# Patient Record
Sex: FEMALE | Race: WHITE | NOT HISPANIC OR LATINO | Employment: OTHER | URBAN - METROPOLITAN AREA
[De-identification: names, ages, dates, MRNs, and addresses within clinical notes are randomized per-mention and may not be internally consistent; named-entity substitution may affect disease eponyms.]

---

## 2017-01-13 ENCOUNTER — AMBULATORY - HEALTHEAST (OUTPATIENT)
Dept: ALLERGY | Facility: CLINIC | Age: 52
End: 2017-01-13

## 2017-01-13 DIAGNOSIS — J30.1 ALLERGIC RHINITIS DUE TO POLLEN, UNSPECIFIED RHINITIS SEASONALITY: ICD-10-CM

## 2017-01-20 ENCOUNTER — AMBULATORY - HEALTHEAST (OUTPATIENT)
Dept: ALLERGY | Facility: CLINIC | Age: 52
End: 2017-01-20

## 2017-01-20 DIAGNOSIS — J30.89 SEASONAL ALLERGIC RHINITIS DUE TO OTHER ALLERGIC TRIGGER: ICD-10-CM

## 2017-01-29 ENCOUNTER — COMMUNICATION - HEALTHEAST (OUTPATIENT)
Dept: ALLERGY | Facility: CLINIC | Age: 52
End: 2017-01-29

## 2017-01-29 DIAGNOSIS — J45.30 MILD PERSISTENT ASTHMA, UNCOMPLICATED: ICD-10-CM

## 2017-02-15 ENCOUNTER — HOSPITAL ENCOUNTER (OUTPATIENT)
Dept: MAMMOGRAPHY | Facility: CLINIC | Age: 52
Discharge: HOME | End: 2017-02-15
Attending: FAMILY MEDICINE

## 2017-02-15 DIAGNOSIS — Z12.31 VISIT FOR SCREENING MAMMOGRAM: ICD-10-CM

## 2017-02-16 ENCOUNTER — COMMUNICATION - HEALTHEAST (OUTPATIENT)
Dept: MAMMOGRAPHY | Facility: CLINIC | Age: 52
End: 2017-02-16

## 2017-02-20 ENCOUNTER — HOSPITAL ENCOUNTER (OUTPATIENT)
Dept: ULTRASOUND IMAGING | Facility: CLINIC | Age: 52
Discharge: HOME | End: 2017-02-20
Attending: FAMILY MEDICINE

## 2017-02-20 ENCOUNTER — HOSPITAL ENCOUNTER (OUTPATIENT)
Dept: MAMMOGRAPHY | Facility: CLINIC | Age: 52
Discharge: HOME | End: 2017-02-20
Attending: FAMILY MEDICINE

## 2017-02-20 DIAGNOSIS — N63.10 BREAST MASS, RIGHT: ICD-10-CM

## 2017-02-20 DIAGNOSIS — N63.0 BREAST NODULE: ICD-10-CM

## 2017-02-23 ENCOUNTER — COMMUNICATION - HEALTHEAST (OUTPATIENT)
Dept: ONCOLOGY | Facility: CLINIC | Age: 52
End: 2017-02-23

## 2017-02-23 ENCOUNTER — COMMUNICATION - HEALTHEAST (OUTPATIENT)
Dept: FAMILY MEDICINE | Facility: CLINIC | Age: 52
End: 2017-02-23

## 2017-02-23 DIAGNOSIS — C50.919 BREAST CANCER (H): ICD-10-CM

## 2017-02-23 LAB
LAB AP CHARGES (HE HISTORICAL CONVERSION): ABNORMAL
LAB AP IHC ER/PR REPORT,ADDENDUM (HE HISTORICAL CONVERSION): ABNORMAL
PATH REPORT.COMMENTS IMP SPEC: ABNORMAL
PATH REPORT.COMMENTS IMP SPEC: ABNORMAL
PATH REPORT.FINAL DX SPEC: ABNORMAL
PATH REPORT.GROSS SPEC: ABNORMAL
PATH REPORT.MICROSCOPIC SPEC OTHER STN: ABNORMAL
PATH REPORT.MICROSCOPIC SPEC OTHER STN: ABNORMAL
PATH REPORT.RELEVANT HX SPEC: ABNORMAL
RESULT FLAG (HE HISTORICAL CONVERSION): ABNORMAL

## 2017-02-24 ENCOUNTER — OFFICE VISIT - HEALTHEAST (OUTPATIENT)
Dept: ALLERGY | Facility: CLINIC | Age: 52
End: 2017-02-24

## 2017-02-24 DIAGNOSIS — J30.89 ALLERGIC RHINITIS DUE TO OTHER ALLERGEN: ICD-10-CM

## 2017-02-24 DIAGNOSIS — J45.30 MILD PERSISTENT ASTHMA WITHOUT COMPLICATION: ICD-10-CM

## 2017-02-28 ENCOUNTER — HOSPITAL ENCOUNTER (OUTPATIENT)
Dept: PHYSICAL MEDICINE AND REHAB | Facility: CLINIC | Age: 52
Discharge: HOME | End: 2017-02-28
Attending: PHYSICIAN ASSISTANT

## 2017-02-28 ENCOUNTER — COMMUNICATION - HEALTHEAST (OUTPATIENT)
Dept: ALLERGY | Facility: CLINIC | Age: 52
End: 2017-02-28

## 2017-02-28 ENCOUNTER — OFFICE VISIT - HEALTHEAST (OUTPATIENT)
Dept: SURGERY | Facility: CLINIC | Age: 52
End: 2017-02-28

## 2017-02-28 ENCOUNTER — AMBULATORY - HEALTHEAST (OUTPATIENT)
Dept: SURGERY | Facility: CLINIC | Age: 52
End: 2017-02-28

## 2017-02-28 DIAGNOSIS — M79.18 MYOFASCIAL PAIN: ICD-10-CM

## 2017-02-28 DIAGNOSIS — C50.411 MALIGNANT NEOPLASM OF UPPER-OUTER QUADRANT OF RIGHT FEMALE BREAST (H): ICD-10-CM

## 2017-02-28 DIAGNOSIS — M47.816 FACET ARTHROPATHY, LUMBAR: ICD-10-CM

## 2017-02-28 DIAGNOSIS — J45.30 MILD PERSISTENT ASTHMA, UNCOMPLICATED: ICD-10-CM

## 2017-02-28 DIAGNOSIS — G47.9 SLEEP DISTURBANCE: ICD-10-CM

## 2017-02-28 DIAGNOSIS — M54.50 LUMBALGIA: ICD-10-CM

## 2017-02-28 ASSESSMENT — MIFFLIN-ST. JEOR
SCORE: 1812.4
SCORE: 1828.73

## 2017-03-01 ENCOUNTER — OFFICE VISIT - HEALTHEAST (OUTPATIENT)
Dept: FAMILY MEDICINE | Facility: CLINIC | Age: 52
End: 2017-03-01

## 2017-03-01 DIAGNOSIS — Z01.818 PREOP EXAMINATION: ICD-10-CM

## 2017-03-02 ENCOUNTER — AMBULATORY - HEALTHEAST (OUTPATIENT)
Dept: PHYSICAL MEDICINE AND REHAB | Facility: CLINIC | Age: 52
End: 2017-03-02

## 2017-03-02 LAB
ATRIAL RATE - MUSE: 75 BPM
DIASTOLIC BLOOD PRESSURE - MUSE: NORMAL MMHG
INTERPRETATION ECG - MUSE: NORMAL
P AXIS - MUSE: 43 DEGREES
PR INTERVAL - MUSE: 166 MS
QRS DURATION - MUSE: 86 MS
QT - MUSE: 398 MS
QTC - MUSE: 444 MS
R AXIS - MUSE: 23 DEGREES
SYSTOLIC BLOOD PRESSURE - MUSE: NORMAL MMHG
T AXIS - MUSE: 45 DEGREES
VENTRICULAR RATE- MUSE: 75 BPM

## 2017-03-10 ENCOUNTER — HOSPITAL ENCOUNTER (OUTPATIENT)
Dept: MAMMOGRAPHY | Facility: HOSPITAL | Age: 52
Discharge: HOME | End: 2017-03-10
Attending: SPECIALIST

## 2017-03-10 ENCOUNTER — HOSPITAL ENCOUNTER (OUTPATIENT)
Dept: NUCLEAR MEDICINE | Facility: HOSPITAL | Age: 52
Discharge: HOME | End: 2017-03-10
Attending: SPECIALIST

## 2017-03-10 ENCOUNTER — RECORDS - HEALTHEAST (OUTPATIENT)
Dept: ADMINISTRATIVE | Facility: OTHER | Age: 52
End: 2017-03-10

## 2017-03-10 ENCOUNTER — AMBULATORY - HEALTHEAST (OUTPATIENT)
Dept: SURGERY | Facility: CLINIC | Age: 52
End: 2017-03-10

## 2017-03-10 DIAGNOSIS — C50.411 MALIGNANT NEOPLASM OF UPPER-OUTER QUADRANT OF RIGHT FEMALE BREAST (H): ICD-10-CM

## 2017-03-15 ENCOUNTER — COMMUNICATION - HEALTHEAST (OUTPATIENT)
Dept: SURGERY | Facility: CLINIC | Age: 52
End: 2017-03-15

## 2017-03-17 ENCOUNTER — COMMUNICATION - HEALTHEAST (OUTPATIENT)
Dept: ONCOLOGY | Facility: HOSPITAL | Age: 52
End: 2017-03-17

## 2017-03-17 ENCOUNTER — OFFICE VISIT - HEALTHEAST (OUTPATIENT)
Dept: SURGERY | Facility: CLINIC | Age: 52
End: 2017-03-17

## 2017-03-17 DIAGNOSIS — C50.411 MALIGNANT NEOPLASM OF UPPER-OUTER QUADRANT OF RIGHT FEMALE BREAST (H): ICD-10-CM

## 2017-03-17 DIAGNOSIS — Z48.89 POSTOPERATIVE VISIT: ICD-10-CM

## 2017-03-18 ENCOUNTER — COMMUNICATION - HEALTHEAST (OUTPATIENT)
Dept: PHYSICAL MEDICINE AND REHAB | Facility: CLINIC | Age: 52
End: 2017-03-18

## 2017-03-18 DIAGNOSIS — G47.9 SLEEP DISTURBANCE: ICD-10-CM

## 2017-03-18 DIAGNOSIS — M47.816 FACET ARTHROPATHY, LUMBAR: ICD-10-CM

## 2017-03-18 DIAGNOSIS — M99.03 LUMBOSACRAL DYSFUNCTION: ICD-10-CM

## 2017-03-18 DIAGNOSIS — M79.18 MYOFASCIAL PAIN: ICD-10-CM

## 2017-03-18 DIAGNOSIS — M47.816 SPONDYLOSIS OF LUMBAR REGION WITHOUT MYELOPATHY OR RADICULOPATHY: ICD-10-CM

## 2017-03-18 DIAGNOSIS — M54.50 LUMBALGIA: ICD-10-CM

## 2017-03-22 ENCOUNTER — COMMUNICATION - HEALTHEAST (OUTPATIENT)
Dept: ADMINISTRATIVE | Facility: CLINIC | Age: 52
End: 2017-03-22

## 2017-03-22 ENCOUNTER — COMMUNICATION - HEALTHEAST (OUTPATIENT)
Dept: ONCOLOGY | Facility: HOSPITAL | Age: 52
End: 2017-03-22

## 2017-03-30 ENCOUNTER — OFFICE VISIT - HEALTHEAST (OUTPATIENT)
Dept: AUDIOLOGY | Facility: CLINIC | Age: 52
End: 2017-03-30

## 2017-03-30 DIAGNOSIS — H90.3 SENSORINEURAL HEARING LOSS, BILATERAL: ICD-10-CM

## 2017-03-31 ENCOUNTER — RECORDS - HEALTHEAST (OUTPATIENT)
Dept: ADMINISTRATIVE | Facility: OTHER | Age: 52
End: 2017-03-31

## 2017-04-01 ENCOUNTER — COMMUNICATION - HEALTHEAST (OUTPATIENT)
Dept: ALLERGY | Facility: CLINIC | Age: 52
End: 2017-04-01

## 2017-04-01 DIAGNOSIS — J45.30 MILD PERSISTENT ASTHMA, UNCOMPLICATED: ICD-10-CM

## 2017-04-03 ENCOUNTER — COMMUNICATION - HEALTHEAST (OUTPATIENT)
Dept: FAMILY MEDICINE | Facility: CLINIC | Age: 52
End: 2017-04-03

## 2017-04-03 DIAGNOSIS — R11.0 NAUSEA: ICD-10-CM

## 2017-04-12 ENCOUNTER — OFFICE VISIT - HEALTHEAST (OUTPATIENT)
Dept: ONCOLOGY | Facility: CLINIC | Age: 52
End: 2017-04-12

## 2017-04-12 ENCOUNTER — AMBULATORY - HEALTHEAST (OUTPATIENT)
Dept: ONCOLOGY | Facility: CLINIC | Age: 52
End: 2017-04-12

## 2017-04-12 ENCOUNTER — HOSPITAL ENCOUNTER (OUTPATIENT)
Dept: CT IMAGING | Facility: CLINIC | Age: 52
Setting detail: RADIATION/ONCOLOGY SERIES
Discharge: STILL ADMITTED OR ADMINISTRATIVE | End: 2017-04-12
Attending: INTERNAL MEDICINE

## 2017-04-12 ENCOUNTER — RECORDS - HEALTHEAST (OUTPATIENT)
Dept: ADMINISTRATIVE | Facility: OTHER | Age: 52
End: 2017-04-12

## 2017-04-12 DIAGNOSIS — R10.32 LEFT LOWER QUADRANT ABDOMINAL PAIN OF UNKNOWN ETIOLOGY: ICD-10-CM

## 2017-04-12 DIAGNOSIS — C50.411 BREAST CANCER OF UPPER-OUTER QUADRANT OF RIGHT FEMALE BREAST (H): ICD-10-CM

## 2017-04-12 ASSESSMENT — MIFFLIN-ST. JEOR: SCORE: 1825.56

## 2017-04-13 ENCOUNTER — AMBULATORY - HEALTHEAST (OUTPATIENT)
Dept: ONCOLOGY | Facility: CLINIC | Age: 52
End: 2017-04-13

## 2017-04-13 ENCOUNTER — OFFICE VISIT - HEALTHEAST (OUTPATIENT)
Dept: RADIATION ONCOLOGY | Facility: CLINIC | Age: 52
End: 2017-04-13

## 2017-04-13 DIAGNOSIS — C50.411 BREAST CANCER OF UPPER-OUTER QUADRANT OF RIGHT FEMALE BREAST (H): ICD-10-CM

## 2017-04-13 ASSESSMENT — MIFFLIN-ST. JEOR: SCORE: 1825.56

## 2017-04-14 ENCOUNTER — COMMUNICATION - HEALTHEAST (OUTPATIENT)
Dept: ONCOLOGY | Facility: CLINIC | Age: 52
End: 2017-04-14

## 2017-04-16 ENCOUNTER — COMMUNICATION - HEALTHEAST (OUTPATIENT)
Dept: PHYSICAL MEDICINE AND REHAB | Facility: CLINIC | Age: 52
End: 2017-04-16

## 2017-04-16 DIAGNOSIS — M54.50 LUMBALGIA: ICD-10-CM

## 2017-04-16 DIAGNOSIS — M47.816 FACET ARTHROPATHY, LUMBAR: ICD-10-CM

## 2017-04-16 DIAGNOSIS — M79.18 MYOFASCIAL PAIN: ICD-10-CM

## 2017-04-16 DIAGNOSIS — G47.9 SLEEP DISTURBANCE: ICD-10-CM

## 2017-04-17 ENCOUNTER — AMBULATORY - HEALTHEAST (OUTPATIENT)
Dept: RADIATION ONCOLOGY | Facility: HOSPITAL | Age: 52
End: 2017-04-17

## 2017-04-21 ENCOUNTER — COMMUNICATION - HEALTHEAST (OUTPATIENT)
Dept: ONCOLOGY | Facility: CLINIC | Age: 52
End: 2017-04-21

## 2017-04-21 ENCOUNTER — AMBULATORY - HEALTHEAST (OUTPATIENT)
Dept: ALLERGY | Facility: CLINIC | Age: 52
End: 2017-04-21

## 2017-04-21 DIAGNOSIS — J30.89 SEASONAL ALLERGIC RHINITIS DUE TO OTHER ALLERGIC TRIGGER: ICD-10-CM

## 2017-04-25 ENCOUNTER — COMMUNICATION - HEALTHEAST (OUTPATIENT)
Dept: INTERNAL MEDICINE | Facility: CLINIC | Age: 52
End: 2017-04-25

## 2017-04-26 ENCOUNTER — OFFICE VISIT - HEALTHEAST (OUTPATIENT)
Dept: RADIATION ONCOLOGY | Facility: CLINIC | Age: 52
End: 2017-04-26

## 2017-04-26 DIAGNOSIS — C50.411 BREAST CANCER OF UPPER-OUTER QUADRANT OF RIGHT FEMALE BREAST (H): ICD-10-CM

## 2017-04-26 ASSESSMENT — MIFFLIN-ST. JEOR: SCORE: 1817.85

## 2017-05-02 ENCOUNTER — COMMUNICATION - HEALTHEAST (OUTPATIENT)
Dept: ALLERGY | Facility: CLINIC | Age: 52
End: 2017-05-02

## 2017-05-02 DIAGNOSIS — J45.30 MILD PERSISTENT ASTHMA, UNCOMPLICATED: ICD-10-CM

## 2017-05-03 ENCOUNTER — OFFICE VISIT - HEALTHEAST (OUTPATIENT)
Dept: RADIATION ONCOLOGY | Facility: CLINIC | Age: 52
End: 2017-05-03

## 2017-05-03 DIAGNOSIS — C50.411 BREAST CANCER OF UPPER-OUTER QUADRANT OF RIGHT FEMALE BREAST (H): ICD-10-CM

## 2017-05-05 ENCOUNTER — AMBULATORY - HEALTHEAST (OUTPATIENT)
Dept: ALLERGY | Facility: CLINIC | Age: 52
End: 2017-05-05

## 2017-05-05 ENCOUNTER — OFFICE VISIT - HEALTHEAST (OUTPATIENT)
Dept: INTERNAL MEDICINE | Facility: CLINIC | Age: 52
End: 2017-05-05

## 2017-05-05 DIAGNOSIS — Z79.811 AROMATASE INHIBITOR USE: ICD-10-CM

## 2017-05-05 DIAGNOSIS — M81.0 OSTEOPOROSIS: ICD-10-CM

## 2017-05-05 DIAGNOSIS — C50.411 BREAST CANCER OF UPPER-OUTER QUADRANT OF RIGHT FEMALE BREAST (H): ICD-10-CM

## 2017-05-05 DIAGNOSIS — R79.89 ELEVATED TSH: ICD-10-CM

## 2017-05-05 DIAGNOSIS — J30.89 SEASONAL ALLERGIC RHINITIS DUE TO OTHER ALLERGIC TRIGGER: ICD-10-CM

## 2017-05-10 ENCOUNTER — AMBULATORY - HEALTHEAST (OUTPATIENT)
Dept: ONCOLOGY | Facility: CLINIC | Age: 52
End: 2017-05-10

## 2017-05-10 ENCOUNTER — AMBULATORY - HEALTHEAST (OUTPATIENT)
Dept: RADIATION ONCOLOGY | Facility: CLINIC | Age: 52
End: 2017-05-10

## 2017-05-10 ENCOUNTER — OFFICE VISIT - HEALTHEAST (OUTPATIENT)
Dept: RADIATION ONCOLOGY | Facility: CLINIC | Age: 52
End: 2017-05-10

## 2017-05-10 DIAGNOSIS — C50.411 BREAST CANCER OF UPPER-OUTER QUADRANT OF RIGHT FEMALE BREAST (H): ICD-10-CM

## 2017-05-16 ENCOUNTER — AMBULATORY - HEALTHEAST (OUTPATIENT)
Dept: ONCOLOGY | Facility: CLINIC | Age: 52
End: 2017-05-16

## 2017-05-17 ENCOUNTER — OFFICE VISIT - HEALTHEAST (OUTPATIENT)
Dept: RADIATION ONCOLOGY | Facility: CLINIC | Age: 52
End: 2017-05-17

## 2017-05-17 DIAGNOSIS — C50.411 BREAST CANCER OF UPPER-OUTER QUADRANT OF RIGHT FEMALE BREAST (H): ICD-10-CM

## 2017-05-17 ASSESSMENT — MIFFLIN-ST. JEOR: SCORE: 1785.19

## 2017-05-19 ENCOUNTER — AMBULATORY - HEALTHEAST (OUTPATIENT)
Dept: ALLERGY | Facility: CLINIC | Age: 52
End: 2017-05-19

## 2017-05-19 DIAGNOSIS — J30.89 SEASONAL ALLERGIC RHINITIS DUE TO OTHER ALLERGIC TRIGGER: ICD-10-CM

## 2017-05-25 ENCOUNTER — COMMUNICATION - HEALTHEAST (OUTPATIENT)
Dept: RADIATION ONCOLOGY | Facility: CLINIC | Age: 52
End: 2017-05-25

## 2017-06-15 ENCOUNTER — AMBULATORY - HEALTHEAST (OUTPATIENT)
Dept: ONCOLOGY | Facility: CLINIC | Age: 52
End: 2017-06-15

## 2017-06-15 ENCOUNTER — OFFICE VISIT - HEALTHEAST (OUTPATIENT)
Dept: ONCOLOGY | Facility: CLINIC | Age: 52
End: 2017-06-15

## 2017-06-15 ENCOUNTER — AMBULATORY - HEALTHEAST (OUTPATIENT)
Dept: INFUSION THERAPY | Facility: CLINIC | Age: 52
End: 2017-06-15

## 2017-06-15 DIAGNOSIS — C50.411 BREAST CANCER OF UPPER-OUTER QUADRANT OF RIGHT FEMALE BREAST (H): ICD-10-CM

## 2017-06-15 DIAGNOSIS — G89.29 OTHER CHRONIC PAIN: ICD-10-CM

## 2017-06-15 DIAGNOSIS — Z79.811 AROMATASE INHIBITOR USE: ICD-10-CM

## 2017-06-15 ASSESSMENT — MIFFLIN-ST. JEOR: SCORE: 1790.18

## 2017-06-16 ENCOUNTER — COMMUNICATION - HEALTHEAST (OUTPATIENT)
Dept: PHYSICAL MEDICINE AND REHAB | Facility: CLINIC | Age: 52
End: 2017-06-16

## 2017-06-16 DIAGNOSIS — G47.9 SLEEP DISTURBANCE: ICD-10-CM

## 2017-06-16 DIAGNOSIS — M47.816 FACET ARTHROPATHY, LUMBAR: ICD-10-CM

## 2017-06-16 DIAGNOSIS — M54.50 LUMBALGIA: ICD-10-CM

## 2017-06-16 DIAGNOSIS — M79.18 MYOFASCIAL PAIN: ICD-10-CM

## 2017-06-21 ENCOUNTER — OFFICE VISIT - HEALTHEAST (OUTPATIENT)
Dept: RADIATION ONCOLOGY | Facility: CLINIC | Age: 52
End: 2017-06-21

## 2017-06-21 DIAGNOSIS — C50.411 BREAST CANCER OF UPPER-OUTER QUADRANT OF RIGHT FEMALE BREAST (H): ICD-10-CM

## 2017-06-23 ENCOUNTER — AMBULATORY - HEALTHEAST (OUTPATIENT)
Dept: ALLERGY | Facility: CLINIC | Age: 52
End: 2017-06-23

## 2017-06-23 DIAGNOSIS — J30.89 SEASONAL ALLERGIC RHINITIS DUE TO OTHER ALLERGIC TRIGGER: ICD-10-CM

## 2017-06-26 ENCOUNTER — AMBULATORY - HEALTHEAST (OUTPATIENT)
Dept: ALLERGY | Facility: CLINIC | Age: 52
End: 2017-06-26

## 2017-06-26 DIAGNOSIS — J30.89 SEASONAL ALLERGIC RHINITIS DUE TO OTHER ALLERGIC TRIGGER: ICD-10-CM

## 2017-07-05 ENCOUNTER — COMMUNICATION - HEALTHEAST (OUTPATIENT)
Dept: ONCOLOGY | Facility: CLINIC | Age: 52
End: 2017-07-05

## 2017-07-07 ENCOUNTER — COMMUNICATION - HEALTHEAST (OUTPATIENT)
Dept: FAMILY MEDICINE | Facility: CLINIC | Age: 52
End: 2017-07-07

## 2017-07-07 DIAGNOSIS — R11.0 NAUSEA: ICD-10-CM

## 2017-07-11 ENCOUNTER — RECORDS - HEALTHEAST (OUTPATIENT)
Dept: ADMINISTRATIVE | Facility: OTHER | Age: 52
End: 2017-07-11

## 2017-07-13 ENCOUNTER — COMMUNICATION - HEALTHEAST (OUTPATIENT)
Dept: ONCOLOGY | Facility: CLINIC | Age: 52
End: 2017-07-13

## 2017-07-13 ENCOUNTER — COMMUNICATION - HEALTHEAST (OUTPATIENT)
Dept: PHYSICAL MEDICINE AND REHAB | Facility: CLINIC | Age: 52
End: 2017-07-13

## 2017-07-13 DIAGNOSIS — M54.50 LUMBALGIA: ICD-10-CM

## 2017-07-13 DIAGNOSIS — C50.411 BREAST CANCER OF UPPER-OUTER QUADRANT OF RIGHT FEMALE BREAST (H): ICD-10-CM

## 2017-07-13 DIAGNOSIS — G47.9 SLEEP DISTURBANCE: ICD-10-CM

## 2017-07-13 DIAGNOSIS — M47.816 FACET ARTHROPATHY, LUMBAR: ICD-10-CM

## 2017-07-13 DIAGNOSIS — M79.18 MYOFASCIAL PAIN: ICD-10-CM

## 2017-07-21 ENCOUNTER — AMBULATORY - HEALTHEAST (OUTPATIENT)
Dept: ALLERGY | Facility: CLINIC | Age: 52
End: 2017-07-21

## 2017-08-01 ENCOUNTER — COMMUNICATION - HEALTHEAST (OUTPATIENT)
Dept: FAMILY MEDICINE | Facility: CLINIC | Age: 52
End: 2017-08-01

## 2017-08-01 DIAGNOSIS — G89.29 CHRONIC PAIN: ICD-10-CM

## 2017-08-11 ENCOUNTER — OFFICE VISIT - HEALTHEAST (OUTPATIENT)
Dept: ALLERGY | Facility: CLINIC | Age: 52
End: 2017-08-11

## 2017-08-11 ENCOUNTER — COMMUNICATION - HEALTHEAST (OUTPATIENT)
Dept: PHYSICAL MEDICINE AND REHAB | Facility: CLINIC | Age: 52
End: 2017-08-11

## 2017-08-11 DIAGNOSIS — M79.18 MYOFASCIAL PAIN: ICD-10-CM

## 2017-08-11 DIAGNOSIS — J30.89 SEASONAL ALLERGIC RHINITIS DUE TO OTHER ALLERGIC TRIGGER: ICD-10-CM

## 2017-08-11 DIAGNOSIS — G47.9 SLEEP DISTURBANCE: ICD-10-CM

## 2017-08-11 DIAGNOSIS — M47.816 FACET ARTHROPATHY, LUMBAR: ICD-10-CM

## 2017-08-11 DIAGNOSIS — M54.50 LUMBALGIA: ICD-10-CM

## 2017-08-11 DIAGNOSIS — J45.20 MILD INTERMITTENT ASTHMA WITHOUT COMPLICATION: ICD-10-CM

## 2017-08-11 ASSESSMENT — MIFFLIN-ST. JEOR: SCORE: 1809.45

## 2017-08-14 ENCOUNTER — OFFICE VISIT - HEALTHEAST (OUTPATIENT)
Dept: FAMILY MEDICINE | Facility: CLINIC | Age: 52
End: 2017-08-14

## 2017-08-14 ENCOUNTER — AMBULATORY - HEALTHEAST (OUTPATIENT)
Dept: ALLERGY | Facility: CLINIC | Age: 52
End: 2017-08-14

## 2017-08-14 DIAGNOSIS — G47.00 INSOMNIA, UNSPECIFIED: ICD-10-CM

## 2017-08-14 DIAGNOSIS — J30.89 SEASONAL ALLERGIC RHINITIS DUE TO OTHER ALLERGIC TRIGGER: ICD-10-CM

## 2017-08-14 DIAGNOSIS — J45.20 MILD INTERMITTENT ASTHMA WITHOUT COMPLICATION: ICD-10-CM

## 2017-08-14 DIAGNOSIS — Z01.818 PREOP EXAMINATION: ICD-10-CM

## 2017-08-14 DIAGNOSIS — C50.411 BREAST CANCER OF UPPER-OUTER QUADRANT OF RIGHT FEMALE BREAST (H): ICD-10-CM

## 2017-08-14 DIAGNOSIS — M17.9 OSTEOARTHRITIS, KNEE: ICD-10-CM

## 2017-08-14 DIAGNOSIS — F30.9 BIPOLAR I DISORDER, SINGLE MANIC EPISODE (H): ICD-10-CM

## 2017-08-14 DIAGNOSIS — G89.29 OTHER CHRONIC PAIN: ICD-10-CM

## 2017-08-14 ASSESSMENT — MIFFLIN-ST. JEOR: SCORE: 1830.15

## 2017-08-15 ENCOUNTER — RECORDS - HEALTHEAST (OUTPATIENT)
Dept: ADMINISTRATIVE | Facility: OTHER | Age: 52
End: 2017-08-15

## 2017-08-25 ENCOUNTER — AMBULATORY - HEALTHEAST (OUTPATIENT)
Dept: ALLERGY | Facility: CLINIC | Age: 52
End: 2017-08-25

## 2017-08-25 DIAGNOSIS — J30.89 SEASONAL ALLERGIC RHINITIS DUE TO OTHER ALLERGIC TRIGGER: ICD-10-CM

## 2017-08-26 ENCOUNTER — ANESTHESIA - HEALTHEAST (OUTPATIENT)
Dept: SURGERY | Facility: CLINIC | Age: 52
End: 2017-08-26

## 2017-08-28 ENCOUNTER — HOME CARE/HOSPICE - HEALTHEAST (OUTPATIENT)
Dept: HOME HEALTH SERVICES | Facility: HOME HEALTH | Age: 52
End: 2017-08-28

## 2017-08-28 ENCOUNTER — SURGERY - HEALTHEAST (OUTPATIENT)
Dept: SURGERY | Facility: CLINIC | Age: 52
End: 2017-08-28

## 2017-08-28 ASSESSMENT — MIFFLIN-ST. JEOR: SCORE: 1824.2

## 2017-09-05 ENCOUNTER — OFFICE VISIT - HEALTHEAST (OUTPATIENT)
Dept: FAMILY MEDICINE | Facility: CLINIC | Age: 52
End: 2017-09-05

## 2017-09-05 DIAGNOSIS — D62 ACUTE BLOOD LOSS AS CAUSE OF POSTOPERATIVE ANEMIA: ICD-10-CM

## 2017-09-05 DIAGNOSIS — Z96.659 S/P TOTAL KNEE ARTHROPLASTY: ICD-10-CM

## 2017-09-12 ENCOUNTER — RECORDS - HEALTHEAST (OUTPATIENT)
Dept: ADMINISTRATIVE | Facility: OTHER | Age: 52
End: 2017-09-12

## 2017-09-15 ENCOUNTER — AMBULATORY - HEALTHEAST (OUTPATIENT)
Dept: ALLERGY | Facility: CLINIC | Age: 52
End: 2017-09-15

## 2017-09-15 DIAGNOSIS — J30.89 SEASONAL ALLERGIC RHINITIS DUE TO OTHER ALLERGIC TRIGGER: ICD-10-CM

## 2017-09-22 ENCOUNTER — AMBULATORY - HEALTHEAST (OUTPATIENT)
Dept: ALLERGY | Facility: CLINIC | Age: 52
End: 2017-09-22

## 2017-09-22 DIAGNOSIS — J30.89 SEASONAL ALLERGIC RHINITIS DUE TO OTHER ALLERGIC TRIGGER: ICD-10-CM

## 2017-09-29 ENCOUNTER — COMMUNICATION - HEALTHEAST (OUTPATIENT)
Dept: SCHEDULING | Facility: CLINIC | Age: 52
End: 2017-09-29

## 2017-10-02 ENCOUNTER — OFFICE VISIT - HEALTHEAST (OUTPATIENT)
Dept: FAMILY MEDICINE | Facility: CLINIC | Age: 52
End: 2017-10-02

## 2017-10-02 DIAGNOSIS — L50.9 URTICARIA: ICD-10-CM

## 2017-10-17 ENCOUNTER — RECORDS - HEALTHEAST (OUTPATIENT)
Dept: ADMINISTRATIVE | Facility: OTHER | Age: 52
End: 2017-10-17

## 2017-10-18 ENCOUNTER — OFFICE VISIT - HEALTHEAST (OUTPATIENT)
Dept: ONCOLOGY | Facility: CLINIC | Age: 52
End: 2017-10-18

## 2017-10-18 DIAGNOSIS — Z17.0 MALIGNANT NEOPLASM OF UPPER-OUTER QUADRANT OF RIGHT BREAST IN FEMALE, ESTROGEN RECEPTOR POSITIVE (H): ICD-10-CM

## 2017-10-18 DIAGNOSIS — Z79.811 AROMATASE INHIBITOR USE: ICD-10-CM

## 2017-10-18 DIAGNOSIS — C50.411 MALIGNANT NEOPLASM OF UPPER-OUTER QUADRANT OF RIGHT BREAST IN FEMALE, ESTROGEN RECEPTOR POSITIVE (H): ICD-10-CM

## 2017-10-23 ENCOUNTER — COMMUNICATION - HEALTHEAST (OUTPATIENT)
Dept: ONCOLOGY | Facility: CLINIC | Age: 52
End: 2017-10-23

## 2017-10-31 ENCOUNTER — OFFICE VISIT - HEALTHEAST (OUTPATIENT)
Dept: FAMILY MEDICINE | Facility: CLINIC | Age: 52
End: 2017-10-31

## 2017-10-31 DIAGNOSIS — J06.9 URI (UPPER RESPIRATORY INFECTION): ICD-10-CM

## 2017-10-31 DIAGNOSIS — R58 BLEEDING: ICD-10-CM

## 2017-10-31 ASSESSMENT — MIFFLIN-ST. JEOR: SCORE: 1881.8

## 2017-11-07 ENCOUNTER — COMMUNICATION - HEALTHEAST (OUTPATIENT)
Dept: ONCOLOGY | Facility: HOSPITAL | Age: 52
End: 2017-11-07

## 2017-12-11 ENCOUNTER — COMMUNICATION - HEALTHEAST (OUTPATIENT)
Dept: FAMILY MEDICINE | Facility: CLINIC | Age: 52
End: 2017-12-11

## 2017-12-11 DIAGNOSIS — G89.29 OTHER CHRONIC PAIN: ICD-10-CM

## 2018-01-01 ENCOUNTER — COMMUNICATION - HEALTHEAST (OUTPATIENT)
Dept: FAMILY MEDICINE | Facility: CLINIC | Age: 53
End: 2018-01-01

## 2018-01-01 DIAGNOSIS — G89.29 OTHER CHRONIC PAIN: ICD-10-CM

## 2018-01-15 ENCOUNTER — OFFICE VISIT - HEALTHEAST (OUTPATIENT)
Dept: FAMILY MEDICINE | Facility: CLINIC | Age: 53
End: 2018-01-15

## 2018-01-15 DIAGNOSIS — K52.9 GASTROENTERITIS: ICD-10-CM

## 2018-01-16 ENCOUNTER — COMMUNICATION - HEALTHEAST (OUTPATIENT)
Dept: FAMILY MEDICINE | Facility: CLINIC | Age: 53
End: 2018-01-16

## 2018-01-16 DIAGNOSIS — G47.00 INSOMNIA: ICD-10-CM

## 2018-02-14 ENCOUNTER — HOSPITAL ENCOUNTER (OUTPATIENT)
Dept: PHYSICAL MEDICINE AND REHAB | Facility: CLINIC | Age: 53
Discharge: HOME | End: 2018-02-14
Attending: PHYSICIAN ASSISTANT

## 2018-02-14 DIAGNOSIS — G56.00 CARPAL TUNNEL SYNDROME: ICD-10-CM

## 2018-02-14 DIAGNOSIS — G89.4 CHRONIC PAIN SYNDROME: ICD-10-CM

## 2018-02-14 DIAGNOSIS — M54.50 LUMBALGIA: ICD-10-CM

## 2018-02-14 DIAGNOSIS — R20.2 PARESTHESIA OF HAND, BILATERAL: ICD-10-CM

## 2018-02-14 DIAGNOSIS — M79.18 MYOFASCIAL PAIN: ICD-10-CM

## 2018-02-14 DIAGNOSIS — R20.2 PARESTHESIA OF BOTH FEET: ICD-10-CM

## 2018-02-14 ASSESSMENT — MIFFLIN-ST. JEOR: SCORE: 1847.78

## 2018-02-23 ENCOUNTER — HOSPITAL ENCOUNTER (OUTPATIENT)
Dept: MAMMOGRAPHY | Facility: CLINIC | Age: 53
Discharge: HOME | End: 2018-02-23
Attending: SPECIALIST

## 2018-02-23 DIAGNOSIS — C50.411 MALIGNANT NEOPLASM OF UPPER-OUTER QUADRANT OF RIGHT FEMALE BREAST (H): ICD-10-CM

## 2018-02-28 ENCOUNTER — OFFICE VISIT - HEALTHEAST (OUTPATIENT)
Dept: ONCOLOGY | Facility: CLINIC | Age: 53
End: 2018-02-28

## 2018-02-28 DIAGNOSIS — Z17.0 MALIGNANT NEOPLASM OF UPPER-OUTER QUADRANT OF RIGHT BREAST IN FEMALE, ESTROGEN RECEPTOR POSITIVE (H): ICD-10-CM

## 2018-02-28 DIAGNOSIS — C50.411 MALIGNANT NEOPLASM OF UPPER-OUTER QUADRANT OF RIGHT BREAST IN FEMALE, ESTROGEN RECEPTOR POSITIVE (H): ICD-10-CM

## 2018-02-28 DIAGNOSIS — Z79.811 AROMATASE INHIBITOR USE: ICD-10-CM

## 2018-02-28 ASSESSMENT — MIFFLIN-ST. JEOR: SCORE: 1883.62

## 2018-03-02 ENCOUNTER — COMMUNICATION - HEALTHEAST (OUTPATIENT)
Dept: PHYSICAL MEDICINE AND REHAB | Facility: CLINIC | Age: 53
End: 2018-03-02

## 2018-03-02 ENCOUNTER — COMMUNICATION - HEALTHEAST (OUTPATIENT)
Dept: ONCOLOGY | Facility: CLINIC | Age: 53
End: 2018-03-02

## 2018-03-28 ENCOUNTER — COMMUNICATION - HEALTHEAST (OUTPATIENT)
Dept: FAMILY MEDICINE | Facility: CLINIC | Age: 53
End: 2018-03-28

## 2018-03-28 DIAGNOSIS — K21.9 GERD (GASTROESOPHAGEAL REFLUX DISEASE): ICD-10-CM

## 2018-03-29 ENCOUNTER — COMMUNICATION - HEALTHEAST (OUTPATIENT)
Dept: FAMILY MEDICINE | Facility: CLINIC | Age: 53
End: 2018-03-29

## 2018-03-29 DIAGNOSIS — K21.9 GERD (GASTROESOPHAGEAL REFLUX DISEASE): ICD-10-CM

## 2018-04-03 ENCOUNTER — OFFICE VISIT - HEALTHEAST (OUTPATIENT)
Dept: FAMILY MEDICINE | Facility: CLINIC | Age: 53
End: 2018-04-03

## 2018-04-03 ENCOUNTER — COMMUNICATION - HEALTHEAST (OUTPATIENT)
Dept: SCHEDULING | Facility: CLINIC | Age: 53
End: 2018-04-03

## 2018-04-03 DIAGNOSIS — J10.1 INFLUENZA B: ICD-10-CM

## 2018-04-03 LAB
DEPRECATED S PYO AG THROAT QL EIA: NORMAL
FLUAV AG SPEC QL IA: ABNORMAL
FLUBV AG SPEC QL IA: ABNORMAL

## 2018-04-04 LAB — GROUP A STREP BY PCR: NORMAL

## 2018-04-19 ENCOUNTER — COMMUNICATION - HEALTHEAST (OUTPATIENT)
Dept: FAMILY MEDICINE | Facility: CLINIC | Age: 53
End: 2018-04-19

## 2018-04-19 DIAGNOSIS — G47.00 INSOMNIA: ICD-10-CM

## 2018-04-20 ENCOUNTER — COMMUNICATION - HEALTHEAST (OUTPATIENT)
Dept: FAMILY MEDICINE | Facility: CLINIC | Age: 53
End: 2018-04-20

## 2018-04-30 ENCOUNTER — COMMUNICATION - HEALTHEAST (OUTPATIENT)
Dept: FAMILY MEDICINE | Facility: CLINIC | Age: 53
End: 2018-04-30

## 2018-04-30 DIAGNOSIS — G89.29 OTHER CHRONIC PAIN: ICD-10-CM

## 2018-05-23 ENCOUNTER — OFFICE VISIT - HEALTHEAST (OUTPATIENT)
Dept: ONCOLOGY | Facility: CLINIC | Age: 53
End: 2018-05-23

## 2018-05-23 DIAGNOSIS — C50.411 MALIGNANT NEOPLASM OF UPPER-OUTER QUADRANT OF RIGHT BREAST IN FEMALE, ESTROGEN RECEPTOR POSITIVE (H): ICD-10-CM

## 2018-05-23 DIAGNOSIS — M25.511 CHRONIC RIGHT SHOULDER PAIN: ICD-10-CM

## 2018-05-23 DIAGNOSIS — Z17.0 MALIGNANT NEOPLASM OF UPPER-OUTER QUADRANT OF RIGHT BREAST IN FEMALE, ESTROGEN RECEPTOR POSITIVE (H): ICD-10-CM

## 2018-05-23 DIAGNOSIS — G89.29 CHRONIC RIGHT SHOULDER PAIN: ICD-10-CM

## 2018-05-23 DIAGNOSIS — Z79.811 AROMATASE INHIBITOR USE: ICD-10-CM

## 2018-05-23 RX ORDER — DEXTROAMPHETAMINE SACCHARATE, AMPHETAMINE ASPARTATE, DEXTROAMPHETAMINE SULFATE AND AMPHETAMINE SULFATE 5; 5; 5; 5 MG/1; MG/1; MG/1; MG/1
1 TABLET ORAL 2 TIMES DAILY
Refills: 0 | Status: SHIPPED | COMMUNITY
Start: 2018-05-09 | End: 2022-05-23

## 2018-05-24 ENCOUNTER — COMMUNICATION - HEALTHEAST (OUTPATIENT)
Dept: ONCOLOGY | Facility: CLINIC | Age: 53
End: 2018-05-24

## 2018-05-24 ENCOUNTER — HOSPITAL ENCOUNTER (OUTPATIENT)
Dept: RADIOLOGY | Facility: CLINIC | Age: 53
Setting detail: RADIATION/ONCOLOGY SERIES
Discharge: STILL ADMITTED OR ADMINISTRATIVE | End: 2018-05-24
Attending: INTERNAL MEDICINE

## 2018-05-24 DIAGNOSIS — M19.011 PRIMARY OSTEOARTHRITIS OF RIGHT SHOULDER: ICD-10-CM

## 2018-05-24 DIAGNOSIS — C50.411 MALIGNANT NEOPLASM OF UPPER-OUTER QUADRANT OF RIGHT BREAST IN FEMALE, ESTROGEN RECEPTOR POSITIVE (H): ICD-10-CM

## 2018-05-24 DIAGNOSIS — M25.511 CHRONIC RIGHT SHOULDER PAIN: ICD-10-CM

## 2018-05-24 DIAGNOSIS — G89.29 CHRONIC RIGHT SHOULDER PAIN: ICD-10-CM

## 2018-05-24 DIAGNOSIS — Z17.0 MALIGNANT NEOPLASM OF UPPER-OUTER QUADRANT OF RIGHT BREAST IN FEMALE, ESTROGEN RECEPTOR POSITIVE (H): ICD-10-CM

## 2018-06-11 ENCOUNTER — COMMUNICATION - HEALTHEAST (OUTPATIENT)
Dept: FAMILY MEDICINE | Facility: CLINIC | Age: 53
End: 2018-06-11

## 2018-06-11 DIAGNOSIS — G89.29 OTHER CHRONIC PAIN: ICD-10-CM

## 2018-07-30 ENCOUNTER — OFFICE VISIT - HEALTHEAST (OUTPATIENT)
Dept: RHEUMATOLOGY | Facility: CLINIC | Age: 53
End: 2018-07-30

## 2018-07-30 ENCOUNTER — RECORDS - HEALTHEAST (OUTPATIENT)
Dept: GENERAL RADIOLOGY | Facility: CLINIC | Age: 53
End: 2018-07-30

## 2018-07-30 DIAGNOSIS — G89.29 OTHER CHRONIC PAIN: ICD-10-CM

## 2018-07-30 DIAGNOSIS — G47.8 NON-RESTORATIVE SLEEP: ICD-10-CM

## 2018-07-30 DIAGNOSIS — Z96.643 STATUS POST BILATERAL HIP REPLACEMENTS: ICD-10-CM

## 2018-07-30 DIAGNOSIS — M25.50 MULTIPLE JOINT PAIN: ICD-10-CM

## 2018-07-30 DIAGNOSIS — M54.41 LUMBAGO WITH SCIATICA, RIGHT SIDE: ICD-10-CM

## 2018-07-30 DIAGNOSIS — M54.42 LUMBAGO WITH SCIATICA, LEFT SIDE: ICD-10-CM

## 2018-07-30 DIAGNOSIS — M79.7 FIBROMYALGIA: ICD-10-CM

## 2018-07-30 DIAGNOSIS — R30.0 DYSURIA: ICD-10-CM

## 2018-07-30 DIAGNOSIS — M54.41 CHRONIC BILATERAL LOW BACK PAIN WITH BILATERAL SCIATICA: ICD-10-CM

## 2018-07-30 DIAGNOSIS — M25.512 CHRONIC PAIN OF BOTH SHOULDERS: ICD-10-CM

## 2018-07-30 DIAGNOSIS — M54.42 CHRONIC BILATERAL LOW BACK PAIN WITH BILATERAL SCIATICA: ICD-10-CM

## 2018-07-30 DIAGNOSIS — G89.29 CHRONIC PAIN OF BOTH SHOULDERS: ICD-10-CM

## 2018-07-30 DIAGNOSIS — G89.29 CHRONIC BILATERAL LOW BACK PAIN WITH BILATERAL SCIATICA: ICD-10-CM

## 2018-07-30 DIAGNOSIS — E66.3 OVERWEIGHT: ICD-10-CM

## 2018-07-30 DIAGNOSIS — M25.511 CHRONIC PAIN OF BOTH SHOULDERS: ICD-10-CM

## 2018-07-30 DIAGNOSIS — Z96.651 STATUS POST RIGHT KNEE REPLACEMENT: ICD-10-CM

## 2018-07-30 DIAGNOSIS — M15.9 OSTEOARTHRITIS OF MULTIPLE JOINTS, UNSPECIFIED OSTEOARTHRITIS TYPE: ICD-10-CM

## 2018-07-30 LAB
ALBUMIN SERPL-MCNC: 3.6 G/DL (ref 3.5–5)
ALBUMIN UR-MCNC: NEGATIVE MG/DL
ALT SERPL W P-5'-P-CCNC: 24 U/L (ref 0–45)
APPEARANCE UR: CLEAR
AST SERPL W P-5'-P-CCNC: 23 U/L (ref 0–40)
BACTERIA #/AREA URNS HPF: ABNORMAL HPF
BILIRUB UR QL STRIP: NEGATIVE
C REACTIVE PROTEIN LHE: 1.2 MG/DL (ref 0–0.8)
COLOR UR AUTO: YELLOW
CREAT SERPL-MCNC: 0.8 MG/DL (ref 0.6–1.1)
ERYTHROCYTE [SEDIMENTATION RATE] IN BLOOD BY WESTERGREN METHOD: 10 MM/HR (ref 0–20)
GFR SERPL CREATININE-BSD FRML MDRD: >60 ML/MIN/1.73M2
GLUCOSE UR STRIP-MCNC: NEGATIVE MG/DL
HGB UR QL STRIP: NEGATIVE
KETONES UR STRIP-MCNC: NEGATIVE MG/DL
LEUKOCYTE ESTERASE UR QL STRIP: ABNORMAL
MUCOUS THREADS #/AREA URNS LPF: ABNORMAL LPF
NITRATE UR QL: NEGATIVE
PH UR STRIP: 6 [PH] (ref 5–8)
RBC #/AREA URNS AUTO: ABNORMAL HPF
RHEUMATOID FACT SERPL-ACNC: <15 IU/ML (ref 0–30)
SP GR UR STRIP: 1.02 (ref 1–1.03)
SQUAMOUS #/AREA URNS AUTO: ABNORMAL LPF
UROBILINOGEN UR STRIP-ACNC: ABNORMAL
WBC #/AREA URNS AUTO: ABNORMAL HPF

## 2018-07-30 RX ORDER — MAGNESIUM CITRATE
296 SOLUTION, ORAL ORAL DAILY PRN
Status: SHIPPED | COMMUNITY
Start: 2018-07-30

## 2018-07-30 ASSESSMENT — MIFFLIN-ST. JEOR: SCORE: 1846.88

## 2018-07-31 LAB
ANA SER QL: 0.5 U
B BURGDOR IGG+IGM SER QL: 0.03 INDEX VALUE
BACTERIA SPEC CULT: NO GROWTH
CCP AB SER IA-ACNC: <0.5 U/ML
HBV SURFACE AG SERPL QL IA: NEGATIVE
HCV AB SERPL QL IA: NEGATIVE

## 2018-08-03 LAB
B LOCUS: NORMAL
B27TEST METHOD: NORMAL

## 2018-08-06 ENCOUNTER — COMMUNICATION - HEALTHEAST (OUTPATIENT)
Dept: RHEUMATOLOGY | Facility: CLINIC | Age: 53
End: 2018-08-06

## 2018-08-09 ENCOUNTER — COMMUNICATION - HEALTHEAST (OUTPATIENT)
Dept: FAMILY MEDICINE | Facility: CLINIC | Age: 53
End: 2018-08-09

## 2018-08-09 DIAGNOSIS — G47.00 INSOMNIA: ICD-10-CM

## 2018-08-15 ENCOUNTER — OFFICE VISIT - HEALTHEAST (OUTPATIENT)
Dept: FAMILY MEDICINE | Facility: CLINIC | Age: 53
End: 2018-08-15

## 2018-08-15 DIAGNOSIS — F30.9 BIPOLAR I DISORDER, SINGLE MANIC EPISODE (H): ICD-10-CM

## 2018-08-15 DIAGNOSIS — J44.9 CHRONIC OBSTRUCTIVE PULMONARY DISEASE, UNSPECIFIED COPD TYPE (H): ICD-10-CM

## 2018-08-15 DIAGNOSIS — R30.0 DYSURIA: ICD-10-CM

## 2018-08-15 DIAGNOSIS — E66.01 MORBID OBESITY (H): ICD-10-CM

## 2018-08-15 LAB
ALBUMIN UR-MCNC: NEGATIVE MG/DL
APPEARANCE UR: CLEAR
BILIRUB UR QL STRIP: NEGATIVE
COLOR UR AUTO: YELLOW
GLUCOSE UR STRIP-MCNC: NEGATIVE MG/DL
HGB UR QL STRIP: NEGATIVE
KETONES UR STRIP-MCNC: NEGATIVE MG/DL
LEUKOCYTE ESTERASE UR QL STRIP: NEGATIVE
NITRATE UR QL: NEGATIVE
PH UR STRIP: 7.5 [PH] (ref 5–8)
SP GR UR STRIP: 1.02 (ref 1–1.03)
UROBILINOGEN UR STRIP-ACNC: NORMAL

## 2018-08-16 LAB — BACTERIA SPEC CULT: NO GROWTH

## 2018-08-23 ENCOUNTER — OFFICE VISIT - HEALTHEAST (OUTPATIENT)
Dept: ONCOLOGY | Facility: CLINIC | Age: 53
End: 2018-08-23

## 2018-08-23 DIAGNOSIS — Z17.0 MALIGNANT NEOPLASM OF UPPER-OUTER QUADRANT OF RIGHT BREAST IN FEMALE, ESTROGEN RECEPTOR POSITIVE (H): ICD-10-CM

## 2018-08-23 DIAGNOSIS — Z79.811 AROMATASE INHIBITOR USE: ICD-10-CM

## 2018-08-23 DIAGNOSIS — C50.411 MALIGNANT NEOPLASM OF UPPER-OUTER QUADRANT OF RIGHT BREAST IN FEMALE, ESTROGEN RECEPTOR POSITIVE (H): ICD-10-CM

## 2018-08-23 ASSESSMENT — MIFFLIN-ST. JEOR: SCORE: 1835.99

## 2018-09-08 ENCOUNTER — COMMUNICATION - HEALTHEAST (OUTPATIENT)
Dept: FAMILY MEDICINE | Facility: CLINIC | Age: 53
End: 2018-09-08

## 2018-09-08 DIAGNOSIS — G89.29 OTHER CHRONIC PAIN: ICD-10-CM

## 2018-10-03 ENCOUNTER — COMMUNICATION - HEALTHEAST (OUTPATIENT)
Dept: PHYSICAL MEDICINE AND REHAB | Facility: CLINIC | Age: 53
End: 2018-10-03

## 2018-10-03 DIAGNOSIS — G89.4 CHRONIC PAIN SYNDROME: ICD-10-CM

## 2018-10-08 ENCOUNTER — OFFICE VISIT - HEALTHEAST (OUTPATIENT)
Dept: RHEUMATOLOGY | Facility: CLINIC | Age: 53
End: 2018-10-08

## 2018-10-08 DIAGNOSIS — G89.29 CHRONIC PAIN OF LEFT KNEE: ICD-10-CM

## 2018-10-08 DIAGNOSIS — M25.511 CHRONIC PAIN OF BOTH SHOULDERS: ICD-10-CM

## 2018-10-08 DIAGNOSIS — M25.562 CHRONIC PAIN OF LEFT KNEE: ICD-10-CM

## 2018-10-08 DIAGNOSIS — M15.9 OSTEOARTHRITIS OF MULTIPLE JOINTS, UNSPECIFIED OSTEOARTHRITIS TYPE: ICD-10-CM

## 2018-10-08 DIAGNOSIS — G89.29 CHRONIC BILATERAL LOW BACK PAIN WITH BILATERAL SCIATICA: ICD-10-CM

## 2018-10-08 DIAGNOSIS — G89.29 CHRONIC PAIN OF BOTH SHOULDERS: ICD-10-CM

## 2018-10-08 DIAGNOSIS — G47.8 NON-RESTORATIVE SLEEP: ICD-10-CM

## 2018-10-08 DIAGNOSIS — M79.7 FIBROMYALGIA: ICD-10-CM

## 2018-10-08 DIAGNOSIS — M54.42 CHRONIC BILATERAL LOW BACK PAIN WITH BILATERAL SCIATICA: ICD-10-CM

## 2018-10-08 DIAGNOSIS — M25.512 CHRONIC PAIN OF BOTH SHOULDERS: ICD-10-CM

## 2018-10-08 DIAGNOSIS — M54.41 CHRONIC BILATERAL LOW BACK PAIN WITH BILATERAL SCIATICA: ICD-10-CM

## 2018-11-07 ENCOUNTER — COMMUNICATION - HEALTHEAST (OUTPATIENT)
Dept: FAMILY MEDICINE | Facility: CLINIC | Age: 53
End: 2018-11-07

## 2018-11-07 DIAGNOSIS — G47.00 INSOMNIA: ICD-10-CM

## 2018-11-23 ENCOUNTER — OFFICE VISIT - HEALTHEAST (OUTPATIENT)
Dept: ONCOLOGY | Facility: CLINIC | Age: 53
End: 2018-11-23

## 2018-11-23 DIAGNOSIS — C50.411 MALIGNANT NEOPLASM OF UPPER-OUTER QUADRANT OF RIGHT BREAST IN FEMALE, ESTROGEN RECEPTOR POSITIVE (H): ICD-10-CM

## 2018-11-23 DIAGNOSIS — Z17.0 MALIGNANT NEOPLASM OF UPPER-OUTER QUADRANT OF RIGHT BREAST IN FEMALE, ESTROGEN RECEPTOR POSITIVE (H): ICD-10-CM

## 2018-11-23 DIAGNOSIS — Z79.811 AROMATASE INHIBITOR USE: ICD-10-CM

## 2018-11-23 DIAGNOSIS — Z78.0 POST-MENOPAUSAL: ICD-10-CM

## 2018-11-23 DIAGNOSIS — Z12.31 ENCOUNTER FOR SCREENING MAMMOGRAM FOR MALIGNANT NEOPLASM OF BREAST: ICD-10-CM

## 2019-01-02 ENCOUNTER — COMMUNICATION - HEALTHEAST (OUTPATIENT)
Dept: FAMILY MEDICINE | Facility: CLINIC | Age: 54
End: 2019-01-02

## 2019-01-02 DIAGNOSIS — K21.9 GERD (GASTROESOPHAGEAL REFLUX DISEASE): ICD-10-CM

## 2019-01-16 ENCOUNTER — COMMUNICATION - HEALTHEAST (OUTPATIENT)
Dept: FAMILY MEDICINE | Facility: CLINIC | Age: 54
End: 2019-01-16

## 2019-01-16 DIAGNOSIS — G89.29 OTHER CHRONIC PAIN: ICD-10-CM

## 2019-01-24 ENCOUNTER — COMMUNICATION - HEALTHEAST (OUTPATIENT)
Dept: SCHEDULING | Facility: CLINIC | Age: 54
End: 2019-01-24

## 2019-02-25 ENCOUNTER — HOSPITAL ENCOUNTER (OUTPATIENT)
Dept: MAMMOGRAPHY | Facility: CLINIC | Age: 54
Setting detail: RADIATION/ONCOLOGY SERIES
Discharge: STILL ADMITTED OR ADMINISTRATIVE | End: 2019-02-25
Attending: INTERNAL MEDICINE

## 2019-02-25 ENCOUNTER — RECORDS - HEALTHEAST (OUTPATIENT)
Dept: ADMINISTRATIVE | Facility: OTHER | Age: 54
End: 2019-02-25

## 2019-02-25 ENCOUNTER — RECORDS - HEALTHEAST (OUTPATIENT)
Dept: BONE DENSITY | Facility: CLINIC | Age: 54
End: 2019-02-25

## 2019-02-25 DIAGNOSIS — C50.411 MALIGNANT NEOPLASM OF UPPER-OUTER QUADRANT OF RIGHT FEMALE BREAST (H): ICD-10-CM

## 2019-02-25 DIAGNOSIS — Z17.0 MALIGNANT NEOPLASM OF UPPER-OUTER QUADRANT OF RIGHT BREAST IN FEMALE, ESTROGEN RECEPTOR POSITIVE (H): ICD-10-CM

## 2019-02-25 DIAGNOSIS — C50.411 MALIGNANT NEOPLASM OF UPPER-OUTER QUADRANT OF RIGHT BREAST IN FEMALE, ESTROGEN RECEPTOR POSITIVE (H): ICD-10-CM

## 2019-02-25 DIAGNOSIS — Z12.31 ENCOUNTER FOR SCREENING MAMMOGRAM FOR MALIGNANT NEOPLASM OF BREAST: ICD-10-CM

## 2019-02-25 DIAGNOSIS — Z79.811 LONG TERM (CURRENT) USE OF AROMATASE INHIBITORS: ICD-10-CM

## 2019-02-25 DIAGNOSIS — Z78.0 ASYMPTOMATIC MENOPAUSAL STATE: ICD-10-CM

## 2019-02-25 DIAGNOSIS — Z17.0 ESTROGEN RECEPTOR POSITIVE STATUS (ER+): ICD-10-CM

## 2019-03-01 ENCOUNTER — OFFICE VISIT - HEALTHEAST (OUTPATIENT)
Dept: ONCOLOGY | Facility: CLINIC | Age: 54
End: 2019-03-01

## 2019-03-01 DIAGNOSIS — C50.919 BREAST CANCER (H): ICD-10-CM

## 2019-03-01 DIAGNOSIS — C50.411 MALIGNANT NEOPLASM OF UPPER-OUTER QUADRANT OF RIGHT BREAST IN FEMALE, ESTROGEN RECEPTOR POSITIVE (H): ICD-10-CM

## 2019-03-01 DIAGNOSIS — Z17.0 MALIGNANT NEOPLASM OF UPPER-OUTER QUADRANT OF RIGHT BREAST IN FEMALE, ESTROGEN RECEPTOR POSITIVE (H): ICD-10-CM

## 2019-03-01 DIAGNOSIS — Z79.811 AROMATASE INHIBITOR USE: ICD-10-CM

## 2019-03-01 DIAGNOSIS — G89.4 CHRONIC PAIN SYNDROME: ICD-10-CM

## 2019-03-05 ENCOUNTER — OFFICE VISIT - HEALTHEAST (OUTPATIENT)
Dept: FAMILY MEDICINE | Facility: CLINIC | Age: 54
End: 2019-03-05

## 2019-03-05 ENCOUNTER — COMMUNICATION - HEALTHEAST (OUTPATIENT)
Dept: ADMINISTRATIVE | Facility: CLINIC | Age: 54
End: 2019-03-05

## 2019-03-05 ENCOUNTER — COMMUNICATION - HEALTHEAST (OUTPATIENT)
Dept: PULMONOLOGY | Facility: OTHER | Age: 54
End: 2019-03-05

## 2019-03-05 ENCOUNTER — OFFICE VISIT - HEALTHEAST (OUTPATIENT)
Dept: AUDIOLOGY | Facility: CLINIC | Age: 54
End: 2019-03-05

## 2019-03-05 DIAGNOSIS — F30.9 BIPOLAR I DISORDER, SINGLE MANIC EPISODE (H): ICD-10-CM

## 2019-03-05 DIAGNOSIS — H90.3 SENSORINEURAL HEARING LOSS, BILATERAL: ICD-10-CM

## 2019-03-05 DIAGNOSIS — E66.01 MORBID OBESITY (H): ICD-10-CM

## 2019-03-05 DIAGNOSIS — R04.2 HEMOPTYSIS: ICD-10-CM

## 2019-03-05 DIAGNOSIS — J44.9 CHRONIC OBSTRUCTIVE PULMONARY DISEASE, UNSPECIFIED COPD TYPE (H): ICD-10-CM

## 2019-03-06 ENCOUNTER — COMMUNICATION - HEALTHEAST (OUTPATIENT)
Dept: AUDIOLOGY | Facility: CLINIC | Age: 54
End: 2019-03-06

## 2019-03-08 ENCOUNTER — OFFICE VISIT - HEALTHEAST (OUTPATIENT)
Dept: AUDIOLOGY | Facility: CLINIC | Age: 54
End: 2019-03-08

## 2019-03-08 DIAGNOSIS — H90.3 SENSORINEURAL HEARING LOSS, BILATERAL: ICD-10-CM

## 2019-03-08 LAB
GAMMA INTERFERON BACKGROUND BLD IA-ACNC: 0.05 IU/ML
M TB IFN-G BLD-IMP: NEGATIVE
MITOGEN IGNF BCKGRD COR BLD-ACNC: -0.02 IU/ML
MITOGEN IGNF BCKGRD COR BLD-ACNC: -0.03 IU/ML
QTF INTERPRETATION: NORMAL
QTF MITOGEN - NIL: 7.71 IU/ML

## 2019-03-11 ENCOUNTER — COMMUNICATION - HEALTHEAST (OUTPATIENT)
Dept: PULMONOLOGY | Facility: OTHER | Age: 54
End: 2019-03-11

## 2019-03-11 ENCOUNTER — AMBULATORY - HEALTHEAST (OUTPATIENT)
Dept: PULMONOLOGY | Facility: OTHER | Age: 54
End: 2019-03-11

## 2019-03-11 DIAGNOSIS — R04.2 HEMOPTYSIS: ICD-10-CM

## 2019-03-12 ENCOUNTER — HOSPITAL ENCOUNTER (OUTPATIENT)
Dept: RADIOLOGY | Facility: CLINIC | Age: 54
Discharge: HOME | End: 2019-03-12
Attending: INTERNAL MEDICINE

## 2019-03-12 DIAGNOSIS — R04.2 HEMOPTYSIS: ICD-10-CM

## 2019-03-13 ENCOUNTER — OFFICE VISIT - HEALTHEAST (OUTPATIENT)
Dept: PULMONOLOGY | Facility: OTHER | Age: 54
End: 2019-03-13

## 2019-03-13 DIAGNOSIS — K21.9 GASTROESOPHAGEAL REFLUX DISEASE WITHOUT ESOPHAGITIS: ICD-10-CM

## 2019-03-13 DIAGNOSIS — R05.3 CHRONIC COUGH: ICD-10-CM

## 2019-03-13 DIAGNOSIS — J45.41 MODERATE PERSISTENT ASTHMA WITH EXACERBATION: ICD-10-CM

## 2019-03-13 DIAGNOSIS — J32.9 CHRONIC SINUSITIS, UNSPECIFIED LOCATION: ICD-10-CM

## 2019-03-13 ASSESSMENT — MIFFLIN-ST. JEOR: SCORE: 1880.89

## 2019-03-28 ENCOUNTER — COMMUNICATION - HEALTHEAST (OUTPATIENT)
Dept: AUDIOLOGY | Facility: CLINIC | Age: 54
End: 2019-03-28

## 2019-03-29 ENCOUNTER — OFFICE VISIT - HEALTHEAST (OUTPATIENT)
Dept: AUDIOLOGY | Facility: CLINIC | Age: 54
End: 2019-03-29

## 2019-03-29 DIAGNOSIS — H90.3 SENSORINEURAL HEARING LOSS, BILATERAL: ICD-10-CM

## 2019-04-02 ENCOUNTER — COMMUNICATION - HEALTHEAST (OUTPATIENT)
Dept: ADMINISTRATIVE | Facility: CLINIC | Age: 54
End: 2019-04-02

## 2019-04-03 ENCOUNTER — COMMUNICATION - HEALTHEAST (OUTPATIENT)
Dept: AUDIOLOGY | Facility: CLINIC | Age: 54
End: 2019-04-03

## 2019-04-09 ENCOUNTER — OFFICE VISIT - HEALTHEAST (OUTPATIENT)
Dept: FAMILY MEDICINE | Facility: CLINIC | Age: 54
End: 2019-04-09

## 2019-04-09 DIAGNOSIS — G89.29 OTHER CHRONIC PAIN: ICD-10-CM

## 2019-04-09 ASSESSMENT — MIFFLIN-ST. JEOR: SCORE: 1896.77

## 2019-04-10 ENCOUNTER — OFFICE VISIT - HEALTHEAST (OUTPATIENT)
Dept: RHEUMATOLOGY | Facility: CLINIC | Age: 54
End: 2019-04-10

## 2019-04-10 ENCOUNTER — RECORDS - HEALTHEAST (OUTPATIENT)
Dept: GENERAL RADIOLOGY | Facility: CLINIC | Age: 54
End: 2019-04-10

## 2019-04-10 ENCOUNTER — COMMUNICATION - HEALTHEAST (OUTPATIENT)
Dept: FAMILY MEDICINE | Facility: CLINIC | Age: 54
End: 2019-04-10

## 2019-04-10 DIAGNOSIS — M25.512 CHRONIC PAIN OF BOTH SHOULDERS: ICD-10-CM

## 2019-04-10 DIAGNOSIS — G89.29 OTHER CHRONIC PAIN: ICD-10-CM

## 2019-04-10 DIAGNOSIS — M79.7 FIBROMYALGIA: ICD-10-CM

## 2019-04-10 DIAGNOSIS — M25.522 PAIN IN LEFT ELBOW: ICD-10-CM

## 2019-04-10 DIAGNOSIS — M25.511 CHRONIC PAIN OF BOTH SHOULDERS: ICD-10-CM

## 2019-04-10 DIAGNOSIS — M15.9 OSTEOARTHRITIS OF MULTIPLE JOINTS, UNSPECIFIED OSTEOARTHRITIS TYPE: ICD-10-CM

## 2019-04-10 DIAGNOSIS — G89.29 CHRONIC PAIN OF BOTH SHOULDERS: ICD-10-CM

## 2019-04-10 DIAGNOSIS — M25.522 CHRONIC ELBOW PAIN, LEFT: ICD-10-CM

## 2019-04-10 DIAGNOSIS — M53.3 COCCYX PAIN: ICD-10-CM

## 2019-04-10 DIAGNOSIS — G89.29 CHRONIC ELBOW PAIN, LEFT: ICD-10-CM

## 2019-04-10 DIAGNOSIS — M53.3 SI (SACROILIAC) PAIN: ICD-10-CM

## 2019-04-10 DIAGNOSIS — G89.29 CHRONIC PAIN OF LEFT KNEE: ICD-10-CM

## 2019-04-10 DIAGNOSIS — M25.562 CHRONIC PAIN OF LEFT KNEE: ICD-10-CM

## 2019-04-10 DIAGNOSIS — J45.20 INTERMITTENT ASTHMA, UNCOMPLICATED: ICD-10-CM

## 2019-04-10 DIAGNOSIS — M54.2 NECK PAIN: ICD-10-CM

## 2019-04-10 LAB
ALBUMIN SERPL-MCNC: 3.9 G/DL (ref 3.5–5)
ALT SERPL W P-5'-P-CCNC: 25 U/L (ref 0–45)
AST SERPL W P-5'-P-CCNC: 28 U/L (ref 0–40)
CREAT SERPL-MCNC: 0.95 MG/DL (ref 0.6–1.1)
GFR SERPL CREATININE-BSD FRML MDRD: >60 ML/MIN/1.73M2

## 2019-04-10 ASSESSMENT — MIFFLIN-ST. JEOR: SCORE: 1887.69

## 2019-04-15 ENCOUNTER — COMMUNICATION - HEALTHEAST (OUTPATIENT)
Dept: RHEUMATOLOGY | Facility: CLINIC | Age: 54
End: 2019-04-15

## 2019-04-16 ENCOUNTER — HOSPITAL ENCOUNTER (OUTPATIENT)
Dept: PHYSICAL MEDICINE AND REHAB | Facility: CLINIC | Age: 54
Discharge: HOME | End: 2019-04-16
Attending: INTERNAL MEDICINE

## 2019-04-16 DIAGNOSIS — M54.50 LUMBAR SPINE PAIN: ICD-10-CM

## 2019-04-16 DIAGNOSIS — M53.3 COCCYDYNIA: ICD-10-CM

## 2019-04-16 DIAGNOSIS — M54.2 CERVICALGIA: ICD-10-CM

## 2019-04-16 DIAGNOSIS — M54.6 PAIN IN THORACIC SPINE: ICD-10-CM

## 2019-04-16 DIAGNOSIS — R20.2 PARESTHESIA: ICD-10-CM

## 2019-04-17 ENCOUNTER — OFFICE VISIT - HEALTHEAST (OUTPATIENT)
Dept: AUDIOLOGY | Facility: CLINIC | Age: 54
End: 2019-04-17

## 2019-04-17 DIAGNOSIS — H90.3 SENSORINEURAL HEARING LOSS, BILATERAL: ICD-10-CM

## 2019-04-25 ENCOUNTER — HOSPITAL ENCOUNTER (OUTPATIENT)
Dept: MRI IMAGING | Facility: CLINIC | Age: 54
Discharge: HOME | End: 2019-04-25
Attending: PHYSICIAN ASSISTANT

## 2019-04-25 DIAGNOSIS — M54.6 PAIN IN THORACIC SPINE: ICD-10-CM

## 2019-04-25 DIAGNOSIS — M54.2 CERVICALGIA: ICD-10-CM

## 2019-04-25 DIAGNOSIS — M54.50 LUMBAR SPINE PAIN: ICD-10-CM

## 2019-04-26 ENCOUNTER — OFFICE VISIT - HEALTHEAST (OUTPATIENT)
Dept: PULMONOLOGY | Facility: OTHER | Age: 54
End: 2019-04-26

## 2019-04-26 ENCOUNTER — COMMUNICATION - HEALTHEAST (OUTPATIENT)
Dept: PHYSICAL MEDICINE AND REHAB | Facility: CLINIC | Age: 54
End: 2019-04-26

## 2019-04-26 ENCOUNTER — HOSPITAL ENCOUNTER (OUTPATIENT)
Dept: RESPIRATORY THERAPY | Facility: HOSPITAL | Age: 54
Discharge: HOME | End: 2019-04-26
Attending: INTERNAL MEDICINE

## 2019-04-26 DIAGNOSIS — J45.20 INTERMITTENT ASTHMA, UNCOMPLICATED: ICD-10-CM

## 2019-04-26 DIAGNOSIS — J45.41 MODERATE PERSISTENT ASTHMA WITH EXACERBATION: ICD-10-CM

## 2019-04-26 DIAGNOSIS — R05.3 CHRONIC COUGH: ICD-10-CM

## 2019-04-26 DIAGNOSIS — J32.9 CHRONIC SINUSITIS, UNSPECIFIED LOCATION: ICD-10-CM

## 2019-04-26 LAB — HGB BLD-MCNC: 13.6 G/DL (ref 12–16)

## 2019-04-26 RX ORDER — ALBUTEROL SULFATE 90 UG/1
1-2 AEROSOL, METERED RESPIRATORY (INHALATION) EVERY 4 HOURS PRN
Qty: 1 INHALER | Refills: 11 | Status: ON HOLD | OUTPATIENT
Start: 2019-04-26 | End: 2023-01-27

## 2019-04-26 ASSESSMENT — MIFFLIN-ST. JEOR: SCORE: 1871.82

## 2019-04-30 ENCOUNTER — RECORDS - HEALTHEAST (OUTPATIENT)
Dept: ADMINISTRATIVE | Facility: OTHER | Age: 54
End: 2019-04-30

## 2019-04-30 ENCOUNTER — OFFICE VISIT - HEALTHEAST (OUTPATIENT)
Dept: OTOLARYNGOLOGY | Facility: CLINIC | Age: 54
End: 2019-04-30

## 2019-04-30 DIAGNOSIS — H90.3 SENSORINEURAL HEARING LOSS (SNHL) OF BOTH EARS: ICD-10-CM

## 2019-05-08 ENCOUNTER — OFFICE VISIT - HEALTHEAST (OUTPATIENT)
Dept: AUDIOLOGY | Facility: CLINIC | Age: 54
End: 2019-05-08

## 2019-05-08 DIAGNOSIS — H90.3 SENSORINEURAL HEARING LOSS, BILATERAL: ICD-10-CM

## 2019-05-23 ENCOUNTER — HOSPITAL ENCOUNTER (OUTPATIENT)
Dept: PHYSICAL MEDICINE AND REHAB | Facility: CLINIC | Age: 54
Discharge: HOME | End: 2019-05-23
Attending: PHYSICIAN ASSISTANT

## 2019-05-23 DIAGNOSIS — M47.816 LUMBAR FACET ARTHROPATHY: ICD-10-CM

## 2019-05-23 DIAGNOSIS — M54.6 PAIN IN THORACIC SPINE: ICD-10-CM

## 2019-05-23 DIAGNOSIS — R20.2 PARESTHESIA: ICD-10-CM

## 2019-05-23 DIAGNOSIS — G56.02 CARPAL TUNNEL SYNDROME OF LEFT WRIST: ICD-10-CM

## 2019-05-23 DIAGNOSIS — M47.812 ARTHROPATHY OF CERVICAL FACET JOINT: ICD-10-CM

## 2019-05-23 DIAGNOSIS — M54.16 LUMBAR RADICULITIS: ICD-10-CM

## 2019-05-23 DIAGNOSIS — M51.24 PROTRUSION OF THORACIC INTERVERTEBRAL DISC: ICD-10-CM

## 2019-05-23 DIAGNOSIS — M79.18 MYOFASCIAL PAIN: ICD-10-CM

## 2019-05-23 DIAGNOSIS — M48.061 SPINAL STENOSIS OF LUMBAR REGION WITHOUT NEUROGENIC CLAUDICATION: ICD-10-CM

## 2019-05-23 DIAGNOSIS — M51.9 SCHMORL'S NODE: ICD-10-CM

## 2019-05-23 ASSESSMENT — MIFFLIN-ST. JEOR: SCORE: 1871.82

## 2019-05-28 ENCOUNTER — COMMUNICATION - HEALTHEAST (OUTPATIENT)
Dept: FAMILY MEDICINE | Facility: CLINIC | Age: 54
End: 2019-05-28

## 2019-05-28 DIAGNOSIS — F31.9 BIPOLAR AFFECTIVE DISORDER, REMISSION STATUS UNSPECIFIED (H): ICD-10-CM

## 2019-06-05 ENCOUNTER — OFFICE VISIT - HEALTHEAST (OUTPATIENT)
Dept: PULMONOLOGY | Facility: OTHER | Age: 54
End: 2019-06-05

## 2019-06-05 DIAGNOSIS — R05.3 CHRONIC COUGH: ICD-10-CM

## 2019-06-05 ASSESSMENT — MIFFLIN-ST. JEOR: SCORE: 1881.34

## 2019-07-10 ENCOUNTER — OFFICE VISIT - HEALTHEAST (OUTPATIENT)
Dept: ONCOLOGY | Facility: CLINIC | Age: 54
End: 2019-07-10

## 2019-07-10 DIAGNOSIS — C50.411 MALIGNANT NEOPLASM OF UPPER-OUTER QUADRANT OF RIGHT BREAST IN FEMALE, ESTROGEN RECEPTOR POSITIVE (H): ICD-10-CM

## 2019-07-10 DIAGNOSIS — Z17.0 MALIGNANT NEOPLASM OF UPPER-OUTER QUADRANT OF RIGHT BREAST IN FEMALE, ESTROGEN RECEPTOR POSITIVE (H): ICD-10-CM

## 2019-07-10 DIAGNOSIS — Z12.31 ENCOUNTER FOR SCREENING MAMMOGRAM FOR MALIGNANT NEOPLASM OF BREAST: ICD-10-CM

## 2019-10-27 ENCOUNTER — COMMUNICATION - HEALTHEAST (OUTPATIENT)
Dept: FAMILY MEDICINE | Facility: CLINIC | Age: 54
End: 2019-10-27

## 2019-10-27 DIAGNOSIS — G89.29 OTHER CHRONIC PAIN: ICD-10-CM

## 2019-11-11 ENCOUNTER — OFFICE VISIT - HEALTHEAST (OUTPATIENT)
Dept: RHEUMATOLOGY | Facility: CLINIC | Age: 54
End: 2019-11-11

## 2019-11-11 DIAGNOSIS — G89.29 CHRONIC PAIN OF LEFT KNEE: ICD-10-CM

## 2019-11-11 DIAGNOSIS — M25.562 CHRONIC PAIN OF LEFT KNEE: ICD-10-CM

## 2019-11-11 DIAGNOSIS — G89.29 CHRONIC PAIN OF BOTH SHOULDERS: ICD-10-CM

## 2019-11-11 DIAGNOSIS — M79.7 FIBROMYALGIA: ICD-10-CM

## 2019-11-11 DIAGNOSIS — G89.29 CHRONIC ELBOW PAIN, LEFT: ICD-10-CM

## 2019-11-11 DIAGNOSIS — M53.3 SI (SACROILIAC) PAIN: ICD-10-CM

## 2019-11-11 DIAGNOSIS — M25.512 CHRONIC PAIN OF BOTH SHOULDERS: ICD-10-CM

## 2019-11-11 DIAGNOSIS — M25.522 CHRONIC ELBOW PAIN, LEFT: ICD-10-CM

## 2019-11-11 DIAGNOSIS — M25.511 CHRONIC PAIN OF BOTH SHOULDERS: ICD-10-CM

## 2019-11-11 DIAGNOSIS — M15.9 OSTEOARTHRITIS OF MULTIPLE JOINTS, UNSPECIFIED OSTEOARTHRITIS TYPE: ICD-10-CM

## 2019-11-11 ASSESSMENT — MIFFLIN-ST. JEOR: SCORE: 1891.32

## 2019-11-26 ENCOUNTER — COMMUNICATION - HEALTHEAST (OUTPATIENT)
Dept: ADMINISTRATIVE | Facility: HOSPITAL | Age: 54
End: 2019-11-26

## 2019-11-29 ENCOUNTER — COMMUNICATION - HEALTHEAST (OUTPATIENT)
Dept: ONCOLOGY | Facility: CLINIC | Age: 54
End: 2019-11-29

## 2019-11-29 DIAGNOSIS — Z17.0 MALIGNANT NEOPLASM OF UPPER-OUTER QUADRANT OF RIGHT BREAST IN FEMALE, ESTROGEN RECEPTOR POSITIVE (H): ICD-10-CM

## 2019-11-29 DIAGNOSIS — C50.411 MALIGNANT NEOPLASM OF UPPER-OUTER QUADRANT OF RIGHT BREAST IN FEMALE, ESTROGEN RECEPTOR POSITIVE (H): ICD-10-CM

## 2020-02-06 ENCOUNTER — COMMUNICATION - HEALTHEAST (OUTPATIENT)
Dept: FAMILY MEDICINE | Facility: CLINIC | Age: 55
End: 2020-02-06

## 2020-02-10 ENCOUNTER — OFFICE VISIT - HEALTHEAST (OUTPATIENT)
Dept: FAMILY MEDICINE | Facility: CLINIC | Age: 55
End: 2020-02-10

## 2020-02-10 DIAGNOSIS — Z00.00 ROUTINE GENERAL MEDICAL EXAMINATION AT A HEALTH CARE FACILITY: ICD-10-CM

## 2020-02-10 DIAGNOSIS — J45.20 MILD INTERMITTENT ASTHMA WITHOUT COMPLICATION: ICD-10-CM

## 2020-02-10 DIAGNOSIS — J44.9 CHRONIC OBSTRUCTIVE PULMONARY DISEASE, UNSPECIFIED COPD TYPE (H): ICD-10-CM

## 2020-02-10 DIAGNOSIS — E66.01 MORBID OBESITY (H): ICD-10-CM

## 2020-02-10 DIAGNOSIS — F51.01 PRIMARY INSOMNIA: ICD-10-CM

## 2020-02-10 DIAGNOSIS — Z12.4 CERVICAL CANCER SCREENING: ICD-10-CM

## 2020-02-10 DIAGNOSIS — K21.9 GASTROESOPHAGEAL REFLUX DISEASE WITHOUT ESOPHAGITIS: ICD-10-CM

## 2020-02-10 DIAGNOSIS — G89.29 OTHER CHRONIC PAIN: ICD-10-CM

## 2020-02-10 DIAGNOSIS — R05.3 CHRONIC COUGH: ICD-10-CM

## 2020-02-10 DIAGNOSIS — K21.9 GASTROESOPHAGEAL REFLUX DISEASE, ESOPHAGITIS PRESENCE NOT SPECIFIED: ICD-10-CM

## 2020-02-10 DIAGNOSIS — Z17.0 MALIGNANT NEOPLASM OF UPPER-OUTER QUADRANT OF RIGHT BREAST IN FEMALE, ESTROGEN RECEPTOR POSITIVE (H): ICD-10-CM

## 2020-02-10 DIAGNOSIS — C50.411 MALIGNANT NEOPLASM OF UPPER-OUTER QUADRANT OF RIGHT BREAST IN FEMALE, ESTROGEN RECEPTOR POSITIVE (H): ICD-10-CM

## 2020-02-10 DIAGNOSIS — F30.9 BIPOLAR I DISORDER, SINGLE MANIC EPISODE (H): ICD-10-CM

## 2020-02-10 LAB
ALBUMIN SERPL-MCNC: 3.9 G/DL (ref 3.5–5)
ALP SERPL-CCNC: 90 U/L (ref 45–120)
ALT SERPL W P-5'-P-CCNC: 31 U/L (ref 0–45)
ANION GAP SERPL CALCULATED.3IONS-SCNC: 9 MMOL/L (ref 5–18)
AST SERPL W P-5'-P-CCNC: 30 U/L (ref 0–40)
BASOPHILS # BLD AUTO: 0 THOU/UL (ref 0–0.2)
BASOPHILS NFR BLD AUTO: 1 % (ref 0–2)
BILIRUB SERPL-MCNC: 0.5 MG/DL (ref 0–1)
BUN SERPL-MCNC: 15 MG/DL (ref 8–22)
CALCIUM SERPL-MCNC: 9.2 MG/DL (ref 8.5–10.5)
CHLORIDE BLD-SCNC: 103 MMOL/L (ref 98–107)
CHOLEST SERPL-MCNC: 166 MG/DL
CO2 SERPL-SCNC: 29 MMOL/L (ref 22–31)
CREAT SERPL-MCNC: 0.82 MG/DL (ref 0.6–1.1)
EOSINOPHIL # BLD AUTO: 0.3 THOU/UL (ref 0–0.4)
EOSINOPHIL NFR BLD AUTO: 3 % (ref 0–6)
ERYTHROCYTE [DISTWIDTH] IN BLOOD BY AUTOMATED COUNT: 12.5 % (ref 11–14.5)
FASTING STATUS PATIENT QL REPORTED: YES
GFR SERPL CREATININE-BSD FRML MDRD: >60 ML/MIN/1.73M2
GLUCOSE BLD-MCNC: 106 MG/DL (ref 70–125)
HCT VFR BLD AUTO: 40 % (ref 35–47)
HDLC SERPL-MCNC: 41 MG/DL
HGB BLD-MCNC: 13.3 G/DL (ref 12–16)
LDLC SERPL CALC-MCNC: 109 MG/DL
LYMPHOCYTES # BLD AUTO: 2.7 THOU/UL (ref 0.8–4.4)
LYMPHOCYTES NFR BLD AUTO: 32 % (ref 20–40)
MCH RBC QN AUTO: 30.1 PG (ref 27–34)
MCHC RBC AUTO-ENTMCNC: 33.3 G/DL (ref 32–36)
MCV RBC AUTO: 90 FL (ref 80–100)
MONOCYTES # BLD AUTO: 0.4 THOU/UL (ref 0–0.9)
MONOCYTES NFR BLD AUTO: 5 % (ref 2–10)
NEUTROPHILS # BLD AUTO: 5.1 THOU/UL (ref 2–7.7)
NEUTROPHILS NFR BLD AUTO: 60 % (ref 50–70)
PLATELET # BLD AUTO: 208 THOU/UL (ref 140–440)
PMV BLD AUTO: 7.6 FL (ref 7–10)
POTASSIUM BLD-SCNC: 4.5 MMOL/L (ref 3.5–5)
PROT SERPL-MCNC: 6.8 G/DL (ref 6–8)
RBC # BLD AUTO: 4.43 MILL/UL (ref 3.8–5.4)
SODIUM SERPL-SCNC: 141 MMOL/L (ref 136–145)
T3FREE SERPL-MCNC: 3 PG/ML (ref 1.9–3.9)
T4 FREE SERPL-MCNC: 1 NG/DL (ref 0.7–1.8)
TRIGL SERPL-MCNC: 81 MG/DL
TSH SERPL DL<=0.005 MIU/L-ACNC: 3.48 UIU/ML (ref 0.3–5)
WBC: 8.6 THOU/UL (ref 4–11)

## 2020-02-10 ASSESSMENT — PATIENT HEALTH QUESTIONNAIRE - PHQ9: SUM OF ALL RESPONSES TO PHQ QUESTIONS 1-9: 20

## 2020-02-10 ASSESSMENT — MIFFLIN-ST. JEOR: SCORE: 1847.44

## 2020-02-11 LAB
HPV SOURCE: NORMAL
HUMAN PAPILLOMA VIRUS 16 DNA: NEGATIVE
HUMAN PAPILLOMA VIRUS 18 DNA: NEGATIVE
HUMAN PAPILLOMA VIRUS FINAL DIAGNOSIS: NORMAL
HUMAN PAPILLOMA VIRUS OTHER HR: NEGATIVE
SPECIMEN DESCRIPTION: NORMAL
THYROID PEROXIDASE ANTIBODIES - HISTORICAL: 156.2 IU/ML (ref 0–5.6)

## 2020-02-12 ENCOUNTER — COMMUNICATION - HEALTHEAST (OUTPATIENT)
Dept: FAMILY MEDICINE | Facility: CLINIC | Age: 55
End: 2020-02-12

## 2020-02-28 ENCOUNTER — HOSPITAL ENCOUNTER (OUTPATIENT)
Dept: MAMMOGRAPHY | Facility: CLINIC | Age: 55
Setting detail: RADIATION/ONCOLOGY SERIES
Discharge: STILL ADMITTED OR ADMINISTRATIVE | End: 2020-02-28
Attending: INTERNAL MEDICINE

## 2020-02-28 DIAGNOSIS — Z12.31 ENCOUNTER FOR SCREENING MAMMOGRAM FOR MALIGNANT NEOPLASM OF BREAST: ICD-10-CM

## 2020-02-28 DIAGNOSIS — C50.411 MALIGNANT NEOPLASM OF UPPER-OUTER QUADRANT OF RIGHT BREAST IN FEMALE, ESTROGEN RECEPTOR POSITIVE (H): ICD-10-CM

## 2020-02-28 DIAGNOSIS — Z17.0 MALIGNANT NEOPLASM OF UPPER-OUTER QUADRANT OF RIGHT BREAST IN FEMALE, ESTROGEN RECEPTOR POSITIVE (H): ICD-10-CM

## 2020-03-02 ENCOUNTER — AMBULATORY - HEALTHEAST (OUTPATIENT)
Dept: PULMONOLOGY | Facility: OTHER | Age: 55
End: 2020-03-02

## 2020-03-02 DIAGNOSIS — R05.3 CHRONIC COUGH: ICD-10-CM

## 2020-03-02 DIAGNOSIS — J32.9 CHRONIC SINUSITIS, UNSPECIFIED LOCATION: ICD-10-CM

## 2020-03-03 ENCOUNTER — OFFICE VISIT - HEALTHEAST (OUTPATIENT)
Dept: ONCOLOGY | Facility: CLINIC | Age: 55
End: 2020-03-03

## 2020-03-03 DIAGNOSIS — Z17.0 MALIGNANT NEOPLASM OF UPPER-OUTER QUADRANT OF RIGHT BREAST IN FEMALE, ESTROGEN RECEPTOR POSITIVE (H): ICD-10-CM

## 2020-03-03 DIAGNOSIS — Z79.811 AROMATASE INHIBITOR USE: ICD-10-CM

## 2020-03-03 DIAGNOSIS — C50.411 MALIGNANT NEOPLASM OF UPPER-OUTER QUADRANT OF RIGHT BREAST IN FEMALE, ESTROGEN RECEPTOR POSITIVE (H): ICD-10-CM

## 2020-03-04 ENCOUNTER — COMMUNICATION - HEALTHEAST (OUTPATIENT)
Dept: FAMILY MEDICINE | Facility: CLINIC | Age: 55
End: 2020-03-04

## 2020-03-04 DIAGNOSIS — F31.9 BIPOLAR AFFECTIVE DISORDER, REMISSION STATUS UNSPECIFIED (H): ICD-10-CM

## 2020-05-11 ENCOUNTER — COMMUNICATION - HEALTHEAST (OUTPATIENT)
Dept: RHEUMATOLOGY | Facility: CLINIC | Age: 55
End: 2020-05-11

## 2020-05-11 ENCOUNTER — AMBULATORY - HEALTHEAST (OUTPATIENT)
Dept: RHEUMATOLOGY | Facility: CLINIC | Age: 55
End: 2020-05-11

## 2020-05-11 ENCOUNTER — OFFICE VISIT - HEALTHEAST (OUTPATIENT)
Dept: RHEUMATOLOGY | Facility: CLINIC | Age: 55
End: 2020-05-11

## 2020-05-11 DIAGNOSIS — M15.9 OSTEOARTHRITIS OF MULTIPLE JOINTS, UNSPECIFIED OSTEOARTHRITIS TYPE: ICD-10-CM

## 2020-05-11 DIAGNOSIS — M25.511 CHRONIC PAIN OF BOTH SHOULDERS: ICD-10-CM

## 2020-05-11 DIAGNOSIS — G89.29 CHRONIC PAIN OF BOTH SHOULDERS: ICD-10-CM

## 2020-05-11 DIAGNOSIS — G89.29 CHRONIC PAIN OF LEFT KNEE: ICD-10-CM

## 2020-05-11 DIAGNOSIS — M25.562 CHRONIC PAIN OF LEFT KNEE: ICD-10-CM

## 2020-05-11 DIAGNOSIS — M79.7 FIBROMYALGIA: ICD-10-CM

## 2020-05-11 DIAGNOSIS — M25.512 CHRONIC PAIN OF BOTH SHOULDERS: ICD-10-CM

## 2020-05-14 ENCOUNTER — AMBULATORY - HEALTHEAST (OUTPATIENT)
Dept: PULMONOLOGY | Facility: OTHER | Age: 55
End: 2020-05-14

## 2020-05-14 DIAGNOSIS — J32.9 CHRONIC SINUSITIS, UNSPECIFIED LOCATION: ICD-10-CM

## 2020-05-14 DIAGNOSIS — R05.3 CHRONIC COUGH: ICD-10-CM

## 2020-05-26 ENCOUNTER — COMMUNICATION - HEALTHEAST (OUTPATIENT)
Dept: SCHEDULING | Facility: CLINIC | Age: 55
End: 2020-05-26

## 2020-05-27 ENCOUNTER — OFFICE VISIT - HEALTHEAST (OUTPATIENT)
Dept: FAMILY MEDICINE | Facility: CLINIC | Age: 55
End: 2020-05-27

## 2020-05-27 DIAGNOSIS — S99.912A INJURY OF LEFT ANKLE, INITIAL ENCOUNTER: ICD-10-CM

## 2020-06-02 ENCOUNTER — COMMUNICATION - HEALTHEAST (OUTPATIENT)
Dept: FAMILY MEDICINE | Facility: CLINIC | Age: 55
End: 2020-06-02

## 2020-06-02 ENCOUNTER — RECORDS - HEALTHEAST (OUTPATIENT)
Dept: GENERAL RADIOLOGY | Facility: CLINIC | Age: 55
End: 2020-06-02

## 2020-06-02 DIAGNOSIS — S99.912A UNSPECIFIED INJURY OF LEFT ANKLE, INITIAL ENCOUNTER: ICD-10-CM

## 2020-06-03 ENCOUNTER — AMBULATORY - HEALTHEAST (OUTPATIENT)
Dept: FAMILY MEDICINE | Facility: CLINIC | Age: 55
End: 2020-06-03

## 2020-06-03 DIAGNOSIS — S82.892D CLOSED FRACTURE OF LEFT ANKLE WITH ROUTINE HEALING, SUBSEQUENT ENCOUNTER: ICD-10-CM

## 2020-06-08 ENCOUNTER — COMMUNICATION - HEALTHEAST (OUTPATIENT)
Dept: FAMILY MEDICINE | Facility: CLINIC | Age: 55
End: 2020-06-08

## 2020-06-12 ENCOUNTER — COMMUNICATION - HEALTHEAST (OUTPATIENT)
Dept: FAMILY MEDICINE | Facility: CLINIC | Age: 55
End: 2020-06-12

## 2020-06-12 ENCOUNTER — OFFICE VISIT - HEALTHEAST (OUTPATIENT)
Dept: FAMILY MEDICINE | Facility: CLINIC | Age: 55
End: 2020-06-12

## 2020-06-12 DIAGNOSIS — J01.00 ACUTE NON-RECURRENT MAXILLARY SINUSITIS: ICD-10-CM

## 2020-06-17 ENCOUNTER — COMMUNICATION - HEALTHEAST (OUTPATIENT)
Dept: FAMILY MEDICINE | Facility: CLINIC | Age: 55
End: 2020-06-17

## 2020-06-18 ENCOUNTER — RECORDS - HEALTHEAST (OUTPATIENT)
Dept: ADMINISTRATIVE | Facility: OTHER | Age: 55
End: 2020-06-18

## 2020-07-14 ENCOUNTER — AMBULATORY - HEALTHEAST (OUTPATIENT)
Dept: PULMONOLOGY | Facility: OTHER | Age: 55
End: 2020-07-14

## 2020-07-14 DIAGNOSIS — J32.9 CHRONIC SINUSITIS, UNSPECIFIED LOCATION: ICD-10-CM

## 2020-07-14 DIAGNOSIS — R05.3 CHRONIC COUGH: ICD-10-CM

## 2020-08-06 ENCOUNTER — AMBULATORY - HEALTHEAST (OUTPATIENT)
Dept: PULMONOLOGY | Facility: OTHER | Age: 55
End: 2020-08-06

## 2020-08-06 DIAGNOSIS — J32.9 CHRONIC SINUSITIS, UNSPECIFIED LOCATION: ICD-10-CM

## 2020-08-06 DIAGNOSIS — R05.3 CHRONIC COUGH: ICD-10-CM

## 2020-09-14 ENCOUNTER — AMBULATORY - HEALTHEAST (OUTPATIENT)
Dept: LAB | Facility: CLINIC | Age: 55
End: 2020-09-14

## 2020-09-14 DIAGNOSIS — G89.29 CHRONIC PAIN OF LEFT KNEE: ICD-10-CM

## 2020-09-14 DIAGNOSIS — G89.29 CHRONIC PAIN OF BOTH SHOULDERS: ICD-10-CM

## 2020-09-14 DIAGNOSIS — M25.511 CHRONIC PAIN OF BOTH SHOULDERS: ICD-10-CM

## 2020-09-14 DIAGNOSIS — M15.9 OSTEOARTHRITIS OF MULTIPLE JOINTS, UNSPECIFIED OSTEOARTHRITIS TYPE: ICD-10-CM

## 2020-09-14 DIAGNOSIS — M25.512 CHRONIC PAIN OF BOTH SHOULDERS: ICD-10-CM

## 2020-09-14 DIAGNOSIS — M25.562 CHRONIC PAIN OF LEFT KNEE: ICD-10-CM

## 2020-09-14 DIAGNOSIS — M79.7 FIBROMYALGIA: ICD-10-CM

## 2020-09-14 LAB
ALT SERPL W P-5'-P-CCNC: 40 U/L (ref 0–45)
AST SERPL W P-5'-P-CCNC: 40 U/L (ref 0–40)
CREAT SERPL-MCNC: 1.09 MG/DL (ref 0.6–1.1)
GFR SERPL CREATININE-BSD FRML MDRD: 52 ML/MIN/1.73M2

## 2020-09-15 ENCOUNTER — COMMUNICATION - HEALTHEAST (OUTPATIENT)
Dept: RHEUMATOLOGY | Facility: CLINIC | Age: 55
End: 2020-09-15

## 2020-09-15 DIAGNOSIS — G89.29 CHRONIC PAIN OF LEFT KNEE: ICD-10-CM

## 2020-09-15 DIAGNOSIS — M25.511 CHRONIC PAIN OF BOTH SHOULDERS: ICD-10-CM

## 2020-09-15 DIAGNOSIS — M15.9 OSTEOARTHRITIS OF MULTIPLE JOINTS, UNSPECIFIED OSTEOARTHRITIS TYPE: ICD-10-CM

## 2020-09-15 DIAGNOSIS — M25.512 CHRONIC PAIN OF BOTH SHOULDERS: ICD-10-CM

## 2020-09-15 DIAGNOSIS — M25.562 CHRONIC PAIN OF LEFT KNEE: ICD-10-CM

## 2020-09-15 DIAGNOSIS — M79.7 FIBROMYALGIA: ICD-10-CM

## 2020-09-15 DIAGNOSIS — G89.29 CHRONIC PAIN OF BOTH SHOULDERS: ICD-10-CM

## 2020-10-14 ENCOUNTER — COMMUNICATION - HEALTHEAST (OUTPATIENT)
Dept: RHEUMATOLOGY | Facility: CLINIC | Age: 55
End: 2020-10-14

## 2020-10-14 DIAGNOSIS — R79.89 ABNORMAL SERUM CREATININE LEVEL: ICD-10-CM

## 2020-10-16 ENCOUNTER — AMBULATORY - HEALTHEAST (OUTPATIENT)
Dept: LAB | Facility: CLINIC | Age: 55
End: 2020-10-16

## 2020-10-16 DIAGNOSIS — M25.512 CHRONIC PAIN OF BOTH SHOULDERS: ICD-10-CM

## 2020-10-16 DIAGNOSIS — R79.89 ABNORMAL SERUM CREATININE LEVEL: ICD-10-CM

## 2020-10-16 DIAGNOSIS — G89.29 CHRONIC PAIN OF BOTH SHOULDERS: ICD-10-CM

## 2020-10-16 DIAGNOSIS — G89.29 CHRONIC PAIN OF LEFT KNEE: ICD-10-CM

## 2020-10-16 DIAGNOSIS — M25.562 CHRONIC PAIN OF LEFT KNEE: ICD-10-CM

## 2020-10-16 DIAGNOSIS — M25.511 CHRONIC PAIN OF BOTH SHOULDERS: ICD-10-CM

## 2020-10-16 DIAGNOSIS — M15.9 OSTEOARTHRITIS OF MULTIPLE JOINTS, UNSPECIFIED OSTEOARTHRITIS TYPE: ICD-10-CM

## 2020-10-16 DIAGNOSIS — M79.7 FIBROMYALGIA: ICD-10-CM

## 2020-10-16 LAB
ALT SERPL W P-5'-P-CCNC: 34 U/L (ref 0–45)
AST SERPL W P-5'-P-CCNC: 29 U/L (ref 0–40)
CREAT SERPL-MCNC: 0.88 MG/DL (ref 0.6–1.1)
GFR SERPL CREATININE-BSD FRML MDRD: >60 ML/MIN/1.73M2

## 2020-10-19 ENCOUNTER — OFFICE VISIT - HEALTHEAST (OUTPATIENT)
Dept: RHEUMATOLOGY | Facility: CLINIC | Age: 55
End: 2020-10-19

## 2020-10-19 DIAGNOSIS — M25.522 CHRONIC ELBOW PAIN, LEFT: ICD-10-CM

## 2020-10-19 DIAGNOSIS — R94.4 DECREASED GFR: ICD-10-CM

## 2020-10-19 DIAGNOSIS — G89.29 CHRONIC ELBOW PAIN, LEFT: ICD-10-CM

## 2020-10-19 DIAGNOSIS — M15.9 OSTEOARTHRITIS OF MULTIPLE JOINTS, UNSPECIFIED OSTEOARTHRITIS TYPE: ICD-10-CM

## 2020-10-20 ENCOUNTER — COMMUNICATION - HEALTHEAST (OUTPATIENT)
Dept: RHEUMATOLOGY | Facility: CLINIC | Age: 55
End: 2020-10-20

## 2020-10-20 DIAGNOSIS — M25.512 CHRONIC PAIN OF BOTH SHOULDERS: ICD-10-CM

## 2020-10-20 DIAGNOSIS — M79.7 FIBROMYALGIA: ICD-10-CM

## 2020-10-20 DIAGNOSIS — M15.9 OSTEOARTHRITIS OF MULTIPLE JOINTS, UNSPECIFIED OSTEOARTHRITIS TYPE: ICD-10-CM

## 2020-10-20 DIAGNOSIS — G89.29 CHRONIC PAIN OF LEFT KNEE: ICD-10-CM

## 2020-10-20 DIAGNOSIS — M25.511 CHRONIC PAIN OF BOTH SHOULDERS: ICD-10-CM

## 2020-10-20 DIAGNOSIS — M25.562 CHRONIC PAIN OF LEFT KNEE: ICD-10-CM

## 2020-10-20 DIAGNOSIS — G89.29 CHRONIC PAIN OF BOTH SHOULDERS: ICD-10-CM

## 2020-10-26 ENCOUNTER — OFFICE VISIT - HEALTHEAST (OUTPATIENT)
Dept: RHEUMATOLOGY | Facility: CLINIC | Age: 55
End: 2020-10-26

## 2020-10-26 DIAGNOSIS — M25.511 CHRONIC PAIN IN RIGHT SHOULDER: ICD-10-CM

## 2020-10-26 DIAGNOSIS — M25.562 CHRONIC PAIN OF LEFT KNEE: ICD-10-CM

## 2020-10-26 DIAGNOSIS — G89.29 CHRONIC PAIN OF LEFT KNEE: ICD-10-CM

## 2020-10-26 DIAGNOSIS — G89.29 CHRONIC PAIN IN RIGHT SHOULDER: ICD-10-CM

## 2020-11-12 ENCOUNTER — COMMUNICATION - HEALTHEAST (OUTPATIENT)
Dept: ADMINISTRATIVE | Facility: CLINIC | Age: 55
End: 2020-11-12

## 2020-11-13 ENCOUNTER — COMMUNICATION - HEALTHEAST (OUTPATIENT)
Dept: AUDIOLOGY | Facility: CLINIC | Age: 55
End: 2020-11-13

## 2020-11-17 ENCOUNTER — COMMUNICATION - HEALTHEAST (OUTPATIENT)
Dept: RHEUMATOLOGY | Facility: CLINIC | Age: 55
End: 2020-11-17

## 2020-11-17 ENCOUNTER — OFFICE VISIT - HEALTHEAST (OUTPATIENT)
Dept: FAMILY MEDICINE | Facility: CLINIC | Age: 55
End: 2020-11-17

## 2020-11-17 ENCOUNTER — HOSPITAL ENCOUNTER (OUTPATIENT)
Dept: ULTRASOUND IMAGING | Facility: CLINIC | Age: 55
Discharge: HOME | End: 2020-11-17
Attending: FAMILY MEDICINE

## 2020-11-17 DIAGNOSIS — M25.562 CHRONIC PAIN OF LEFT KNEE: ICD-10-CM

## 2020-11-17 DIAGNOSIS — Z13.29 SCREENING FOR THYROID DISORDER: ICD-10-CM

## 2020-11-17 DIAGNOSIS — G89.29 CHRONIC PAIN OF BOTH SHOULDERS: ICD-10-CM

## 2020-11-17 DIAGNOSIS — N93.9 VAGINAL BLEEDING: ICD-10-CM

## 2020-11-17 DIAGNOSIS — R94.4 DECREASED GFR: ICD-10-CM

## 2020-11-17 DIAGNOSIS — M15.9 OSTEOARTHRITIS OF MULTIPLE JOINTS, UNSPECIFIED OSTEOARTHRITIS TYPE: ICD-10-CM

## 2020-11-17 DIAGNOSIS — R31.9 HEMATURIA, UNSPECIFIED TYPE: ICD-10-CM

## 2020-11-17 DIAGNOSIS — G89.29 CHRONIC PAIN OF LEFT KNEE: ICD-10-CM

## 2020-11-17 DIAGNOSIS — M79.7 FIBROMYALGIA: ICD-10-CM

## 2020-11-17 DIAGNOSIS — M25.512 CHRONIC PAIN OF BOTH SHOULDERS: ICD-10-CM

## 2020-11-17 DIAGNOSIS — M25.511 CHRONIC PAIN OF BOTH SHOULDERS: ICD-10-CM

## 2020-11-17 LAB
CREAT SERPL-MCNC: 1 MG/DL (ref 0.6–1.1)
ERYTHROCYTE [DISTWIDTH] IN BLOOD BY AUTOMATED COUNT: 13.1 % (ref 11–14.5)
ESTRADIOL SERPL-MCNC: 17 PG/ML
FSH SERPL-ACNC: 31.8 MIU/ML
GFR SERPL CREATININE-BSD FRML MDRD: 57 ML/MIN/1.73M2
HCT VFR BLD AUTO: 44.9 % (ref 35–47)
HGB BLD-MCNC: 14.7 G/DL (ref 12–16)
LH SERPL-ACNC: 15.6 MIU/ML
MCH RBC QN AUTO: 29.5 PG (ref 27–34)
MCHC RBC AUTO-ENTMCNC: 32.7 G/DL (ref 32–36)
MCV RBC AUTO: 90 FL (ref 80–100)
PLATELET # BLD AUTO: 213 THOU/UL (ref 140–440)
PMV BLD AUTO: 7.6 FL (ref 7–10)
PROGEST SERPL-MCNC: <0.1 NG/ML
RBC # BLD AUTO: 4.98 MILL/UL (ref 3.8–5.4)
T3FREE SERPL-MCNC: 2.1 PG/ML (ref 1.9–3.9)
T4 FREE SERPL-MCNC: 0.9 NG/DL (ref 0.7–1.8)
TSH SERPL DL<=0.005 MIU/L-ACNC: 4.09 UIU/ML (ref 0.3–5)
WBC: 12.2 THOU/UL (ref 4–11)

## 2020-11-17 RX ORDER — CELECOXIB 200 MG/1
CAPSULE ORAL
Qty: 30 CAPSULE | Refills: 0 | Status: SHIPPED | OUTPATIENT
Start: 2020-11-17 | End: 2021-07-22

## 2020-11-18 ENCOUNTER — AMBULATORY - HEALTHEAST (OUTPATIENT)
Dept: FAMILY MEDICINE | Facility: CLINIC | Age: 55
End: 2020-11-18

## 2020-11-18 DIAGNOSIS — D25.9 UTERINE LEIOMYOMA, UNSPECIFIED LOCATION: ICD-10-CM

## 2020-11-18 DIAGNOSIS — N85.00 ENDOMETRIAL HYPERPLASIA: ICD-10-CM

## 2020-11-18 DIAGNOSIS — N93.9 ABNORMAL VAGINAL BLEEDING: ICD-10-CM

## 2020-11-18 LAB — BACTERIA SPEC CULT: NO GROWTH

## 2020-11-20 ENCOUNTER — AMBULATORY - HEALTHEAST (OUTPATIENT)
Dept: LAB | Facility: CLINIC | Age: 55
End: 2020-11-20

## 2020-11-20 DIAGNOSIS — M15.9 OSTEOARTHRITIS OF MULTIPLE JOINTS, UNSPECIFIED OSTEOARTHRITIS TYPE: ICD-10-CM

## 2020-11-20 DIAGNOSIS — G89.29 CHRONIC PAIN OF LEFT KNEE: ICD-10-CM

## 2020-11-20 DIAGNOSIS — M79.7 FIBROMYALGIA: ICD-10-CM

## 2020-11-20 DIAGNOSIS — M25.511 CHRONIC PAIN OF BOTH SHOULDERS: ICD-10-CM

## 2020-11-20 DIAGNOSIS — M25.512 CHRONIC PAIN OF BOTH SHOULDERS: ICD-10-CM

## 2020-11-20 DIAGNOSIS — M25.562 CHRONIC PAIN OF LEFT KNEE: ICD-10-CM

## 2020-11-20 DIAGNOSIS — G89.29 CHRONIC PAIN OF BOTH SHOULDERS: ICD-10-CM

## 2020-11-20 LAB
ALT SERPL W P-5'-P-CCNC: 26 U/L (ref 0–45)
AST SERPL W P-5'-P-CCNC: 22 U/L (ref 0–40)
CREAT SERPL-MCNC: 0.88 MG/DL (ref 0.6–1.1)
GFR SERPL CREATININE-BSD FRML MDRD: >60 ML/MIN/1.73M2

## 2020-12-02 ENCOUNTER — COMMUNICATION - HEALTHEAST (OUTPATIENT)
Dept: RHEUMATOLOGY | Facility: CLINIC | Age: 55
End: 2020-12-02

## 2020-12-02 DIAGNOSIS — R94.4 DECREASED GFR: ICD-10-CM

## 2020-12-03 ENCOUNTER — RECORDS - HEALTHEAST (OUTPATIENT)
Dept: ADMINISTRATIVE | Facility: OTHER | Age: 55
End: 2020-12-03

## 2020-12-04 ENCOUNTER — AMBULATORY - HEALTHEAST (OUTPATIENT)
Dept: SURGERY | Facility: AMBULATORY SURGERY CENTER | Age: 55
End: 2020-12-04

## 2020-12-04 DIAGNOSIS — Z11.59 ENCOUNTER FOR SCREENING FOR OTHER VIRAL DISEASES: ICD-10-CM

## 2020-12-09 ENCOUNTER — OFFICE VISIT - HEALTHEAST (OUTPATIENT)
Dept: FAMILY MEDICINE | Facility: CLINIC | Age: 55
End: 2020-12-09

## 2020-12-09 DIAGNOSIS — J30.89 SEASONAL ALLERGIC RHINITIS DUE TO OTHER ALLERGIC TRIGGER: ICD-10-CM

## 2020-12-09 DIAGNOSIS — F51.01 PRIMARY INSOMNIA: ICD-10-CM

## 2020-12-09 DIAGNOSIS — K21.9 GASTROESOPHAGEAL REFLUX DISEASE, UNSPECIFIED WHETHER ESOPHAGITIS PRESENT: ICD-10-CM

## 2020-12-09 DIAGNOSIS — F98.8 ATTENTION DEFICIT DISORDER (ADD) WITHOUT HYPERACTIVITY: ICD-10-CM

## 2020-12-09 DIAGNOSIS — G89.4 CHRONIC PAIN SYNDROME: ICD-10-CM

## 2020-12-09 DIAGNOSIS — N85.02 ENDOMETRIAL HYPERPLASIA WITH ATYPIA: ICD-10-CM

## 2020-12-09 DIAGNOSIS — F31.9 BIPOLAR AFFECTIVE DISORDER, REMISSION STATUS UNSPECIFIED (H): ICD-10-CM

## 2020-12-09 DIAGNOSIS — Z01.818 PREOP GENERAL PHYSICAL EXAM: ICD-10-CM

## 2020-12-09 DIAGNOSIS — R06.83 SNORES: ICD-10-CM

## 2020-12-09 ASSESSMENT — MIFFLIN-ST. JEOR: SCORE: 1890.42

## 2020-12-11 ENCOUNTER — AMBULATORY - HEALTHEAST (OUTPATIENT)
Dept: LAB | Facility: CLINIC | Age: 55
End: 2020-12-11

## 2020-12-11 DIAGNOSIS — Z11.59 ENCOUNTER FOR SCREENING FOR OTHER VIRAL DISEASES: ICD-10-CM

## 2020-12-12 LAB
SARS-COV-2 PCR COMMENT: NORMAL
SARS-COV-2 RNA SPEC QL NAA+PROBE: NEGATIVE
SARS-COV-2 VIRUS SPECIMEN SOURCE: NORMAL

## 2020-12-13 ENCOUNTER — COMMUNICATION - HEALTHEAST (OUTPATIENT)
Dept: SCHEDULING | Facility: CLINIC | Age: 55
End: 2020-12-13

## 2020-12-14 ENCOUNTER — ANESTHESIA - HEALTHEAST (OUTPATIENT)
Dept: SURGERY | Facility: AMBULATORY SURGERY CENTER | Age: 55
End: 2020-12-14

## 2020-12-14 ASSESSMENT — MIFFLIN-ST. JEOR: SCORE: 1887.7

## 2020-12-15 ENCOUNTER — SURGERY - HEALTHEAST (OUTPATIENT)
Dept: SURGERY | Facility: AMBULATORY SURGERY CENTER | Age: 55
End: 2020-12-15

## 2020-12-15 ENCOUNTER — COMMUNICATION - HEALTHEAST (OUTPATIENT)
Dept: SLEEP MEDICINE | Facility: CLINIC | Age: 55
End: 2020-12-15

## 2020-12-15 ASSESSMENT — MIFFLIN-ST. JEOR: SCORE: 1887.7

## 2020-12-22 ENCOUNTER — COMMUNICATION - HEALTHEAST (OUTPATIENT)
Dept: FAMILY MEDICINE | Facility: CLINIC | Age: 55
End: 2020-12-22

## 2021-01-04 ENCOUNTER — AMBULATORY - HEALTHEAST (OUTPATIENT)
Dept: LAB | Facility: CLINIC | Age: 56
End: 2021-01-04

## 2021-01-04 DIAGNOSIS — G89.29 CHRONIC PAIN OF LEFT KNEE: ICD-10-CM

## 2021-01-04 DIAGNOSIS — M25.562 CHRONIC PAIN OF LEFT KNEE: ICD-10-CM

## 2021-01-04 DIAGNOSIS — M25.511 CHRONIC PAIN OF BOTH SHOULDERS: ICD-10-CM

## 2021-01-04 DIAGNOSIS — M25.512 CHRONIC PAIN OF BOTH SHOULDERS: ICD-10-CM

## 2021-01-04 DIAGNOSIS — M15.9 OSTEOARTHRITIS OF MULTIPLE JOINTS, UNSPECIFIED OSTEOARTHRITIS TYPE: ICD-10-CM

## 2021-01-04 DIAGNOSIS — M79.7 FIBROMYALGIA: ICD-10-CM

## 2021-01-04 DIAGNOSIS — G89.29 CHRONIC PAIN OF BOTH SHOULDERS: ICD-10-CM

## 2021-01-04 LAB
ALT SERPL W P-5'-P-CCNC: 32 U/L (ref 0–45)
AST SERPL W P-5'-P-CCNC: 26 U/L (ref 0–40)
CREAT SERPL-MCNC: 0.81 MG/DL (ref 0.6–1.1)
GFR SERPL CREATININE-BSD FRML MDRD: >60 ML/MIN/1.73M2

## 2021-01-11 ENCOUNTER — AMBULATORY - HEALTHEAST (OUTPATIENT)
Dept: ONCOLOGY | Facility: CLINIC | Age: 56
End: 2021-01-11

## 2021-01-11 DIAGNOSIS — Z17.0 MALIGNANT NEOPLASM OF UPPER-OUTER QUADRANT OF RIGHT BREAST IN FEMALE, ESTROGEN RECEPTOR POSITIVE (H): ICD-10-CM

## 2021-01-11 DIAGNOSIS — E55.9 VITAMIN D DEFICIENCY: ICD-10-CM

## 2021-01-11 DIAGNOSIS — M80.00XS AGE-RELATED OSTEOPOROSIS WITH CURRENT PATHOLOGICAL FRACTURE, SEQUELA: ICD-10-CM

## 2021-01-11 DIAGNOSIS — Z79.811 LONG TERM (CURRENT) USE OF AROMATASE INHIBITORS: ICD-10-CM

## 2021-01-11 DIAGNOSIS — Z87.310 PERSONAL HISTORY OF (HEALED) OSTEOPOROSIS FRACTURE: ICD-10-CM

## 2021-01-11 DIAGNOSIS — Z12.31 ENCOUNTER FOR SCREENING MAMMOGRAM FOR MALIGNANT NEOPLASM OF BREAST: ICD-10-CM

## 2021-01-11 DIAGNOSIS — C50.411 MALIGNANT NEOPLASM OF UPPER-OUTER QUADRANT OF RIGHT BREAST IN FEMALE, ESTROGEN RECEPTOR POSITIVE (H): ICD-10-CM

## 2021-01-15 ENCOUNTER — COMMUNICATION - HEALTHEAST (OUTPATIENT)
Dept: FAMILY MEDICINE | Facility: CLINIC | Age: 56
End: 2021-01-15

## 2021-01-15 DIAGNOSIS — G89.29 OTHER CHRONIC PAIN: ICD-10-CM

## 2021-02-26 ENCOUNTER — RECORDS - HEALTHEAST (OUTPATIENT)
Dept: GENERAL RADIOLOGY | Facility: CLINIC | Age: 56
End: 2021-02-26

## 2021-02-26 ENCOUNTER — OFFICE VISIT - HEALTHEAST (OUTPATIENT)
Dept: FAMILY MEDICINE | Facility: CLINIC | Age: 56
End: 2021-02-26

## 2021-02-26 DIAGNOSIS — S49.92XA UNSPECIFIED INJURY OF LEFT SHOULDER AND UPPER ARM, INITIAL ENCOUNTER: ICD-10-CM

## 2021-02-26 DIAGNOSIS — S49.92XA INJURY OF SHOULDER, LEFT, INITIAL ENCOUNTER: ICD-10-CM

## 2021-02-26 DIAGNOSIS — S46.112A RUPTURE LONG HEAD BICEPS TENDON, LEFT, INITIAL ENCOUNTER: ICD-10-CM

## 2021-02-28 ENCOUNTER — COMMUNICATION - HEALTHEAST (OUTPATIENT)
Dept: FAMILY MEDICINE | Facility: CLINIC | Age: 56
End: 2021-02-28

## 2021-02-28 DIAGNOSIS — F31.9 BIPOLAR AFFECTIVE DISORDER, REMISSION STATUS UNSPECIFIED (H): ICD-10-CM

## 2021-02-28 RX ORDER — BUPROPION HYDROCHLORIDE 150 MG/1
TABLET ORAL
Qty: 90 TABLET | Refills: 3 | Status: SHIPPED | OUTPATIENT
Start: 2021-02-28 | End: 2023-01-26

## 2021-03-01 ENCOUNTER — COMMUNICATION - HEALTHEAST (OUTPATIENT)
Dept: FAMILY MEDICINE | Facility: CLINIC | Age: 56
End: 2021-03-01

## 2021-03-01 DIAGNOSIS — R05.3 CHRONIC COUGH: ICD-10-CM

## 2021-03-01 DIAGNOSIS — K21.9 GASTROESOPHAGEAL REFLUX DISEASE WITHOUT ESOPHAGITIS: ICD-10-CM

## 2021-03-01 RX ORDER — OMEPRAZOLE 40 MG/1
CAPSULE, DELAYED RELEASE ORAL
Qty: 90 CAPSULE | Refills: 3 | Status: SHIPPED | OUTPATIENT
Start: 2021-03-01 | End: 2022-02-28

## 2021-03-03 ENCOUNTER — OFFICE VISIT - HEALTHEAST (OUTPATIENT)
Dept: FAMILY MEDICINE | Facility: CLINIC | Age: 56
End: 2021-03-03

## 2021-03-03 DIAGNOSIS — J45.21 MILD INTERMITTENT ASTHMA WITH EXACERBATION: ICD-10-CM

## 2021-03-03 DIAGNOSIS — G89.4 CHRONIC PAIN SYNDROME: ICD-10-CM

## 2021-03-03 DIAGNOSIS — F31.9 BIPOLAR AFFECTIVE DISORDER, REMISSION STATUS UNSPECIFIED (H): ICD-10-CM

## 2021-03-03 DIAGNOSIS — K21.9 GASTROESOPHAGEAL REFLUX DISEASE WITHOUT ESOPHAGITIS: ICD-10-CM

## 2021-03-03 DIAGNOSIS — Z00.00 ROUTINE GENERAL MEDICAL EXAMINATION AT A HEALTH CARE FACILITY: ICD-10-CM

## 2021-03-03 LAB
CHOLEST SERPL-MCNC: 167 MG/DL
FASTING STATUS PATIENT QL REPORTED: YES
FASTING STATUS PATIENT QL REPORTED: YES
GLUCOSE BLD-MCNC: 108 MG/DL (ref 70–99)
HDLC SERPL-MCNC: 44 MG/DL
LDLC SERPL CALC-MCNC: 106 MG/DL
TRIGL SERPL-MCNC: 87 MG/DL

## 2021-03-03 ASSESSMENT — ANXIETY QUESTIONNAIRES
2. NOT BEING ABLE TO STOP OR CONTROL WORRYING: SEVERAL DAYS
5. BEING SO RESTLESS THAT IT IS HARD TO SIT STILL: NOT AT ALL
GAD7 TOTAL SCORE: 7
4. TROUBLE RELAXING: NEARLY EVERY DAY
IF YOU CHECKED OFF ANY PROBLEMS ON THIS QUESTIONNAIRE, HOW DIFFICULT HAVE THESE PROBLEMS MADE IT FOR YOU TO DO YOUR WORK, TAKE CARE OF THINGS AT HOME, OR GET ALONG WITH OTHER PEOPLE: SOMEWHAT DIFFICULT
6. BECOMING EASILY ANNOYED OR IRRITABLE: SEVERAL DAYS
1. FEELING NERVOUS, ANXIOUS, OR ON EDGE: SEVERAL DAYS
7. FEELING AFRAID AS IF SOMETHING AWFUL MIGHT HAPPEN: NOT AT ALL
3. WORRYING TOO MUCH ABOUT DIFFERENT THINGS: SEVERAL DAYS

## 2021-03-03 ASSESSMENT — PATIENT HEALTH QUESTIONNAIRE - PHQ9: SUM OF ALL RESPONSES TO PHQ QUESTIONS 1-9: 18

## 2021-03-03 ASSESSMENT — MIFFLIN-ST. JEOR: SCORE: 1910.38

## 2021-03-04 ENCOUNTER — COMMUNICATION - HEALTHEAST (OUTPATIENT)
Dept: PULMONOLOGY | Facility: OTHER | Age: 56
End: 2021-03-04

## 2021-03-05 ENCOUNTER — AMBULATORY - HEALTHEAST (OUTPATIENT)
Dept: PULMONOLOGY | Facility: OTHER | Age: 56
End: 2021-03-05

## 2021-03-05 DIAGNOSIS — J32.9 CHRONIC SINUSITIS, UNSPECIFIED LOCATION: ICD-10-CM

## 2021-03-05 DIAGNOSIS — R05.3 CHRONIC COUGH: ICD-10-CM

## 2021-03-09 ENCOUNTER — OFFICE VISIT - HEALTHEAST (OUTPATIENT)
Dept: PULMONOLOGY | Facility: OTHER | Age: 56
End: 2021-03-09

## 2021-03-09 DIAGNOSIS — R05.3 CHRONIC COUGH: ICD-10-CM

## 2021-03-09 DIAGNOSIS — J32.9 CHRONIC SINUSITIS, UNSPECIFIED LOCATION: ICD-10-CM

## 2021-03-09 RX ORDER — MONTELUKAST SODIUM 10 MG/1
10 TABLET ORAL AT BEDTIME
Qty: 90 TABLET | Refills: 3 | Status: SHIPPED | OUTPATIENT
Start: 2021-03-09 | End: 2022-03-21

## 2021-03-09 RX ORDER — GABAPENTIN 800 MG/1
2 TABLET ORAL AT BEDTIME
Status: SHIPPED | COMMUNITY
Start: 2021-02-19 | End: 2023-02-07

## 2021-03-09 RX ORDER — GABAPENTIN 100 MG/1
2 CAPSULE ORAL
Status: SHIPPED | COMMUNITY
Start: 2021-01-27 | End: 2022-05-23

## 2021-03-09 RX ORDER — TRAZODONE HYDROCHLORIDE 100 MG/1
2 TABLET ORAL AT BEDTIME
Status: SHIPPED | COMMUNITY
Start: 2020-12-20 | End: 2023-02-07

## 2021-03-23 ENCOUNTER — OFFICE VISIT - HEALTHEAST (OUTPATIENT)
Dept: RHEUMATOLOGY | Facility: CLINIC | Age: 56
End: 2021-03-23

## 2021-03-23 ENCOUNTER — COMMUNICATION - HEALTHEAST (OUTPATIENT)
Dept: RHEUMATOLOGY | Facility: CLINIC | Age: 56
End: 2021-03-23

## 2021-04-02 ENCOUNTER — HOSPITAL ENCOUNTER (OUTPATIENT)
Dept: MAMMOGRAPHY | Facility: CLINIC | Age: 56
Discharge: HOME | End: 2021-04-02
Attending: INTERNAL MEDICINE

## 2021-04-02 DIAGNOSIS — Z17.0 MALIGNANT NEOPLASM OF UPPER-OUTER QUADRANT OF RIGHT BREAST IN FEMALE, ESTROGEN RECEPTOR POSITIVE (H): ICD-10-CM

## 2021-04-02 DIAGNOSIS — C50.411 MALIGNANT NEOPLASM OF UPPER-OUTER QUADRANT OF RIGHT BREAST IN FEMALE, ESTROGEN RECEPTOR POSITIVE (H): ICD-10-CM

## 2021-04-02 DIAGNOSIS — Z12.31 ENCOUNTER FOR SCREENING MAMMOGRAM FOR MALIGNANT NEOPLASM OF BREAST: ICD-10-CM

## 2021-04-05 ENCOUNTER — RECORDS - HEALTHEAST (OUTPATIENT)
Dept: BONE DENSITY | Facility: CLINIC | Age: 56
End: 2021-04-05

## 2021-04-05 ENCOUNTER — HOSPITAL ENCOUNTER (OUTPATIENT)
Dept: ULTRASOUND IMAGING | Facility: CLINIC | Age: 56
Discharge: HOME | End: 2021-04-05
Attending: FAMILY MEDICINE

## 2021-04-05 ENCOUNTER — RECORDS - HEALTHEAST (OUTPATIENT)
Dept: ADMINISTRATIVE | Facility: OTHER | Age: 56
End: 2021-04-05

## 2021-04-05 DIAGNOSIS — E55.9 VITAMIN D DEFICIENCY, UNSPECIFIED: ICD-10-CM

## 2021-04-05 DIAGNOSIS — M80.00XS AGE-RELATED OSTEOPOROSIS WITH CURRENT PATHOLOGICAL FRACTURE, UNSPECIFIED SITE, SEQUELA: ICD-10-CM

## 2021-04-05 DIAGNOSIS — C50.411 MALIGNANT NEOPLASM OF UPPER-OUTER QUADRANT OF RIGHT FEMALE BREAST (H): ICD-10-CM

## 2021-04-05 DIAGNOSIS — Z87.310 PERSONAL HISTORY OF (HEALED) OSTEOPOROSIS FRACTURE: ICD-10-CM

## 2021-04-05 DIAGNOSIS — S46.112A RUPTURE LONG HEAD BICEPS TENDON, LEFT, INITIAL ENCOUNTER: ICD-10-CM

## 2021-04-05 DIAGNOSIS — Z79.811 LONG TERM (CURRENT) USE OF AROMATASE INHIBITORS: ICD-10-CM

## 2021-04-05 DIAGNOSIS — Z17.0 ESTROGEN RECEPTOR POSITIVE STATUS (ER+): ICD-10-CM

## 2021-04-07 ENCOUNTER — OFFICE VISIT - HEALTHEAST (OUTPATIENT)
Dept: ONCOLOGY | Facility: CLINIC | Age: 56
End: 2021-04-07

## 2021-04-07 DIAGNOSIS — C50.411 MALIGNANT NEOPLASM OF UPPER-OUTER QUADRANT OF RIGHT BREAST IN FEMALE, ESTROGEN RECEPTOR POSITIVE (H): ICD-10-CM

## 2021-04-07 DIAGNOSIS — Z12.31 ENCOUNTER FOR SCREENING MAMMOGRAM FOR MALIGNANT NEOPLASM OF BREAST: ICD-10-CM

## 2021-04-07 DIAGNOSIS — Z17.0 MALIGNANT NEOPLASM OF UPPER-OUTER QUADRANT OF RIGHT BREAST IN FEMALE, ESTROGEN RECEPTOR POSITIVE (H): ICD-10-CM

## 2021-04-16 ENCOUNTER — COMMUNICATION - HEALTHEAST (OUTPATIENT)
Dept: FAMILY MEDICINE | Facility: CLINIC | Age: 56
End: 2021-04-16

## 2021-04-16 DIAGNOSIS — G89.29 OTHER CHRONIC PAIN: ICD-10-CM

## 2021-04-30 ENCOUNTER — AMBULATORY - HEALTHEAST (OUTPATIENT)
Dept: NURSING | Facility: CLINIC | Age: 56
End: 2021-04-30

## 2021-05-17 ENCOUNTER — COMMUNICATION - HEALTHEAST (OUTPATIENT)
Dept: PHYSICAL MEDICINE AND REHAB | Facility: CLINIC | Age: 56
End: 2021-05-17

## 2021-05-17 ENCOUNTER — HOSPITAL ENCOUNTER (OUTPATIENT)
Dept: PHYSICAL MEDICINE AND REHAB | Facility: CLINIC | Age: 56
Discharge: HOME | End: 2021-05-17
Attending: PHYSICIAN ASSISTANT

## 2021-05-17 DIAGNOSIS — M54.16 LUMBAR RADICULITIS: ICD-10-CM

## 2021-05-17 DIAGNOSIS — M47.816 LUMBAR FACET ARTHROPATHY: ICD-10-CM

## 2021-05-17 DIAGNOSIS — M54.6 PAIN IN THORACIC SPINE: ICD-10-CM

## 2021-05-17 DIAGNOSIS — M54.2 CERVICALGIA: ICD-10-CM

## 2021-05-17 DIAGNOSIS — M47.812 ARTHROPATHY OF CERVICAL FACET JOINT: ICD-10-CM

## 2021-05-17 RX ORDER — PREDNISONE 20 MG/1
TABLET ORAL
Qty: 15 TABLET | Refills: 0 | Status: SHIPPED | OUTPATIENT
Start: 2021-05-17 | End: 2022-05-23

## 2021-05-17 ASSESSMENT — MIFFLIN-ST. JEOR: SCORE: 1883.16

## 2021-05-21 ENCOUNTER — AMBULATORY - HEALTHEAST (OUTPATIENT)
Dept: NURSING | Facility: CLINIC | Age: 56
End: 2021-05-21

## 2021-05-25 ENCOUNTER — RECORDS - HEALTHEAST (OUTPATIENT)
Dept: ADMINISTRATIVE | Facility: CLINIC | Age: 56
End: 2021-05-25

## 2021-05-26 ENCOUNTER — RECORDS - HEALTHEAST (OUTPATIENT)
Dept: ADMINISTRATIVE | Facility: CLINIC | Age: 56
End: 2021-05-26

## 2021-05-27 ENCOUNTER — COMMUNICATION - HEALTHEAST (OUTPATIENT)
Dept: PHYSICAL MEDICINE AND REHAB | Facility: CLINIC | Age: 56
End: 2021-05-27

## 2021-05-27 VITALS — SYSTOLIC BLOOD PRESSURE: 140 MMHG | DIASTOLIC BLOOD PRESSURE: 80 MMHG | HEART RATE: 84 BPM

## 2021-05-27 VITALS — HEIGHT: 69 IN | BODY MASS INDEX: 40.29 KG/M2 | WEIGHT: 272 LBS

## 2021-05-27 ASSESSMENT — PATIENT HEALTH QUESTIONNAIRE - PHQ9
SUM OF ALL RESPONSES TO PHQ QUESTIONS 1-9: 20
SUM OF ALL RESPONSES TO PHQ QUESTIONS 1-9: 18

## 2021-05-27 NOTE — PROGRESS NOTES
Assessment:   Emani Vargas is a 54 y.o. y.o. female with past medical history significant for vitamin D deficiency, chronic pain, bipolar disorder, insomnia mild intermittent asthma, breast cancer status post lumpectomy and radiation), COPD, obesity who presents today for follow-up regarding 3 areas of pain.  1.  Chronic neck pain with associated headaches.  X-ray cervical spine shows moderate diffuse facet arthropathy.  Patient also reports left greater than right upper extremity paresthesias.  This may be related to carpal tunnel syndrome.  2.  A 3-month history of midline mid back pain at approximately T8 without thoracic radicular pain.  Patient has not had any imaging of the thoracic spine.  Somewhat concerning is a history of an L1 compression fracture with no known trauma.  Patient did have a recent DEXA scan which was normal.  3.  Chronic bilateral low back pain with coccydynia.  MRI lumbar spine from 2016 showed mild multilevel lumbar spondylitic change with mild to moderate central canal stenosis at L3-4 and L4-5 with multilevel facet arthropathy.  Patient also had x-rays of the sacroiliac joints which showed mild to moderate degenerative changes at these joints.       Plan:     A shared decision making plan was used.  The patient's values and choices were respected.  The following represents what was discussed and decided upon by the physician assistant and the patient.      1.  DIAGNOSTIC TESTS: I reviewed the x-ray sacroiliac joints, x-ray cervical spine, and MRI lumbar spine.  I ordered MRI scans of the cervical, thoracic, and lumbar spine for further evaluation.  -If I cannot  Upper extremity paresthesias by the MRI cervical spine, will recommend an EMG of bilateral upper cavities for further evaluation.    2.  PHYSICAL THERAPY: Entered a referral for the patient begin pool therapy at [facility].  Patient did physical therapy most recently in October 2016 with some relief.  This was land therapy.   I think she will be a better candidate for full therapy.    3.  MEDICATIONS: No changes are made to the patient's medications.  She uses Tylenol 1000 mg as needed, gabapentin 800 mg in the morning and 1600 mg at bedtime 500 mg as needed, and diclofenac gel.  Patient also admits to using some leftover oxycodone sparingly for pain.  I told the patient I recommend avoiding opiates.  Patient does not tolerate NSAIDs due to stomach irritation.    4.  INTERVENTIONS: No interventions were ordered.  Patient may benefit from interventional pain management she fails to improve conservative treatment, depending on the results of her MRI scans.    5.  PATIENT EDUCATION:    -I offered a referral to behavioral health.  Patient declined.  -I recommended that the patient eliminate dairy, sugar, and gluten from her diet and try an anti-inflammatory diet).  -Patient asked if I thought she would benefit from chiropractic manipulation.  I said that I think it can be helpful adjunct in treating chronic pain.    6.  FOLLOW-UP: A nurse will call the patient with results of her imaging studies.  At that time I will likely recommend that she continue with pool therapy and follow-up with me in 4 weeks.  If she has any questions or concerns in the meantime, she should not hesitate to call.    Subjective:     Emani Vargas is a 54 y.o. female who presents today for follow-up regarding 3 areas of pain.  She is referred back to our clinic by her rheumatologist.  Patient was last seen in our clinic in February 2018 by Giselle Londono PA-C.    Patient complains first of neck pain.  This been a chronic issue.  Patient complains of midline neck pain which extends from the craniocervical junction to the cervicothoracic junction.  Patient reports that she has headaches associated with the neck pain.  She denies any pain radiating into the shoulders or down the arms.  She does complain of intermittent numbness and tingling involving digits 2 through 4  bilaterally, left greater than right.  She does wake up with numbness in her hands.  The numbness and tingling also comes and goes throughout the day.  She feels weakness in her hands with grasp.    Patient complains next of mid back pain.  This pain just began 3 months ago.  She cannot think of any injury or event to cause the pain.  It is located at the midline between the shoulder blades, just above her bra line.  This pain comes and goes, but when it is there it is sharp.  She also feels pain in the sternal area.  She thinks it comes through from the back to the sternum.  Denies pain wrapping around the chest wall.    Patient also complains of chronic low back pain.  This is centralized in the lower lumbar region and extends down through the sacrum to the coccyx.  In particular, the tailbone pain is getting worse.  Back pain is aggravated with prolonged standing.  She finds that if she stands for more than 3 hours throughout the day, her pain is much worse.  She denies pain radiating down the legs.  She states that she has intermittent numbness and tingling involving both legs.  She states that it feels like the whole leg goes numb if she sits in certain positions.  The left leg tends to be more severely affected than the right.  Patient states that she has chronic urinary incontinence due to urgency and then not being able to get to the bathroom in time.  This is stable.  She denies loss of bowel control.    Overall, patient rates her pain today is a 4 out of 10.  At its worst it is a 9 out of 10.  At its best it is a 3 out of 10.  In general, her pain is more severe with increased activity but is also worse if she does not move enough.  She states that she feels like she stiffens up if she does not move enough.    Patient did physical therapy for her lower back in 2016 with some relief.  She tried chiropractic treatment 2 years ago with temporary relief.  She had lumbar facet joint injections in 2016 which did  not provide any relief.  She thinks she may have had relief for the first day of the injection.  She has not had any spine surgery.  Patient uses Tylenol 1000 mg as needed.  She takes gabapentin 800 mg in the morning and 60 mg at bedtime.  She uses diclofenac gel as needed.  She uses methocarbamol 500 mg about every other day.  She also admits to using some leftover oxycodone sparingly for days when her pain is severe.    Past medical history is reviewed and is pertinent for seeing rheumatology.  She gets injections in her shoulders and knees as needed.    Review of Systems:  Positive for numbness/tingling, weakness, loss of bladder control, headache, night pain, difficulty swallowing, difficulty with hand skills.  Negative loss of bowel control, inability to urinate, trips, fevers, unintentional weight loss.     Objective:   CONSTITUTIONAL:  Vital signs as above.  No acute distress.  The patient is well nourished and well groomed.    PSYCHIATRIC:  The patient is awake, alert, oriented to person, place and time.  The patient is answering questions appropriately with clear speech.  Normal affect.  HEENT: Normocephalic, atraumatic.  Sclera clear.  Neck is supple.  SKIN:  Skin over the face, neck bilateral upper extremities is clean, dry, intact without rashes.  MUSCULOSKELETAL: Ambulates with an antalgic gait.  Able to ablate on toes and heels bilaterally.  The patient has 5/5 strength for the bilateral shoulder abductors, elbow flexors/extensors, wrist extensors, finger flexors/abductors, hip flexors, knee flexors/extensors, ankle dorsi/plantar flexors, EHL.  Tender to palpation cervical spinous processes.  Tender palpation mid thoracic spinous processes.  Tender palpation bilateral lower lumbar paraspinous muscles and coccyx.  Range of motion is restricted with flexion and lateral rotation left.  Lumbar flexion intact.  Lumbar extension significantly restricted.  Lumbar facet loading maneuvers increase low back pain  bilaterally.  NEUROLOGICAL: 1-2+ biceps, brachioradialis, triceps, patellar, and Achilles reflexes bilaterally.  Negative Ulloa's bilaterally.  No ankle clonus.  Negative Babinski's bilaterally.  Sensation to light touch is subjectively diminished left greater than right fingers 2 through 4.  Sensation light touch is intact over bilateral lower extremities throughout.  Negative Tinel sign bilateral carpal tunnel.  Negative Phalen's sign bilaterally.    RESULTS:    I reviewed the MRI lumbar spine [facility] dated July 2, 2016.  This showed an acute or subacute superior endplate compression fracture of L1 vertebral body with 30% height loss.  There is developmental narrowing of the lumbar spinal canal with mild multilevel lumbar spondylitic change.  This results in mild to moderate central canal stenosis at L3-4 and L4-5.    XR SACROILIAC JOINTS 3 OR MORE VWS   7/30/2018 4:01 PM     INDICATION: Lumbago with sciatica, left side  COMPARISON: None.     FINDINGS: Mild to moderate degenerative changes involving the bilateral SI joints. Bilateral total hip replacements. No sacral fracture.      XR CERVICAL SPINE 2 - 3 VWS4/26/2016 2:54 PMINDICATION: Dorsalgia, unspecifiedCOMPARISON: None.FINDINGS:  Normal cervical lordosis. Mild left convex mid cervical asymmetry. Vertebral body heights are normal. Mild disc space narrowing at C3-4 and mild to   moderate disc space narrowing C5-6. Mild ventral spurring at C5-6. Moderate diffuse facet arthropathy. No significant superimposed bony finding. No prevertebral or posterior soft tissue swelling.This report was electronically interpreted by: Dr. Sam Dunlap MD ON 04/27/2016 at 13:18

## 2021-05-27 NOTE — PATIENT INSTRUCTIONS - HE
Summary of Your Rheumatology Visit    Next Appointment:  6 Months    Medications:  .  Please follow directives on Voltaren tube on how to utilize.      Referrals:    Spine clinic    Tests:     Please have labs and x-rays that were ordered performed.          Injections:  As discussed, if necessary you can contact us and schedule an injection visit for pains involving the following regions: left knee    Other:    Recommend looking into obtaining a paraffin wax machine for hand pains.

## 2021-05-27 NOTE — PROGRESS NOTES
"Emani Vargas who presents today with a chief complaint of  Osteoarthritis (follow up)      Joint Pains:  Yes  Location: neck, shoulders, tail bone  Onset: 3-4 wks for neck, shoulders and tail bone many yrs  Intensity: 5-6 /10  AM Stiffness: few Minutes  Alleviating/Aggravating Factors:some relief from current meds.  Tolerating Meds: not tramadol (nausea)  Other:      ROS:  Patient  Has some chest pain/shortness of breath/cough managed by Pulmonary, no: abdominal pain, + nausea \"all the time\", no: vomiting, rashes, fevers, oral ulcers and recent infections.  Patient admits to having a fair appetite      Problem List:  Patient Active Problem List   Diagnosis     Vitamin D Deficiency     Endometriosis     Chronic Pain     Insomnia     Bipolar Disorder     Hearing Loss     Osteoarthrosis, unspecified whether generalized or localized, pelvic region and thigh     Allergic rhinitis     Mild intermittent asthma without complication     Compression fracture of vertebral column with routine healing     Malignant neoplasm of upper-outer quadrant of right breast in female, estrogen receptor positive (H)     Refusal of blood transfusions as patient is Bahai     Osteoporosis     Aromatase inhibitor use     Knee osteoarthritis     Chronic obstructive pulmonary disease, unspecified COPD type (H)     Bipolar affective disorder, remission status unspecified (H)     Chronic pain syndrome     Morbid obesity (H)        PMH:   Past Medical History:   Diagnosis Date     Allergic rhinitis      Asthma      Bipolar 1 disorder (H)      Breast cancer of upper-outer quadrant of right female breast (H) 4/11/2017     Chronic pain     Back pain due to arthritis.     COPD (chronic obstructive pulmonary disease) (H)      Endometriosis      Fibroid uterus      Hx of radiation therapy 2017     Insomnia      Osteoarthritis      Overweight      Refusal of blood transfusions as patient is Bahai 4/12/2017     Sensorineural hearing " loss, bilateral      Vitamin D deficiency        Surgical History:  Past Surgical History:   Procedure Laterality Date     BREAST BIOPSY Right 2000's     BREAST BIOPSY Right 02/20/2017     BREAST LUMPECTOMY Right 03/10/2017    with sentinel lymph node biopsy.     KNEE ARTHROSCOPY Right 1996    Description: Arthroscopy Knee Right;  Recorded: 12/08/2011;     KNEE SURGERY Right 1997    lateral release     LAPAROSCOPIC ENDOMETRIOSIS FULGURATION  2002     ID TOTAL KNEE ARTHROPLASTY Right 8/28/2017    Procedure: RIGHT TOTAL KNEE ARTHROPLASTY;  Surgeon: Antony Elias MD;  Location: [facility] OR;  Service: Orthopedics     TOTAL HIP ARTHROPLASTY N/A 9/24/2014    Procedure: LEFT HIP TOTAL ARTHROPLASTY;  Surgeon: Antony Elias MD;  Location: [facility] OR;  Service:      TOTAL HIP ARTHROPLASTY Right 8/10/2015    Procedure: HIP TOTAL ARTHROPLASTY RIGHT;  Surgeon: Antony Elias MD;  Location: [facility] OR;  Service:        Family History:  Family History   Problem Relation Age of Onset     Depression Mother      COPD Mother      Diabetes Father      Skin cancer Father 45     Colon cancer Maternal Grandmother      Colon cancer Maternal Grandfather      Diabetes Paternal Grandmother      Colon cancer Paternal Grandmother 80     Breast cancer Cousin 48        Paternal side.     Testicular cancer Brother 44     Lung cancer Paternal Aunt 60     Anesthesia problems Neg Hx        Social History:   reports that she quit smoking about 34 years ago. She has a 7.00 pack-year smoking history. She has never used smokeless tobacco. She reports that she drinks alcohol. She reports that she does not use drugs.    Allergies:  Allergies   Allergen Reactions     Cat/Feline Products Itching     Egg Derived      Flu like symptoms--pain     Trees Other (See Comments)     Grass, itching        Current Medications:  Current Outpatient Medications   Medication Sig Dispense Refill     acetaminophen (TYLENOL) 500 MG  "tablet Take 1,000 mg by mouth every 6 (six) hours as needed for pain.       albuterol (PROVENTIL HFA;VENTOLIN HFA) 90 mcg/actuation inhaler Inhale 2 puffs every 6 (six) hours as needed for wheezing. 2 Inhaler 0     buPROPion (WELLBUTRIN XL) 150 MG 24 hr tablet Take 1 tablet (150 mg total) by mouth every morning. 90 tablet 3     cetirizine (ZYRTEC) 10 MG tablet Take 10 mg by mouth 2 (two) times a day.              cholecalciferol, vitamin D3, 1,000 unit tablet Take 2,000 Units by mouth daily.       dextroamphetamine-amphetamine 20 mg Tab Take 1 tablet by mouth 2 (two) times a day.  0     FLUoxetine (PROZAC) 20 MG capsule Take 20 mg by mouth daily.       fluticasone (FLONASE ALLERGY RELIEF) 50 mcg/actuation nasal spray One spray in each nostril daily 16 g 12     gabapentin (NEURONTIN) 400 MG capsule 2400 mg daily  11     letrozole (FEMARA) 2.5 mg tablet Take 1 tablet (2.5 mg total) by mouth daily. 90 tablet 3     lidocaine (XYLOCAINE) 5 % ointment Apply to the affected area BID prn for pain. 35.44 g 1     magnesium citrate solution Take 296 mL by mouth daily as needed.       methocarbamol (ROBAXIN) 500 MG tablet Take 1 tablet (500 mg total) by mouth 3 (three) times a day. As needed 90 tablet 6     montelukast (SINGULAIR) 10 mg tablet Take 1 tablet (10 mg total) by mouth at bedtime. 30 tablet 11     omeprazole (PRILOSEC) 40 MG capsule Take 1 capsule (40 mg total) by mouth daily before breakfast. 30 capsule 11     traMADol (ULTRAM) 50 mg tablet Take 1-2 tab po q6hr prn for pain. 1 tab for pain levels of 4-6/10. 2 tabs for pain levels of 7-10/10. 90 tablet 1     traZODone (DESYREL) 100 MG tablet TAKE 1/2 TO 1 TABLET BY MOUTH NIGHTLY AT BEDTIME. 90 tablet 3     No current facility-administered medications for this visit.            Physical Exam:  /86 (Patient Site: Left Arm, Patient Position: Sitting, Cuff Size: Adult Large)   Pulse 88   Ht 5' 10\" (1.778 m)   Wt (!) 269 lb 8 oz (122.2 kg)   LMP 08/10/2012   " "BMI 38.67 kg/m    General: A & O x 3 in NAD, overweight.  HEENT: EOMI, Non injected/non icteric sclera, no oral lesions noted  CV: s1s2 with RRR, no rubs appreciated   Lungs: CTA B/L, no wheezing , rales or rhonci appreciated  GI: Soft, NT/ND, no rebound, no guarding noted  MS: Hypertrophic changes noted involving hand joints.  Positive discomfort involving left shoulder on passive/active range of motion testing.  Mild discomfort of left subacromial bursa, no discomfort over left bicipital tendon.  Has some difficulty with full extension of left elbow, no synovitis noted.  Positive discomfort involving left knee on passive flexion/extension, no clear signs of synovitis noted.  Otherwise patient demonstrated good passive/active ROM over other joints with no warmth, erythema, tenderness or synovitis noted over these joints.      Summary/Assessment:    History that includes osteoarthritis, fibromyalgia, chronic low back pain.    Presents for a follow-up visit.    Lately having pains involving neck, left shoulder and left knee.    For years has been experiencing difficulty with full extension of left elbow, attributes to multiple falls in the past however cannot recall any specific incident.    Denies trauma contributing to neck pains.  Neck pain is nonradiating.    Has chronic pains involving \"tailbone\"'.  Managed by spine clinic in the past with injections.    Current combination of medications provides relief.    Did not tolerate tramadol well contributing to worsening nausea.    Cortisone injections provided on past visit involving right shoulder left knee were helpful.  However defers on having repeat left knee injection does desire a left shoulder cortisone injection.    No clear signs of synovitis noted on exam today.    Please see below for management plan.      Pertinent rheumatology/past medical history (please refer to above for more detailed history):      Osteoarthritis    Fibromyalgia    Chronic shoulder " pains (likely rotator cuff tendinopathy)    Chronic low back pain    Neck pain    History bilateral hip replacements    Chronic left knee pain (s/p cortisone injection)    History right knee replacement    History of vertebral compression fracture    History right breast cancer    History COPD/chronic bronchitis    Overweight    Rheumatology medications provided/suggested:          Pertinent medication from other providers or from otc (please refer to above for more detailed med list):    Tylenol  Neurontin  Trazodone  Robaxin  Prozac  Letrozole (MSK pain present prior to initiation)      Pertinent medications already tried:     Tylenol (pruritus)  NSAIDs (upset stomach)  Tramadol (worsening nausea)  Lidoderm patches (ineffective)     Pertinent lab history:    Noted to have negative/unremarkable: Rheumatoid factor, CCP antibody, CONSTANTINE, Lyme antibody, HLA-B27.    Mildly elevated CRP, normal ESR.    Pertinent imaging/test history:      X-rays of right shoulder from May 2018 showed:  Very minimal degenerative irregularity at the rotator cuff insertion site. Glenohumeral joint appears normal. No fracture or dislocation.     Other:    Was a  for over 25 years.    Plan:      We will inject left shoulder with cortisone today.    Deferred repeat left knee with cortisone today.    Takes Tylenol with some benefit.    Receives benefit from combination of Neurontin, trazodone, Robaxin prescribed by other providers.    We will x-ray left elbow.    Will refer to spine clinic for chronic pains involving: SI joints, coccyx and worsening neck pain.    Suggested looking into obtaining a paraffin wax machine for hand pains.    We will provide Voltaren gel for hand and left elbow pain as needed.    We will check labs today.    Follow-up in 6 months.      Procedure note:      Consent to treat form signed by the patient.  Patient's left shoulder joint was prepped in a sterile manner with an alcohol swab and ChloraPrep applicator.   Injected Kenalog 40 mg 1:1 with lidocaine into left shoulder joint.  Patient tolerated the procedure well with no complications noted.  Patient was advised to place ice over injection sites if sore over the next 24-48 hours. Emiliana CASTORENA assisted with procedure.    Post injections lungs were clear to auscultation.      Spent 40  minutes with greater than half of this time spent with the patient going over differential diagnosis, prognosis, treatment plan, medication side effects and  answering questions.    Above time noted, does not include time involved with procedure(s).      This note was transcribed using Dragon voice recognition software as a result unintentional grammatical errors or word substitutions may have occurred. Please contact our Rheumatology department if you need any clarification or if you have any related inquiries.    Major side effect profile of medications provided were discussed with the patient.      Kilo Ramon ....................  4/10/2019   2:32 PM

## 2021-05-27 NOTE — PROGRESS NOTES
ASSESSMENT/PLAN:  Chronic Pain  Increase methocarbamol to 500 mg 3 times a day as needed to treat her leg pain, back pain, neck pain.  Follow-up if she has further questions or concerns.  Patient verbalized understanding and agreed with the plan  -     methocarbamol (ROBAXIN) 500 MG tablet; Take 1 tablet (500 mg total) by mouth 3 (three) times a day. As needed    Hemoptysis  Pulmonology is following  Stable at this time    SUBJECTIVE:    Emani Vargas is a 54 y.o. female who came in today for medication check.  She takes methocarbamol for chronic leg pain, back pain, neck pain.  She has been taking 500 mg daily as needed.  She finds that this dose is not enough for her.  She is asking for an increase in dose.  She is also taking gabapentin, tramadol as needed, and applying lidocaine ointment as needed.    The patient was seen recently for hemoptysis.  I referred her to pulmonology.  The notes were available for review.  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  She will be undergoing a pulmonary function test.  She is tolerating Flonase and Singulair.  Omeprazole was increased to 40 mg.  Patient has not had any further hemoptysis episodes    Review of Systems (except those mentioned above)  Constitutional: Negative.   HENT: Negative.   Eyes: Negative.   Respiratory: Negative.   Cardiovascular: Negative.   Gastrointestinal: Negative.   Endocrine: Negative.   Genitourinary: Negative.   Musculoskeletal: Negative.   Skin: Negative.   Allergic/Immunologic: Negative.   Neurological: Negative.   Hematological: Negative.   Psychiatric/Behavioral: Negative.     Patient Active Problem List    Diagnosis Date Noted     Morbid obesity (H) 08/15/2018     Knee osteoarthritis 08/28/2017     Chronic obstructive pulmonary disease, unspecified COPD type (H)      Bipolar affective disorder, remission status unspecified (H)      Chronic pain syndrome      Osteoporosis  05/05/2017     Aromatase inhibitor use 05/05/2017     Refusal of blood transfusions as patient is Tenriism 04/12/2017     Malignant neoplasm of upper-outer quadrant of right breast in female, estrogen receptor positive (H) 04/11/2017     Compression fracture of vertebral column with routine healing 09/09/2016     Mild intermittent asthma without complication 02/24/2015     Osteoarthrosis, unspecified whether generalized or localized, pelvic region and thigh 09/24/2014     Allergic rhinitis      Vitamin D Deficiency      Endometriosis      Chronic Pain      Insomnia      Bipolar Disorder      Hearing Loss      Allergies   Allergen Reactions     Cat/Feline Products Itching     Egg Derived      Flu like symptoms--pain     Trees Other (See Comments)     Grass, itching     Current Outpatient Medications   Medication Sig Dispense Refill     acetaminophen (TYLENOL) 500 MG tablet Take 1,000 mg by mouth every 6 (six) hours as needed for pain.       albuterol (PROVENTIL HFA;VENTOLIN HFA) 90 mcg/actuation inhaler Inhale 2 puffs every 6 (six) hours as needed for wheezing. 2 Inhaler 0     buPROPion (WELLBUTRIN XL) 150 MG 24 hr tablet Take 1 tablet (150 mg total) by mouth every morning. 90 tablet 3     cetirizine (ZYRTEC) 10 MG tablet Take 10 mg by mouth 2 (two) times a day.              cholecalciferol, vitamin D3, 1,000 unit tablet Take 2,000 Units by mouth daily.       dextroamphetamine-amphetamine 20 mg Tab Take 1 tablet by mouth 2 (two) times a day.  0     FLUoxetine (PROZAC) 20 MG capsule Take 20 mg by mouth daily.       fluticasone (FLONASE ALLERGY RELIEF) 50 mcg/actuation nasal spray One spray in each nostril daily 16 g 12     gabapentin (NEURONTIN) 400 MG capsule 2400 mg daily  11     letrozole (FEMARA) 2.5 mg tablet Take 1 tablet (2.5 mg total) by mouth daily. 90 tablet 3     lidocaine (XYLOCAINE) 5 % ointment Apply to the affected area BID prn for pain. 35.44 g 1     magnesium citrate solution Take 296 mL by  mouth daily as needed.       methocarbamol (ROBAXIN) 500 MG tablet Take 1 tablet (500 mg total) by mouth 3 (three) times a day. As needed 90 tablet 6     montelukast (SINGULAIR) 10 mg tablet Take 1 tablet (10 mg total) by mouth at bedtime. 30 tablet 11     omeprazole (PRILOSEC) 40 MG capsule Take 1 capsule (40 mg total) by mouth daily before breakfast. 30 capsule 11     traMADol (ULTRAM) 50 mg tablet Take 1-2 tab po q6hr prn for pain. 1 tab for pain levels of 4-6/10. 2 tabs for pain levels of 7-10/10. 90 tablet 1     traZODone (DESYREL) 100 MG tablet TAKE 1/2 TO 1 TABLET BY MOUTH NIGHTLY AT BEDTIME. 90 tablet 3     No current facility-administered medications for this visit.      Past Medical History:   Diagnosis Date     Allergic rhinitis      Asthma      Bipolar 1 disorder (H)      Breast cancer of upper-outer quadrant of right female breast (H) 4/11/2017     Chronic pain     Back pain due to arthritis.     COPD (chronic obstructive pulmonary disease) (H)      Endometriosis      Fibroid uterus      Hx of radiation therapy 2017     Insomnia      Osteoarthritis      Overweight      Refusal of blood transfusions as patient is Mu-ism 4/12/2017     Sensorineural hearing loss, bilateral      Vitamin D deficiency      Past Surgical History:   Procedure Laterality Date     BREAST BIOPSY Right 2000's     BREAST BIOPSY Right 02/20/2017     BREAST LUMPECTOMY Right 03/10/2017    with sentinel lymph node biopsy.     KNEE ARTHROSCOPY Right 1996    Description: Arthroscopy Knee Right;  Recorded: 12/08/2011;     KNEE SURGERY Right 1997    lateral release     LAPAROSCOPIC ENDOMETRIOSIS FULGURATION  2002     DE TOTAL KNEE ARTHROPLASTY Right 8/28/2017    Procedure: RIGHT TOTAL KNEE ARTHROPLASTY;  Surgeon: Antony Elias MD;  Location: [facility];  Service: Orthopedics     TOTAL HIP ARTHROPLASTY N/A 9/24/2014    Procedure: LEFT HIP TOTAL ARTHROPLASTY;  Surgeon: Antony Elias MD;  Location: [facility]  Main OR;  Service:      TOTAL HIP ARTHROPLASTY Right 8/10/2015    Procedure: HIP TOTAL ARTHROPLASTY RIGHT;  Surgeon: Antony Elias MD;  Location: [facility] Main OR;  Service:      Social History     Socioeconomic History     Marital status:      Spouse name: None     Number of children: None     Years of education: None     Highest education level: None   Occupational History     Occupation: Disabled.     Comment: Was an .   Social Needs     Financial resource strain: None     Food insecurity:     Worry: None     Inability: None     Transportation needs:     Medical: None     Non-medical: None   Tobacco Use     Smoking status: Former Smoker     Packs/day: 1.00     Years: 7.00     Pack years: 7.00     Last attempt to quit: 1985     Years since quittin.2     Smokeless tobacco: Never Used   Substance and Sexual Activity     Alcohol use: Yes     Alcohol/week: 0.0 oz     Comment: 1 drink/month     Drug use: No     Comment: past use of marijuana and hash.     Sexual activity: None   Lifestyle     Physical activity:     Days per week: None     Minutes per session: None     Stress: None   Relationships     Social connections:     Talks on phone: None     Gets together: None     Attends Sabianism service: None     Active member of club or organization: None     Attends meetings of clubs or organizations: None     Relationship status: None     Intimate partner violence:     Fear of current or ex partner: None     Emotionally abused: None     Physically abused: None     Forced sexual activity: None   Other Topics Concern     None   Social History Narrative     None     Family History   Problem Relation Age of Onset     Depression Mother      COPD Mother      Diabetes Father      Skin cancer Father 45     Colon cancer Maternal Grandmother      Colon cancer Maternal Grandfather      Diabetes Paternal Grandmother      Colon cancer Paternal Grandmother 80     Breast cancer Cousin 48         "Paternal side.     Testicular cancer Brother 44     Lung cancer Paternal Aunt 60     Anesthesia problems Neg Hx          OBJECTIVE:    Vitals:    04/09/19 1523   BP: 110/80   Patient Site: Left Arm   Patient Position: Sitting   Cuff Size: Adult Large   Pulse: 96   Temp: 97.9  F (36.6  C)   TempSrc: Oral   Weight: (!) 271 lb 8 oz (123.2 kg)   Height: 5' 10\" (1.778 m)     Body mass index is 38.96 kg/m .    Physical Exam:   Constitutional: Patient is oriented to person, place, and time. Patient appears well-developed and well-nourished. No distress.   Head: Normocephalic and atraumatic.   Right Ear: External ear normal.   Left Ear: External ear normal.   Nose: Nose normal.   Eyes: Conjunctivae and EOM are normal. Right eye exhibits no discharge. Left eye exhibits no discharge. No scleral icterus.   Neurological: Patient is alert and oriented to person, place, and time. Patient has normal reflexes. No cranial nerve deficit. Coordination normal.   Skin: No rash noted. Patient is not diaphoretic. No erythema. No pallor.          Results for orders placed or performed in visit on 03/05/19   QTF-Mycobacterium tuberculosis by QuantiFERON-TB Gold Plus   Result Value Ref Range    QTF RESULT Negative Negative    QTF INTREPRETATION       No interferon-gamma response to M. tuberculosis antigens was detected.  Infecton with M. tuberculosis is unlikely.  A negative result alone does not exclude infection with M. tuberculosis    QTF NIL 0.05 IU/mL    QTF ANTIGEN TB1-NIL -0.02 IU/mL    QTF ANTIGEN TB2 - NIL -0.03 IU/mL    QTF MITOGEN-NIL 7.71 IU/mL      "

## 2021-05-27 NOTE — TELEPHONE ENCOUNTER
pts insurance does not cover Diclofenac sodium 1% gel, is there an alternative pt can get?    Last ov- 4/10/19  Last labs- 4/10/19    Future appt- 10/14/19

## 2021-05-27 NOTE — TELEPHONE ENCOUNTER
Please call [phone] to schedule a 30 min. Hearing aid check appointment - new earmolds are in and the tubing needs to be cut to fit.

## 2021-05-27 NOTE — TELEPHONE ENCOUNTER
Pt would like to speak directly about the hearing aid concerns.  Please call her back at [phone]

## 2021-05-27 NOTE — TELEPHONE ENCOUNTER
Hearing aid is dead and out of warranty; repair would cost $75.00 through , with an additional 12-month warranty. Per insurance review 3-5-19, Ms. Vargas is also eligible for new hearing aids through her insurance carrier. A hearing aid evaluation appointment may be scheduled to discuss current hearing aid options and to start process of a new fitting. Please call [phone] to discuss options or to schedule hearing aid evaluation appointment.

## 2021-05-27 NOTE — TELEPHONE ENCOUNTER
I believe that there is an online coupon, which may reduce the cost by about $25 and may make the medication more feasible.  The tube is a large tube, which typically can last a while.    Otherwise, she can try over-the-counter Aspercreme or Mobisyl cream instead and follow directives on respective cartons/tubes on how to utilize.

## 2021-05-27 NOTE — TELEPHONE ENCOUNTER
Please inform the patient that her insurance informed us that they denied the methocarbamol prescription.  Which she be interested in an alternative such as tizanidine?

## 2021-05-27 NOTE — PROGRESS NOTES
Audiology only; hearing aid check ([facility] patient)    Ms. Vargas received new earmolds today. Otoscopy was unremarkable in either ear. Physical fit was good, bilaterally. Tubing was cut to fit, and acoustic specifications were modified in fitting software. Further HAC appointments may be made on an as-needed basis. Ms. Vargas expressed verbal understanding of this information and plan.    Carter Peng, [facility]  [place] Licensed Audiologist [id number]

## 2021-05-27 NOTE — TELEPHONE ENCOUNTER
Patient Returning Call  Reason for call:  Returning phone call  Information relayed to patient:  Below message relayed to patient. Patient states she does not want to change the medication at this time. Patient states she will just pay for the prescription out of pocket.  Patient has additional questions:  No  If YES, what are your questions/concerns:  No additional questions at this time.  Okay to leave a detailed message?: No call back needed

## 2021-05-27 NOTE — TELEPHONE ENCOUNTER
Left message to call back for: Emani  Information to relay to patient:  Please inform the patient that her insurance informed us that they denied the methocarbamol prescription.  Which she be interested in an alternative such as tizanidine?

## 2021-05-27 NOTE — TELEPHONE ENCOUNTER
Dr Navarrete    Fax received from [facility] , insurance plan does not cover Methocarbamol 500mg tablets    Covered Alternatives: Tizanidine, Baclofen    Will you consider changing to a covered alternative?    If you prefer that the patient stays on this medciation and you would like to start the PA process, please send this encounter to the [facility] MED pool       Insurance Plan: [employer]OB[employer] Part D  Patient ID: [id number]

## 2021-05-28 ASSESSMENT — ASTHMA QUESTIONNAIRES
ACT_TOTALSCORE: 21
ACT_TOTALSCORE: 14

## 2021-05-28 ASSESSMENT — ANXIETY QUESTIONNAIRES: GAD7 TOTAL SCORE: 7

## 2021-05-28 NOTE — TELEPHONE ENCOUNTER
Pt returned call. Provider's results and recommendations given. Pt verbalized understanding. Assisted pt in scheduling 40 minute long 4 week follow-up appt.

## 2021-05-28 NOTE — PROGRESS NOTES
Emani Vargas is a 54 y.o. female seen in consultation at the request of Dr. Rosalba Yap for hearing loss.  Patient has noticed gradual hearing loss over many years.  SHe has been using BTE hearing aids for 5 years.  She notices particular decline in hearing over the past 6 months.  Denies otologic history of infections or surgeries. Denied vertigo but notes intermittent non pulsatile tinnitus.  She denies noise exposure and strong family history of hearing loss.    ALLERGY:    Allergies   Allergen Reactions     Cat/Feline Products Itching     Egg Derived      Flu like symptoms--pain     Trees Other (See Comments)     Grass, itching       MEDICATIONS:     Current Outpatient Medications on File Prior to Visit   Medication Sig Dispense Refill     acetaminophen (TYLENOL) 500 MG tablet Take 1,000 mg by mouth every 6 (six) hours as needed for pain.       albuterol (PROAIR HFA;PROVENTIL HFA;VENTOLIN HFA) 90 mcg/actuation inhaler Inhale 1-2 puffs every 4 (four) hours as needed for wheezing or shortness of breath. 1 Inhaler 11     buPROPion (WELLBUTRIN XL) 150 MG 24 hr tablet Take 1 tablet (150 mg total) by mouth every morning. 90 tablet 3     cholecalciferol, vitamin D3, 1,000 unit tablet Take 2,000 Units by mouth daily.       dextroamphetamine-amphetamine 20 mg Tab Take 1 tablet by mouth 2 (two) times a day.  0     diclofenac sodium (VOLTAREN) 1 % Gel Apply approximately 1/2-1 gm over affected hand and/or left elbow joints tid-qid, as needed. 1 Tube 2     FLUoxetine (PROZAC) 20 MG capsule Take 20 mg by mouth daily.       fluticasone furoate-vilanterol (BREO ELLIPTA) 200-25 mcg/dose DsDv inhaler Inhale 1 puff daily. 30 each 11     fluticasone propionate (FLONASE ALLERGY RELIEF) 50 mcg/actuation nasal spray One spray in each nostril twice daily. 16 g 12     gabapentin (NEURONTIN) 400 MG capsule 2400 mg daily  11     letrozole (FEMARA) 2.5 mg tablet Take 1 tablet (2.5 mg total) by mouth daily. 90 tablet 3      loratadine-pseudoephedrine (CLARITIN-D 24 HOUR)  mg per 24 hr tablet Take 1 tablet by mouth daily. 30 tablet 11     magnesium citrate solution Take 296 mL by mouth daily as needed.       methocarbamol (ROBAXIN) 500 MG tablet Take 1 tablet (500 mg total) by mouth 3 (three) times a day. As needed 90 tablet 6     montelukast (SINGULAIR) 10 mg tablet Take 1 tablet (10 mg total) by mouth at bedtime. 30 tablet 11     omeprazole (PRILOSEC) 40 MG capsule Take 1 capsule (40 mg total) by mouth daily before breakfast. 30 capsule 11     traZODone (DESYREL) 100 MG tablet TAKE 1/2 TO 1 TABLET BY MOUTH NIGHTLY AT BEDTIME. 90 tablet 3     No current facility-administered medications on file prior to visit.        Past Medical/Surgical History, Family History and Social History reviewed in detail and documented separately in the medical record.    Complete Review of Systems:  A 10-point review was performed.  Pertinent positives are noted in the HPI and on a separate scanned document in the chart.    EXAM:  There were no vitals filed for this visit.    Nurse documentation reviewed  and documented separately.    General Appearance: Pleasant, alert, appropriate appearance for age. No acute distress    Head Exam: Normal. Normocephalic, atraumatic.    Eye Exam: Normal external eye, conjunctiva, lids, cornea. Extra-ocular movements are intact.    Left external ear: normal  Left otoscopic exam: Normal EAC. Normal TM     Right external ear: normal  Right otoscopic exam: Normal EAC. Normal TM    Nose Exam: Normal external nose. Septum midline. Nasal mucosa normal.  Inferior turbinates normal.    OroPharynx Exam: Dental hygiene adequate. Normal tongue. Normal buccal mucosa. Normal palate.  Normal pharynx. Normal tonsils.    Neck Exam: Supple, no masses or nodes. Trachea and larynx midline.    Thyroid Exam: No tenderness, nodules or enlargement.    Salivary Glands: nontender without masses    Neuro: Alert and oriented times 3, CN 2-12  "grossly intact, no nystagmus, PERRL, EOMI, normal speech and gait    Chest/Respiratory Exam: Normal chest wall motion and respiratory effort. No audible stridor or wheezing.    Cardiovascular Exam: Regular rate and rhythm.  No cyanosis, clubbing or edema.    Pulses: carotid pulses normal    Mood:  Calm, appropriate    Skin:  No conspicuous rash or lesion in the head and neck    Audiogram:  Essentially stable \"cookie-bite\" hearing loss    ASSESSMENT:  1. Sensorineural hearing loss (SNHL) of both ears        PLAN: Findings, assessment, and management options were discussed. I do note see anything significant with her hearing loss that makes me concerned about an underlying medical issue.  Recommend annual audiogram.  Happy that she is using hearing amplification.        "

## 2021-05-28 NOTE — PATIENT INSTRUCTIONS - HE
It was good to see you in clinic today. This is what we discussed:    1. Your lung function test is normal.  2. There can still be some asthma even with a normal lung function test.  3. We will try to treat sinus inflammation and asthma and see if you improve.  4. Start Breo one inhalation daily. Rinse, gargle, and spit water after use.  5. Increase the Flonase to one spray in each nostril twice daily.  6. Stop Zyrtec.  7. Start Claritin-D one pill daily.  8. Continue montelukast one pill at bedtime.  9. I will see you in 6 weeks.  10. Call any time with questions or concerning symptoms.    Marques Mary MD  Pulmonary and Critical Care Medicine  [facility]  Office [phone]

## 2021-05-28 NOTE — TELEPHONE ENCOUNTER
----- Message from Flavia Camarena PA-C sent at 4/26/2019  1:03 PM CDT -----  Please call patient let her know I reviewed her MRI scans of the cervical, thoracic, and lumbar spine.  In the neck there is some arthritis in the joints in the neck.  There is also some narrowing around nerves of the exit of the spine on the right.  In the thoracic spine there is a small disc bulge at T7-T8 which could explain her mid back pain.  In the lower back, there is mild spinal canal stenosis related to a disc bulge.  There is also some arthritis in the joints.  I would recommend that the patient continue with pool therapy and follow-up with me in 4 weeks.  This patient will need 40 minutes for her follow-up visit.

## 2021-05-28 NOTE — PROGRESS NOTES
"Pulmonary Clinic Follow-up Visit    Assessment and Plan:   54 year old female with a history of remote tobacco use, obesity, vitamin D deficiency, osteoarthritis s/p bilateral LALA and right TKA, insomnia, possible asthma, GERD, right breast cancer s/p lumpectomy and radiation, chronic low back pain, bipolar disorder, allergic rhinitis, endometriosis, and chronic cough, presenting for follow-up.     Chronic cough: Present for years. Empiric therapy for upper airway cough syndrome (\"postnasal drip\" due to chronic rhinosinusitis) with nasal fluticasone and montelukast in addition to cetirizine, and increase in omeprazole from 20 mg daily to 40 mg daily (was having persistent nocturnal chest burning despite the lower dose), led to some improvement (from 6/10 to 4/10 with 10 being worse), but still has bothersome cough. Most prominent when lauging, with occasional white to off-white phlegm. Not worse at any particular time of day. With the cough she occasionally has dyspnea. Has \"constant drainage in the back of the throat.\" Has history of wheezing and allergies; was previously on mometasone-formoterol but felt that it did not help; that was about two years ago by her reckoning. Complete PFTs are normal. Use of albuterol HFA at home does sometimes help with the cough and associated dyspnea. 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     Plan:  - discussed options of possible empiric therapies, possible additional testing  - will try more aggressive therapy for chronic rhinosinusitis and possible asthma first before additional testing  - stop cetirizine  - start loratadine-pseudoephedrine 24-hr tab one daily  - start fluticasone-vilanterol 200-25 mcg one inhalation daily; rinse/gargle/spit water after use  - increase nasal fluticasone from one spray in each nostril daily to one spray in each nostril two times a day  - continue montelukast 10 mg at bedtime  - follow up in 6 weeks  - if " "refractory, consider methacholine challenge, possible sinus CT and chest CT, possible nasal antihistamine  - encouraged her to call any time with questions or concerning symptoms     I appreciate the opportunity to participate in the care of Ms. Vargas.  Please feel free to contact me at any time.     CCx: chronic cough    HPI: 54 year old female with a history of remote tobacco use, obesity, vitamin D deficiency, osteoarthritis s/p bilateral LALA and right TKA, insomnia, possible asthma, GERD, right breast cancer s/p lumpectomy and radiation, chronic low back pain, bipolar disorder, allergic rhinitis, endometriosis, and chronic cough, presenting for follow-up. Present for years. Empiric therapy for upper airway cough syndrome (\"postnasal drip\" due to chronic rhinosinusitis) with nasal fluticasone and montelukast in addition to cetirizine, and increase in omeprazole from 20 mg daily to 40 mg daily (was having persistent nocturnal chest burning despite the lower dose), led to some improvement (from 6/10 to 4/10 with 10 being worse), but still has bothersome cough. Most prominent when lauging, with occasional white to off-white phlegm. Not worse at any particular time of day. With the cough she occasionally has dyspnea. Has \"constant drainage in the back of the throat.\" Has history of wheezing and allergies; was previously on mometasone-formoterol but felt that it did not help; that was about two years ago by her reckoning. Complete PFTs are normal. Use of albuterol HFA at home does sometimes help with the cough and associated dyspnea. Feels an occasional tightness in the center of her chest. 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    ROS:  A 12-system review was obtained and was negative with the exception of the symptoms endorsed in the history of present illness.    PMH:  BMI 38.2  Hearing loss  OA s/p bilateral LALA, right TKA  Insomnia  Possible asthma  Back pain  Right breast " cancer s/p lumpectomy and radiation  Bipolar  Allergic rhinitis  Multiple allergies  endometriosis    PSH:  Past Surgical History:   Procedure Laterality Date     BREAST BIOPSY Right 2000's     BREAST BIOPSY Right 02/20/2017     BREAST LUMPECTOMY Right 03/10/2017    with sentinel lymph node biopsy.     KNEE ARTHROSCOPY Right 1996    Description: Arthroscopy Knee Right;  Recorded: 12/08/2011;     KNEE SURGERY Right 1997    lateral release     LAPAROSCOPIC ENDOMETRIOSIS FULGURATION  2002     MO TOTAL KNEE ARTHROPLASTY Right 8/28/2017    Procedure: RIGHT TOTAL KNEE ARTHROPLASTY;  Surgeon: Antony Elias MD;  Location: [facility] Main OR;  Service: Orthopedics     TOTAL HIP ARTHROPLASTY N/A 9/24/2014    Procedure: LEFT HIP TOTAL ARTHROPLASTY;  Surgeon: Antony Elias MD;  Location: [facility] Main OR;  Service:      TOTAL HIP ARTHROPLASTY Right 8/10/2015    Procedure: HIP TOTAL ARTHROPLASTY RIGHT;  Surgeon: Antony Elias MD;  Location: [facility] Main OR;  Service:        Allergies:  Allergies   Allergen Reactions     Cat/Feline Products Itching     Egg Derived      Flu like symptoms--pain     Trees Other (See Comments)     Grass, itching       Family HX:  Family History   Problem Relation Age of Onset     Depression Mother      COPD Mother      Diabetes Father      Skin cancer Father 45     Colon cancer Maternal Grandmother      Colon cancer Maternal Grandfather      Diabetes Paternal Grandmother      Colon cancer Paternal Grandmother 80     Breast cancer Cousin 48        Paternal side.     Testicular cancer Brother 44     Lung cancer Paternal Aunt 60     Anesthesia problems Neg Hx        Social Hx:  Social History     Socioeconomic History     Marital status:      Spouse name: Not on file     Number of children: Not on file     Years of education: Not on file     Highest education level: Not on file   Occupational History     Occupation: Disabled.     Comment: Was an .   Social  Needs     Financial resource strain: Not on file     Food insecurity:     Worry: Not on file     Inability: Not on file     Transportation needs:     Medical: Not on file     Non-medical: Not on file   Tobacco Use     Smoking status: Former Smoker     Packs/day: 1.00     Years: 8.00     Pack years: 8.00     Last attempt to quit: 1985     Years since quittin.3     Smokeless tobacco: Never Used   Substance and Sexual Activity     Alcohol use: Yes     Alcohol/week: 0.0 oz     Comment: 1 drink/month     Drug use: No     Comment: past use of marijuana and hash.     Sexual activity: Not on file   Lifestyle     Physical activity:     Days per week: Not on file     Minutes per session: Not on file     Stress: Not on file   Relationships     Social connections:     Talks on phone: Not on file     Gets together: Not on file     Attends [employer] service: Not on file     Active member of club or organization: Not on file     Attends meetings of clubs or organizations: Not on file     Relationship status: Not on file     Intimate partner violence:     Fear of current or ex partner: Not on file     Emotionally abused: Not on file     Physically abused: Not on file     Forced sexual activity: Not on file   Other Topics Concern     Not on file   Social History Narrative     Not on file       Current Meds:  Current Outpatient Medications   Medication Sig Dispense Refill     acetaminophen (TYLENOL) 500 MG tablet Take 1,000 mg by mouth every 6 (six) hours as needed for pain.       albuterol (PROAIR HFA;PROVENTIL HFA;VENTOLIN HFA) 90 mcg/actuation inhaler Inhale 1-2 puffs every 4 (four) hours as needed for wheezing or shortness of breath. 1 Inhaler 11     buPROPion (WELLBUTRIN XL) 150 MG 24 hr tablet Take 1 tablet (150 mg total) by mouth every morning. 90 tablet 3     cholecalciferol, vitamin D3, 1,000 unit tablet Take 2,000 Units by mouth daily.       dextroamphetamine-amphetamine 20 mg Tab Take 1 tablet by mouth 2 (two)  "times a day.  0     diclofenac sodium (VOLTAREN) 1 % Gel Apply approximately 1/2-1 gm over affected hand and/or left elbow joints tid-qid, as needed. 1 Tube 2     FLUoxetine (PROZAC) 20 MG capsule Take 20 mg by mouth daily.       fluticasone furoate-vilanterol (BREO ELLIPTA) 200-25 mcg/dose DsDv inhaler Inhale 1 puff daily. 30 each 11     fluticasone propionate (FLONASE ALLERGY RELIEF) 50 mcg/actuation nasal spray One spray in each nostril twice daily. 16 g 12     gabapentin (NEURONTIN) 400 MG capsule 2400 mg daily  11     letrozole (FEMARA) 2.5 mg tablet Take 1 tablet (2.5 mg total) by mouth daily. 90 tablet 3     loratadine-pseudoephedrine (CLARITIN-D 24 HOUR)  mg per 24 hr tablet Take 1 tablet by mouth daily. 30 tablet 11     magnesium citrate solution Take 296 mL by mouth daily as needed.       methocarbamol (ROBAXIN) 500 MG tablet Take 1 tablet (500 mg total) by mouth 3 (three) times a day. As needed 90 tablet 6     montelukast (SINGULAIR) 10 mg tablet Take 1 tablet (10 mg total) by mouth at bedtime. 30 tablet 11     omeprazole (PRILOSEC) 40 MG capsule Take 1 capsule (40 mg total) by mouth daily before breakfast. 30 capsule 11     traZODone (DESYREL) 100 MG tablet TAKE 1/2 TO 1 TABLET BY MOUTH NIGHTLY AT BEDTIME. 90 tablet 3     No current facility-administered medications for this visit.        Physical Exam:  /78   Pulse 86   Ht 5' 10\" (1.778 m)   Wt (!) 266 lb (120.7 kg)   LMP 08/10/2012   SpO2 95%   Breastfeeding? No   BMI 38.17 kg/m    Gen: alert, oriented, no distress  HEENT: nasal turbinates are mildly edematous, no oropharyngeal lesions, no cervical or supraclavicular lymphadenopathy  CV: RRR, no M/G/R  Resp: CTAB, no focal crackles or wheezes  Abd: soft, nontender, no palpable organomegaly  Skin: no apparent rashes  Ext: no cyanosis, clubbing or edema  Neuro: alert, nonfocal    Labs:  reviewed     Imaging studies:  CXR (3/12/19):  - directly reviewed  - clear lungs  - no " effusions    PFTs (4/26/19):  FEV1/FVC  is normal  FEV1 is normal  FVC is normal  There was no improvement in spirometry after a single inhaled dose of bronchodilator  TLC is normal  DLCO is normal when it is corrected for hemoglobin.    Marques Mary MD  Pulmonary and Critical Care Medicine  [facility]  Cell [phone]  Office [phone]  Pager [phone]

## 2021-05-28 NOTE — PROGRESS NOTES
RESPIRATORY CARE NOTE     Patient Name: Emani Vargas  Today's Date: 4/26/2019     Complete PFT done. Pt performed tests with good effort. Test results meet ATS standards for acceptability and repeatability.  HGB drawn. Results scanned into epic. Pt left in no distress.       Marycarmen Goodwin, BRENDANT

## 2021-05-28 NOTE — PROGRESS NOTES
Hearing aid fitting (MA patient)    Emani Vargas was seen today for hearing aid fitting, and is an accomplished user of amplification.  She was fit binaurally with [employer] B50-IL behind-the-ear devices (Right SN = [id number]; Left SN = [id number]; MA model # 050-0374-01), coupled to the ears with patient's existing custom silicone skeleton earmolds ordered 3-8-19 (vent size =  2.5 mm) and filtered earhooks/standard tubing. Warranty on both devices expires 7-; return for credit date will be 6-24-19.  Otoscopy was unremarkable in both ears.  Physical fit was good, and initial sound quality was acceptable to the patient.   Initial targets were set at 100%. Speechmapping via [employer] equipment yielded close approximations to targets at various input levels.Emani Vargas readily demonstrated ability to manipulate the on/off button (long/2 seconds press to turn on/off), and to insert/remove devices from her ears. The toggle switch on each device controls volume. Device use and care, as well as use of the  were all discussed.  Hearing aid check will be scheduled as needed, per patient request. Emani Vargas was in verbal agreement with this information and plan.    Carter Peng, [facility]  [place] Licensed Audiologist [id number]

## 2021-05-29 NOTE — PATIENT INSTRUCTIONS - HE
It was good to see you in clinic today. This is what we discussed:    1. Continue Claritin-D one tab daily.  2. Continue Flonase one spray in each nostril twice daily.  3. Continue Breo one inhalation daily. Rinse, gargling, and spit water after use.  4. Continue Singulair (montelukast) one pill at bedtime.  5. Try guaifenesin (Mucinex) 1-2 tabs twice daily as needed for throat and chest congestion.  6. If you have worsening cough, or other questions or concerning symptoms, give me a call.    Marques Mary MD  Pulmonary and Critical Care Medicine  [facility]  Office [phone]

## 2021-05-29 NOTE — PROGRESS NOTES
Assessment:   Emani Vargas is a 54 y.o. y.o. female with past medical history significant for vitamin D deficiency, chronic pain, bipolar disorder, insomnia, mild intermittent asthma, breast cancer status post lumpectomy and radiation, COPD, obesity who presents today for follow-up regarding 3 areas of pain.  1.  Chronic right greater than left neck pain and headaches.  Patient also has left hand paresthesias.  MRI cervical spine shows scattered cervical facet hypertrophy, worse on the right.  There is minimal right foraminal stenosis C3-4 and moderate right foraminal stenosis C4-5.  Pain is most consistent with cervical facet arthropathy with secondary myofascial pain.  I do not see any high-grade left sided neural impingement to explain her left hand paresthesias.  I suspect that this is related to carpal tunnel syndrome.  2.  A 3-month history of midline mid back pain at approximately T8 without thoracic radicular pain.  MRI thoracic spine shows a mild posterior annular bulge at T7-T8 and Schmorl's nodes at both sides of the T11-T12 interspace.  3.  Chronic bilateral low back pain with coccydynia.  The patient has intermittent pain and numbness radiating down the left leg in a nondermatomal pattern.  MRI lumbar spine shows a posterior annular bulge and facet hypertrophy at L4-5 resulting in mild spinal canal stenosis.  There is facet hypertrophy L3-L5.  X-rays of the sacroiliac joint shows mild to moderate degenerative change.  Patient seems primarily symptomatic from the lumbar facet arthropathy.  -Overall, patient reports a 10% improvement in her pain with pool therapy.       Plan:     A shared decision making plan was used.  The patient's values and choices were respected.  The following represents what was discussed and decided upon by the physician assistant and the patient.      1.  DIAGNOSTIC TESTS: I reviewed the MRI scans of the cervical, thoracic, and lumbar spine with the patient.  I offered to obtain  an EMG of the left upper extremity for further evaluation of her hand paresthesias.  She declined.    2.  PHYSICAL THERAPY: Patient is currently in pool therapy at [facility].  She has had 6-7 sessions so far.  She has 3 sessions remaining.  I encouraged her to continue with the physical therapy program.    3.  MEDICATIONS: No changes are made to the patient's medications.  Uses Tylenol 1000 mill grams as needed, gabapentin 800 mg in the morning and 1600 mg at bedtime, methocarbamol 500 mg as needed, and diclofenac gel.  -I did provide a prescription for a left wrist splint for carpal tunnel syndrome.    4.  INTERVENTIONS:    -Patient states that her neck pain is most bothersome area for her.  I recommended a right C3-4, C4-5, C5-6 facet joint injection.  Patient indicated she would like to proceed and an order was placed.  -If the right-sided pain improves but she continues to have left-sided pain, we could repeat this procedure on the left.  -If the cervical facet joint injections failed to provide relief of her pain, we could also consider a C7-T1 interlaminar epidural steroid injection versus trigger point injections.  I was not able to identify a discrete trigger point on exam today.  -I would not recommend interventional pain management for the thoracic spine pain at this time.  - In regards to the lower back pain, I would likely begin with bilateral L3, L4, L5 medial branch blocks.  She had tried lumbar facet joint injections in 2016 which did not provide relief of her pain.  -If she had a positive response to the medial branch blocks, recommend a bilateral L4-5, L5-S1 facet joint injection.  -If she fails the medial branch blocks, we could try a bilateral L4-5 transforaminal epidural steroid injection to address the spinal stenosis at this level.  Patient, we could consider sacral iliac joint injections.    5.  PATIENT EDUCATION: Patient is in agreement the above plan.  All questions were  answered.  -Patient complained of some change in her vision over the past 2 days.  It comes and goes.  She states that it feels like her eyes are shaky.  I recommended that she follow-up with her ophthalmologist.  She states that she will call to schedule an appointment.    6.  FOLLOW-UP: I will see the patient back in clinic for a 2-week postprocedure follow-up visit.  This patient always needs a 40-minute follow-up.  If she has questions or concerns in the meantime, she should not hesitate to call.    Subjective:     Emani Vargas is a 54 y.o. female who presents today for follow-up regarding 3 areas of pain.  I last saw the patient on April 16, 2019.  At that time I ordered MRI scans of the cervical, thoracic, and lumbar spine.  She returns today to review those results and discuss treatment options.  Since the patient was last seen she has had 6 or 7 sessions of pool therapy.  She notes about 10% improvement in her pain overall.    Patient complains of right greater than left neck pain.  This is the most severe area of pain.  The pain radiates up into the head causing headache.  She has some pain which extends into the right shoulder.  She also complains of pain in the left elbow.  She has numbness and tingling in left fingers 2, 3, and 4, and occasionally left finger 1.  Patient states that she feels weak in her arms.    Patient also complains of midline mid back pain.  She has pain right between the shoulder blades which is worse today, but is not always that severe.  She also has pain at the thoracolumbar junction.  She denies any pain wrapping around the chest/abdomen.    Patient also complains of bilateral low back pain.  She has pain which extends into the tailbone.  She states that she has intermittent pain and paresthesias radiating down the left leg.  She states that it feels like the whole leg is affected.  She states that she does not really pay attention to it.    Overall, patient rates her pain  today as a 4-10.  At its worst it is an 8 out of 10.  At its best it is a 2 out of 10.  Pain tends to be worse with being on her feet too long, sitting too long, turning her neck.  Pain is alleviated with rest and balance activity.    Patient has had 6 or 7 sessions of pool therapy.  She has 3 sessions remaining..  Patient uses Tylenol 1000 mg as needed, gabapentin 800 mg in the morning and 1600 mill grams at bedtime, diclofenac gel as needed, and methocarbamol 500 mg as needed.    Past medical history is reviewed and is unchanged.    Review of Systems:  Positive for numbness/tingling, weakness, wetting pants, headache, night pain, falls, difficulty swallowing, difficulty with hand skills.  Negative for loss of bowel/bladder control, inability to urinate, fevers, unintentional weight loss.     Objective:   CONSTITUTIONAL:  Vital signs as above.  No acute distress.  The patient is well nourished and well groomed.    PSYCHIATRIC:  The patient is awake, alert, oriented to person, place and time.  The patient is answering questions appropriately with clear speech.  Normal affect.  HEENT: Normocephalic, atraumatic.  Sclera clear.  Neck is supple.  SKIN:  Skin over the face, neck bilateral upper extremities is clean, dry, intact without rashes.  MUSCULOSKELETAL: Tender to palpation right cervical paraspinous muscles.  Cervical range of motion is moderately restricted with lateral rotation bilaterally.  Positive Kemps maneuver bilaterally.  Mild tenderness palpation midline at approximately T8.  Mild tenderness palpation bilateral lower lumbar paraspinous muscles.  The patient has 5/5 strength for the bilateral shoulder abductors, elbow flexors/extensors, wrist extensors, finger flexors/abductors, hip flexors, knee flexors/extensors, ankle dorsi/plantar flexors.   NEUROLOGICAL: 1-2+ biceps, brachioradialis, triceps, patellar, and Achilles reflexes bilaterally.  Negative Ulloa's bilaterally.  Babinski's bilaterally.  No  ankle clonus.  Sensation to light touch is subjectively diminished left fingers 2, 3, and 4.    RESULTS:    I reviewed the MRI cervical spine from [facility] dated April 25, 2019.  There are multilevel posterior and right foraminal disc bulges resulting in minimal right foraminal stenosis C3-4 and moderate right foraminal stenosis C4-5.  No additional neural impingement.  There is scattered cervical facet hypertrophy, greater on the right.    I reviewed the MRI thoracic spine from [facility] dated April 25, 2019.  This shows a mild posterior annular bulge at T7-T8 there are Schmorl's nodes on both sides of the T11-T12 interspace and along the superior endplate of L1 with minimal edema.    I reviewed the MRI lumbar spine from [facility] dated April 25, 2019.  This shows a posterior annular bulge and facet hypertrophy at L4-5 resulting in mild spinal canal stenosis.  There is no neuroforaminal stenosis.  There are Schmorl's nodes about the upper lumbar interspaces and facet hypertrophy L3-L5.

## 2021-05-29 NOTE — TELEPHONE ENCOUNTER
Refill Approved    Rx renewed per Medication Renewal Policy. Medication was last renewed on 4/23/18.    Lucy Gloria, [facility] Triage/Med Refill 5/28/2019     Requested Prescriptions   Pending Prescriptions Disp Refills     buPROPion (WELLBUTRIN XL) 150 MG 24 hr tablet [Pharmacy Med Name: BUPROPION HCL  MG TABLET] 90 tablet 3     Sig: TAKE 1 TABLET (150 MG TOTAL) BY MOUTH EVERY MORNING.       Tricyclics/Misc Antidepressant/Antianxiety Meds Refill Protocol Passed - 5/28/2019 10:35 AM        Passed - PCP or prescribing provider visit in last year     Last office visit with prescriber/PCP: 4/9/2019 Shira Miller MD OR same dept: 4/9/2019 Shira Miller MD OR same specialty: 4/9/2019 Shira Miller MD  Last physical: 8/14/2017 Last MTM visit: Visit date not found   Next visit within 3 mo: Visit date not found  Next physical within 3 mo: Visit date not found  Prescriber OR PCP: Shira Yap MD  Last diagnosis associated with med order: There are no diagnoses linked to this encounter.  If protocol passes may refill for 12 months if within 3 months of last provider visit (or a total of 15 months).

## 2021-05-30 VITALS — WEIGHT: 256.4 LBS | BODY MASS INDEX: 37.86 KG/M2

## 2021-05-30 VITALS — HEIGHT: 69 IN | BODY MASS INDEX: 38.16 KG/M2 | WEIGHT: 257.6 LBS

## 2021-05-30 VITALS — HEIGHT: 69 IN | WEIGHT: 259.3 LBS | BODY MASS INDEX: 38.41 KG/M2

## 2021-05-30 VITALS — WEIGHT: 257 LBS | BODY MASS INDEX: 37.95 KG/M2

## 2021-05-30 VITALS — BODY MASS INDEX: 38.17 KG/M2 | WEIGHT: 258.5 LBS

## 2021-05-30 VITALS — WEIGHT: 259.3 LBS | HEIGHT: 69 IN | BODY MASS INDEX: 38.41 KG/M2

## 2021-05-30 VITALS — WEIGHT: 260 LBS | HEIGHT: 69 IN | BODY MASS INDEX: 38.51 KG/M2

## 2021-05-30 VITALS — WEIGHT: 256.4 LBS | BODY MASS INDEX: 37.98 KG/M2 | HEIGHT: 69 IN

## 2021-05-30 VITALS — BODY MASS INDEX: 37.86 KG/M2 | WEIGHT: 256.4 LBS

## 2021-05-30 NOTE — PROGRESS NOTES
[facility] Hematology and Oncology Progress Note    Patient: Emani Vargas  MRN: [id number]  Date of Service: 07/10/2019      Reason for Visit    Follow-up regarding breast cancer.    Assessment and Plan  Cancer Staging  Malignant neoplasm of upper-outer quadrant of right breast in female, estrogen receptor positive (H)  Staging form: Breast, AJCC 7th Edition  - Pathologic: Stage IA (T1c, N0, cM0) - Signed by Christina Leigh MD on 4/11/2017      ECOG Performance   ECOG Performance Status: 1    Distress Assessment  Distress Assessment Score: 7: Please see the discussion below.    Pain  Pain Score (Initial OR Reassessment): 6: Continue current management for multiple body aches (fibromyalgia).    Ms. Emani Vargas is a 54 y.o. woman had a breast lumpectomy and sentinel lymph node biopsy done on 3/10/17 by Dr. Yoder.  Final pathology showed a 15 mm invasive ductal carcinoma, grade 2 with associated DCIS, ER UT positive and HER-2 negative.  Margins were clear.  She had a fourth sentinel lymph nodes and none of them were involved.  Final stage was IA (T1c, N0, cM0).  She had an Oncotype DX score of 18 which corresponds to an approximately 11% risk of recurrence in the next 10 years on tamoxifen.  She has numerous comorbidities including bipolar disorder, COPD endometriosis and osteoarthritis.  After thorough discussion we mutually agreed not to go for adjuvant chemotherapy.  She completed adjuvant radiation and started letrozole on 5/18/17.      1.  She tells me that she decided to stop taking letrozole about a month ago.  She says that she stopped taking the medication because of fatigue.  I discussed with her that certainly letrozole can cause fatigue but I doubt that is the cause of how she feels given that she has been on the medication for 2 years.  She is very clear that she wants to be off the medication.  She says that she really cannot tolerate the fatigue because of all the other things that she has to take care  of in her life.  What is curious is that she says that the heart pressures are acting up after the letrozole was stopped.  I told her that I would expect her that I would have thought that the hot flashes would have come down.  She says that she does not think the 4% improvement in overall survival from letrozole is worth it.  I discussed with her that I certainly do not agree with her.  If the breast cancer comes back, more likely it would be metastatic disease and then it will not be curable.  She has promised to think about things.  I told her that if she decides to change her mind and needs a new prescription she need just call us.    2.  I discussed with her that we will keep her in follow-up in the hope of catching a breast cancer recurrence early.  She was agreeable to that plan.  We decided that we will continue with annual mammograms and a follow-up in the clinic every 6 months.    3.  I have ordered the annual screening mammogram for 6 months from now.  Return to clinic with MD or NP after that.    Time spend >25 minutes face to face with the patient. More than 50 % in counseling and coordination of care.      Problem List    1. Malignant neoplasm of upper-outer quadrant of right breast in female, estrogen receptor positive (H)  Mammo Screening Bilateral   2. Encounter for screening mammogram for malignant neoplasm of breast   Mammo Screening Bilateral        CC: Rosalba Yap, Shira Gutierrez MD        ______________________________________________________________________________    History of Present Illness    Ms. Emani Vargas is here for follow-up alone.    She says that she has stopped the letrozole tablets about a month ago.  Apparently she was feeling fatigued.  She felt it is because of the letrozole and stopped the medication.  She says that she did not feel anything different for about 2 weeks but in the last week or so she feels that her energy has improved.  On the other hand she feels that hot  flashes are acting up after the letrozole was stopped.  She had some occasional hot flashes earlier but they are more frequent now.    She says that she is under a lot of stress at home taking care of her elderly father who has multiple medical problems.  She has other stressors in life also.  With that she needs all the energy that she can get.  I discussed with her that earlier she felt that she was tolerating letrozole okay.  She says that she did have the fatigue earlier but she attributed the fatigue to recovering from radiation etc.  She says that she has had a discussion with other breast cancer survivors.  She decided that the side effects of letrozole are not worth it for the 4% improvement in overall survival.    Apart from the fatigue she has not noticed any lumps or bumps anywhere.  She still has multiple body aches.  Off-and-on she has some cough and wheezing from the asthma.    No fevers.  No night sweats.  She has not lost weight.  No nausea or vomiting.  No unusual headaches.  No eyesight problems.  No mouth sores.  No swallowing difficulty.  No nausea or vomiting.  No chest pain or palpitation.  No abdominal pain.  No constipation or diarrhea.  No blood in stool or black stools.  No vaginal bleeding.  No difficulty with urination.  No skin rashes.    Please see below.  A 14 point review of system is otherwise completely negative.      Pain Status  Currently in Pain: Yes    Review of Systems    Constitutional  Constitutional (WDL): Exceptions to WDL  Fatigue: Fatigue relieved by rest  Fever: None  Chills: Mild sensation of cold, shivering, chattering of teeth  Weight Gain: 5 - <10% from baseline  Weight Loss: None(3#)  Neurosensory  Neurosensory (WDL): Exceptions to WDL  Peripheral Motor Neuropathy: Asymptomatic, clinical or diagnostic observations only, intervention not indicated  Ataxia: Asymptomatic, clinical or diagnostic observations only, intervention not indicated  Peripheral Sensory  Neuropathy: Asymptomatic, loss of deep tendon reflexes or paresthesia(Lt hand)  Confusion: None  Syncope: None  Cardiovascular  Cardiovascular (WDL): Exceptions to WDL  Palpitations: None  Edema: No  Phlebitis: None  Superficial thrombophlebitis: None  Pulmonary  Respiratory (WDL): Exceptions to WDL  Cough: Mild symptoms, nonprescription intervention indicated  Dyspnea: Shortness of breath with moderate exertion  Hypoxia: None  Gastrointestinal  Gastrointestinal (WDL): All gastrointestinal elements are within defined limits  Genitourinary  Genitourinary (WDL): All genitourinary elements are within defined limits  Integumentary  Integumentary (WDL): All integumentary elements are within defined limits  Patient Coping  Patient Coping: Open/discussion  Distress Assessment  Distress Assessment Score: 7  Accompanied by  Accompanied by: Alone    Past History  Past Medical History:   Diagnosis Date     Allergic rhinitis      Asthma      Bipolar 1 disorder (H)      Breast cancer of upper-outer quadrant of right female breast (H) 4/11/2017     Chronic pain     Back pain due to arthritis.     COPD (chronic obstructive pulmonary disease) (H)      Endometriosis      Fibroid uterus      Hx of radiation therapy 2017     Insomnia      Osteoarthritis      Overweight      Refusal of blood transfusions as patient is [employer] 4/12/2017     Sensorineural hearing loss, bilateral      Vitamin D deficiency          Past Surgical History:   Procedure Laterality Date     BREAST BIOPSY Right 2000's     BREAST BIOPSY Right 02/20/2017     BREAST LUMPECTOMY Right 03/10/2017    with sentinel lymph node biopsy.     KNEE ARTHROSCOPY Right 1996    Description: Arthroscopy Knee Right;  Recorded: 12/08/2011;     KNEE SURGERY Right 1997    lateral release     LAPAROSCOPIC ENDOMETRIOSIS FULGURATION  2002     VT TOTAL KNEE ARTHROPLASTY Right 8/28/2017    Procedure: RIGHT TOTAL KNEE ARTHROPLASTY;  Surgeon: Antony Elias MD;  Location:  [facility] OR;  Service: Orthopedics     TOTAL HIP ARTHROPLASTY N/A 9/24/2014    Procedure: LEFT HIP TOTAL ARTHROPLASTY;  Surgeon: Antony Elias MD;  Location: [facility] OR;  Service:      TOTAL HIP ARTHROPLASTY Right 8/10/2015    Procedure: HIP TOTAL ARTHROPLASTY RIGHT;  Surgeon: Antony Elias MD;  Location: [facility] OR;  Service:        Physical Exam    Recent Vitals 7/10/2019   Height -   Weight 271 lbs 3 oz   BSA (m2) 2.46 m2   /92   Pulse 92   Temp 98.7   Temp src 1   SpO2 94   Some recent data might be hidden       GENERAL: Alert and oriented to time place and person. Seated comfortably. In no distress.  Large build.  She looks well.  A bit anxious and tearful.    HEAD: Atraumatic and normocephalic.    EYES: CORBY, EOMI.  No pallor.  No icterus.    Oral cavity: no mucosal lesion or tonsillar enlargement.    NECK: supple. JVP normal.  No thyroid enlargement.    LYMPH NODES: No palpable, cervical, axillary or inguinal lymphadenopathy.    CHEST: clear to auscultation bilaterally.  Resonant to percussion throughout bilaterally.  Symmetrical breath movements bilaterally.    CVS: S1 and S2 are heard. Regular rate and rhythm.  No murmur or gallop or rub heard.  No peripheral edema.    ABDOMEN: Soft. Not tender. Not distended.  No palpable hepatomegaly or splenomegaly.  No other mass palpable.  Bowel sounds heard.    EXTREMITIES: Warm.    SKIN: no rash, or bruising or purpura.  Has a full head of hair.    BREASTS:  Right breast: Contour looks normal.  Skin looks normal.  Surgical scar has healed well and is very faint.  There is no underlying induration.  Nipple looks normal.  No palpable mass in the right breast.  Right axilla is without mass or nodule.    Left breast: Contour looks normal.  Skin looks normal.  No palpable mass.  Nipple looks normal.  Left axilla is without mass or nodule.    Lab Results    No results found for this or any previous visit (from the past 168  hour(s)).    Imaging    No results found.      Signed by: Christina Leigh MD

## 2021-05-31 VITALS — WEIGHT: 273 LBS | BODY MASS INDEX: 40.32 KG/M2

## 2021-05-31 VITALS — BODY MASS INDEX: 37.09 KG/M2 | HEIGHT: 69 IN | WEIGHT: 250.4 LBS

## 2021-05-31 VITALS — WEIGHT: 261 LBS | HEIGHT: 68 IN | BODY MASS INDEX: 39.56 KG/M2

## 2021-05-31 VITALS — HEIGHT: 69 IN | BODY MASS INDEX: 37.25 KG/M2 | WEIGHT: 251.5 LBS

## 2021-05-31 VITALS — BODY MASS INDEX: 37.48 KG/M2 | WEIGHT: 253.8 LBS

## 2021-05-31 VITALS — WEIGHT: 271.7 LBS | HEIGHT: 69 IN | BODY MASS INDEX: 40.24 KG/M2

## 2021-05-31 VITALS — BODY MASS INDEX: 38.55 KG/M2 | HEIGHT: 69 IN | WEIGHT: 260.31 LBS

## 2021-05-31 VITALS — BODY MASS INDEX: 39.44 KG/M2 | WEIGHT: 267.06 LBS

## 2021-05-31 VITALS — WEIGHT: 254.7 LBS | BODY MASS INDEX: 37.61 KG/M2

## 2021-05-31 VITALS — HEIGHT: 69 IN | WEIGHT: 259 LBS | BODY MASS INDEX: 38.36 KG/M2

## 2021-05-31 VITALS — WEIGHT: 270.1 LBS | BODY MASS INDEX: 39.89 KG/M2

## 2021-06-01 VITALS — WEIGHT: 264 LBS | HEIGHT: 69 IN | BODY MASS INDEX: 39.1 KG/M2

## 2021-06-01 VITALS — HEIGHT: 69 IN | BODY MASS INDEX: 39.13 KG/M2 | WEIGHT: 264.2 LBS

## 2021-06-01 VITALS — HEIGHT: 69 IN | WEIGHT: 261.6 LBS | BODY MASS INDEX: 38.75 KG/M2

## 2021-06-01 VITALS — WEIGHT: 266 LBS | BODY MASS INDEX: 39.28 KG/M2

## 2021-06-01 VITALS — WEIGHT: 259.31 LBS | BODY MASS INDEX: 38.29 KG/M2

## 2021-06-01 VITALS — BODY MASS INDEX: 40.3 KG/M2 | HEIGHT: 69 IN | WEIGHT: 272.1 LBS

## 2021-06-01 VITALS — BODY MASS INDEX: 40.32 KG/M2 | WEIGHT: 273 LBS

## 2021-06-02 VITALS — WEIGHT: 273.31 LBS | BODY MASS INDEX: 39.22 KG/M2

## 2021-06-02 VITALS — BODY MASS INDEX: 38.58 KG/M2 | HEIGHT: 70 IN | WEIGHT: 269.5 LBS

## 2021-06-02 VITALS — WEIGHT: 271.5 LBS | BODY MASS INDEX: 38.87 KG/M2 | HEIGHT: 70 IN

## 2021-06-02 VITALS — BODY MASS INDEX: 38.75 KG/M2 | WEIGHT: 262.4 LBS

## 2021-06-02 VITALS — BODY MASS INDEX: 38.58 KG/M2 | WEIGHT: 268.9 LBS

## 2021-06-02 VITALS — WEIGHT: 268 LBS | HEIGHT: 70 IN | BODY MASS INDEX: 38.37 KG/M2

## 2021-06-02 VITALS — BODY MASS INDEX: 37.94 KG/M2 | WEIGHT: 256.9 LBS

## 2021-06-02 NOTE — TELEPHONE ENCOUNTER
RN cannot approve Refill Request    RN can NOT refill this medication med is not covered by policy/route to provider. Last office visit: 4/9/2019 Shira Miller MD Last Physical: 8/14/2017 Last MTM visit: Visit date not found Last visit same specialty: 4/9/2019 Shira Miller MD.  Next visit within 3 mo: Visit date not found  Next physical within 3 mo: Visit date not found      Yarelis Del Cid, [facility] Triage/Med Refill 10/27/2019    Requested Prescriptions   Pending Prescriptions Disp Refills     methocarbamol (ROBAXIN) 500 MG tablet [Pharmacy Med Name: METHOCARBAMOL 500 MG TABLET] 90 tablet 6     Sig: TAKE 1 TABLET (500 MG TOTAL) BY MOUTH 3 (THREE) TIMES A DAY. AS NEEDED       There is no refill protocol information for this order

## 2021-06-03 VITALS — BODY MASS INDEX: 38.38 KG/M2 | WEIGHT: 268.1 LBS | HEIGHT: 70 IN

## 2021-06-03 VITALS — WEIGHT: 266 LBS | HEIGHT: 70 IN | BODY MASS INDEX: 38.08 KG/M2

## 2021-06-03 VITALS — BODY MASS INDEX: 38.08 KG/M2 | HEIGHT: 70 IN | WEIGHT: 266 LBS

## 2021-06-03 VITALS — BODY MASS INDEX: 38.35 KG/M2 | WEIGHT: 267.3 LBS

## 2021-06-03 VITALS — WEIGHT: 271.2 LBS | BODY MASS INDEX: 38.91 KG/M2

## 2021-06-03 NOTE — TELEPHONE ENCOUNTER
Last written 2/2018.   Last OV note from July, 19 stated patient had stopped Letrozole and was not going to take.   Called patient, she sauid she restarted Letrozole shortly after last OV>   She said she was very fatigued, but is adjusting.   Scheduled for f/u 3/20 with mammogram.  Hilraia Bloom RN

## 2021-06-03 NOTE — PATIENT INSTRUCTIONS - HE
Summary of Your Rheumatology Visit    Next Appointment:  6 Months    Medications:      Referrals:      Tests:          Injections:        Other:

## 2021-06-03 NOTE — PROGRESS NOTES
Emani Vargas who presents today with a chief complaint of  Follow-up      Joint Pains: Yes  Location: multiple joint pain, mainly LT knee and B/L shoulders  Onset: chronic 42 years   Intensity: 5-6 /10  AM Stiffness: yes, 1-2 hours  Alleviating/Aggravating Factors: no and unsure if med is helpful   Tolerating Meds: yes tolerate general med.   Other:      ROS:  Patient denies having any active:+ chest pain long time, +shortness of breath many years,+ cough 4 years, abdominal pain,+ nausea 10+ years,+ vomiting on/off, rashes, documented fevers, oral ulcers and recent infections. Patient admits to having a good appetite.  Information gathered by medical assistant incorporated into this note, was reviewed and discussed with the patient.    Problem List:  Patient Active Problem List   Diagnosis     Vitamin D Deficiency     Endometriosis     Chronic Pain     Insomnia     Bipolar Disorder     Hearing Loss     Osteoarthrosis, unspecified whether generalized or localized, pelvic region and thigh     Allergic rhinitis     Mild intermittent asthma without complication     Compression fracture of vertebral column with routine healing     Malignant neoplasm of upper-outer quadrant of right breast in female, estrogen receptor positive (H)     Refusal of blood transfusions as patient is [employer]     Osteoporosis     Aromatase inhibitor use     Knee osteoarthritis     Chronic obstructive pulmonary disease, unspecified COPD type (H)     Bipolar affective disorder, remission status unspecified (H)     Chronic pain syndrome     Morbid obesity (H)        PMH:   Past Medical History:   Diagnosis Date     Allergic rhinitis      Asthma      Bipolar 1 disorder (H)      Breast cancer of upper-outer quadrant of right female breast (H) 4/11/2017     Chronic pain     Back pain due to arthritis.     COPD (chronic obstructive pulmonary disease) (H)      Endometriosis      Fibroid uterus      Hx of radiation therapy 2017     Insomnia       Osteoarthritis      Overweight      Refusal of blood transfusions as patient is L[employer]-day 4/12/2017     Sensorineural hearing loss, bilateral      Vitamin D deficiency        Surgical History:  Past Surgical History:   Procedure Laterality Date     BREAST BIOPSY Right 2000's     BREAST BIOPSY Right 02/20/2017     BREAST LUMPECTOMY Right 03/10/2017    with sentinel lymph node biopsy.     KNEE ARTHROSCOPY Right 1996    Description: Arthroscopy Knee Right;  Recorded: 12/08/2011;     KNEE SURGERY Right 1997    lateral release     LAPAROSCOPIC ENDOMETRIOSIS FULGURATION  2002     OR TOTAL KNEE ARTHROPLASTY Right 8/28/2017    Procedure: RIGHT TOTAL KNEE ARTHROPLASTY;  Surgeon: Antony Elias MD;  Location: [facility] OR;  Service: Orthopedics     TOTAL HIP ARTHROPLASTY N/A 9/24/2014    Procedure: LEFT HIP TOTAL ARTHROPLASTY;  Surgeon: Antony Elias MD;  Location: [facility] OR;  Service:      TOTAL HIP ARTHROPLASTY Right 8/10/2015    Procedure: HIP TOTAL ARTHROPLASTY RIGHT;  Surgeon: Antony Elias MD;  Location: [facility] OR;  Service:        Family History:  Family History   Problem Relation Age of Onset     Depression Mother      COPD Mother      Diabetes Father      Skin cancer Father 45     Colon cancer Maternal Grandmother      Colon cancer Maternal Grandfather      Diabetes Paternal Grandmother      Colon cancer Paternal Grandmother 80     Breast cancer Cousin 48        Paternal side.     Testicular cancer Brother 44     Lung cancer Paternal Aunt 60     Anesthesia problems Neg Hx        Social History:   reports that she quit smoking about 34 years ago. She has a 8.00 pack-year smoking history. She has never used smokeless tobacco. She reports current alcohol use. She reports that she does not use drugs.    Allergies:  Allergies   Allergen Reactions     Cat/Feline Products Itching     Egg Derived      Flu like symptoms--pain     Trees Other (See Comments)     Grass,  itching        Current Medications:  Current Outpatient Medications   Medication Sig Dispense Refill     acetaminophen (TYLENOL) 500 MG tablet Take 1,000 mg by mouth every 6 (six) hours as needed for pain.       albuterol (PROAIR HFA;PROVENTIL HFA;VENTOLIN HFA) 90 mcg/actuation inhaler Inhale 1-2 puffs every 4 (four) hours as needed for wheezing or shortness of breath. 1 Inhaler 11     buPROPion (WELLBUTRIN XL) 150 MG 24 hr tablet TAKE 1 TABLET (150 MG TOTAL) BY MOUTH EVERY MORNING. 90 tablet 2     dextroamphetamine-amphetamine 20 mg Tab Take 1 tablet by mouth 2 (two) times a day.  0     diclofenac sodium (VOLTAREN) 1 % Gel Apply approximately 1/2-1 gm over affected hand and/or left elbow joints tid-qid, as needed. 1 Tube 2     FLUoxetine (PROZAC) 20 MG capsule Take 20 mg by mouth daily.       fluticasone furoate-vilanterol (BREO ELLIPTA) 200-25 mcg/dose DsDv inhaler Inhale 1 puff daily. 30 each 11     fluticasone propionate (FLONASE ALLERGY RELIEF) 50 mcg/actuation nasal spray One spray in each nostril twice daily. 16 g 12     gabapentin (NEURONTIN) 400 MG capsule 2400 mg daily  11     letrozole (FEMARA) 2.5 mg tablet Take 1 tablet (2.5 mg total) by mouth daily. 90 tablet 3     magnesium citrate solution Take 296 mL by mouth daily as needed.       methocarbamol (ROBAXIN) 500 MG tablet TAKE 1 TABLET (500 MG TOTAL) BY MOUTH 3 (THREE) TIMES A DAY. AS NEEDED 90 tablet 0     montelukast (SINGULAIR) 10 mg tablet Take 1 tablet (10 mg total) by mouth at bedtime. 30 tablet 11     omeprazole (PRILOSEC) 40 MG capsule Take 1 capsule (40 mg total) by mouth daily before breakfast. 30 capsule 11     cholecalciferol, vitamin D3, 1,000 unit tablet Take 2,000 Units by mouth daily.       guaiFENesin ER (MUCINEX) 600 mg 12 hr tablet Take 1-2 tablets (600-1,200 mg total) by mouth 2 (two) times a day as needed for congestion. 120 tablet 11     loratadine-pseudoephedrine (CLARITIN-D 24 HOUR)  mg per 24 hr tablet Take 1 tablet by  "mouth daily. 30 tablet 11     traZODone (DESYREL) 150 MG tablet 150 mg at bedtime as needed.  5     No current facility-administered medications for this visit.            Physical Exam:  /88   Pulse 88   Ht 5' 10\" (1.778 m)   Wt (!) 270 lb 4.8 oz (122.6 kg)   LMP 08/10/2012   BMI 38.78 kg/m    General: A & O x 3 in NAD  HEENT: EOMI, Non injected/non icteric sclera, no oral lesions noted  CV: s1s2 with RRR, no rubs appreciated   Lungs: CTA B/L, no wheezing , rales or rhonci appreciated  GI: Soft, NT/ND, no rebound, no guarding noted  MS: Positive discomfort involving shoulders on passive range of motion testing with some mild limitation and guarding noted.  Positive discomfort involving left knee on passive flexion, no synovitis noted.  Otherwise patient demonstrated good passive/active ROM over other joints with no warmth, erythema, tenderness or synovitis noted over these joints.      Summary/Assessment:    History that includes osteoarthritis, fibromyalgia, chronic low back pain.    Presents for a follow-up visit.    States neck and sacral pains have improved with water therapy prescribed by spine clinic.    Has been experiencing resurfacing bilateral shoulder and left knee pains.  Cortisone injections in the past have been helpful, desires repeat cortisone injections for these pains.    Left elbow doing better, x-rays showed some signs of advanced DJD.  Denies known trauma however has had multiple falls in the past.    Current combination of medications provides relief.    Latest liver enzymes noted to be within normal limits.    No clear signs of synovitis noted on exam today.    Please see below for management plan.      Pertinent rheumatology/past medical history (please refer to above for more detailed history):      Osteoarthritis    Fibromyalgia    Chronic shoulder pains (likely rotator cuff tendinopathy, cortisone injections helpful)    Chronic low back pain and sacral pains (established with " spine clinic)    Neck pain    History bilateral hip replacements    Chronic left knee pain (s/p cortisone injection)    History right knee replacement    History of vertebral compression fracture    History right breast cancer    History COPD/chronic bronchitis    Overweight    Rheumatology medications provided/suggested:      Voltaren gel    Pertinent medication from other providers or from otc (please refer to above for more detailed med list):    Tylenol  Neurontin  Trazodone  Robaxin  Prozac  Letrozole (MSK pain present prior to initiation)      Pertinent medications already tried:     Tylenol (pruritus)  NSAIDs (upset stomach)  Tramadol (worsening nausea)  Lidoderm patches (ineffective)     Pertinent lab history:    Noted to have negative/unremarkable: Rheumatoid factor, CCP antibody, CONSTANTINE, Lyme antibody, HLA-B27.    Mildly elevated CRP, normal ESR.    Pertinent imaging/test history:      X-rays of right shoulder from May 2018 showed:  Very minimal degenerative irregularity at the rotator cuff insertion site. Glenohumeral joint appears normal. No fracture or dislocation.     X-rays of the left elbow showed some signs of advanced degenerative joint disease.  Some subchondral lucencies noted which may represent either erosive change or degenerative subcortical cystic change.  Small effusion likely. These findings may be related to inflammation or prior trauma.    Other:    Was a  for over 25 years.    On prior visit, suggested looking into obtaining a paraffin wax machine for hand pains.    Plan:      We will inject bilateral shoulders and left knee with cortisone today.    Takes Tylenol with some benefit.    Receives benefit from combination of Neurontin, trazodone, Robaxin prescribed by other providers.    Established with spine clinic for chronic pains involving: SI joints, coccyx and worsening neck pain.    Has Voltaren gel for hand and left elbow pain as needed.    Follow-up in 6 months.      Procedure  note:      Consent to treat form signed by the patient.  Patient's bilateral shoulder joints were prepped in a sterile manner with an alcohol swab and ChloraPrep applicator.  Injected Kenalog 40 mg 1:1 with lidocaine into bilateral shoulder joints.  Patient tolerated the procedures well with no complications noted.  Patient was advised to place ice over injection sites if sore over the next 24-48 hours. Jessica CASTORENA assisted with procedure.    Post injections lungs were clear to auscultation.    Consent to treat form signed by the patient.  Patient's left knee was prepped in a sterile manner with an alcohol swab and ChloraPrep applicator.  Injected Kenalog 40 mg 1:1 with lidocaine into left knee joint.  Patient tolerated the procedure well with no complications noted.  Patient was advised to place ice over injection sites if sore over the next 24-48 hours.  Jessica CASTORENA assisted with procedure.      This note was transcribed using Dragon voice recognition software as a result unintentional grammatical errors or word substitutions may have occurred. Please contact our Rheumatology department if you need any clarification or if you have any related inquiries.    Major side effect profile of medications provided were discussed with the patient.      Kilo Ramon DO....................  11/11/2019   4:43 PM

## 2021-06-04 VITALS
WEIGHT: 266.2 LBS | HEART RATE: 91 BPM | DIASTOLIC BLOOD PRESSURE: 80 MMHG | TEMPERATURE: 98.3 F | BODY MASS INDEX: 39.6 KG/M2 | OXYGEN SATURATION: 94 % | SYSTOLIC BLOOD PRESSURE: 140 MMHG

## 2021-06-04 VITALS
HEART RATE: 88 BPM | WEIGHT: 270.3 LBS | BODY MASS INDEX: 38.7 KG/M2 | DIASTOLIC BLOOD PRESSURE: 88 MMHG | HEIGHT: 70 IN | SYSTOLIC BLOOD PRESSURE: 130 MMHG

## 2021-06-04 VITALS
DIASTOLIC BLOOD PRESSURE: 78 MMHG | WEIGHT: 265 LBS | SYSTOLIC BLOOD PRESSURE: 120 MMHG | BODY MASS INDEX: 39.25 KG/M2 | HEIGHT: 69 IN | HEART RATE: 64 BPM

## 2021-06-05 VITALS
WEIGHT: 272.8 LBS | HEART RATE: 84 BPM | DIASTOLIC BLOOD PRESSURE: 89 MMHG | BODY MASS INDEX: 40.29 KG/M2 | SYSTOLIC BLOOD PRESSURE: 131 MMHG | TEMPERATURE: 98.1 F | OXYGEN SATURATION: 94 %

## 2021-06-05 VITALS
HEART RATE: 93 BPM | SYSTOLIC BLOOD PRESSURE: 128 MMHG | BODY MASS INDEX: 39.97 KG/M2 | DIASTOLIC BLOOD PRESSURE: 90 MMHG | OXYGEN SATURATION: 96 % | WEIGHT: 268.7 LBS

## 2021-06-05 VITALS — HEIGHT: 69 IN | BODY MASS INDEX: 40.43 KG/M2 | WEIGHT: 273 LBS

## 2021-06-05 VITALS
WEIGHT: 273.6 LBS | BODY MASS INDEX: 40.52 KG/M2 | HEIGHT: 69 IN | HEART RATE: 84 BPM | SYSTOLIC BLOOD PRESSURE: 118 MMHG | DIASTOLIC BLOOD PRESSURE: 78 MMHG

## 2021-06-05 VITALS
BODY MASS INDEX: 41.36 KG/M2 | WEIGHT: 280.1 LBS | SYSTOLIC BLOOD PRESSURE: 100 MMHG | DIASTOLIC BLOOD PRESSURE: 72 MMHG | HEART RATE: 80 BPM

## 2021-06-05 VITALS
HEART RATE: 87 BPM | OXYGEN SATURATION: 96 % | WEIGHT: 278 LBS | DIASTOLIC BLOOD PRESSURE: 82 MMHG | HEIGHT: 69 IN | BODY MASS INDEX: 41.18 KG/M2 | SYSTOLIC BLOOD PRESSURE: 130 MMHG

## 2021-06-06 NOTE — PROGRESS NOTES
Assessment and Plan:     Routine general medical examination at a health care facility  -     Lipid Cascade  -     Comprehensive Metabolic Panel  -     Thyroid Peroxidase Antibody  -     Thyroid Stimulating Hormone (TSH)  -     T4, Free  -     T3 (Triiodothyronine), Free  -     HM1(CBC and Differential)  -     HM1 (CBC with Diff)  We discussed healthy lifestyle, nutrition, cardiovascular risk reduction, self care, safety, sunscreen, seatbelt, and timing of cancer screening.  Health maintenance screening and immunizations reviewed with the patient.    Morbid obesity (H)  Counseled healthy lifestyle modifications    Cervical cancer screening  -     Gynecologic Cytology (PAP Smear)    Chronic Pain  -     methocarbamol (ROBAXIN) 500 MG tablet; Take 1 tablet (500 mg total) by mouth 3 (three) times a day. As needed  Gabapentin per spine clinic    Bipolar I disorder, single manic episode (H), insomnia, ADHD  Fluoxetine and bupropion per psychiatry  adderall per psychiatry    Malignant neoplasm of upper-outer quadrant of right breast in female, estrogen receptor positive (H)  Letrozole per oncology    Gastroesophageal reflux disease, esophagitis presence not specified  Omeprazole 40mg    Chronic cough  Has seen pulmonology  Not compliant with meds  -     omeprazole (PRILOSEC) 40 MG capsule; Take 1 capsule (40 mg total) by mouth daily before breakfast.  singulair  claritin-D  flonase  brieo ellipta    The patient's current medical problems were reviewed.    The following are part of a depression follow up plan for the patient:  case management follow-up and patient follow-up to return when and if necessary  The following high BMI interventions were performed this visit: encouragement to exercise, exercise promotion: walking/step counting and lifestyle education regarding diet  The following health maintenance schedule was reviewed with the patient and provided in printed form in the after visit summary:   Health  Maintenance   Topic Date Due     ASTHMA ACTION PLAN  1965     DEPRESSION ACTION PLAN  1965     COPD ACTION PLAN  1965     HIV SCREENING  02/20/1979     MEDICARE ANNUAL WELLNESS VISIT  02/20/1982     ADVANCE CARE PLANNING  02/20/1982     PAP SMEAR  02/20/1985     ZOSTER VACCINES (1 of 2) 02/20/2014     LIPID  12/08/2016     INFLUENZA VACCINE RULE BASED (1) 08/01/2019     ASTHMA CONTROL TEST  05/07/2020     MAMMOGRAM  02/25/2021     COLONOSCOPY  01/05/2025     TD 18+ HE  07/11/2026     HEPATITIS C SCREENING  Completed     SPIROMETRY  Completed     TDAP ADULT ONE TIME DOSE  Completed        Subjective:   Chief Complaint: Emani Vargas is an 54 y.o. female here for an Annual Wellness visit.     HPI:    Bipolar Disorder: She has a history of bipolar disorder. She has been taking fluoxetine 20 mg once a day and bupropion 150 mg once a day. She has been seeing her psychiatrist every 6 months. She no longer sees a psychologist. She notes that she is happy she is not having suicidal thoughts every day anymore. She is worried about increasing her medications because she does not want to induce a manic episode.     Little interest or pleasure in doing things: Nearly every day  Feeling down, depressed, or hopeless: Several days  Trouble falling or staying asleep, or sleeping too much: Nearly every day  Feeling tired or having little energy: Nearly every day  Poor appetite or overeating: Nearly every day  Feeling bad about yourself - or that you are a failure or have let yourself or your family down: Several days  Trouble concentrating on things, such as reading the newspaper or watching television: Nearly every day  Moving or speaking so slowly that other people could have noticed. Or the opposite - being so fidgety or restless that you have been moving around a lot more than usual: More than half the days  Thoughts that you would be better off dead, or of hurting yourself in some way: Several days  PHQ-9  "Total Score: 20  If you checked off any problems, how difficult have these problems made it for you to do your work, take care of things at home, or get along with other people?: Somewhat difficult    Asthma: She has a history of asthma. She is prescribed montelukast 10 mg once a day and albuterol as needed. However, she does not take them consistently. She does not take them regularly because she is afraid of becoming \"dependent on them.\" She also believes that her medications cause her to gain weight. However, she is short of breath everyday without the medications.     Malignant Neoplasm Right Breast: She had right breast cancer. She was taking letrozole 2.5 mg once a day. She stopped taking it for a few months because she was fatigued. Her oncologist lectured her on the dangers of cessation without discussing with him first. She is now taking the letrozole again.     Health Maintenance: She is fasting today. Patient reports last pap smear was 5-7 years ago. She is due for a pap smear today. She is no longer menstruating. Her mammogram in February 2019 was benign. She has a mammogram scheduled for the end of February.     Review of Systems: Please see above. The rest of the review of systems are negative for all systems.    PSFH:   She is taking care of her father.     Patient Care Team:  Shira Miller MD as PCP - General  Christina Leigh MD as Physician (Hematology and Oncology)  Nelida Recio MD as Physician (Radiation Oncology)  Lombardi, Susan L, RN as RN, Care Manager  Shira Miller MD as Assigned PCP     Patient Active Problem List   Diagnosis     Vitamin D Deficiency     Endometriosis     Chronic Pain     Insomnia     Bipolar Disorder     Hearing Loss     Osteoarthrosis, unspecified whether generalized or localized, pelvic region and thigh     Allergic rhinitis     Compression fracture of vertebral column with routine healing     Malignant neoplasm of upper-outer " quadrant of right breast in female, estrogen receptor positive (H)     Refusal of blood transfusions as patient is Yarsani     Osteoporosis     Aromatase inhibitor use     Knee osteoarthritis     Bipolar affective disorder, remission status unspecified (H)     Chronic pain syndrome     Morbid obesity (H)     Past Medical History:   Diagnosis Date     Allergic rhinitis      Asthma      Bipolar 1 disorder (H)      Breast cancer of upper-outer quadrant of right female breast (H) 4/11/2017     Chronic pain     Back pain due to arthritis.     COPD (chronic obstructive pulmonary disease) (H)      Endometriosis      Fibroid uterus      Hx of radiation therapy 2017     Insomnia      Osteoarthritis      Overweight      Refusal of blood transfusions as patient is Yarsani 4/12/2017     Sensorineural hearing loss, bilateral      Vitamin D deficiency       Past Surgical History:   Procedure Laterality Date     BREAST BIOPSY Right 2000's     BREAST BIOPSY Right 02/20/2017     BREAST LUMPECTOMY Right 03/10/2017    with sentinel lymph node biopsy.     KNEE ARTHROSCOPY Right 1996    Description: Arthroscopy Knee Right;  Recorded: 12/08/2011;     KNEE SURGERY Right 1997    lateral release     LAPAROSCOPIC ENDOMETRIOSIS FULGURATION  2002     OK TOTAL KNEE ARTHROPLASTY Right 8/28/2017    Procedure: RIGHT TOTAL KNEE ARTHROPLASTY;  Surgeon: Antony Elias MD;  Location: [facility] OR;  Service: Orthopedics     TOTAL HIP ARTHROPLASTY N/A 9/24/2014    Procedure: LEFT HIP TOTAL ARTHROPLASTY;  Surgeon: Antony Elias MD;  Location: [facility] Main OR;  Service:      TOTAL HIP ARTHROPLASTY Right 8/10/2015    Procedure: HIP TOTAL ARTHROPLASTY RIGHT;  Surgeon: Antony Elias MD;  Location: [facility] OR;  Service:       Family History   Problem Relation Age of Onset     Depression Mother      COPD Mother      Diabetes Father      Skin cancer Father 45     Colon cancer Maternal Grandmother      Colon  cancer Maternal Grandfather      Diabetes Paternal Grandmother      Colon cancer Paternal Grandmother 80     Breast cancer Cousin 48        Paternal side.     Testicular cancer Brother 44     Lung cancer Paternal Aunt 60     Anesthesia problems Neg Hx       Social History     Socioeconomic History     Marital status:      Spouse name: Not on file     Number of children: Not on file     Years of education: Not on file     Highest education level: Not on file   Occupational History     Occupation: Disabled.     Comment: Was an .   Social Needs     Financial resource strain: Not on file     Food insecurity:     Worry: Not on file     Inability: Not on file     Transportation needs:     Medical: Not on file     Non-medical: Not on file   Tobacco Use     Smoking status: Former Smoker     Packs/day: 1.00     Years: 8.00     Pack years: 8.00     Last attempt to quit: 1985     Years since quittin.1     Smokeless tobacco: Never Used   Substance and Sexual Activity     Alcohol use: Yes     Alcohol/week: 0.0 standard drinks     Comment: 1 drink/month     Drug use: No     Comment: past use of marijuana and hash.     Sexual activity: Not on file   Lifestyle     Physical activity:     Days per week: Not on file     Minutes per session: Not on file     Stress: Not on file   Relationships     Social connections:     Talks on phone: Not on file     Gets together: Not on file     Attends Jewish service: Not on file     Active member of club or organization: Not on file     Attends meetings of clubs or organizations: Not on file     Relationship status: Not on file     Intimate partner violence:     Fear of current or ex partner: Not on file     Emotionally abused: Not on file     Physically abused: Not on file     Forced sexual activity: Not on file   Other Topics Concern     Not on file   Social History Narrative     Not on file      Current Outpatient Medications   Medication Sig Dispense Refill      acetaminophen (TYLENOL) 500 MG tablet Take 1,000 mg by mouth every 6 (six) hours as needed for pain.       albuterol (PROAIR HFA;PROVENTIL HFA;VENTOLIN HFA) 90 mcg/actuation inhaler Inhale 1-2 puffs every 4 (four) hours as needed for wheezing or shortness of breath. 1 Inhaler 11     buPROPion (WELLBUTRIN XL) 150 MG 24 hr tablet TAKE 1 TABLET (150 MG TOTAL) BY MOUTH EVERY MORNING. 90 tablet 2     cholecalciferol, vitamin D3, 1,000 unit tablet Take 2,000 Units by mouth daily.       dextroamphetamine-amphetamine 20 mg Tab Take 1 tablet by mouth 2 (two) times a day.  0     diclofenac sodium (VOLTAREN) 1 % Gel Apply approximately 1/2-1 gm over affected hand and/or left elbow joints tid-qid, as needed. 1 Tube 2     FLUoxetine (PROZAC) 20 MG capsule Take 20 mg by mouth daily.       fluticasone furoate-vilanterol (BREO ELLIPTA) 200-25 mcg/dose DsDv inhaler Inhale 1 puff daily. 30 each 11     fluticasone propionate (FLONASE ALLERGY RELIEF) 50 mcg/actuation nasal spray One spray in each nostril twice daily. 16 g 12     gabapentin (NEURONTIN) 400 MG capsule 2400 mg daily  11     guaiFENesin ER (MUCINEX) 600 mg 12 hr tablet Take 1-2 tablets (600-1,200 mg total) by mouth 2 (two) times a day as needed for congestion. 120 tablet 11     letrozole (FEMARA) 2.5 mg tablet Take 1 tablet (2.5 mg total) by mouth daily. 90 tablet 3     loratadine-pseudoephedrine (CLARITIN-D 24 HOUR)  mg per 24 hr tablet Take 1 tablet by mouth daily. 30 tablet 11     magnesium citrate solution Take 296 mL by mouth daily as needed.       methocarbamol (ROBAXIN) 500 MG tablet Take 1 tablet (500 mg total) by mouth 3 (three) times a day. As needed 90 tablet 3     montelukast (SINGULAIR) 10 mg tablet Take 1 tablet (10 mg total) by mouth at bedtime. 30 tablet 11     omeprazole (PRILOSEC) 40 MG capsule Take 1 capsule (40 mg total) by mouth daily before breakfast. 90 capsule 3     traZODone (DESYREL) 150 MG tablet 150 mg at bedtime as needed.  5     No  "current facility-administered medications for this visit.       Objective:   Vital Signs:   Visit Vitals  /78   Pulse 64   Ht 5' 8.75\" (1.746 m)   Wt (!) 265 lb (120.2 kg)   LMP 08/10/2012   BMI 39.42 kg/m       VisionScreening:  No exam data present     PHYSICAL EXAM  Constitutional: Patient is oriented to person, place, and time. Patient appears well-developed and well-nourished. No distress.   Head: Normocephalic and atraumatic.   Right Ear: External ear normal.   Left Ear: External ear normal.   Nose: Nose normal.   Mouth/Throat: Oropharynx is clear and moist. No oropharyngeal exudate.   Eyes: Conjunctivae and EOM are normal. Pupils are equal, round, and reactive to light. Right eye exhibits no discharge. Left eye exhibits no discharge. No scleral icterus.   Neck: Neck supple. No JVD present. No tracheal deviation present. No thyromegaly present.   Breasts:  Normal appearing, no skin involvement, no palpable mass, no tenderness on palpation.  No axillary involvement  Cardiovascular: Normal rate, regular rhythm, normal heart sounds and intact distal pulses.   No murmur heard.   Pulmonary/Chest: Effort normal and breath sounds normal. No stridor. No respiratory distress. Patient has no wheezes, no rales, exhibits no tenderness.   Abdominal: Soft. Bowel sounds are normal. Patient exhibits no distension and no mass. There is no tenderness. There is no rebound and no guarding.   Lymphadenopathy:  Patient has no cervical adenopathy.   Neurological: Patient is alert and oriented to person, place, and time. Patient has normal reflexes. No cranial nerve deficit. Coordination normal.   Skin: Skin is warm and dry. No rash noted. Patient is not diaphoretic. No erythema. No pallor.   Pelvic:  Normal external genitalia with Normal vulva.  She has atrophic vaginitis with a stenotic cervix. Normal vagina with no polyps or lesions and with physiologic discharge.  Normal mucosa and without CMT.  No adnexal " masses  Psychiatric: Patient has good eye contact without any psychomotor retardation or stereotypic behaviors.  normal mood and affect. Judgment and thought content normal.   Speech is regular rate and rhythm.     Assessment Results 2/10/2020   Activities of Daily Living No help needed   Instrumental Activities of Daily Living No help needed   Get Up and Go Score Less than 12 seconds   Mini Cog Total Score 5   Some recent data might be hidden     A Mini-Cog score of 0-2 suggests the possibility of dementia, score of 3-5 suggests no dementia    Identified Health Risks:     The patient was provided with suggestions to help her develop a healthy physical lifestyle.   She is at risk for lack of exercise and has been provided with information to increase physical activity for the benefit of her well-being.  The patient was counseled and encouraged to consider modifying their diet and eating habits. She was provided with information on recommended healthy diet options.  The patient was provided with written information regarding signs of hearing loss.  Information on urinary incontinence and treatment options given to patient.  The patient was provided with suggestions to help her develop a healthy emotional lifestyle.   The patient's PHQ-9 score is consistent with severe depression.    Patient's advanced directive was discussed and I am comfortable with the patient's wishes.    ADDITIONAL HISTORY SUMMARIZED (2): Reviewed rheumatology note from 11/11/2019 about osteoarthritis, fibromyalgia and chronic low back pack.   DECISION TO OBTAIN EXTRA INFORMATION (1): None.   RADIOLOGY TESTS (1): Reviewed mammogram from 02/25/19 which was benign.   LABS (1): Reviewed labs from 04/26/19 and ordered labs today.   MEDICINE TESTS (1): Performed pap smear today.   INDEPENDENT REVIEW (2 each): None.     Batsheva BARNES, am scribing for and in the presence of, Dr. Navarrete.    IDr. Navarrete, personally performed the services described in  this documentation, as scribed by Batsheva Wilkins in my presence, and it is both accurate and complete.    Total data points: 5

## 2021-06-06 NOTE — TELEPHONE ENCOUNTER
Refill Approved    Rx renewed per Medication Renewal Policy. Medication was last renewed on 5/28/19.    Lucy Gloria, [facility] Triage/Med Refill 3/4/2020     Requested Prescriptions   Pending Prescriptions Disp Refills     buPROPion (WELLBUTRIN XL) 150 MG 24 hr tablet [Pharmacy Med Name: BUPROPION HCL  MG TABLET] 90 tablet 2     Sig: TAKE 1 TABLET (150 MG TOTAL) BY MOUTH EVERY MORNING.       Tricyclics/Misc Antidepressant/Antianxiety Meds Refill Protocol Passed - 3/4/2020  2:11 AM        Passed - PCP or prescribing provider visit in last year     Last office visit with prescriber/PCP: 4/9/2019 Shira Miller MD OR same dept: 4/9/2019 Shira Miller MD OR same specialty: 4/9/2019 Shira Miller MD  Last physical: 2/10/2020 Last MTM visit: Visit date not found   Next visit within 3 mo: Visit date not found  Next physical within 3 mo: Visit date not found  Prescriber OR PCP: Shira Yap MD  Last diagnosis associated with med order: 1. Bipolar affective disorder, remission status unspecified (H)  - buPROPion (WELLBUTRIN XL) 150 MG 24 hr tablet [Pharmacy Med Name: BUPROPION HCL  MG TABLET]; TAKE 1 TABLET (150 MG TOTAL) BY MOUTH EVERY MORNING.  Dispense: 90 tablet; Refill: 2    If protocol passes may refill for 12 months if within 3 months of last provider visit (or a total of 15 months).

## 2021-06-06 NOTE — TELEPHONE ENCOUNTER
Left message  Advised selenium supplementation for elevated TPO antibody  Also advised lifestyle modifications to reduce elevated glucose 106

## 2021-06-06 NOTE — PROGRESS NOTES
Pt presented for provider 3 month  follow up visit for breast cancer with mammo on 2/28. Chloé Adames RN

## 2021-06-06 NOTE — PROGRESS NOTES
[facility] Hematology and Oncology Progress Note    Patient: Emani Vargas  MRN: [id number]  Date of Service: 03/03/2020      Reason for Visit    Follow-up regarding breast cancer.    Assessment and Plan  Cancer Staging  Malignant neoplasm of upper-outer quadrant of right breast in female, estrogen receptor positive (H)  Staging form: Breast, AJCC 7th Edition  - Pathologic: Stage IA (T1c, N0, cM0) - Signed by Christina Leigh MD on 4/11/2017      ECOG Performance   ECOG Performance Status: 1    Distress Assessment  Distress Assessment Score: 3: Please see the discussion below.    Pain  Pain Score (Initial OR Reassessment): 4: Continue current management for multiple body aches (fibromyalgia).    Ms. Emani Vargas is a 55 y.o. woman had a breast lumpectomy and sentinel lymph node biopsy done on 3/10/17 by Dr. Yoder.  Final pathology showed a 15 mm invasive ductal carcinoma, grade 2 with associated DCIS, ER AL positive and HER-2 negative.  Margins were clear.  She had a fourth sentinel lymph nodes and none of them were involved.  Final stage was IA (T1c, N0, cM0).  She had an Oncotype DX score of 18 which corresponds to an approximately 11% risk of recurrence in the next 10 years on tamoxifen.  She has numerous comorbidities including bipolar disorder, COPD endometriosis and osteoarthritis.  After thorough discussion we mutually agreed not to go for adjuvant chemotherapy.  She completed adjuvant radiation and started letrozole on 5/18/17.    1.  She had decided to stop letrozole in July 2019.  She states that she stopped it for about 2 months and then restarted.  She still complains of some mild fatigue with the letrozole but otherwise she is doing okay.  She has had a new mammogram done on 2/28/2020.  I reviewed the results with her.  He does not show any evidence of cancer recurrence.  She was glad to see that.  On physical examination there is no suspicious finding.  She does not give any suspicious symptoms for cancer  recurrence.    I reminded her that she is now 3 years out from the surgery for the breast cancer.  She is doing well without any evidence of recurrence.  I think she needs to continue with letrozole only for 2 more years.  She can then stop.  She was very much agreeable to this plan.    She will call when she needs a new prescription for letrozole.    2.  I discussed with her that she should be continue with the calcium and vitamin D tablets to prevent osteoporosis because of the aromatase inhibitor.  She has promised to restart taking that.  We will plan on getting a new DEXA scan in early 2021.    3.  She was agreeable to regular follow-up every 6 months.  She comes back for follow-up in 6 months we will order the mammogram for next year and also the DEXA scan.    4.  Follow-up: Return to clinic with MD or NP in 6 months.    Time spend >25 minutes face to face with the patient. More than 50 % in counseling and coordination of care.    Problem List    1. Malignant neoplasm of upper-outer quadrant of right breast in female, estrogen receptor positive (H)  calcium-vitamin D (CALCIUM-VITAMIN D) 500 mg(1,250mg) -200 unit per tablet   2. Aromatase inhibitor use          CC: Rosalba Yap, Shira Gutierrez MD        ______________________________________________________________________________    History of Present Illness    Ms. Emani Vargas is here for follow-up alone.    She states that even though she told me that she was stopping letrozole in July 2019, she restarted letrozole after about 2 months.  She states that she felt good when she was off letrozole but she does have a mild fatigue that is ongoing ever since she restarted.  Otherwise she feels that she is tolerating it well.  She is taking it daily.  She believes that she has 3 refills left.    She has not noticed any lumps or bumps anywhere.    No complaints of aches or pains that are new.  She has had chronic body aches which continue in the same way.    She  says that she has a cold and has some nasal congestion and some chest congestion.  She is not short of breath.  No fever.  No sore throat.    Weight has remained stable.  Activity level has remained stable.  No lumps or bumps anywhere.  No nausea or vomiting or abdominal pain.  No constipation or diarrhea.  No blood in stool or black stools.  No vaginal bleeding.  No difficulty with urination.  No skin rashes.    Please see below.  A 14 point review of system is otherwise completely negative.    Pain Status  Currently in Pain: Yes    Review of Systems    Constitutional  Constitutional (WDL): Exceptions to WDL  Fatigue: Fatigue not relieved by rest - Limiting instrumental ADL  Chills: Mild sensation of cold, shivering, chattering of teeth  Neurosensory  Neurosensory (WDL): Exceptions to WDL  Cardiovascular  Cardiovascular (WDL): Exceptions to WDL  Palpitations: Definition: A disorder characterized by inflammation of the muscle tissue of the heart.(occasionally)  Edema: No  Pulmonary  Respiratory (WDL): Exceptions to WDL  Cough: Mild symptoms, nonprescription intervention indicated(chronic copd, sputum very light yellowish)  Dyspnea: Shortness of breath with moderate exertion(with activity)  Gastrointestinal  Gastrointestinal (WDL): Exceptions to WDL  Dysphagia: Symptomatic, able to eat regular diet(a little sore throat right side)  Nausea: Loss of appetite without alteration in eating habits  Genitourinary  Genitourinary (WDL): Exceptions to WDL  Urinary Frequency: Present  Integumentary  Integumentary (WDL): All integumentary elements are within defined limits  Patient Coping  Patient Coping: Accepting;Open/discussion  Distress Assessment  Distress Assessment Score: 3  Accompanied by  Accompanied by: Alone    Past History  Past Medical History:   Diagnosis Date     Allergic rhinitis      Asthma      Bipolar 1 disorder (H)      Breast cancer of upper-outer quadrant of right female breast (H) 4/11/2017     Chronic  pain     Back pain due to arthritis.     COPD (chronic obstructive pulmonary disease) (H)      Endometriosis      Fibroid uterus      Hx of radiation therapy 2017     Insomnia      Osteoarthritis      Overweight      Refusal of blood transfusions as patient is [employer] 4/12/2017     Sensorineural hearing loss, bilateral      Vitamin D deficiency          Past Surgical History:   Procedure Laterality Date     BREAST BIOPSY Right 2000's     BREAST BIOPSY Right 02/20/2017     BREAST LUMPECTOMY Right 03/10/2017    with sentinel lymph node biopsy.     KNEE ARTHROSCOPY Right 1996    Description: Arthroscopy Knee Right;  Recorded: 12/08/2011;     KNEE SURGERY Right 1997    lateral release     LAPAROSCOPIC ENDOMETRIOSIS FULGURATION  2002     ND TOTAL KNEE ARTHROPLASTY Right 8/28/2017    Procedure: RIGHT TOTAL KNEE ARTHROPLASTY;  Surgeon: Antony Elias MD;  Location: [facility] Main OR;  Service: Orthopedics     TOTAL HIP ARTHROPLASTY N/A 9/24/2014    Procedure: LEFT HIP TOTAL ARTHROPLASTY;  Surgeon: Antony Elias MD;  Location: [facility] OR;  Service:      TOTAL HIP ARTHROPLASTY Right 8/10/2015    Procedure: HIP TOTAL ARTHROPLASTY RIGHT;  Surgeon: Antony Elias MD;  Location: [facility] OR;  Service:        Physical Exam    Recent Vitals 3/3/2020   Height -   Weight 266 lbs 3 oz   BSA (m2) 2.42 m2   /80   Pulse 91   Temp 98.3   Temp src 1   SpO2 94   Some recent data might be hidden       GENERAL: Alert and oriented to time place and person. Seated comfortably. In no distress.  Heavyset.  She looks well overall.    HEAD: Atraumatic and normocephalic.    EYES: CORBY, EOMI.  No pallor.  No icterus.    Oral cavity: no mucosal lesion or tonsillar enlargement.    NECK: supple. JVP normal.  No thyroid enlargement.    LYMPH NODES: No palpable, cervical, axillary or inguinal lymphadenopathy.    CHEST: clear to auscultation bilaterally.  Resonant to percussion throughout  bilaterally.  Symmetrical breath movements bilaterally.    CVS: S1 and S2 are heard. Regular rate and rhythm.  No murmur or gallop or rub heard.  No peripheral edema.    ABDOMEN: Soft. Not tender. Not distended.  No palpable hepatomegaly or splenomegaly.  No other mass palpable.  Bowel sounds heard.    EXTREMITIES: Warm.    SKIN: no rash, or bruising or purpura.  Has a full head of hair.    BREASTS:  Right breast: Contour looks normal.  Skin looks normal.  Surgical scar has healed well.  Nipple looks normal.  No palpable mass in the right breast.  Right axilla is without mass or nodule.    Left breast: Contour looks normal.  Skin looks normal.  No palpable mass.  Nipple looks normal.  Left axilla is without mass or nodule.    Lab Results    No results found for this or any previous visit (from the past 168 hour(s)).    Imaging    Mammo Screening Bilateral    Result Date: 2/28/2020  BILATERAL FULL FIELD DIGITAL SCREENING MAMMOGRAM Performed on: 2/28/20 Compared to: 02/25/2019 Mammo Screening Bilateral and 02/23/2018 Mammo Diagnostic Bilateral Findings: The breasts have scattered areas of fibroglandular densities.  There are postsurgical lumpectomy changes in the right breast. No evidence for malignancy and no change from the previous exam(s). This study was evaluated with the assistance of Computer-Aided Detection. Continue routine screening mammogram as recommended. ACR BI-RADS Category 2: Benign Findings        Signed by: Christina Leigh MD

## 2021-06-07 ENCOUNTER — COMMUNICATION - HEALTHEAST (OUTPATIENT)
Dept: FAMILY MEDICINE | Facility: CLINIC | Age: 56
End: 2021-06-07

## 2021-06-07 DIAGNOSIS — G89.29 OTHER CHRONIC PAIN: ICD-10-CM

## 2021-06-08 RX ORDER — METHOCARBAMOL 500 MG/1
TABLET, FILM COATED ORAL
Qty: 90 TABLET | Refills: 0 | Status: SHIPPED | OUTPATIENT
Start: 2021-06-08 | End: 2021-10-14

## 2021-06-08 NOTE — PROGRESS NOTES
DFM/DPM/RW  1:1 V/V EXP 1/13/2018  POLLENS/CAT  1:1 V/V EXP 1/13/2018  TREES  1:1 V/V EXP 1/13/2018    HE WBY  CHECKED BY SF  CHARGED 30 UNITS

## 2021-06-08 NOTE — TELEPHONE ENCOUNTER
Patient Returning Call  Reason for call:  lmtcb    Information relayed to patient:    No message left to relay to patient  States ankle pain is about is about the same.  Swelling with ambulation.    Patient has additional questions:  Yes  If YES, what are your questions/concerns:  Requesting results of Xray's and the plan of care.    Okay to leave a detailed message?: Yes

## 2021-06-08 NOTE — PROGRESS NOTES
Emani Vargas who presents today with a chief complaint of  Follow-up      Joint Pains: Yes    Location: all over   Onset: chronic   Intensity:  6-7/10 today on bad day 8/10  AM Stiffness: 10-15 Minutes  Alleviating/Aggravating Factors:no   Tolerating Meds:yes   Other:      ROS:  Patient denies having any +chest pain COPD, +shortness of breath, cough, abdominal pain, nausea, vomiting, rashes, fevers, oral ulcers and recent infections.  Patient admits to having a good appetite      Problem List:  Patient Active Problem List   Diagnosis     Vitamin D Deficiency     Endometriosis     Chronic Pain     Insomnia     Bipolar Disorder     Hearing Loss     Osteoarthrosis, unspecified whether generalized or localized, pelvic region and thigh     Allergic rhinitis     Compression fracture of vertebral column with routine healing     Malignant neoplasm of upper-outer quadrant of right breast in female, estrogen receptor positive (H)     Refusal of blood transfusions as patient is Muslim     Osteoporosis     Aromatase inhibitor use     Knee osteoarthritis     Bipolar affective disorder, remission status unspecified (H)     Chronic pain syndrome     Morbid obesity (H)        PMH:   Past Medical History:   Diagnosis Date     Allergic rhinitis      Asthma      Bipolar 1 disorder (H)      Breast cancer of upper-outer quadrant of right female breast (H) 4/11/2017     Chronic pain     Back pain due to arthritis.     COPD (chronic obstructive pulmonary disease) (H)      Endometriosis      Fibroid uterus      Hx of radiation therapy 2017     Insomnia      Osteoarthritis      Overweight      Refusal of blood transfusions as patient is Muslim 4/12/2017     Sensorineural hearing loss, bilateral      Vitamin D deficiency        Surgical History:  Past Surgical History:   Procedure Laterality Date     BREAST BIOPSY Right 2000's     BREAST BIOPSY Right 02/20/2017     BREAST LUMPECTOMY Right 03/10/2017    with sentinel  lymph node biopsy.     KNEE ARTHROSCOPY Right 1996    Description: Arthroscopy Knee Right;  Recorded: 12/08/2011;     KNEE SURGERY Right 1997    lateral release     LAPAROSCOPIC ENDOMETRIOSIS FULGURATION  2002     DE TOTAL KNEE ARTHROPLASTY Right 8/28/2017    Procedure: RIGHT TOTAL KNEE ARTHROPLASTY;  Surgeon: Antony Elias MD;  Location: [facility];  Service: Orthopedics     TOTAL HIP ARTHROPLASTY N/A 9/24/2014    Procedure: LEFT HIP TOTAL ARTHROPLASTY;  Surgeon: Antony Elias MD;  Location: [facility] OR;  Service:      TOTAL HIP ARTHROPLASTY Right 8/10/2015    Procedure: HIP TOTAL ARTHROPLASTY RIGHT;  Surgeon: Antony Elias MD;  Location: [facility] OR;  Service:        Family History:  Family History   Problem Relation Age of Onset     Depression Mother      COPD Mother      Diabetes Father      Skin cancer Father 45     Colon cancer Maternal Grandmother      Colon cancer Maternal Grandfather      Diabetes Paternal Grandmother      Colon cancer Paternal Grandmother 80     Breast cancer Cousin 48        Paternal side.     Testicular cancer Brother 44     Lung cancer Paternal Aunt 60     Anesthesia problems Neg Hx        Social History:   reports that she quit smoking about 35 years ago. She has a 8.00 pack-year smoking history. She has never used smokeless tobacco. She reports current alcohol use. She reports that she does not use drugs.    Allergies:  Allergies   Allergen Reactions     Cat/Feline Products Itching     Egg Derived      Flu like symptoms--pain     Trees Other (See Comments)     Grass, itching        Current Medications:  Current Outpatient Medications   Medication Sig Dispense Refill     acetaminophen (TYLENOL) 500 MG tablet Take 1,000 mg by mouth every 6 (six) hours as needed for pain.       calcium-vitamin D (CALCIUM-VITAMIN D) 500 mg(1,250mg) -200 unit per tablet Take 1 tablet by mouth 2 (two) times a day with meals.  0     cholecalciferol, vitamin D3, 1,000  unit tablet Take 2,000 Units by mouth daily.       dextroamphetamine-amphetamine 20 mg Tab Take 1 tablet by mouth 2 (two) times a day.  0     diclofenac sodium (VOLTAREN) 1 % Gel Apply approximately 1/2-1 gm over affected hand and/or left elbow joints tid-qid, as needed. 1 Tube 2     fluticasone furoate-vilanterol (BREO ELLIPTA) 200-25 mcg/dose DsDv inhaler Inhale 1 puff daily. (Patient taking differently: Inhale 1 puff daily as needed. ) 30 each 11     fluticasone propionate (FLONASE ALLERGY RELIEF) 50 mcg/actuation nasal spray One spray in each nostril twice daily. (Patient taking differently: daily as needed. One spray in each nostril twice daily.) 16 g 12     gabapentin (NEURONTIN) 400 MG capsule 2400 mg daily  11     guaiFENesin ER (MUCINEX) 600 mg 12 hr tablet Take 1-2 tablets (600-1,200 mg total) by mouth 2 (two) times a day as needed for congestion. 120 tablet 11     loratadine-pseudoephedrine (CLARITIN-D 24 HOUR)  mg per 24 hr tablet Take 1 tablet by mouth daily. 30 tablet 11     magnesium citrate solution Take 296 mL by mouth daily as needed.       methocarbamol (ROBAXIN) 500 MG tablet Take 1 tablet (500 mg total) by mouth 3 (three) times a day. As needed 90 tablet 3     montelukast (SINGULAIR) 10 mg tablet Take 1 tablet (10 mg total) by mouth at bedtime. 30 tablet 11     omeprazole (PRILOSEC) 40 MG capsule Take 1 capsule (40 mg total) by mouth daily before breakfast. 90 capsule 3     traZODone (DESYREL) 150 MG tablet 150 mg at bedtime as needed.  5     albuterol (PROAIR HFA;PROVENTIL HFA;VENTOLIN HFA) 90 mcg/actuation inhaler Inhale 1-2 puffs every 4 (four) hours as needed for wheezing or shortness of breath. 1 Inhaler 11     buPROPion (WELLBUTRIN XL) 150 MG 24 hr tablet TAKE 1 TABLET (150 MG TOTAL) BY MOUTH EVERY MORNING. 90 tablet 3     FLUoxetine (PROZAC) 20 MG capsule Take 20 mg by mouth daily.       letrozole (FEMARA) 2.5 mg tablet Take 1 tablet (2.5 mg total) by mouth daily. (Patient taking  "differently: Take 2.5 mg by mouth daily. Taking daily) 90 tablet 3     No current facility-administered medications for this visit.            Physical Exam:  Emani Vargas is a 55 y.o. female who is being evaluated via a billable video visit.      The patient has been notified of following:     \"This video visit will be conducted via a call between you and your physician/provider. We have found that certain health care needs can be provided without the need for an in-person physical exam.  This service lets us provide the care you need with a video conversation.  If a prescription is necessary we can send it directly to your pharmacy.  If lab work is needed we can place an order for that and you can then stop by our lab to have the test done at a later time.    Video visits are billed at different rates depending on your insurance coverage. Please reach out to your insurance provider with any questions.    If during the course of the call the physician/provider feels a video visit is not appropriate, you will not be charged for this service.\"    Patient has given verbal consent to a Video visit? Yes    Patient would like to receive their AVS by AVS Preference: Honorio.    Patient would like the video invitation sent by: Text to cell phone: [phone]     Will anyone else be joining your video visit? No      Video Start Time: 2:30 PM    Additional provider notes:       Video-Visit Details    Type of service:  Video Visit    Video End Time (time video stopped): 2:45 PM  Originating Location (pt. Location): Home    Distant Location (provider location):  [facility] RHE[facility]     Platform used for Video Visit: Demetrius failed, therefore used Doxscott Ramon DO     Summary/Assessment:    History that includes osteoarthritis, fibromyalgia, chronic low back pain.    Presents for a follow-up video visit.    Cortisone injection involving bilateral shoulders and left knee were " beneficial on last visit, pain has been resurfacing involving right shoulder.    Also has occasional right second digit/DIP discomfort, no swelling.  Has not tried using Voltaren gel for this pain.    Water therapy beneficial for neck and sacral pains, prescribed by spine clinic.    Denies history of GI bleed/peptic ulcer disease.    Please see below for management plan.      Pertinent rheumatology/past medical history (please refer to above for more detailed history):      Osteoarthritis    Fibromyalgia    Chronic shoulder pains (likely rotator cuff tendinopathy, cortisone injections helpful)    Chronic low back pain and sacral pains (established with spine clinic)    Neck pain    History bilateral hip replacements    Chronic left knee pain (s/p cortisone injection)    History right knee replacement    History of vertebral compression fracture    History right breast cancer    History COPD/chronic bronchitis    Overweight    Rheumatology medications provided/suggested:      Voltaren gel  Celebrex    Pertinent medication from other providers or from otc (please refer to above for more detailed med list):    Tylenol  Neurontin  Trazodone  Robaxin  Prozac  Letrozole (MSK pain present prior to initiation)      Pertinent medications already tried:     Tylenol (pruritus)  NSAIDs (upset stomach)  Tramadol (worsening nausea)  Lidoderm patches (ineffective)       Pertinent lab history:    Noted to have negative/unremarkable: Rheumatoid factor, CCP antibody, CONSTANTINE, Lyme antibody, HLA-B27.    Mildly elevated CRP, normal ESR.    Pertinent imaging/test history:      X-rays of right shoulder from May 2018 showed:  Very minimal degenerative irregularity at the rotator cuff insertion site. Glenohumeral joint appears normal. No fracture or dislocation.     X-rays of the left elbow showed some signs of advanced degenerative joint disease.  Some subchondral lucencies noted which may represent either erosive change or degenerative  subcortical cystic change.  Small effusion likely. These findings may be related to inflammation or prior trauma.    Other:    Was a  for over 25 years.    On prior visit, suggested looking into obtaining a paraffin wax machine for hand pains.    Standing order for labs placed every 3-6 months good through May 2021.      Plan:      Continue Tylenol as needed.    We will add Celebrex 200 mg daily, for pain not responsive to Tylenol.    Has Voltaren gel for hand and left elbow pain as needed.  Made aware can alternate with Celebrex, should avoid taking together.    Receives benefit from combination of Neurontin, trazodone, Robaxin prescribed by other providers.    Established with spine clinic for chronic pains involving: SI joints, coccyx and worsening neck pain.    Will place patient on injections for right shoulder for when we reopen (temporarily close due to pandemic) if needs injection sooner, can go to urgent care orthopedics.    Check labs in 6 weeks.    Follow-up in 6 months.      Procedure note:        This note was transcribed using Dragon voice recognition software as a result unintentional grammatical errors or word substitutions may have occurred. Please contact our Rheumatology department if you need any clarification or if you have any related inquiries.    Major side effect profile of medications provided were discussed with the patient.      Kilo Ramon DO ....................  5/11/2020   1:45 PM

## 2021-06-08 NOTE — TELEPHONE ENCOUNTER
Spoke to patient  Reviewed xray   Still with pain and swelling of left ankle  Will refer [facility]

## 2021-06-08 NOTE — PROGRESS NOTES
"Emani Vargas is a 55 y.o. female who is being evaluated via a billable video visit.      The patient has been notified of following:     \"This video visit will be conducted via a call between you and your physician/provider. We have found that certain health care needs can be provided without the need for an in-person physical exam.  This service lets us provide the care you need with a video conversation.  If a prescription is necessary we can send it directly to your pharmacy.  If lab work is needed we can place an order for that and you can then stop by our lab to have the test done at a later time.    Video visits are billed at different rates depending on your insurance coverage. Please reach out to your insurance provider with any questions.    If during the course of the call the physician/provider feels a video visit is not appropriate, you will not be charged for this service.\"    Patient has given verbal consent to a Video visit? Yes    Will anyone else be joining your video visit? No        Video Start Time: 12:50 PM    Additional provider notes:   Emani Vargas 55 y.o.. female with   Chief Complaint   Patient presents with     ear pain     x 1 day, left ear     Jaw Pain     left side x 1 day     facial pressure        S:    Left side ear pain, jaw pain, facial pressure x 1 day  No fever, runny nose, sore throat, cough  Doesn't hurt to chew, bite, or eat  flonase has not been helping     Was seen recently for ankle fracture   Doing better   Pain is better  Swelling is better    O:  Constitutional: Patient is oriented to person, place, and time. Patient appears well-developed and well-nourished. No distress.   Head: Normocephalic and atraumatic.   Right Ear: External ear normal.   Left Ear: External ear normal.   Nose: Nose normal.   Eyes: Conjunctivae and EOM are normal. Right eye exhibits no discharge. Left eye exhibits no discharge. No scleral icterus.   Neurological: Patient is alert and oriented to " person, place, and time.   The patient has good eye contact.  No psychomotor retardation or stereotypical behaviors.  Speech was regular rate, regular rhythm, adequate responses.  Mood was stable and affect was congruent mood.  No suicidal or homicidal intent.  No hallucination.      A/P:  Diagnoses and all orders for this visit:    Acute non-recurrent maxillary sinusitis  -     amoxicillin-clavulanate (AUGMENTIN) 875-125 mg per tablet; Take 1 tablet by mouth 2 (two) times a day for 10 days.  Dispense: 20 tablet; Refill: 0        Video-Visit Details    Type of service:  Video Visit    Video End Time (time video stopped): 1:05 PM  Originating Location (pt. Location): Home    Distant Location (provider location):  [facility]IC[facility]/OB     Platform used for Video Visit: [facility]      Shira Yap MD

## 2021-06-08 NOTE — TELEPHONE ENCOUNTER
Central PA team  [phone]  Pool: HE PA MED ([id number])          PA has been initiated.       PA form completed and faxed insurance via Cover My Meds     Key:  [id number]     Medication:  METHOCARBAMOL    Insurance:  [employer]        Response will be received via fax and may take up to 5-10 business days depending on plan

## 2021-06-08 NOTE — TELEPHONE ENCOUNTER
Prior Authorization Request  Who s requesting:  Pharmacy  Pharmacy Name and Location: [place] [address]  Medication Name: methocarbamol (ROBAXIN) 500 MG tablet   Insurance Plan: [facility]  Insurance Member ID Number:  [id number]  CoverMyMeds Key: [id number]  Informed patient that prior authorizations can take up to 10 business days for response:   NA  Okay to leave a detailed message: No

## 2021-06-08 NOTE — PATIENT INSTRUCTIONS - HE
Summary of Your Rheumatology Visit    Next Appointment:  4 Months    Medications:     Please follow directives on pill bottle on how to take medication(s) provided.      Referrals:       Tests:      Please have labs performed in about 6 weeks.       Injections:         Other:

## 2021-06-08 NOTE — TELEPHONE ENCOUNTER
RN Triage:    Patient calling about an ankle injury    Says that a ladder fell on her left ankle yesterday and it felt sore but no other symptoms    Now states that today it looks swollen, it's bruised, and the last 4 toes are all numb an tingly.   Says the swelling goes into her foot and up into her leg as well.   The area looks red and is warm to touch.  States she is able to walk without difficulty.      PLAN:  Per protocol, patient should be seen in the ED. Patient agreeable to plan and will have  drop her off at nearest ED.     Cassi Ramos RN    Reason for Disposition    [1] Numbness (new loss of sensation) of toe(s) AND [2] present now    Protocols used: FOOT AND ANKLE INJURY-A-

## 2021-06-08 NOTE — TELEPHONE ENCOUNTER
Emani is calling for results.  Please advise. Haleigh Carrillo LPN    XR Ankle Left 3 or More VWS (Order [id number])   Imaging   Date: 6/2/2020  Department: [facility]  Released By: Saundra Perez RT (R)  Authorizing: Shira Miller MD    Study Result     EXAM: XR ANKLE LEFT 3 OR MORE VWS  LOCATION: [facility]  DATE/TIME: 6/2/2020 1:26 PM     INDICATION: Unspecified injury of left ankle, initial encounter  COMPARISON: None.     IMPRESSION:   There is soft tissue swelling with possible ankle joint effusion. There is a transverse lucency at the distal tip of the fibula consistent with a nondisplaced avulsion fracture off the distal fibula. No displaced fractures or dislocation. The   ankle mortise aligns normally. There is a plantar calcaneal spur.     NOTE: ABNORMAL REPORT     THE DICTATION ABOVE DESCRIBES AN ABNORMALITY FOR WHICH FOLLOW-UP IS NEEDED.

## 2021-06-08 NOTE — TELEPHONE ENCOUNTER
Called Emani and left message that order was sent to [facility] on 6/4/20.  Phone number provided for [facility] and also left SS phone number if additional assistance is needed.    [facility] - Specialty

## 2021-06-08 NOTE — PROGRESS NOTES
"Emani Vargas is a 55 y.o. female who is being evaluated via a billable video visit.      The patient has been notified of following:     \"This video visit will be conducted via a call between you and your physician/provider. We have found that certain health care needs can be provided without the need for an in-person physical exam.  This service lets us provide the care you need with a video conversation.  If a prescription is necessary we can send it directly to your pharmacy.  If lab work is needed we can place an order for that and you can then stop by our lab to have the test done at a later time.    Video visits are billed at different rates depending on your insurance coverage. Please reach out to your insurance provider with any questions.    If during the course of the call the physician/provider feels a video visit is not appropriate, you will not be charged for this service.\"    Patient has given verbal consent to a Video visit? Yes    Patient would like to receive their AVS by AVS Preference: Honorio.    Patient would like the video invitation sent by: Text to cell phone: [phone]    Will anyone else be joining your video visit? No        Video Start Time: 3:19 PM    Additional provider notes:     Emani Vargas 55 y.o.. female with   Chief Complaint   Patient presents with     Follow-up     ER 5/27/2020 fracture left ankle 2 nights ago, needs to do x-ray in 10 day        S:  Hanging curtain in porch.  Ladder was not sturdy  Was seen in ED yesterday for ankle injury whereby a ladder fell and struck her ankle along the lateral malleolus.  Xray showed remote bilateral malleolar injury, possible avulsion injury to her lateral malleolus, and plantar heel spur.  ED notes indicated ambulation without difficulty and without tenderness to palpation     Hardly any pain 2-3/10  Has boot on, minimal swelling  No difficulty with walking    O:  Constitutional: Patient is oriented to person, place, and time. " Patient appears well-developed and well-nourished. No distress.   Head: Normocephalic and atraumatic.   Right Ear: External ear normal.   Left Ear: External ear normal.   Nose: Nose normal.   Eyes: Conjunctivae and EOM are normal. Right eye exhibits no discharge. Left eye exhibits no discharge. No scleral icterus.   Neurological: Patient is alert and oriented to person, place, and time.   The patient has good eye contact.  No psychomotor retardation or stereotypical behaviors.  Speech was regular rate, regular rhythm, adequate responses.  Mood was stable and affect was congruent mood.  No suicidal or homicidal intent.  No hallucination.      A/P:  Diagnoses and all orders for this visit:    Injury of left ankle, initial encounter  -     XR Ankle Left 3 or More VWS; Future; Expected date: 06/03/2020        Video-Visit Details    Type of service:  Video Visit    Video End Time (time video stopped): 3:29 PM  Originating Location (pt. Location): Home    Distant Location (provider location):  [facility]ILY MEDICINE/OB     Platform used for Video Visit: Derick Yap MD

## 2021-06-08 NOTE — TELEPHONE ENCOUNTER
Patient Returning Call  Reason for call:  Return call   Information relayed to patient:  Caller was informed of message below.   Patient has additional questions:  Yes  If YES, what are your questions/concerns:  Caller stated that she cant really think right now, and will call back for the referral to orthopedic.   Okay to leave a detailed message?: No call back needed

## 2021-06-08 NOTE — TELEPHONE ENCOUNTER
Please place patient on an injection list for when our clinic reopens regularly.  Patient willing to wait although made aware this can be weeks to months.  Patient desires to then have right shoulder injected with cortisone.     Left arm;

## 2021-06-09 NOTE — PROGRESS NOTES
In for follow-up of her right lumpectomy  with sentinel lymph node biopsy.  She is feeling well.  She is having very minimal pain.      Physical exam:  Appears well.  Does not appear in any discomfort.  Breasts: Incisions are healing nicely with no signs of infection.  No swelling.    Pathology: The tumor was 1.5 cm.  The margins are negative.  Her sentinel lymph node is negative.    Impression: Postop visit. Doing well. Has good prognosis. Needs radiation and then the determination on chemotherapy will depend on her Oncotype.    Plan: We'll have an Oncotype ordered if it hasn't been done already to determine the need for chemotherapy or not.  We'll refer her onto [facility] oncology.  Follow-up with me in 1 year with bilateral mammograms.

## 2021-06-09 NOTE — PROGRESS NOTES
Assessment / Plan:     Diagnoses and all orders for this visit:    Lumbalgia  -     meloxicam (MOBIC) 7.5 MG tablet;   Dispense: 30 tablet; Refill: 0  -     methocarbamol (ROBAXIN) 500 MG tablet; Take 1 tablet (500 mg total) by mouth 3 (three) times a day for 10 days.  Dispense: 30 tablet; Refill: 1    Myofascial pain  -     meloxicam (MOBIC) 7.5 MG tablet;   Dispense: 30 tablet; Refill: 0  -     methocarbamol (ROBAXIN) 500 MG tablet; Take 1 tablet (500 mg total) by mouth 3 (three) times a day for 10 days.  Dispense: 30 tablet; Refill: 1    Sleep disturbance  -     meloxicam (MOBIC) 7.5 MG tablet;   Dispense: 30 tablet; Refill: 0  -     methocarbamol (ROBAXIN) 500 MG tablet; Take 1 tablet (500 mg total) by mouth 3 (three) times a day for 10 days.  Dispense: 30 tablet; Refill: 1    Facet arthropathy, lumbar  -     meloxicam (MOBIC) 7.5 MG tablet;   Dispense: 30 tablet; Refill: 0  -     methocarbamol (ROBAXIN) 500 MG tablet; Take 1 tablet (500 mg total) by mouth 3 (three) times a day for 10 days.  Dispense: 30 tablet; Refill: 1          Emani Vargas is a 52 y.o. y.o. female with past medical history significant for bipolar disorder, obesity, vitamin D deficiency, insomnia,  who presents today for follow-up regarding bilateral back pain.  Patient will continue on home exercises.  I provided patient with a refill on meloxicam, and methocarbamol.  Patient was recommended to follow up with primary care provider for lab work.          A shared decision making plan was used.  The patient's values and choices were respected.  The following represents what was discussed and decided upon by the physician and the patient.      1.  DIAGNOSTIC TESTS:  No imaging to review today.  2.  PHYSICAL THERAPY: Continue on home exercises.  3.  MEDICATIONS: I   refilled  meloxicam, and methocarbamol.  4.  INTERVENTIONS:  No therapeutic injections at this time.    5.  PATIENT EDUCATION:  I thoroughly discussed the plan with the patient  today.  She verbalizes understanding and agrees with the plan.  6.  FOLLOW-UP: She will follow up With me on an as needed basis.  All questions were answered.    Subjective:     Emani Vargas is a 52 y.o. female who presents today for follow-up regarding bilateral back pain.  Today patient reports ongoing improvement in her symptoms.  She has been taking gabapentin 300 mg 3 tablets twice daily, methocarbamol 5 mg 1 tablet as needed for muscle spasm, meloxicam 7.5 mg 1 tablet as needed for pain daily today she describes her pain as dull achy 2 out of 10 in intensity of the lower back that is improving with home exercises.  Patient stated that she noticed that her symptoms are improving with having the balance between exercises, stretching and rest.  She was diagnosed with breast cancer and she is scheduled for lumpectomy.Patient denies urinary or bowel incontinence, unintentional weight loss, saddle anesthesia, fever or chills, balance difficulties.      Review of Systems:  Bilateral low back pain. Patient denies urinary or bowel incontinence, unintentional weight loss, saddle anesthesia, fever or chills, balance difficulties.       Objective:   CONSTITUTIONAL:  Vital signs as above.  No acute distress.  The patient is well nourished and well groomed.    PSYCHIATRIC:  The patient is awake, alert, oriented to person, place and time.  The patient is answering questions appropriately with clear speech.  Normal affect.  SKIN:  Skin over the face, posterior torso, bilateral upper and lower extremities is clean, dry, intact without rashes.  MUSCULOSKELETAL:  Gait is non-antalgic.  The patient is able to heel and toe walk without any difficulty.  Mild tenderness over the L4-L5 L5-S1 lumbar paraspinal muscles.      The patient has 5/5 strength for the bilateral hip flexors, knee flexors/extensors, ankle dorsiflexors/plantar flexors, ankle evertors/invertors.    NEUROLOGICAL:  2/4 patellar, medial hamstring, achilles reflexes  which are symmetric bilaterally.  No ankle clonus bilaterally.  Sensation to light touch is intact in the bilateral L4, L5, and S1 dermatomes.       RESULTS:      XR LUMBAR SPINE 2 OR 3 VWS4/26/2016 2:51 PMINDICATION: Back pain.COMPARISON: None.FINDINGS: 5 lumbar type vertebral bodies. Patient rotated on the lateral projection. Normal lordotic curve. Mild left convex asymmetry mid lumbar spine. Slight   anterolisthesis of L4 on L5. Slight right subluxation of L3 on L4 and slight left subluxation of L4 on L5. Vertebral body heights are normal. Moderate disc space narrowing L2-S1, and mild disc space narrowing L1-2. Moderate facet arthropathy lower lumbar  spine. No significant superimposed bony finding.Mild degenerative change SI joints. Bilateral hip arthroplasties.This report was electronically interpreted by: Dr. Sam Dunlap MD ON 04/27/2016 at 13:17

## 2021-06-09 NOTE — PROGRESS NOTES
Chief complaint: Asthma concerns    History of present illness: This is a pleasant 52-year-old woman who is here today for asthma follow-up.  At her last visit, I denied her an allergy shot due to the fact that she had an asthma exacerbation.  She states she's been sick for the last 3-4 weeks.  She's had significant cough and some shortness of breath.  She has been using her albuterol inhaler regularly.  Over the last 2 days, however, her symptoms have improved.  She is not coughing at night any longer.  She feels that she has some throat congestion but overall feels that the allergy shots are improving symptoms.  She continues on Dulera 200/5 2 puffs twice daily.  She feels this winter has been improved.  Less nasal congestion and drainage.    Past medical history, social history, family medical history, meds and allergies reviewed and updated accordingly.  It should be noted that recently she was diagnosed with stage II breast cancer and will be having a lumpectomy and radiation soon    Review of Systems performed as above and the remainder is negative.        Current Outpatient Prescriptions:      albuterol (PROVENTIL HFA;VENTOLIN HFA) 90 mcg/actuation inhaler, Inhale 2 puffs every 6 (six) hours as needed for wheezing., Disp: 2 Inhaler, Rfl: 0     buPROPion (WELLBUTRIN XL) 150 MG 24 hr tablet, TK 1 T PO D, Disp: , Rfl: 1     EPIPEN 2-DMITRY 0.3 mg/0.3 mL (1:1,000) atIn, , Disp: , Rfl:      FLUoxetine (PROZAC) 20 MG capsule, TAKE 1 CAPSULE BY MOUTH ONCE DAILY, Disp: 30 capsule, Rfl: 0     gabapentin (NEURONTIN) 300 MG capsule, Take 3 capsules (900 mg total) by mouth 2 (two) times a day., Disp: 540 capsule, Rfl: 3     loratadine (CLARITIN) 10 mg tablet, Take 10 mg by mouth 2 (two) times a day. , Disp: , Rfl:      meloxicam (MOBIC) 7.5 MG tablet, TAKE 1 TABLET(7.5 MG) BY MOUTH DAILY, Disp: 30 tablet, Rfl: 0     montelukast (SINGULAIR) 10 mg tablet, TAKE 1 TABLET BY MOUTH EVERY NIGHT AT BEDTIME, Disp: 30 tablet, Rfl:  0     omeprazole (PRILOSEC) 20 MG capsule, TAKE 1 CAPSULE BY MOUTH DAILY, Disp: 90 capsule, Rfl: 2     traZODone (DESYREL) 50 MG tablet, TK 1/2 TO 1 T PO HS, Disp: , Rfl: 1     magnesium oxide 250 mg Tab, Take 250 mg by mouth daily., Disp: , Rfl:      mometasone-formoterol (DULERA) 200-5 mcg/actuation HFAA inhaler, Inhale 2 puffs 2 (two) times a day., Disp: 30 Inhaler, Rfl: 1     oxyCODONE-acetaminophen (PERCOCET) 5-325 mg per tablet, Take 1 tablet by mouth bedtime as needed for pain., Disp: 15 tablet, Rfl: 0    Allergies   Allergen Reactions     Cat/Feline Products      Egg Derived      Flu like symptoms--pain     Feather/Down      Trees        Visit Vitals     /74     Pulse 74     Wt (!) 257 lb (116.6 kg)     LMP 08/10/2012     BMI 37.95 kg/m2     Gen:  Pleasant female not in acute distress  HEENT:  Eyes no erythema of the bulbar or palpebral conjunctiva, no edema.  Nose:  Clear, no congestionMouth:  Throat clear, no lip or tongue edema.    Cardiac:  Regular rate and rhythm, no murmurs, rubs or gallops  Respiratory:  Clear to auscultation bilaterally, no adventitious breath sounds    Skin:  No rashes or lesions  Psych:  Alert and oriented times 3    Impression report and plan:  1.  Moderate persistent asthma  2.  Recent asthma exacerbation  3.  Allergic rhinitis    Continue current medication.  I did give her allergy shot today.  She seems to be improved.  I asked her to let me know if her asthma worsens prior to her surgery.  Otherwise, she will follow in August.  Continue current medication regimen.  Notify a systemic reaction.  I think it is okay to continue allergy shots with radiation.  If she needs chemotherapy, I would discontinue allergy shots.

## 2021-06-09 NOTE — PROGRESS NOTES
Surgery- right breast lumpectomy  DOS - 3/10/17   Location-  [facility] fax # [phone]  Physician - Dr Yoder        ASSESSMENT/PLAN:  Emani Vargas 52 y.o. female with breast cancer here for preoperative evaluation for a right breast lumpectomy.     1. Preop examination  - Basic Metabolic Panel  - HM2(CBC w/o Differential)  - Electrocardiogram Perform - Clinic      Cardiac Risk Stratification and Management:  Active Cardiac Conditions: No  Low Risk surgery: Yes  Functional Capacity >=4METS:  yes    Surgical Risk: Risk for cardiac event is 0.4%.   Stop adderall, NSAIDs now.  Do not take ASA, fish oil, multivitamins  I do not have any contraindication to the planned procedure.    CHIEF COMPLAINT:  Chief Complaint   Patient presents with     Pre-op Exam     right breast lumpectomy. needs urine test kidney function. Needs EKG         SUBJECTIVE:  Emani Vargas is a 52 y.o. female who is here for preoperative evaluation. She is having a right breast lumpectomy on March 10, 2017 at [facility] with Dr. Yoder.  Pathology showed INVASIVE DUCTAL CARCINOMA, GRADE II (of III), DUCTAL CARCINOMA IN-SITU (DCIS) PRESENT, NONEXTENSIVE GRADE: 2 (INTERMEDIATE) with estrogen/progesterone receptor positive and HER2 negative.   She is going to have 3 weeks of radiation post surgery, and then start Arimidex. She will meet with an oncologist after her surgery.     Abdominal Flutters: She has left upper quadrant flutters. She has had this on and off for years, but has been more consistent in the past 3 months. She is not constipated, and moves her bowels every day. She usually has looser stools. This lasts a few seconds and comes and goes. No pain or shortness of breath associated with this.    Prior Anesthesia: yes  Previous Anesthesia Reaction:  no  Bleeding Disorder: no    REVIEW OF SYSTEMS:  Her breathing has been stable, she does not need the Dulera every day. She does not use albuterol every day.   All  other systems reviewed are negative.     PFSH:  No new history.     History   Smoking Status     Former Smoker   Smokeless Tobacco     Former User     Quit date: 6/6/1985       Family History   Problem Relation Age of Onset     Depression Mother      Diabetes Father      Skin cancer Father      Colon cancer Maternal Grandmother      Colon cancer Maternal Grandfather      Diabetes Paternal Grandmother      Lung cancer Paternal Grandmother      Breast cancer Cousin      Anesthesia problems Neg Hx        Past Surgical History:   Procedure Laterality Date     BREAST BIOPSY Right 2000's     JOINT REPLACEMENT Left     hip     NH KNEE SCOPE,DIAGNOSTIC      Description: Arthroscopy Knee Right;  Recorded: 12/08/2011;     NH LAP,UTERUS,UNLISTED PROCEDURE      Description: Laparoscopy Of Uterus;  Recorded: 11/21/2008;     TOTAL HIP ARTHROPLASTY N/A 9/24/2014    Procedure: LEFT HIP TOTAL ARTHROPLASTY;  Surgeon: Antony Elias MD;  Location: [facility] OR;  Service:      TOTAL HIP ARTHROPLASTY Right 8/10/2015    Procedure: HIP TOTAL ARTHROPLASTY RIGHT;  Surgeon: Antony Elias MD;  Location: [facility] OR;  Service:        Allergies   Allergen Reactions     Cat/Feline Products      Egg Derived      Flu like symptoms--pain     Feather/Down      Trees        Active Ambulatory Problems     Diagnosis Date Noted     Vitamin D Deficiency      Overweight      Endometriosis      Chronic Pain      Insomnia      Bipolar Disorder      Hearing Loss      Osteoarthrosis, unspecified whether generalized or localized, pelvic region and thigh 09/24/2014     Allergic rhinitis      Mild persistent asthma without complication 02/24/2015     Compression fracture of vertebral column with routine healing 09/09/2016     Resolved Ambulatory Problems     Diagnosis Date Noted     Asthma      Asthma      Past Medical History:   Diagnosis Date     Allergic rhinitis      Asthma      Bipolar 1 disorder      Chronic pain      Endometriosis       Hearing loss      Insomnia      Overweight      Vitamin D deficiency        PHYSICAL EXAM:    Vitals:    03/01/17 1529   BP: 120/80   Patient Site: Left Arm   Patient Position: Sitting   Cuff Size: Adult Large   Pulse: 85   SpO2: 97%   Weight: (!) 258 lb 8 oz (117.3 kg)       Physical Exam:  Constitutional: Patient is oriented to person, place, and time. Patient appears well-developed and well-nourished. No distress.   Head: Normocephalic and atraumatic.   Right Ear: External ear normal. Ear canal and TM normal.   Left Ear: External ear normal. Ear canal and TM normal.   Nose: Nose normal.   Mouth/Throat: Oropharynx is clear and moist. No oropharyngeal exudate.   Eyes: Conjunctivae and EOM are normal. Pupils are equal, round, and reactive to light. Right eye exhibits no discharge. Left eye exhibits no discharge. No scleral icterus.   Cardiovascular: Normal rate, regular rhythm, normal heart sounds and intact distal pulses. No murmur heard.   Pulmonary/Chest: Effort normal and breath sounds normal. No stridor. No respiratory distress. Patient has no wheezes, no rales, exhibits no tenderness.   Abdominal: Soft. Bowel sounds are normal. Patient exhibits no distension and no mass. There is no tenderness. There is no rebound and no guarding.   Neurological: Patient is alert and oriented to person, place, and time. Patient has normal reflexes. No cranial nerve deficit. Coordination normal.   Skin: Skin is warm and dry. No rash noted. Patient is not diaphoretic. No erythema. No pallor.       Results for orders placed or performed in visit on 03/01/17   Basic Metabolic Panel   Result Value Ref Range    Sodium 141 136 - 145 mmol/L    Potassium 4.1 3.5 - 5.0 mmol/L    Chloride 105 98 - 107 mmol/L    CO2 28 22 - 31 mmol/L    Anion Gap, Calculation 8 5 - 18 mmol/L    Glucose 71 70 - 125 mg/dL    Calcium 9.1 8.5 - 10.5 mg/dL    BUN 17 8 - 22 mg/dL    Creatinine 0.96 0.60 - 1.10 mg/dL    GFR MDRD Af Amer >60 >60 mL/min/1.73m2     GFR MDRD Non Af Amer >60 >60 mL/min/1.73m2   HM2(CBC w/o Differential)   Result Value Ref Range    WBC 8.7 4.0 - 11.0 thou/uL    RBC 4.82 3.80 - 5.40 mill/uL    Hemoglobin 14.1 12.0 - 16.0 g/dL    Hematocrit 43.3 35.0 - 47.0 %    MCV 90 80 - 100 fL    MCH 29.3 27.0 - 34.0 pg    MCHC 32.6 32.0 - 36.0 g/dL    RDW 12.5 11.0 - 14.5 %    Platelets 191 140 - 440 thou/uL    MPV 7.8 7.0 - 10.0 fL   Electrocardiogram Perform - Clinic   Result Value Ref Range    SYSTOLIC BLOOD PRESSURE  mmHg    DIASTOLIC BLOOD PRESSURE  mmHg    VENTRICULAR RATE 75 BPM    ATRIAL RATE 75 BPM    P-R INTERVAL 166 ms    QRS DURATION 86 ms    Q-T INTERVAL 398 ms    QTC CALCULATION (BEZET) 444 ms    P Axis 43 degrees    R AXIS 23 degrees    T AXIS 45 degrees    MUSE DIAGNOSIS       Normal sinus rhythm  Normal ECG  No previous ECGs available           ADDITIONAL HISTORY SUMMARIZED (2): Dr munguia's notes.  DECISION TO OBTAIN EXTRA INFORMATION (1): Used surgical risk calculator.   RADIOLOGY TESTS (1): None.  LABS (1): Ordered labs today.  MEDICINE TESTS (1): Ordered EKG today.  INDEPENDENT REVIEW (2 each): Personally reviewed EKG.     The visit lasted a total of 22 minutes face to face with the patient. Over 50% of the time was spent counseling and educating the patient about pre operative procedures.    I, Nelida Lopez, am scribing for and in the presence of, Dr. Navarrete.    I, Dr. Navarrete, personally performed the services described in this documentation, as scribed by Nelida Lopez in my presence, and it is both accurate and complete.    MEDICATIONS:  Current Outpatient Prescriptions   Medication Sig Dispense Refill     albuterol (PROVENTIL HFA;VENTOLIN HFA) 90 mcg/actuation inhaler Inhale 2 puffs every 6 (six) hours as needed for wheezing. 2 Inhaler 0     buPROPion (WELLBUTRIN XL) 150 MG 24 hr tablet TK 1 T PO D  1     dextroamphetamine-amphetamine (ADDERALL) 5 mg Tab tablet Take 1 tablet by mouth daily.       EPIPEN 2-DMITRY 0.3  mg/0.3 mL (1:1,000) atIn        FLUoxetine (PROZAC) 20 MG capsule TAKE 1 CAPSULE BY MOUTH ONCE DAILY 30 capsule 0     gabapentin (NEURONTIN) 300 MG capsule Take 3 capsules (900 mg total) by mouth 2 (two) times a day. 540 capsule 3     loratadine (CLARITIN) 10 mg tablet Take 10 mg by mouth 2 (two) times a day.        magnesium oxide 250 mg Tab Take 250 mg by mouth daily.       meloxicam (MOBIC) 7.5 MG tablet TAKE 1 TABLET(7.5 MG) BY MOUTH DAILY 30 tablet 0     methocarbamol (ROBAXIN) 500 MG tablet Take 1 tablet (500 mg total) by mouth 3 (three) times a day for 10 days. 30 tablet 1     mometasone-formoterol (DULERA) 200-5 mcg/actuation HFAA inhaler Inhale 2 puffs 2 (two) times a day. 30 Inhaler 1     montelukast (SINGULAIR) 10 mg tablet TAKE 1 TABLET BY MOUTH EVERY NIGHT AT BEDTIME 30 tablet 0     omeprazole (PRILOSEC) 20 MG capsule TAKE 1 CAPSULE BY MOUTH DAILY 90 capsule 2     traZODone (DESYREL) 50 MG tablet TK 1/2 TO 1 T PO HS  1     No current facility-administered medications for this visit.        Total data points: 7

## 2021-06-09 NOTE — PROGRESS NOTES
90-day supply of size 13 hearing aid batteries were mailed to patient's address on record. Patient was not seen by provider today.    Nahid Peng., [facility]  [place] Licensed Audiologist [id number]

## 2021-06-09 NOTE — PROGRESS NOTES
Emani is scheduled for right breast lumpectomy with Dr. Yoder on 3/10/17 at [facility]. Patient was given instructions of arrival time, need a , pre-op physical within 30days and NPO after midnight. Patient verbalized understanding.     Roxane Collier [facility]  Physician    [facility]   [phone]

## 2021-06-09 NOTE — PROGRESS NOTES
This is a 52 y.o.  female who I'm asked to see by Shira Yap MD for evaluation of a right breast cancer.  This was picked up  on screening mammogram.  The patient cannot feel a mass.  A needle biopsy was done which shows an invasive ductal carcinoma.  It is estrogen receptor positive, progesterone receptor positive and HER-2 negative.  Interestingly, this developed in an area that she had had a needle biopsy for some calcifications 13 years ago.    She has no significant  family history of breast cancer.      Please see the chart review for PMH, med list, allergies and social history.  Past Medical History:   Diagnosis Date     Allergic rhinitis      Asthma      Bipolar 1 disorder      Chronic pain      Endometriosis      Hearing loss      Insomnia      Overweight      Vitamin D deficiency        Current Outpatient Prescriptions:      albuterol (PROVENTIL HFA;VENTOLIN HFA) 90 mcg/actuation inhaler, Inhale 2 puffs every 6 (six) hours as needed for wheezing., Disp: 2 Inhaler, Rfl: 0     buPROPion (WELLBUTRIN XL) 150 MG 24 hr tablet, TK 1 T PO D, Disp: , Rfl: 1     dextroamphetamine-amphetamine (ADDERALL) 5 mg Tab tablet, Take 1 tablet by mouth daily., Disp: , Rfl:      EPIPEN 2-DMITRY 0.3 mg/0.3 mL (1:1,000) atIn, , Disp: , Rfl:      FLUoxetine (PROZAC) 20 MG capsule, TAKE 1 CAPSULE BY MOUTH ONCE DAILY, Disp: 30 capsule, Rfl: 0     gabapentin (NEURONTIN) 300 MG capsule, Take 3 capsules (900 mg total) by mouth 2 (two) times a day., Disp: 540 capsule, Rfl: 3     loratadine (CLARITIN) 10 mg tablet, Take 10 mg by mouth 2 (two) times a day. , Disp: , Rfl:      magnesium oxide 250 mg Tab, Take 250 mg by mouth daily., Disp: , Rfl:      mometasone-formoterol (DULERA) 200-5 mcg/actuation HFAA inhaler, Inhale 2 puffs 2 (two) times a day., Disp: 30 Inhaler, Rfl: 1     montelukast (SINGULAIR) 10 mg tablet, TAKE 1 TABLET BY MOUTH EVERY NIGHT AT BEDTIME, Disp: 30 tablet, Rfl: 0     omeprazole (PRILOSEC) 20 MG  "capsule, TAKE 1 CAPSULE BY MOUTH DAILY, Disp: 90 capsule, Rfl: 2     traZODone (DESYREL) 50 MG tablet, TK 1/2 TO 1 T PO HS, Disp: , Rfl: 1     meloxicam (MOBIC) 7.5 MG tablet, TAKE 1 TABLET(7.5 MG) BY MOUTH DAILY, Disp: 30 tablet, Rfl: 0     methocarbamol (ROBAXIN) 500 MG tablet, Take 1 tablet (500 mg total) by mouth 3 (three) times a day for 10 days., Disp: 30 tablet, Rfl: 1  Allergies   Allergen Reactions     Cat/Feline Products      Egg Derived      Flu like symptoms--pain     Feather/Down      Trees          ROS:  A 12 point comprehensive review of systems was negative except as noted.    Physical Exam  Visit Vitals     BP (!) 149/96 (Patient Site: Left Arm, Patient Position: Sitting, Cuff Size: Adult Large)     Pulse 89     Resp 18     Ht 5' 9\" (1.753 m)     Wt (!) 260 lb (117.9 kg)     LMP 08/10/2012     SpO2 100%     BMI 38.4 kg/m2     General:alert, appears older than stated age, cooperative and mildly obese  Lungs:clear to auscultation bilaterally  CV:regular rate and rhythm, S1, S2 normal, no murmur, click, rub or gallop  Breasts: No palpable masses in either breast.  No skin changes noted.  Lymph Nodes:no axillary nodes palpated  Neuro:Grossly normal      Reviewed her mammograms and ultrasound and pathology.     Impression: Right Breast Cancer.  This is clinically T1, N0.  Discussed the surgical options for treatment of breast cancer which generally are a lumpectomy (partial mastectomy) combined with radiation versus a mastectomy.  Explained that the survival benefit is the same for both.  The difference is in local recurrence risk.  The patient is a Excellent candidate for a lumpectomy.  This is a small low-grade lesion.  Discussed SLN biopsy.  The procedure and rationale were explained.  Discussed that at this point we do not know yet whether or not she will need chemotherapy and we may not know until we get all of the results of surgery back.  Explained that often Oncotype is necessary to help " determine this.      Plan: We'll schedule for a right partial mastectomy with wire localization and sentinel lymph node biopsy. This is typically an outpatient procedure under local MAC anesthetic.  The risks and benefits of surgery were explained.  Also talked about expected recovery time.

## 2021-06-10 NOTE — PROGRESS NOTES
"  RADIATION ONCOLOGY WEEKLY TREATMENT VISIT NOTE      Assessment / Impression       1. Breast cancer of upper-outer quadrant of right female breast          Tolerating radiation therapy well.  All questions and concerns addressed.      Plan:     Continue radiation treatment as prescribed.  Radiation: Site: Right Breast  Stereotactic Radiosurgery: No  Stereotactic Radiosurgery: No  Concurrent Therapy: No  Today's Dose: 4256  Total Dose for Breast: 4256  Today's Fraction/Total Fraction Breast: 16/16        Subjective:      HPI: Emani Vargas is a 52 y.o. female with    1. Breast cancer of upper-outer quadrant of right female breast         The following portions of the patient's history were reviewed and updated as appropriate: allergies, current medications, past family history, past medical history, past social history, past surgical history and problem list.      Objective:     Exam:     Vitals:    05/17/17 1038   BP: 132/87   Pulse: 67   Temp: 97.6  F (36.4  C)   TempSrc: Oral   SpO2: 97%   Weight: (!) 250 lb 6.4 oz (113.6 kg)   Height: 5' 9\" (1.753 m)       Wt Readings from Last 8 Encounters:   05/17/17 (!) 250 lb 6.4 oz (113.6 kg)   05/10/17 (!) 254 lb 11.2 oz (115.5 kg)   05/05/17 (!) 256 lb 6.4 oz (116.3 kg)   05/03/17 (!) 256 lb 6.4 oz (116.3 kg)   04/26/17 (!) 257 lb 9.6 oz (116.8 kg)   04/13/17 (!) 259 lb 4.8 oz (117.6 kg)   04/12/17 (!) 259 lb 4.8 oz (117.6 kg)   03/01/17 (!) 258 lb 8 oz (117.3 kg)       General: Alert and oriented, in no acute distress  Emani has moderate Erythema.    Treatment Summary to Date    Aria chart and setup information reviewed    Nelida Recio MD  "

## 2021-06-10 NOTE — PROGRESS NOTES
I met with Emani and her sister in law today following her consult with Dr. Leigh.  She said the appt went well.  She said she is on disability and takes in about $1077/ month.  She said when she is done paying bills and rent, she has about 100.00 left over to pay gas and food.  I talked to her about the [facility] medical bill payment program.  I sent Tomasa Alonzo an email to ask about sending pt an application.  I also offered to apply for an [employer] for her and also fill out an [employer] application for her.  She agrees to both.  Support and encouragement provided.  Invited calls.

## 2021-06-10 NOTE — PROGRESS NOTES
"  RADIATION ONCOLOGY WEEKLY TREATMENT VISIT NOTE      Assessment / Impression       1. Breast cancer of upper-outer quadrant of right female breast          Tolerating radiation therapy well.  All questions and concerns addressed.      Plan:     Continue radiation treatment as prescribed.  Radiation: Site: Right Breast  Stereotactic Radiosurgery: No  Stereotactic Radiosurgery: No  Concurrent Therapy: No  Today's Dose: 532  Total Dose for Breast: 4256  Today's Fraction/Total Fraction Breast: 2/16      Subjective:      HPI: Emani Vargas is a 52 y.o. female with    1. Breast cancer of upper-outer quadrant of right female breast         The following portions of the patient's history were reviewed and updated as appropriate: allergies, current medications, past family history, past medical history, past social history, past surgical history and problem list.      Objective:     Exam:     Vitals:    04/26/17 1025   BP: 124/85   Pulse: 89   Temp: 98.3  F (36.8  C)   TempSrc: Oral   SpO2: 96%   Weight: (!) 257 lb 9.6 oz (116.8 kg)   Height: 5' 9\" (1.753 m)       Wt Readings from Last 8 Encounters:   04/26/17 (!) 257 lb 9.6 oz (116.8 kg)   04/13/17 (!) 259 lb 4.8 oz (117.6 kg)   04/12/17 (!) 259 lb 4.8 oz (117.6 kg)   03/01/17 (!) 258 lb 8 oz (117.3 kg)   02/28/17 (!) 256 lb 6.4 oz (116.3 kg)   02/28/17 (!) 260 lb (117.9 kg)   02/24/17 (!) 257 lb (116.6 kg)   12/28/16 (!) 268 lb (121.6 kg)       General: Alert and oriented, in no acute distress  Emani has no Erythema.    Treatment Summary to Date    Aria chart and setup information reviewed    Nelida Recio MD  "

## 2021-06-10 NOTE — PROGRESS NOTES
1. Compression fracture of vertebral column with routine healing  denosumab 60 mg (PROLIA 60 mg/ml)   2. Osteoporosis  Vitamin D, Total (25-Hydroxy)    denosumab 60 mg (PROLIA 60 mg/ml)   3. Breast cancer of upper-outer quadrant of right female breast  denosumab 60 mg (PROLIA 60 mg/ml)   4. Aromatase inhibitor use  denosumab 60 mg (PROLIA 60 mg/ml)   5. Elevated TSH  Thyroid Stimulating Hormone (TSH)    T4, Free       Return in about 6 months (around 11/5/2017) for Recheck.    Patient Instructions   Prolia 1st today.  Prolia 2nd in 6 months.  DXA in 1 year .   Phone number to schedule [phone].  Please avoid any extensive dental work as implants and teeth extractions for the next 1-2 months.  Daily calcium need is 1667-3570 mg a day from the diet and supplements.  Calcium citrate is easier to digest.  Vitamin D 2000 IU daily recommended.      Chief Complaint   Patient presents with     Osteoporosis Follow Up     recent diagnosis of breast cancer       /76  Pulse 96  Wt (!) 256 lb 6.4 oz (116.3 kg)  LMP 08/10/2012  BMI 37.86 kg/m2        Any medication change in the last 6 months? no  Did you take prednisone or other immunosupressant drugs in the last 6 months   (chemo, transplant, rheum, dermatology conditions)? no  Did you have any serious infection in the last 6 months?no  Any recent hospitalizations?no  Do you plan any dental work in the next 2-3 months?no  How much calcium do you take daily from the diet and supplements?1200 mg  How much vit D do you take daily? 2000 IU  Last DXA? 9/2016      Patient is here today for the 1st Prolia injection.   I saw her in October 2016 for the follow-up of subacute L1 compression fracture which happened a few months earlier.  No clear explanation how and why this fracture happened. Patient had a bone density scan which actually came back as normal with a T score of -0.2 in the lumbar spine and a 1.1 in her forearm. She had both hips replaced so we were not able  to evaluate these 2 sites. She never had any other fractures. Complete workup for secondary causes of the bone loss and was negative. TSH was just slightly elevated to 6.8 but the free T4 was normal. Vitamin D was slightly low and she is now taking 4000 international units of vitamin D daily.  She was diagnosed with breast cancer in feb 2017, had lumpectomy, radiation and started on Femara.  We discussed calcium and vit D daily needs today.     Next Prolia injection will be in 6 months.     25 minutes spent with the patient and more then 50 % of the time in counseling.  This note has been dictated using voice recognition software. Any grammatical or context distortions are unintentional and inherent to the software      Patient Active Problem List   Diagnosis     Vitamin D Deficiency     Endometriosis     Chronic Pain     Insomnia     Bipolar Disorder     Hearing Loss     Osteoarthrosis, unspecified whether generalized or localized, pelvic region and thigh     Allergic rhinitis     Mild persistent asthma without complication     Compression fracture of vertebral column with routine healing     Breast cancer of upper-outer quadrant of right female breast     Refusal of blood transfusions as patient is Gnosticism     Osteoporosis     Aromatase inhibitor use       Current Outpatient Prescriptions on File Prior to Visit   Medication Sig Dispense Refill     albuterol (PROVENTIL HFA;VENTOLIN HFA) 90 mcg/actuation inhaler Inhale 2 puffs every 6 (six) hours as needed for wheezing. 2 Inhaler 0     buPROPion (WELLBUTRIN XL) 150 MG 24 hr tablet TK 1 T PO D  1     cholecalciferol, vitamin D3, 1,000 unit tablet Take 2,000 Units by mouth daily.       dextroamphetamine-amphetamine (ADDERALL) 5 mg Tab tablet Take 1 tablet by mouth daily.       EPIPEN 2-DMITRY 0.3 mg/0.3 mL (1:1,000) atIn        FLUoxetine (PROZAC) 20 MG capsule TAKE 1 CAPSULE BY MOUTH ONCE DAILY 30 capsule 0     gabapentin (NEURONTIN) 300 MG capsule Take 3  capsules (900 mg total) by mouth 2 (two) times a day. 540 capsule 3     HYDROcodone-acetaminophen 5-325 mg per tablet TAKE 1 TO 2 TABLETS PO Q 4 TO 6 H PRN FOR PAIN  0     loratadine (CLARITIN) 10 mg tablet Take 10 mg by mouth 2 (two) times a day.        magnesium oxide 250 mg Tab Take 250 mg by mouth daily.       meloxicam (MOBIC) 7.5 MG tablet TAKE 1 TABLET(7.5 MG) BY MOUTH DAILY 30 tablet 1     mometasone-formoterol (DULERA) 200-5 mcg/actuation HFAA inhaler Inhale 2 puffs 2 (two) times a day. 30 Inhaler 1     montelukast (SINGULAIR) 10 mg tablet TAKE 1 TABLET BY MOUTH EVERY NIGHT AT BEDTIME 30 tablet 0     multivitamin with minerals (THERA-M) 9 mg iron-400 mcg Tab tablet Take 1 tablet by mouth daily.       omeprazole (PRILOSEC) 20 MG capsule TAKE 1 CAPSULE BY MOUTH DAILY 90 capsule 0     traZODone (DESYREL) 50 MG tablet TK 1/2 TO 1 T PO HS  1     letrozole (FEMARA) 2.5 mg tablet Take 1 tablet (2.5 mg total) by mouth daily. (Patient taking differently: Take 2.5 mg by mouth daily. Starting after radiation completed) 30 tablet 2     No current facility-administered medications on file prior to visit.

## 2021-06-10 NOTE — PROGRESS NOTES
RADIATION ONCOLOGY WEEKLY TREATMENT VISIT NOTE      Assessment / Impression       1. Breast cancer of upper-outer quadrant of right female breast          Tolerating radiation therapy well.  All questions and concerns addressed.      Plan:     Continue radiation treatment as prescribed.  Radiation: Site: Right Breast  Stereotactic Radiosurgery: No  Stereotactic Radiosurgery: No  Concurrent Therapy: No  Today's Dose: 1862  Total Dose for Breast: 4256  Today's Fraction/Total Fraction Breast: 7/16        Subjective:      HPI: Emani Vargas is a 52 y.o. female with    1. Breast cancer of upper-outer quadrant of right female breast         The following portions of the patient's history were reviewed and updated as appropriate: allergies, current medications, past family history, past medical history, past social history, past surgical history and problem list.      Objective:     Exam:     Vitals:    05/03/17 1021   BP: 123/76   Pulse: 69   Temp: 98.1  F (36.7  C)   TempSrc: Oral   SpO2: 96%   Weight: (!) 256 lb 6.4 oz (116.3 kg)       Wt Readings from Last 8 Encounters:   05/03/17 (!) 256 lb 6.4 oz (116.3 kg)   04/26/17 (!) 257 lb 9.6 oz (116.8 kg)   04/13/17 (!) 259 lb 4.8 oz (117.6 kg)   04/12/17 (!) 259 lb 4.8 oz (117.6 kg)   03/01/17 (!) 258 lb 8 oz (117.3 kg)   02/28/17 (!) 256 lb 6.4 oz (116.3 kg)   02/28/17 (!) 260 lb (117.9 kg)   02/24/17 (!) 257 lb (116.6 kg)       General: Alert and oriented, in no acute distress  Emani has mild Erythema.    Treatment Summary to Date    Aria chart and setup information reviewed    Nelida Recio MD

## 2021-06-10 NOTE — PROGRESS NOTES
Radiation Treatment Summary    Patient: Emani Vargas   MRN: [id number]  : 1965  Care Provider: Nelida Recio    Date of Service: 05/10/2017      HISTORY: Emani Vargas was treated with 3D conformal radiation therapy for Right UOQ [id number] ER+ HER-2 neg, breast cancer.    SITE TREATED: right  breast  TOTAL DOSE: [id number]  NUMBER OF FRACTIONS: 16  DATES COMPLETED: 2017  CONCURRENT CHEMOTHERAPY: No  ADJUVANT THERAPY:Yes: Letrozole     Emani tolerated the treatment without unexpected side effects.     PLAN: Discharge instructions were given and Emani knows to call if questions/issues arise. Emani will be seen in f/u in 4 weeks without a scan.        Signed by: Nelida Recio MD

## 2021-06-10 NOTE — CONSULTS
[facility]ology [facility] Consult Note    Patient: Emani Vargas  MRN: [id number]  Date of Service: 04/12/2017        Reason for Visit    I was consulted by Dr. Yoder regarding right-sided breast cancer.    Assessment/Plan    Ms. Emani Vargas is a 52 y.o. woman who was diagnosed with breast cancer after the screening mammogram showed a suspicious lesion in the upper outer quadrant of the right breast.  She could not palpate a mass there.  She had a breast biopsy followed by a breast lumpectomy and sentinel lymph node procedure done on 3/10/17 by Dr. Yoder.  Final pathology showed a 15 mm invasive ductal carcinoma, grade 2 with associated DCIS, ER GA positive and HER-2 negative.  Margins were clear.  She had a fourth sentinel lymph nodes and none of them were involved.  Final stage was IA (T1c, N0, cM0).  She had an Oncotype DX score of 18 which corresponds to an approximately 11% risk of recurrence in the next 10 years on tamoxifen.  She has numerous comorbidities including bipolar disorder, COPD endometriosis and osteoarthritis.    1.  Today I had a detailed discussion with the patient about the breast cancer diagnosis.  I went through the pathology in detail with her and explained the details to her.  I discussed with her that she had stage IA breast cancer.  It is completely resected.  It was hormone receptor positive and HER-2 negative.  I discussed with her that because she had a lumpectomy and not a mastectomy I certainly recommend that she should have adjuvant radiation.  She was agreeable to the plan.  She already has an appointment with radiation oncology.  I explained to her that radiation is mainly to reduce the risk of local recurrence.  I gave her a rough idea about what to expect with the radiation.    2.  We then discussed about adjuvant chemotherapy.  I went through the Oncotype DX results with her in detail.  I explained to her that from her clinical features in pathology, she is on the  borderline of where I would consider chemotherapy.  When we look at the Oncotype DX score of 18 this is right at the borderline between low risk and intermediate risk based on the score.  I explained to her that if she had a score higher than 31 I would have certainly recommend adjuvant chemotherapy.  At this time it is not quite clear how to handle patients in the intermediate risk zone.  We have not received the results of the TailorX trial for the patient's in this region.  I told her that I can certainly give her chemotherapy if that is her wish, but taking into account her comorbidities I think chemotherapy would likely cause more problems for her than benefit.  She was also of the same mind and we mutually decided to forego adjuvant chemotherapy.    3.  I then discussed with her about adjuvant hormonal treatment.  I explained to her about how we can reduce the risk of cancer recurrence by a blocking estrogen.  I discussed with her about tamoxifen and what aromatase inhibitors.  Since she is clearly postmenopausal she is a good candidate for aromatase inhibitors.  I explained that they have a slightly better response rate compared to tamoxifen.  I went through the side effects of aromatase inhibitors in detail.  We discussed about side effects like body aches, hot flashes, stomach upset, bloated sensation, edema, and allergic reactions.  We then discussed about serious side effects including hypersensitivity reactions, the increased risk of blood clots including DVTs, pulmonary emboli, heart attacks and strokes.  We discussed about the increased risk of osteoporosis.  We discussed about the possibility of occasional reduction of blood counts and LFT elevations.  I also discussed with her particularly about the risk of second gynecological cancers including endometrial cancer.  This is a particular importance for her because of the history of endometriosis and fibroids and occasional vaginal bleeding.  I  emphasized to her that she should continue regular follow-up with gynecology and have her annual exams done.  She should have Pap smears according to standard schedule.  If she has any suspicious symptoms, especially increased vaginal bleeding she will need to go for prompt gynecology evaluation.  She voiced understanding.    I discussed with her that we usually start aromatase inhibitors after she is done with radiation.  The rationale was explained.  She wanted me to write the prescription for the medication.  Her insurance company appears to prefer letrozole.  I have prescribed letrozole 2.5 mg p.o. daily.  I told her that she can fill the prescription but start the medication only after she completes radiation.  I have also given her printed information about letrozole to read from the [employer] database.    4.  She tells me that she is somewhat apprehensive about the possibility of metastatic breast cancer or of a second cancer like ovarian cancer.  This is mainly on account of the persistent left lower quadrant abdominal discomfort that she has had for months or years.  I told her that my suspicion for metastatic breast cancer is low but if she is very worried about it we can obtain a CT scan of the chest abdomen and pelvis.  She wanted to have that done.  Today we will obtain a baseline blood count and CMP.  I have ordered a CT scan of the chest abdomen and pelvis which will be scheduled soon according to her convenience.  She just wants to be called with the results.    5.  I discussed with her about preventing osteoporosis.  She is no longer smoking.  I encouraged her to be physically active and start a walking program.  I also to take calcium and vitamin D tablets daily.  She has already had a DEXA scan done in 2016 which showed no evidence of osteoporosis.  We will plan to repeat a DEXA scan in 2 years.    6.  We also discussed about her family history.  There are several members of her family who have had  cancer.  However none of the cancers appear to have happened very early.  I offered referral to the genetic counselor.  The patient however feels that this is of not much benefit for her and declined.    7.  She has brought in her advanced directives.  She wants to be DNR and DNI.  This is already been entered into the medical record from earlier.  She discussed with me that she is a [employer] and does not want any blood transfusion or blood fractions.  I told her that I will have her wishes entered into the medical record.  The advanced directive will be scanned into epic.    8.  I discussed with her about the follow-up schedule that we follow usually for resected breast cancer.  She can expect a follow-up visit every 3 months or so once we are sure that she is doing okay on letrozole.  We will plan on an annual mammogram.  She was agreeable to the plan.    9.  Follow-up: I have asked her to schedule a follow-up appointment with me in 2 months.  We will recheck a CBC differential platelets and CMP at that time.  This appointment is mainly to see how she is doing on letrozole.          ECOG Performance   ECOG Performance Status: 1  Distress Assessment  Distress Assessment Score: 1        Problem List    1. Breast cancer of upper-outer quadrant of right female breast  HM1(CBC and Differential)    Comprehensive Metabolic Panel    CT Chest Abdomen Pelvis With Oral With IV Cont    letrozole (FEMARA) 2.5 mg tablet    HM1(CBC and Differential)    Comprehensive Metabolic Panel   2. Left lower quadrant abdominal pain of unknown etiology  HM1(CBC and Differential)    Comprehensive Metabolic Panel    CT Chest Abdomen Pelvis With Oral With IV Cont           CC: MD Shelia Campbell MD Maureen McDonald, MD      ______________________________________________________________________________      Staging History    Breast cancer of upper-outer quadrant of right female breast    Staging form:  Breast, AJCC 7th Edition      Pathologic: Stage IA (T1c, N0, cM0) - Signed by Christina Leigh MD on 4/11/2017      History    Ms. Emani Vargas is a 52-year-old woman who is here for the consultation with her sister-in-law.    She had a screening mammogram done on 2/15/17 which showed an enlarging soft tissue nodule in the superior part of the right breast.  Subsequently she had this confirmed by a diagnostic mammogram and ultrasound.  She had an ultrasound-guided core biopsy done on 2/20/17 which showed an invasive ductal carcinoma that was ER MD positive and HER-2 negative.  She herself could not palpate any lump.  She says that her breasts were always lumpy.  She had a right breast lumpectomy and sentinel lymph node procedure done on 3/10/17.  She is here today to discuss about adjuvant treatment.    She says that she is healing very well after the surgery.  She has no pain or swelling in the site of the surgery in the right breast.  In fact she does not feel anything suspicious in the breasts elsewhere.     She says that she has several chronic health problems.  She says that she has a persistent cough and episodic shortness of breath because of asthma and COPD.  This has been going on for a long time.  She has hearing loss bilaterally and she uses hearing aids.  She says that this started after a very loud rock concert by Jose Wheeldonida in the 1970s.    She says that her physical activity level is up and down.  Some days she can do a lot some days that she cannot do a lot.  When she tries to walk with the main problem is from arthritic pains.  She can walk only about a block before her legs and feet start to hurt.  She also has some shortness of breath.  However she is able to manage all the ADLs and work needed at home.    She has a long-standing history of nausea in the morning.  No other than she throws up.  She says that she has long-standing pain in the left lower quadrant.  She says that she has a fibroid and  residual endometriosis and it is thought that this pain is from that.  This is been going on for many years.    She says that she still has episodic vaginal bleeding which is thought to be due to the fibroid.  She is under the care of Dr. Smith for gynecology.    Her appetite is up and down but her weight has remained stable.  She usually has loose stools.  No blood in stool or black stools.    She has brought in her advanced directives.  She states that she wants to be DNR and DNI.  She also states that she does not want blood transfusions or even fractions of blood.  On asking she says that she is a [employer] and does not want blood transfusion due to her Christian conviction.    No unusual headaches.  Vision is stable.  No mouth sores.  No swallowing difficulty.  No anginal chest pain.  No palpitations.  No difficulty passing urine.  No numbness or tingling in hands or feet.  No skin rashes.    Please see below.  A 14 point review of system is otherwise completely negative.      Past History  Past Medical History:   Diagnosis Date     Allergic rhinitis      Asthma      Bipolar 1 disorder      Breast cancer of upper-outer quadrant of right female breast 4/11/2017     Chronic pain     Back pain due to arthritis.     COPD (chronic obstructive pulmonary disease)      Endometriosis      Fibroid uterus      Insomnia      Osteoarthritis      Overweight      Refusal of blood transfusions as patient is [employer] 4/12/2017     Sensorineural hearing loss, bilateral      Vitamin D deficiency      Past Surgical History:   Procedure Laterality Date     BREAST BIOPSY Right 2000's     BREAST LUMPECTOMY Right 03/10/2017    with sentinel lymph node biopsy.     KNEE ARTHROSCOPY Right 1996    Description: Arthroscopy Knee Right;  Recorded: 12/08/2011;     LAPAROSCOPIC ENDOMETRIOSIS FULGURATION  2002     TOTAL HIP ARTHROPLASTY N/A 9/24/2014    Procedure: LEFT HIP TOTAL ARTHROPLASTY;  Surgeon: Antony Elias,  MD;  Location: [facility];  Service:      TOTAL HIP ARTHROPLASTY Right 8/10/2015    Procedure: HIP TOTAL ARTHROPLASTY RIGHT;  Surgeon: Antony Elias MD;  Location: [facility];  Service:      Family History   Problem Relation Age of Onset     Depression Mother      COPD Mother      Diabetes Father      Skin cancer Father 45     Colon cancer Maternal Grandmother      Colon cancer Maternal Grandfather      Diabetes Paternal Grandmother      Colon cancer Paternal Grandmother 80     Breast cancer Cousin 48     Paternal side.     Testicular cancer Brother 44     Lung cancer Paternal Aunt 60     No Medical Problems Son      Anesthesia problems Neg Hx      Social History     Social History     Marital status:      Spouse name: N/A     Number of children: N/A     Years of education: N/A     Occupational History     Disabled.      Was an .     Social History Main Topics     Smoking status: Former Smoker     Quit date: 1/1/1985     Smokeless tobacco: Never Used     Alcohol use 0.0 oz/week     0 Standard drinks or equivalent per week      Comment: 1 drink/month     Drug use: No      Comment: past use of marijuana and hash.     Sexual activity: Not Asked     Other Topics Concern     None     Social History Narrative     Allergies    Allergies   Allergen Reactions     Cat/Feline Products      Egg Derived      Flu like symptoms--pain     Feather/Down      Trees        Review of Systems    General  General (WDL): Exceptions to WDL  Fatigue: Yes - Chronic (Greater than 3 months)  Fever: None  Generalized Muscle Weakness: None  Weight Loss: No  ENT  ENT (WDL): Exceptions to WDL  Vertigo (Dizziness): None  Changes in vision: None  Glasses or Contacts: Yes - Chronic (Greater than 3 months)  Hearing loss: Yes - Chronic (Greater than 3 months)  Hearing Aids: Yes - Chronic (Greater than 3 months)  Tinnitus: None  Pain/Pressure in ears: None  Sinus Congestion/Drainage: Yes - Chronic (Greater  than 3 months)  Hoarseness: Yes - Chronic (Greater than 3 months)  Sore Throat: Yes - Chronic (Greater than 3 months)  Dental Problems: Yes - Chronic (Greater than 3 months)  Dentures: None  Respiratory  Respiratory (WDL): Exceptions to WDL  Dyspnea: Yes - Chronic (Greater than 3 months)  hemoptysis: None  Is patient on O2?: None  Cough: Yes - Chronic (Greater than 3 months)  Non-Cardiac Chest Pain: None  Cardiovascular  Cardiovascular (WDL): Exceptions to WDL  Palpitations: None  Edema Limbs: None  Irregular Heart Beat: None  Chest Pain: None  Lightheadedness: Yes - Chronic (Greater than 3 months)  Endocrine  Endocrine (WDL): Exceptions to WDL  Heat Intolerance: Yes - Chronic (Greater than 3 months)  Excessive Thirst: None  Cold Intolerance: None  Excessive Urination: None  Hotflashes: None  Gastrointestinal  Gastrointestinal (WDL): Exceptions to WDL  Difficulty Swallowing: None  Heartburn: Yes - Chronic (Greater than 3 months)  Constipation: None  Yellowish skin and/or eyes: None  Blood from rectum: None  Nausea and Vomiting: Yes - Chronic (Greater than 3 months)  Abdominal Pain: None  Diarrhea: Yes - Chronic (Greater than 3 months)  Have had black or tan stools?: None  Hemorrhoids: Yes - Recent (Less than 3 months)  Poor Appetite: Yes- Recent (Less than 3 months)  Musculoskeletal  Musculoskeletal (WDL): Exceptions to WDL  Range of Motion Limitation: Yes - Chronic (Greater than 3 months)  Joint pain: Yes - Chronic (Greater than 3 months)  Back Pain: Yes - Chronic (Greater than 3 months)  Activity Assistance: None  Difficulty to lie flat for more than 30 minutes: Yes - Chronic (Greater than 3 months)  Pain interfering with walking: Yes - Chronic (Greater than 3 months)  Muscle pain or stiffness: Yes - Chronic (Greater than 3 months)  Recent fall: None  Assistive device: None  Neurological  Neurological (WDL): Exceptions to WDL  History of LOC?: None  Headaches: Yes - Chronic (Greater than 3 months)  Difficulty  "walking: Yes - Chronic (Greater than 3 months)  Difficulty with speech: Yes - Recent (Less than 3 months)  Difficulty with memory: Yes - Chronic (Greater than 3 months)  Vertigo (Dizziness): None  Dominant Hand: Right  Seizures: None  Difficulty with Balance: None  Numbness and/or tingling: Yes - Chronic (Greater than 3 months)  Psychological/Emotional  Psychological/Emotional (WDL): Exceptions to WDL  Depression: Yes - Chronic (Greater than 3 months)  Insomnia: Yes - Chronic (Greater than 3 months)  Panic attacks: None  Anxiety: Yes - Chronic (Greater than 3 months)  Daytime sleepiness: Yes - Chronic (Greater than 3 months)  Hallucinations: None  Psychosocial needs or not coping well: None  Hematological/Lymphatic  Hematological/Lymphatic (WDL): All hematological/lymphatic elements are within defined limits  Dermatological  Dermatologic (WDL): Exceptions to WDL  Open wounds: None  Rash : None  Hair Loss: Yes - Chronic (Greater than 3 months)  Healing Incision: None  Changes in moles: None  Genitourinary/Reproductive  Genitourinary/Reproductive (WDL): Exceptions to WDL  Urinary Frequency: None  Urinary Incontinence: Yes - Chronic (Greater than 3 months)  Painful urination: None  Urination more than 2 times a night: Yes - Chronic (Greater than 3 months)  Urinary Urgency: None  Difficulty Initiating Urine Stream: None  Blood in urine: Yes - Chronic (Greater than 3 months)  Reproductive (Females only)  Age at start of periods: 11  Last menstural cycle: 11/2012  Number of pregnancies: 1  Age at first pregnancy: 17  Patient Pregnant?: No  Is the patient trying to get pregnant?: No  Is the patient on birth control: No  Pain  Currently in Pain: No/denies    Physical Exam    Recent Vitals 4/12/2017   Height 5' 9\"   Weight 259 lbs 5 oz   BSA (m2) 2.39 m2   /79   Pulse 92   SpO2 93       GENERAL: Alert and oriented. Seated comfortably. In no distress.  Heavily built.    HEAD: Atraumatic and normocephalic.  Has a full " head of hair.    EYES: CORBY, EOMI.  No pallor.  No icterus.    Oral cavity: no mucosal lesion or tonsillar enlargement.    NECK: supple. JVP normal.  No thyroid enlargement.    LYMPH NODES: No palpable, cervical, axillary or inguinal lymphadenopathy.    CHEST: clear to auscultation bilaterally.  Resonant to percussion throughout bilaterally.  Symmetrical breath movements bilaterally.    CVS: S1 and S2 are heard. Regular rate and rhythm.  No murmur or gallop or rub heard.    ABDOMEN: Soft. Not tender. Not distended.  A bit obese.  No palpable hepatomegaly or splenomegaly.  No other mass palpable.  Bowel sounds heard.    EXTREMITIES: Warm.  No peripheral edema.    SKIN: no rash, or bruising or purpura.    BREASTS:  Right breast: The contour looks normal.  The skin looks normal.  The surgical scars of the lumpectomy and sentinel lymph node procedure are healing quite well.  Clean and dry.  No palpable mass.  No tenderness.  Nipple looks normal.  Right axilla without mass or nodule.    Left breast: The contour looks normal.  Skin looks normal.  No palpable mass.  The nipple looks normal.  The left axilla is without mass or nodule.      Lab Results  Results for [place] (MRN [id number]) as of 4/12/2017 14:19   Ref. Range 3/1/2017 16:37   WBC Latest Ref Range: 4.0 - 11.0 thou/uL 8.7   RBC Latest Ref Range: 3.80 - 5.40 mill/uL 4.82   Hemoglobin Latest Ref Range: 12.0 - 16.0 g/dL 14.1   Hematocrit Latest Ref Range: 35.0 - 47.0 % 43.3   MCV Latest Ref Range: 80 - 100 fL 90   MCH Latest Ref Range: 27.0 - 34.0 pg 29.3   MCHC Latest Ref Range: 32.0 - 36.0 g/dL 32.6   RDW Latest Ref Range: 11.0 - 14.5 % 12.5   Platelets Latest Ref Range: 140 - 440 thou/uL 191   MPV Latest Ref Range: 7.0 - 10.0 fL 7.8     Results for [place] (MRN [id number]) as of 4/12/2017 14:19   Ref. Range 3/1/2017 16:37   Sodium Latest Ref Range: 136 - 145 mmol/L 141   Potassium Latest Ref Range: 3.5 - 5.0 mmol/L 4.1   Chloride Latest Ref Range:  98 - 107 mmol/L 105   CO2 Latest Ref Range: 22 - 31 mmol/L 28   Anion Gap, Calculation Latest Ref Range: 5 - 18 mmol/L 8   BUN Latest Ref Range: 8 - 22 mg/dL 17   Creatinine Latest Ref Range: 0.60 - 1.10 mg/dL 0.96   GFR MDRD Af Amer Latest Ref Range: >60 mL/min/1.73m2 >60   GFR MDRD Non Af Amer Latest Ref Range: >60 mL/min/1.73m2 >60   Calcium Latest Ref Range: 8.5 - 10.5 mg/dL 9.1   Glucose Latest Ref Range: 70 - 125 mg/dL 71       Pathology report from the breast biopsy dated 2/20/17 and the lumpectomy dated 3/10/17 were reviewed.      Imaging Results  SCREENING MAMMOGRAM   2/15/2017      INDICATION: Screening.     COMPARISON: May 2014 and older studies.     REPORT: Scattered glandular tissue present bilaterally. Left breast is stable and negative.     Enlarging soft tissue nodule directly adjacent to or surrounding the previously placed surgical clip within the superior aspect of right breast.     Images evaluated with the assistance of CAD.     IMPRESSION:   CONCLUSION:   We will recall the patient for diagnostic mammogram including spot compression views of superior right breast to address the question of a developing nodule. Ultrasound will follow.      ACR BI-RADS Category 0: Incomplete; Needs Additional Imaging Evaluation and/or Prior Mammograms for Comparison.    MAMMO DIAGNOSITC 2D RIGHT , US BREAST LIMITED (FOCAL) RIGHT  2/20/2017 1:46 PM     INDICATION: Benign right breast biopsy 2001. New right breast nodule near the biopsy clip on screening mammogram area  COMPARISON: 2/15/2017, 2014, and 2011 mammograms.     MAMMOGRAPHIC FINDINGS: Right full-field digital diagnostic mammograms performed. There are scattered areas of fibroglandular density. The additional views confirm the presence of an irregular 12:00 position mass surrounding the clip. This lesion is   suspicious for cancer.     ULTRASOUND FINDINGS: Targeted ultrasound of the right breast 12:00 position zone 2 demonstrates a 7 x 7 x 5 mm irregular  mass suspicious for cancer. It is also possible this represents very delayed scar development, however this is felt less likely.     No suspicious lymph nodes in the axilla.     IMPRESSION:   Suspicious right breast mass.  Ultrasound-guided core biopsy will be performed today.        Signed by: Christina Leigh MD

## 2021-06-10 NOTE — PROGRESS NOTES
Patient here ambulatory for final radiation treatment.  Bright erythema present.  Reviewed skin cares.  Written discharge instructions reviewed and given to patient.  Follow up with Dr. Recio in about 4 to 6 weeks.  Patient able to verbalize understanding and left ambulatory.

## 2021-06-10 NOTE — PROGRESS NOTES
Pt here ambulatory for consult with Dr. Recio for new dx of breast cancer. She's had lumpectomy with LN bx and saw [facility] yesterday and they decided against chemotherapy. She plans to have RT, then hormone therapy. She's feeling well since surgery, incision healed up well. She was given the new patient RT folder and we discussed consult, body mold, SIM CT, daily treatments, weekly RO visits, and common s/e of fatigue and skin irritation. I explained the skin care routine and she verbalized her understanding and had her questions answered. She prefers to have tx at [facility] over [facility].

## 2021-06-10 NOTE — PROGRESS NOTES
Brief check in with Emani today following her second to the last radiation treatment.  She will finish tomorrow.  Denied needs, invited calls.

## 2021-06-10 NOTE — PROGRESS NOTES
RADIATION ONCOLOGY WEEKLY TREATMENT VISIT NOTE      Assessment / Impression       1. Breast cancer of upper-outer quadrant of right female breast          Tolerating radiation therapy well.  All questions and concerns addressed.      Plan:   Doing well.     Continue radiation treatment as prescribed.  Radiation: Site: Right Breast  Stereotactic Radiosurgery: No  Stereotactic Radiosurgery: No  Concurrent Therapy: No  Today's Dose: 3192  Total Dose for Breast: 4256  Today's Fraction/Total Fraction Breast: 12/16        Subjective:      HPI: Emani Vargas is a 52 y.o. female with    1. Breast cancer of upper-outer quadrant of right female breast         The following portions of the patient's history were reviewed and updated as appropriate: allergies, current medications, past family history, past medical history, past social history, past surgical history and problem list.      Objective:     Exam:     Vitals:    05/10/17 1028   BP: 125/79   Pulse: 71   SpO2: 96%   Weight: (!) 254 lb 11.2 oz (115.5 kg)       Wt Readings from Last 8 Encounters:   05/10/17 (!) 254 lb 11.2 oz (115.5 kg)   05/05/17 (!) 256 lb 6.4 oz (116.3 kg)   05/03/17 (!) 256 lb 6.4 oz (116.3 kg)   04/26/17 (!) 257 lb 9.6 oz (116.8 kg)   04/13/17 (!) 259 lb 4.8 oz (117.6 kg)   04/12/17 (!) 259 lb 4.8 oz (117.6 kg)   03/01/17 (!) 258 lb 8 oz (117.3 kg)   02/28/17 (!) 256 lb 6.4 oz (116.3 kg)       General: Alert and oriented, in no acute distress  Emani has mild Erythema.    Treatment Summary to Date    [facility] chart and setup information reviewed    Nelida Recio MD

## 2021-06-10 NOTE — PROGRESS NOTES
I met with Emani briefly to give her more info on [employer] and [employer].  Those applications were sent yesterday, I told her I would let her know when I heard back from [employer] but [employer] will contact her directly.  I told her to let me know if she doesn't hear from them.  I also gave her informaiton on the open enrollment for [employer].  She does have a computer at home so I told her to go to their website to apply.  I told her let me know if she has any issues.  Support and encouragement provided, invited calls.

## 2021-06-10 NOTE — PROGRESS NOTES
[facility] Radiation Oncology Consult Note    Patient: Emani Vargas  MRN: [id number]  Date of Service: 2017    Assessment / Impression     1. Breast cancer of upper-outer quadrant of right female breast       Breast cancer of upper-outer quadrant of right female breast    Staging form: Breast, AJCC 7th Edition      Pathologic: Stage IA (T1c, N0, cM0) - Signed by Christina Leigh MD on 2017    ECOG Peformance Status  ECOG Performance Status: 1  Distress Assessment Score: 1    Plan:   Proceed with hypofractionated XRT to right breast.  Encouraged her to find ways to decrease cat scratches to right arm.     Face to face time  60 minutes with > 75% spent on consultation, education and coordination of care.  Intent of Therapy: Curative  Patient on concurrent Herceptin No  Adjuvant hormonal therapy: Yes:  Agent: TBD  Start: Following radiation  Chemotherapy: n/a   Intended fractionation schedule - hypofractionation without boost.     Breast cancer risk factors:     LMP Dates from Last 4 Encounters:   LMP: 08/10/2012   LMP: 08/10/2012   LMP: 08/10/2012   LMP: 08/10/2012       We recommend adjuvant radiation as part of her breast conserving therapy to decrease her chance of local recurrence to the single digits. ROM stretches and skin care were discussed with the patient. She understands that these maneuvers need to continue for 6 months following completion of radiation as skin and muscle fibrosis continue to form for weeks to months following completion of therapy.      Side effects that may occur during or within weeks after radiation therapy      Fatigue and general weakness    Darkening, irritation, itchiness, redness, dryness, erythema, peeling, scabbing, ulceration and contraction of the skin of the breast and chest    Swelling of the breast    Loss of armpit hair    Lung irritation    Decrease in appetite    Side effects that may occur months or years after radiation therapy      Development of  another tumor or cancer    Thickening, telangiectasias (development of spider like blood vessels in the skin) and ulceration of the skin of the breast and chest    Firming, fibrosis (scar tissue), fat necrosis, and distortion of the breast    Poor healing after a trauma or surgery in the irradiated area    Nerve damage resulting in loss of arm strength and sensation    Coronary artery blockage causing angina pain or a heart attack    Lung inflammation of fibrosis causing cough, fever and shortness of breath    Fracture of the ribs    Swellingof an arm and hand    The risks, benefits and alternatives to radiation therapy were outlined with the patient. All questions were answered and a consent was signed.      Subjective:      HPI: Emani Vargas is a 52 y.o. female with mammographically detected ER+/HER-2 neg  right upper outer breast cancer. Oncotype 18.  Lumpectomy 3/10/2017 with Dr. Yoder 15mm` grade II LVI negative IDC with negative margins 0/4 SLN involved .  Uncomplicated post op course. Oncotype 18.  Seen by medical oncology and will get hormonal treatment post XRT.     Chief Complaint   Patient presents with     HE Cancer     Consult with Dr. Recio     Breast Cancer   .  Prior Radiation: No  Concurrent Chemotherapy: No    Current Outpatient Prescriptions   Medication Sig Dispense Refill     albuterol (PROVENTIL HFA;VENTOLIN HFA) 90 mcg/actuation inhaler Inhale 2 puffs every 6 (six) hours as needed for wheezing. 2 Inhaler 0     buPROPion (WELLBUTRIN XL) 150 MG 24 hr tablet TK 1 T PO D  1     cholecalciferol, vitamin D3, 1,000 unit tablet Take 2,000 Units by mouth daily.       dextroamphetamine-amphetamine (ADDERALL) 5 mg Tab tablet Take 1 tablet by mouth daily.       EPIPEN 2-DMITRY 0.3 mg/0.3 mL (1:1,000) atIn        FLUoxetine (PROZAC) 20 MG capsule TAKE 1 CAPSULE BY MOUTH ONCE DAILY 30 capsule 0     gabapentin (NEURONTIN) 300 MG capsule Take 3 capsules (900 mg total) by mouth 2 (two) times a day. 540 capsule  3     HYDROcodone-acetaminophen 5-325 mg per tablet TAKE 1 TO 2 TABLETS PO Q 4 TO 6 H PRN FOR PAIN  0     letrozole (FEMARA) 2.5 mg tablet Take 1 tablet (2.5 mg total) by mouth daily. 30 tablet 2     loratadine (CLARITIN) 10 mg tablet Take 10 mg by mouth 2 (two) times a day.        magnesium oxide 250 mg Tab Take 250 mg by mouth daily.       meloxicam (MOBIC) 7.5 MG tablet TAKE 1 TABLET(7.5 MG) BY MOUTH DAILY 30 tablet 0     mometasone-formoterol (DULERA) 200-5 mcg/actuation HFAA inhaler Inhale 2 puffs 2 (two) times a day. 30 Inhaler 1     montelukast (SINGULAIR) 10 mg tablet TAKE 1 TABLET BY MOUTH EVERY NIGHT AT BEDTIME 30 tablet 0     multivitamin with minerals (THERA-M) 9 mg iron-400 mcg Tab tablet Take 1 tablet by mouth daily.       omeprazole (PRILOSEC) 20 MG capsule TAKE 1 CAPSULE BY MOUTH DAILY 90 capsule 0     traZODone (DESYREL) 50 MG tablet TK 1/2 TO 1 T PO HS  1     No current facility-administered medications for this visit.      Past Medical History:   Diagnosis Date     Allergic rhinitis      Asthma      Bipolar 1 disorder      Breast cancer of upper-outer quadrant of right female breast 4/11/2017     Chronic pain     Back pain due to arthritis.     COPD (chronic obstructive pulmonary disease)      Endometriosis      Fibroid uterus      Insomnia      Osteoarthritis      Overweight      Refusal of blood transfusions as patient is Bahai 4/12/2017     Sensorineural hearing loss, bilateral      Vitamin D deficiency      Past Surgical History:   Procedure Laterality Date     BREAST BIOPSY Right 2000's     BREAST LUMPECTOMY Right 03/10/2017    with sentinel lymph node biopsy.     KNEE ARTHROSCOPY Right 1996    Description: Arthroscopy Knee Right;  Recorded: 12/08/2011;     LAPAROSCOPIC ENDOMETRIOSIS FULGURATION  2002     TOTAL HIP ARTHROPLASTY N/A 9/24/2014    Procedure: LEFT HIP TOTAL ARTHROPLASTY;  Surgeon: Antony Elias MD;  Location: [facility];  Service:      TOTAL HIP  ARTHROPLASTY Right 8/10/2015    Procedure: HIP TOTAL ARTHROPLASTY RIGHT;  Surgeon: Antony Elias MD;  Location: [facility] Main OR;  Service:      Cat/feline products; Egg derived; Feather/down; and Trees  Family History   Problem Relation Age of Onset     Depression Mother      COPD Mother      Diabetes Father      Skin cancer Father 45     Colon cancer Maternal Grandmother      Colon cancer Maternal Grandfather      Diabetes Paternal Grandmother      Colon cancer Paternal Grandmother 80     Breast cancer Cousin 48     Paternal side.     Testicular cancer Brother 44     Lung cancer Paternal Aunt 60     No Medical Problems Son      Anesthesia problems Neg Hx      Social History     Social History     Marital status:      Spouse name: N/A     Number of children: N/A     Years of education: N/A     Occupational History     Disabled.      Was an .     Social History Main Topics     Smoking status: Former Smoker     Quit date: 1/1/1985     Smokeless tobacco: Never Used     Alcohol use 0.0 oz/week     0 Standard drinks or equivalent per week      Comment: 1 drink/month     Drug use: No      Comment: past use of marijuana and hash.     Sexual activity: Not on file     Other Topics Concern     Not on file     Social History Narrative        Review of Systems     General  General (WDL): Exceptions to WDL  Fatigue: Yes - Chronic (Greater than 3 months)  Fever: None  Generalized Muscle Weakness: None  Weight Loss: No  ENT  ENT (WDL): Exceptions to WDL  Vertigo (Dizziness): None  Changes in vision: None  Glasses or Contacts: Yes - Chronic (Greater than 3 months)  Hearing loss: Yes - Chronic (Greater than 3 months)  Hearing Aids: Yes - Chronic (Greater than 3 months)  Tinnitus: None  Pain/Pressure in ears: None  Sinus Congestion/Drainage: Yes - Chronic (Greater than 3 months)  Hoarseness: Yes - Chronic (Greater than 3 months)  Sore Throat: Yes - Chronic (Greater than 3 months)  Dental Problems: Yes  - Chronic (Greater than 3 months)  Dentures: None  Respiratory  Respiratory (WDL): Exceptions to WDL  Dyspnea: Yes - Chronic (Greater than 3 months)  hemoptysis: None  Is patient on O2?: None  Cough: Yes - Chronic (Greater than 3 months)  Non-Cardiac Chest Pain: None  Cardiovascular  Cardiovascular (WDL): Exceptions to WDL  Palpitations: None  Edema Limbs: None  Irregular Heart Beat: None  Chest Pain: None  Lightheadedness: Yes - Chronic (Greater than 3 months)  Endocrine  Endocrine (WDL): Exceptions to WDL  Heat Intolerance: Yes - Chronic (Greater than 3 months)  Excessive Thirst: None  Cold Intolerance: None  Excessive Urination: None  Hotflashes: None  Gastrointestinal  Gastrointestinal (WDL): Exceptions to WDL  Difficulty Swallowing: None  Heartburn: Yes - Chronic (Greater than 3 months)  Constipation: None  Yellowish skin and/or eyes: None  Blood from rectum: None  Nausea and Vomiting: Yes - Chronic (Greater than 3 months)  Abdominal Pain: None  Diarrhea: Yes - Chronic (Greater than 3 months)  Have had black or tan stools?: None  Hemorrhoids: Yes - Recent (Less than 3 months)  Poor Appetite: Yes- Recent (Less than 3 months)  Musculoskeletal  Musculoskeletal (WDL): Exceptions to WDL  Range of Motion Limitation: Yes - Chronic (Greater than 3 months)  Joint pain: Yes - Chronic (Greater than 3 months)  Back Pain: Yes - Chronic (Greater than 3 months)  Activity Assistance: None  Difficulty to lie flat for more than 30 minutes: Yes - Chronic (Greater than 3 months)  Pain interfering with walking: Yes - Chronic (Greater than 3 months)  Muscle pain or stiffness: Yes - Chronic (Greater than 3 months)  Recent fall: None  Assistive device: None  Neurological  Neurological (WDL): Exceptions to WDL  History of LOC?: None  Headaches: Yes - Chronic (Greater than 3 months)  Difficulty walking: Yes - Chronic (Greater than 3 months)  Difficulty with speech: Yes - Recent (Less than 3 months)  Difficulty with memory: Yes - Chronic  "(Greater than 3 months)  Vertigo (Dizziness): None  Dominant Hand: Right  Seizures: None  Difficulty with Balance: None  Numbness and/or tingling: Yes - Chronic (Greater than 3 months)  Psychological/Emotional  Psychological/Emotional (WDL): Exceptions to WDL  Depression: Yes - Chronic (Greater than 3 months)  Insomnia: Yes - Chronic (Greater than 3 months)  Panic attacks: None  Anxiety: Yes - Chronic (Greater than 3 months)  Daytime sleepiness: Yes - Chronic (Greater than 3 months)  Hallucinations: None  Psychosocial needs or not coping well: None  Hematological/Lymphatic  Hematological/Lymphatic (WDL): All hematological/lymphatic elements are within defined limits  Dermatological  Dermatologic (WDL): Exceptions to WDL  Open wounds: None  Rash : None  Hair Loss: Yes - Chronic (Greater than 3 months)  Healing Incision: None  Changes in moles: None  Genitourinary/Reproductive  Genitourinary/Reproductive (WDL): Exceptions to WDL  Urinary Frequency: None  Urinary Incontinence: Yes - Chronic (Greater than 3 months)  Painful urination: None  Urination more than 2 times a night: Yes - Chronic (Greater than 3 months)  Urinary Urgency: None  Difficulty Initiating Urine Stream: None  Blood in urine: Yes - Chronic (Greater than 3 months)  Reproductive (Females only)  Age at start of periods: 11  Last menstural cycle: 11/2012  Number of pregnancies: 1  Age at first pregnancy: 17  Patient Pregnant?: No  Is the patient trying to get pregnant?: No  Is the patient on birth control: No  Pain  Currently in Pain: No/denies    Objective:     Physical Exam    Vitals:    04/13/17 0827   Weight: (!) 259 lb 4.8 oz (117.6 kg)   Height: 5' 9\" (1.753 m)       Ht 5' 9\" (1.753 m)  Wt (!) 259 lb 4.8 oz (117.6 kg)  LMP 08/10/2012  BMI 38.29 kg/m2  General appearance: alert, appears stated age and cooperative  Eyes: negative findings: conjunctivae and sclerae normal  Neck: no adenopathy and supple, symmetrical, trachea midline  Back: " symmetric, no curvature. ROM normal. No CVA tenderness.  Lungs: clear to auscultation bilaterally  Breasts: incision dry intact, expected post op changes.   Heart: regular rate and rhythm  Extremities: extremities normal, atraumatic, no cyanosis or edema  Skin: Skin color, texture, turgor normal. No rashes or lesions  Lymph nodes: Cervical, supraclavicular, and axillary nodes normal.  Neurologic: Grossly normal     Recent Labs:   Recent Results (from the past 168 hour(s))   Comprehensive Metabolic Panel   Result Value Ref Range    Sodium 141 136 - 145 mmol/L    Potassium 3.6 3.5 - 5.0 mmol/L    Chloride 106 98 - 107 mmol/L    CO2 27 22 - 31 mmol/L    Anion Gap, Calculation 8 5 - 18 mmol/L    Glucose 83 70 - 125 mg/dL    BUN 8 8 - 22 mg/dL    Creatinine 0.80 0.60 - 1.10 mg/dL    GFR MDRD Af Amer >60 >60 mL/min/1.73m2    GFR MDRD Non Af Amer >60 >60 mL/min/1.73m2    Bilirubin, Total 0.5 0.0 - 1.0 mg/dL    Calcium 9.0 8.5 - 10.5 mg/dL    Protein, Total 7.0 6.0 - 8.0 g/dL    Albumin 3.6 3.5 - 5.0 g/dL    Alkaline Phosphatase 93 45 - 120 U/L    AST 27 0 - 40 U/L    ALT 27 0 - 45 U/L   HM1 (CBC with Diff)   Result Value Ref Range    WBC 7.8 4.0 - 11.0 thou/uL    RBC 4.51 3.80 - 5.40 mill/uL    Hemoglobin 12.9 12.0 - 16.0 g/dL    Hematocrit 39.8 35.0 - 47.0 %    MCV 88 80 - 100 fL    MCH 28.6 27.0 - 34.0 pg    MCHC 32.4 32.0 - 36.0 g/dL    RDW 13.2 11.0 - 14.5 %    Platelets 204 140 - 440 thou/uL    MPV 10.2 8.5 - 12.5 fL    Neutrophils % 50 50 - 70 %    Lymphocytes % 39 20 - 40 %    Monocytes % 7 2 - 10 %    Eosinophils % 3 0 - 6 %    Basophils % 1 0 - 2 %    Neutrophils Absolute 3.8 2.0 - 7.7 thou/uL    Lymphocytes Absolute 3.1 0.8 - 4.4 thou/uL    Monocytes Absolute 0.6 0.0 - 0.9 thou/uL    Eosinophils Absolute 0.3 0.0 - 0.4 thou/uL    Basophils Absolute 0.0 0.0 - 0.2 thou/uL       Imaging: Imaging results 30 days: Ct Chest Abdomen Pelvis With Oral With Iv Cont    Result Date: 4/12/2017  CT CHEST, ABDOMEN, AND PELVIS  4/12/2017 5:26 PM      INDICATION: New diagnosis of breast cancer, left lower quadrant pain TECHNIQUE: CT chest, abdomen, and pelvis. Dose reduction techniques were used. IV CONTRAST: Omnipaque 350, 100 mL COMPARISON: None FINDINGS: CHEST: The lungs are clear. The pleural spaces appear normal. No mediastinal or hilar lymphadenopathy. Postoperative changes are seen in the right breast and right axilla.  ABDOMEN: The liver is diffusely low in attenuation consistent with fatty infiltration. The gallbladder, spleen, adrenal glands, kidneys, and pancreas appear normal. The abdominal aorta is normal in caliber. The appendix is visualized and appears normal. PELVIS: There are sigmoid diverticula without evidence of diverticulitis. Beam hardening artifact from bilateral total hip arthroplasties compromises evaluation. No definite mass. MUSCULOSKELETAL: Bilateral total hip arthroplasties.     CONCLUSION: 1.  No evidence of metastatic disease. 2.  Colonic diverticulosis without evidence of diverticulitis. 3.  Fatty infiltration of the liver. 4.  Postoperative changes in the right axilla and right breast.      Pathology:   Results for orders placed or performed during the hospital encounter of 02/20/17 (from the past 8760 hour(s))   Surgical Pathology Exam   Result Value    Case Report      Surgical Pathology                                Case: [id number]                                   Authorizing Provider:  Shira Navarrete         Collected:           02/20/2017 Jac Yap MD                                                                 Ordering Location:     [facility]    Received:            02/21/2017 0759                                     Ultrasound                                                                   Pathologist:           Toyin Zee MD PhD                                                        Specimen:    Breast, Right                                                                               IHC ER/WI  Report, Addendum      BREAST, RIGHT, CORE BIOPSY:     1) ESTROGEN RECEPTOR:  POSITIVE (80% OF TUMOR NUCLEI, STRONG)     2) PROGESTERONE RECEPTOR: POSITIVE (APPROXIMATELY 60% OF TUMOR NUCLEI, MODERATE TO                                                                           STRONG)     3) HER2:    NEGATIVE (0)    COMMENT:  The negative and positive controls are adequate and the results of ER, WI and HER-2/bill immunostains were  analyzed by visual estimate.     PQRS: 82057N, 3395F    Note - Estrogen and Progesterone Receptor Immunoperoxidase Stains:  These stains were done using the following criteria:    Specimen fixative: Formalin-fixed paraffin-embedded sections    Detection system: Biotin-free multimer-based technology detection system ([employer])    Retrieval method: CC1 pretreatment; a mario-based buffer with a slightly basic PH used at an elevated     temperature (95+5 degrees C)    Clone:  Estrogen receptor-SP1, Rabbit monoclonal ([place])     Progesterone receptor-1E2, Rabbit monoclonal ([employer])     Scoring method: Intensity of nuclear stain, strong vs weak vs absent; % of cells stained     Note - HER-2/bill Immunoperoxidase Stain:  This stain was done using the following criteria:    Specimen fixative: Formalin-fixed paraffin-embedded sections    Detection system: Biotin-free multimer-based technology detection system ([employer])    Retrieval method: CC1 pretreatment; a mario-based buffer with a slightly basic PH used at an elevated     temperature (95 plus or minus 5 degrees C)    Clone:  4B5, Rabbit monoclonal ([place] [facility])    Scoring method: IHC 3+ - Positive (Circumferential membrane staining that is complete, intense, and     within greater than 10% of tumor cells)       IHC 2+ - Equivocal (Circumferential Membrane staining that is incomplete and/or     weak/moderate and within greater than 10% of tumor cells or complete  "and     circumferential membrane staining that is intense and less than or equal to 10% of tumor cells)       IHC1+ - Negative (Incomplete membrane staining  that is faint/barely perceptible     and within greater than 10% of tumor cells       IHC 0 - Negative (No staining is Observed or Membrane staining that is incomplete and     is faint/barely perceptible and within less than or equal to 10% of tumor cells)    REFERENCES:  1) Gonzalo GOLDMAN et al: Recommendations for Human Epidermal Growth Factor Receptor 2 Testing in Breast Cancer.      American Society of Clinical Oncology/College of American Pathologists Clinical Practice Guideline Update.      Arch Pathol Lab Med. 2014;138: 241-256     * HER-2/bill stain performed using the [place] [facility]'s FDA approved methodology.      Final Diagnosis      BREAST, RIGHT, 12 O'CLOCK DIRECTION, ZONE 2, CORE BIOPSY:     1) INVASIVE DUCTAL CARCINOMA        a) GRADE: OBEY GRADE II (of III)        b) SCORE: 6 (OF 9)    2) DUCTAL CARCINOMA IN-SITU (DCIS) PRESENT, NONEXTENSIVE:         a) NUCLEAR GRADE: 2 (INTERMEDIATE)        b) PATTERNS: SOLID    3) ADDITIONAL FINDINGS: BACKGROUND EXTENSIVE DESMOPLASIA     4) ER/MN/HER2 IMMUNOHISTOCHEMISTRY IS PENDING AND WILL BE REPORTED IN AN ADDENDUM    [facility]    Comment      Dr. Shantal Brown has reviewed this case and concurs with the diagnosis.    Black ink is confirmed. Time in formalin: 7 hours 3 minutes    Microscopic Description      Microscopic examination performed, substantiating the above diagnosis.    Clinical Information      Pre-op Diagnosis: N63 - Breast mass, right [ICD-10-CM]  Time in Formalin: 03:12 pm    Breast: Right   Site: 12:00, zone 2  Quadrant: Not provided   Number of Cores: 7  Indication: .7 cm  Pre-test Suspicion: High  Microcalcifications on Specimen Radiograph: Not provided   Radiologist: Josh Andrew MD    Gross Description      Received in formalin, labeled with the patient's name Emani Vargas and \"right " "breast cores, 12 o'clock, Zone 2,\" are multiple, minute to 0.7 x 0.1 cm, irregular to cylindrical, yellow-white to pink cores of tissue. The cores are inked black. F&TE-1C  RJR:dls    Note: The specimen is placed into formalin at 1512, 02/20/17. RJR:eugenia    Special Stains      Note - Estrogen and Progesterone Receptor Immunoperoxidase Stains:  These stains were done using the following criteria:    Specimen fixative: Formalin-fixed paraffin-embedded sections    Detection system: Biotin-free multimer-based technology detection system ([employer])    Retrieval method: CC1 pretreatment; a mario-based buffer with a slightly basic PH used at an elevated     temperature (95+5 degrees C)    Clone:  Estrogen receptor-SP1, Rabbit monoclonal ([place])     Progesterone receptor-1E2, Rabbit monoclonal ([employer])    Scoring method: Intensity of nuclear stain, strong vs weak vs absent; % of cells stained       Note - HER-2/bill Immunoperoxidase Stain:  This stain was done using the following criteria:    Specimen fixative: Formalin-fixed paraffin-embedded sections    Detection system: Biotin-free multimer-based technology detection system ([employer])    Retrieval method: CC1 pretreatment; a mario-based buffer with a slightly basic PH used at an elevated     temperature (95 plus or  minus 5 degrees C)    Clone:  4B5, Rabbit monoclonal ([place] [facility])    Scoring method: IHC 3+ - Positive (Circumferential membrane staining that is complete, intense, and     within greater than 10% of tumor cells)       IHC 2+ - Equivocal (Circumferential Membrane staining that is incomplete and/or     weak/moderate and within greater than 10% of tumor cells or complete and     circumferential membrane staining that is intense and less than or equal to 10% of tumor cells)       IHC1+ - Negative (Incomplete membrane staining that is faint/barely perceptible     and within greater than 10% of tumor cells       IHC 0 - Negative (No staining is Observed or " Membrane staining that is incomplete and     is faint/barely perceptible and within less than or equal to 10% of tumor cells)    REFERENCES:  1) Gonzalo GOLDMAN et al: Recommendations for Human Epidermal Growth Factor Receptor 2 Testing in Breast Cancer.      American Society of Clinical Oncology/College of American Pathologists Clinical  Practice Guideline Update.      Arch Pathol Lab Med. 2014;138: 241-256     * HER-2/bill stain performed using the [place] [facility]'s FDA approved methodology.    Charges      CPT: 48059, 88360 x3  ICD-10: C50.811    CC:  Josh Andrew MD    Result Flag Malignant (!)     Comment:   SPECIMEN PROCESSING:    All histology slide preparation and stains; and cytology slide preparation, staining, and cytotechnologist screening done at [facility] are performed at [facility], [address] [place] [address], [facility], with final interpretation, frozen section analysis, and cytology adequacy assessment at indicated laboratory.         Pathology Results     Malignant - Core biopsy, Right - 2/20/2017      Pathology Code Malignancy Type    Invasive ductal carcinoma Invasive    Ductal carcinoma in situ intermediate nuclear grade Non-Invasive          Additional Information            Concordance:  Not Entered Estrogen:  Positive     Progesterone:  Positive    Stage:  1A     Histology Grade:  G2 Margin Status:  Uninvolved    HER2/bill IHC:  0           Removed:  4     Positive:  0          Overall Size:  15 mm     Additional Comments:  Updated with 3/10/17 [facility] report [id number]. MSC. Dr. Shelia Yoder.                       Signed by: Nelida Recio MD

## 2021-06-10 NOTE — PROGRESS NOTES
I met with Emani briefly today after her radiation treatment.  She said she is doing well.  She is receiving the [employer] meals and finds them good and helpful.  She said she still hasn't heard from the [facility] Rj and I assured her my other patients have not either.  Support and encouragement provided, invited calls.

## 2021-06-11 NOTE — PROGRESS NOTES
[facility] Hematology and Oncology Progress Note    Patient: Emani Vargas  MRN: [id number]  Date of Service: 06/15/2017        DNI / DNI    Reason for Visit:    Scheduled routine follow up.    Assessment and Plan:    1) Breast cancer of upper-outer quadrant of right female breast       Pathologic Stage IA (T1c, N0, cM0)    ER and CT positive and HER-2 negative   Oncotype DX score of 18    52 y.o.     2017, February - diagnosed with screening mammogram showing a suspicious lesion in the upper outer quadrant of the right breast.      03/10/17 - right breast biopsy followed by lumpectomy and sentinel lymph node procedure by Dr. Yoder.      Final pathology showed a 15 mm invasive ductal carcinoma, grade 2 with associated DCIS, ER CT positive and HER-2 negative.  Margins were clear.  Four sentinel lymph nodes negative.  Oncotype DX score of 18 which corresponds to an approximately 11% risk of recurrence in the next 10 years on tamoxifen.      CT CAP - no evidence of metastatic disease.  Colonic diverticulosis without evidence of diverticulitis.  Fatty infiltration of the liver.  Postoperative changes in the right axilla and right breast.    Numerous comorbidities including bipolar disorder, COPD endometriosis and osteoarthritis.      Opted against genetic counselor referral.      Mutually decided to forego adjuvant chemotherapy.    05/17/17 completed 4256 cGy / 16 fx breast conservation EBRT.    05/18/17 - began daily adjuvant aromatase inhibitor (AI) therapy with letrozole 2.5 mg p.o.    History of endometriosis, fibroids and occasional vaginal bleeding suggesting increased risk of second gynecological cancers including endometrial cancer.        Continue regular follow-up with gynecology with annual examinations.  Pap smears according to standard schedule.  If she has any suspicious symptoms, especially increased vaginal bleeding she will need prompt gynecology evaluation.    06/15/17:    CBC - WNL    CMP -  WNL    Reviewed Survivorship Care Plan.    Patient looks and feels quite good.  Occasional mild warm sensations since starting AI therapy.  Otherwise tolerating AI therapy very well.  Clinically ROXAAN.  Encouraged self-breast examinations.    Continue daily AI therapy with letrozole, anticipating 5 years adjuvant therapy.    09/14/17 - 3-month follow up without labs.    2) Osteoporosis prevention:    Quit smoking 30 years ago.      Encouraged her to be physically active and start a walking program.      Continue calcium 1200 mg and vitamin D 2000 iu supplements daily.      2016 DEXA scan showed no evidence of osteoporosis.      Repeat a DEXA scan in 2019.    ECOG Performance:     ECOG Performance Status: 0    Distress Assessment:    Distress Assessment Score: No distress        TT > 25 minutes face to face with > 50% counseling and care coordination.    Chetan No, CNP     CC: Shira Yap MD  ______________________________________________________________________________    Interim History:    The patient presents looking and feeling quite good.  Feels good other than chronic arthritic joint discomforts.  Has had BTHA and needs BTKAs.  Has noted mild breast itching and occasional shooting pains since completing EBRT.  She denies cough, fever, chills, unusual headaches, visual or mentation disturbance, bowel or bladder issues.  Her appetite, weight and performance status are quite stable.  She has lost a few pounds intentionally by watching portions.    Pain Status:    Currently in Pain: No/denies    Review of Systems    Constitutional  Constitutional (WDL): All constitutional elements are within defined limits  Neurosensory  Neurosensory (WDL): All neurosensory elements are within defined limits  Cardiovascular  Cardiovascular (WDL): All cardiovascular elements are within defined limits  Pulmonary  Respiratory (WDL): Within Defined Limits  Gastrointestinal  Gastrointestinal (WDL): All  "gastrointestinal elements are within defined limits  Genitourinary  Genitourinary (WDL): All genitourinary elements are within defined limits  Integumentary  Integumentary (WDL): All integumentary elements are within defined limits  Patient Coping  Patient Coping: Accepting  Distress Assessment  Distress Assessment Score: No distress  Accompanied by       Past Histories:    Past Medical History:   Diagnosis Date     Allergic rhinitis      Asthma      Bipolar 1 disorder      Breast cancer of upper-outer quadrant of right female breast 4/11/2017     Chronic pain     Back pain due to arthritis.     COPD (chronic obstructive pulmonary disease)      Endometriosis      Fibroid uterus      Insomnia      Osteoarthritis      Overweight      Refusal of blood transfusions as patient is Hinduism 4/12/2017     Sensorineural hearing loss, bilateral      Vitamin D deficiency          Past Surgical History:   Procedure Laterality Date     BREAST BIOPSY Right 2000's     BREAST LUMPECTOMY Right 03/10/2017    with sentinel lymph node biopsy.     KNEE ARTHROSCOPY Right 1996    Description: Arthroscopy Knee Right;  Recorded: 12/08/2011;     LAPAROSCOPIC ENDOMETRIOSIS FULGURATION  2002     TOTAL HIP ARTHROPLASTY N/A 9/24/2014    Procedure: LEFT HIP TOTAL ARTHROPLASTY;  Surgeon: Antony Elias MD;  Location: [facility];  Service:      TOTAL HIP ARTHROPLASTY Right 8/10/2015    Procedure: HIP TOTAL ARTHROPLASTY RIGHT;  Surgeon: Antony Elias MD;  Location: [facility];  Service:        Physical Exam:    Recent Vitals 5/17/2017   Height 5' 9\"   Weight 250 lbs 6 oz   BSA (m2) 2.35 m2   /87   Pulse 67   Temp 97.6   Temp src 1   SpO2 97       GENERAL:   Alert and oriented.  No distress.    HEENT:   Full head of hair. CORBY, EOMI.  No pallor.  No icterus.  No mucosal lesions.    LYMPH NODES:  No palpable cervical, axillary or inguinal lymphadenopathy.    CHEST:   Lungs clear.    CVS:    S1 and S2 heard. " Regular rate and rhythm.  No murmur, gallop or rub heard.    ABDOMEN:   Soft. Not tender. Not distended.  No palpable hepatomegaly or splenomegaly, masses or ascites.    EXTREMITIES:  Warm.  No peripheral edema.    SKIN:    No rash, bruising or purpura.    BREASTS:  Right c/w lumpectomy and EBRT hyperpigmentation.  No suspect lumps, bumps or skin changes.  Bilateral axilla negative.  Left breast unremarkable.  Patient declined offer for female  during examination.    Lab Results:    Reviewed with patient.    Recent Results (from the past 24 hour(s))   Comprehensive Metabolic Panel   Result Value Ref Range    Sodium 141 136 - 145 mmol/L    Potassium 4.4 3.5 - 5.0 mmol/L    Chloride 106 98 - 107 mmol/L    CO2 26 22 - 31 mmol/L    Anion Gap, Calculation 9 5 - 18 mmol/L    Glucose 97 70 - 125 mg/dL    BUN 17 8 - 22 mg/dL    Creatinine 0.88 0.60 - 1.10 mg/dL    GFR MDRD Af Amer >60 >60 mL/min/1.73m2    GFR MDRD Non Af Amer >60 >60 mL/min/1.73m2    Bilirubin, Total 0.6 0.0 - 1.0 mg/dL    Calcium 8.9 8.5 - 10.5 mg/dL    Protein, Total 7.1 6.0 - 8.0 g/dL    Albumin 3.8 3.5 - 5.0 g/dL    Alkaline Phosphatase 75 45 - 120 U/L    AST 21 0 - 40 U/L    ALT 20 0 - 45 U/L   HM1 (CBC with Diff)   Result Value Ref Range    WBC 7.2 4.0 - 11.0 thou/uL    RBC 4.54 3.80 - 5.40 mill/uL    Hemoglobin 13.5 12.0 - 16.0 g/dL    Hematocrit 40.2 35.0 - 47.0 %    MCV 89 80 - 100 fL    MCH 29.7 27.0 - 34.0 pg    MCHC 33.6 32.0 - 36.0 g/dL    RDW 12.8 11.0 - 14.5 %    Platelets 203 140 - 440 thou/uL    MPV 9.8 8.5 - 12.5 fL    Neutrophils % 62 50 - 70 %    Lymphocytes % 30 20 - 40 %    Monocytes % 5 2 - 10 %    Eosinophils % 2 0 - 6 %    Basophils % 1 0 - 2 %    Neutrophils Absolute 4.5 2.0 - 7.7 thou/uL    Lymphocytes Absolute 2.2 0.8 - 4.4 thou/uL    Monocytes Absolute 0.3 0.0 - 0.9 thou/uL    Eosinophils Absolute 0.1 0.0 - 0.4 thou/uL    Basophils Absolute 0.1 0.0 - 0.2 thou/uL       Imaging:    No results found.

## 2021-06-11 NOTE — PROGRESS NOTES
I met with Emani today to present her SCP. I explained the different components of the plan, treatment summary and resources. She denies having any struggles in her survivorship. I asked if she could review it again when she has a few minutes and see what she thinks. She jokingly said she would put it with all her other HE folders. I advised that I would call her in a couple weeks on follow up to get her thoughts. She expressed understanding. I congratulated her on her survivorship! Jason

## 2021-06-11 NOTE — PROGRESS NOTES
[facility] Radiation Oncology Follow Up Note    Patient: Emani Vargas  MRN: [id number]  Date of Service: 06/21/2017    Assessment / Impression     1. Breast cancer of upper-outer quadrant of right female breast        ECOG Peformance Status  ECOG Performance Status: 0  Distress Assessment Score  Distress Assessment Score: No distress  Body site: Breast     Plan:   F/u prn   Use inhaler more often  Call primary MD if fever, chills, purulent sputum.     Face to face time  25 minutes with > 75% spent on consultation, education and coordination of care.    Subjective:      HPI: Emani Vargas is a 52 y.o. female with     Emani Vargas was treated with 3D conformal radiation therapy for Right UOQ [id number] ER+ HER-2 neg, breast cancer.     SITE TREATED: right  breast  TOTAL DOSE: [id number]  NUMBER OF FRACTIONS: 16  DATES COMPLETED: 5/17/2017  CONCURRENT CHEMOTHERAPY: No  ADJUVANT THERAPY:Yes: Letrozole start 5/18/2017      Emani tolerated the treatment without unexpected side effects.     Cough with whitish sputum. No fevers. Has asthma on antihistamines. Uses inhaler prn.   Chief Complaint   Patient presents with     HE Cancer   .    Current Outpatient Prescriptions   Medication Sig Dispense Refill     albuterol (PROVENTIL HFA;VENTOLIN HFA) 90 mcg/actuation inhaler Inhale 2 puffs every 6 (six) hours as needed for wheezing. 2 Inhaler 0     buPROPion (WELLBUTRIN XL) 150 MG 24 hr tablet TK 1 T PO D  1     cholecalciferol, vitamin D3, 1,000 unit tablet Take 2,000 Units by mouth daily.       dextroamphetamine-amphetamine (ADDERALL) 5 mg Tab tablet Take 1 tablet by mouth daily.       EPIPEN 2-DMITRY 0.3 mg/0.3 mL (1:1,000) atIn        FLUoxetine (PROZAC) 20 MG capsule TAKE 1 CAPSULE BY MOUTH ONCE DAILY 30 capsule 0     gabapentin (NEURONTIN) 300 MG capsule Take 3 capsules (900 mg total) by mouth 2 (two) times a day. 540 capsule 3     letrozole (FEMARA) 2.5 mg tablet Take 1 tablet (2.5 mg total) by mouth daily. 90 tablet 3      loratadine (CLARITIN) 10 mg tablet Take 10 mg by mouth 2 (two) times a day.        magnesium oxide 250 mg Tab Take 250 mg by mouth daily.       meloxicam (MOBIC) 7.5 MG tablet TAKE 1 TABLET(7.5 MG) BY MOUTH DAILY 30 tablet 0     methocarbamol (ROBAXIN) 500 MG tablet Take 1 tablet by mouth as needed.  1     montelukast (SINGULAIR) 10 mg tablet TAKE 1 TABLET BY MOUTH EVERY NIGHT AT BEDTIME 30 tablet 0     multivitamin with minerals (THERA-M) 9 mg iron-400 mcg Tab tablet Take 1 tablet by mouth daily.       omeprazole (PRILOSEC) 20 MG capsule TAKE 1 CAPSULE BY MOUTH DAILY 90 capsule 0     traZODone (DESYREL) 50 MG tablet TK 1/2 TO 1 T PO HS  1     HYDROcodone-acetaminophen 5-325 mg per tablet TAKE 1 TO 2 TABLETS PO Q 4 TO 6 H PRN FOR PAIN  0     No current facility-administered medications for this visit.        The following portions of the patient's history were reviewed and updated as appropriate: allergies, current medications, past family history, past medical history, past social history, past surgical history and problem list.    Review of Systems    Constitutional  Constitutional (WDL): Exceptions to WDL  Fatigue: Fatigue relieved by rest  Hot flashes/Night Sweats: Mild symptoms, no intervention needed (mild and tolerable)  Neurosensory  Neurosensory (WDL): All neurosensory elements are within defined limits  Eye        Eye Disorder (WDL): All eye disorder elements are within defined limits  Ear     Cardiovascular  Cardiovascular (WDL): All cardiovascular elements are within defined limits  Pulmonary  Respiratory (WDL): Exceptions to WDL (normal for her, allergy related issues)  Cough: Mild symptoms, nonprescription intervention indicated  Dyspnea: Shortness of breath with moderate exertion  Gastrointestinal  Gastrointestinal (WDL): All gastrointestinal elements are within defined limits  Genitourinary  Genitourinary (WDL): All genitourinary elements are within defined limits              Musculoskeletal               Musculoskeletal and Connetive Tissue Disorders (WDL): Exceptions to WDL  Arthralgia: Mild pain  Bone Pain: Mild pain  Integumentary  Integumentary (WDL): All integumentary elements are within defined limits (skin healed well from RT, mild pigmentation )  Patient Coping     Pain              Currently in Pain: Yes  Pain Score (Initial OR Reassessment): 3  Pain Frequency: Intermittent  Pain Characteristics : Aching  Pain Intervention(s): Home medication  Accompanied by  Accompanied by: Alone    Objective:     Physical Exam    Vitals:    06/21/17 1126   BP: 115/80   Pulse: 83   Temp: 97.9  F (36.6  C)   TempSrc: Oral   SpO2: 95%   Weight: (!) 253 lb 12.8 oz (115.1 kg)     Resp; Mild wheezing left side.     Recent Labs:   Recent Results (from the past 168 hour(s))   Comprehensive Metabolic Panel   Result Value Ref Range    Sodium 141 136 - 145 mmol/L    Potassium 4.4 3.5 - 5.0 mmol/L    Chloride 106 98 - 107 mmol/L    CO2 26 22 - 31 mmol/L    Anion Gap, Calculation 9 5 - 18 mmol/L    Glucose 97 70 - 125 mg/dL    BUN 17 8 - 22 mg/dL    Creatinine 0.88 0.60 - 1.10 mg/dL    GFR MDRD Af Amer >60 >60 mL/min/1.73m2    GFR MDRD Non Af Amer >60 >60 mL/min/1.73m2    Bilirubin, Total 0.6 0.0 - 1.0 mg/dL    Calcium 8.9 8.5 - 10.5 mg/dL    Protein, Total 7.1 6.0 - 8.0 g/dL    Albumin 3.8 3.5 - 5.0 g/dL    Alkaline Phosphatase 75 45 - 120 U/L    AST 21 0 - 40 U/L    ALT 20 0 - 45 U/L   HM1 (CBC with Diff)   Result Value Ref Range    WBC 7.2 4.0 - 11.0 thou/uL    RBC 4.54 3.80 - 5.40 mill/uL    Hemoglobin 13.5 12.0 - 16.0 g/dL    Hematocrit 40.2 35.0 - 47.0 %    MCV 89 80 - 100 fL    MCH 29.7 27.0 - 34.0 pg    MCHC 33.6 32.0 - 36.0 g/dL    RDW 12.8 11.0 - 14.5 %    Platelets 203 140 - 440 thou/uL    MPV 9.8 8.5 - 12.5 fL    Neutrophils % 62 50 - 70 %    Lymphocytes % 30 20 - 40 %    Monocytes % 5 2 - 10 %    Eosinophils % 2 0 - 6 %    Basophils % 1 0 - 2 %    Neutrophils Absolute 4.5 2.0 - 7.7 thou/uL    Lymphocytes Absolute 2.2  0.8 - 4.4 thou/uL    Monocytes Absolute 0.3 0.0 - 0.9 thou/uL    Eosinophils Absolute 0.1 0.0 - 0.4 thou/uL    Basophils Absolute 0.1 0.0 - 0.2 thou/uL           Signed by: Nelida Recio MD

## 2021-06-11 NOTE — PROGRESS NOTES
I met with Emani today following her follow up appt with Dr. Leigh.  She said she is doing well.  She said she was able to receive the financial grants ([employer] [facility] financial aide) and PRR.  She is feeling good and tolerating her hormonal medication well.  Support and reassurance provided.  Invited calls.

## 2021-06-12 NOTE — ANESTHESIA PROCEDURE NOTES
Peripheral Block    Patient location during procedure: pre-op  Start time: 8/28/2017 7:33 AM  End time: 8/28/2017 7:37 AM  post-op analgesia per surgeon order as noted in medical record  Staffing:  Performing  Anesthesiologist: MISTY MERLOS  Preanesthetic Checklist  Completed: patient identified, site marked, risks, benefits, and alternatives discussed, timeout performed, consent obtained, at patient's request, airway assessed, oxygen available, suction available, emergency drugs available and hand hygiene performed  Peripheral Block  Block type: saphenous  Prep: ChloraPrep  Patient position: supine  Patient monitoring: cardiac monitor, continuous pulse oximetry, blood pressure and heart rate  Laterality: right  Injection technique: ultrasound guided    Ultrasound used to visualize needle placement in proximity to nerve being blocked: yes   Permanent ultrasound image captured for medical record    Needle  Needle type: Stimuplex   Needle gauge: 21 G  Needle length: 4 in  no peripheral nerve catheter placed  Assessment  Injection assessment: negative aspiration for heme, no difficulty with injection, no paresthesia on injection and incremental injection

## 2021-06-12 NOTE — PROGRESS NOTES
Patient presents today for a procedure visit, as patient is still experiencing similar pains involving right shoulder and left knee.    Consent to treat form signed by the patient.  Patient's right shoulder joint was prepped in a sterile manner with an alcohol swab and ChloraPrep applicator.  Injected Kenalog 40 mg 1:1 with lidocaine into right shoulder joint.  Patient tolerated the procedure well with no complications noted.  Patient was advised to place ice over injection sites if sore over the next 24-48 hours. Shaye PEMBERTON assisted with procedure.      Consent to treat form signed by the patient.  Patient's left knee was prepped in a sterile manner with an alcohol swab and ChloraPrep applicator.  Injected Kenalog 40 mg 1:1 with lidocaine into left knee joint.  Patient tolerated the procedure well with no complications noted.  Patient was advised to place ice over injection sites if sore over the next 24-48 hours. Shaye PEMBERTON assisted with procedure(s).

## 2021-06-12 NOTE — PROGRESS NOTES
Surgery- RIGHT TOTAL KNEE ARTHROPLASTY  DOS -8/28/17  Location-   Physician - Dr. Manzo      ASSESSMENT/PLAN:  Emani Vargas 52 y.o. female with osteoarthritis here for preoperative evaluation for right total knee arthroplasty.     1. Preop examination  2. Osteoarthritis, right knee  - HM2(CBC w/o Differential)  - Basic Metabolic Panel  Cardiac Risk Stratification and Management:  Active Cardiac Conditions: No  Low Risk surgery: Yes  Functional Capacity >=4METS:  Yes    Surgical Risk: Risk for cardiac event is 0.4%.   Stop adderall and letrozol as discussed below  I do not have any contraindication to the planned procedure.    3. Chronic Pain  refilled  - methocarbamol (ROBAXIN) 500 MG tablet; Take 1 tablet (500 mg total) by mouth as needed.  Dispense: 30 tablet; Refill: 1  - gabapentin (NEURONTIN) 300 MG capsule; TAKE 3 CAPSULES BY MOUTH TWICE DAILY  Dispense: 180 capsule; Refill: 3    4. Insomnia  Stable with trazodone    5. Bipolar Disorder  Stable with fluoxetine    6. Breast cancer of upper-outer quadrant of right female breast  Following oncology  Stop letrozole 2 days prior to surgery    7. Mild intermittent asthma without complication  stable    8.  Low engery, taking adderall  Stop adderall 1 wk prior to surgery      CHIEF COMPLAINT:  Chief Complaint   Patient presents with     Pre-op Exam     RIGHT TOTAL KNEE ARTHROPLASTY     Medication Refill       SUBJECTIVE:  Emani Vargas is a 52 y.o. female who is here for preoperative evaluation. She is having a total knee arthroplasty on August 28, 2017 at [facility] with Dr. Manzo. She has had problems with her right knee for years. The knee has been worsening for her as of recent. She has had multiple steroid injections without relief. She has also tried physical therapy without improvement. She will stay in the hospital for a few days post surgery before she goes into transitional care.     Breast Cancer: She is seeing the oncologist every 3 months. She is  currently taking letrozole. She did finish radiation therapy.     Prior Anesthesia: yes  Previous Anesthesia Reaction:  no  Bleeding Disorder: no    REVIEW OF SYSTEMS:  Denies chest pain. Notes some shortness of breath from asthma. She is using her albuterol 3 times per week. All other systems are negative.     PFSH:  No new history.     History   Smoking Status     Former Smoker     Packs/day: 1.00     Years: 7.00     Quit date: 1/1/1985   Smokeless Tobacco     Never Used       Family History   Problem Relation Age of Onset     Depression Mother      COPD Mother      Diabetes Father      Skin cancer Father 45     Colon cancer Maternal Grandmother      Colon cancer Maternal Grandfather      Diabetes Paternal Grandmother      Colon cancer Paternal Grandmother 80     Breast cancer Cousin 48     Paternal side.     Testicular cancer Brother 44     Lung cancer Paternal Aunt 60     No Medical Problems Son      Anesthesia problems Neg Hx        Past Surgical History:   Procedure Laterality Date     BREAST BIOPSY Right 2000's     BREAST LUMPECTOMY Right 03/10/2017    with sentinel lymph node biopsy.     KNEE ARTHROSCOPY Right 1996    Description: Arthroscopy Knee Right;  Recorded: 12/08/2011;     LAPAROSCOPIC ENDOMETRIOSIS FULGURATION  2002     TOTAL HIP ARTHROPLASTY N/A 9/24/2014    Procedure: LEFT HIP TOTAL ARTHROPLASTY;  Surgeon: Antony Elias MD;  Location: [facility] OR;  Service:      TOTAL HIP ARTHROPLASTY Right 8/10/2015    Procedure: HIP TOTAL ARTHROPLASTY RIGHT;  Surgeon: Antony Elias MD;  Location: [facility] OR;  Service:        Allergies   Allergen Reactions     Cat/Feline Products      Egg Derived      Flu like symptoms--pain     Trees        Active Ambulatory Problems     Diagnosis Date Noted     Vitamin D Deficiency      Endometriosis      Chronic Pain      Insomnia      Bipolar Disorder      Hearing Loss      Osteoarthrosis, unspecified whether generalized or localized, pelvic region  "and thigh 09/24/2014     Allergic rhinitis      Mild intermittent asthma without complication 02/24/2015     Compression fracture of vertebral column with routine healing 09/09/2016     Breast cancer of upper-outer quadrant of right female breast 04/11/2017     Refusal of blood transfusions as patient is [facility] 04/12/2017     Osteoporosis 05/05/2017     Aromatase inhibitor use 05/05/2017     Resolved Ambulatory Problems     Diagnosis Date Noted     Asthma      Overweight      Asthma      Past Medical History:   Diagnosis Date     Allergic rhinitis      Asthma      Bipolar 1 disorder      Breast cancer of upper-outer quadrant of right female breast 4/11/2017     Chronic pain      COPD (chronic obstructive pulmonary disease)      Endometriosis      Fibroid uterus      Insomnia      Osteoarthritis      Overweight      Refusal of blood transfusions as patient is [facility] 4/12/2017     Sensorineural hearing loss, bilateral      Vitamin D deficiency        PHYSICAL EXAM:    Vitals:    08/14/17 1348   BP: 110/88   Patient Site: Left Arm   Patient Position: Sitting   Cuff Size: Adult Large   Pulse: 77   SpO2: 95%   Weight: (!) 260 lb 5 oz (118.1 kg)   Height: 5' 9\" (1.753 m)       Physical Exam:  Constitutional: Patient is oriented to person, place, and time. Patient appears well-developed and well-nourished. No distress.   Head: Normocephalic and atraumatic.   Right Ear: External ear normal. Hearing aide. Ear canal and TM normal.   Left Ear: External ear normal. Hearing aide. Ear canal and TM normal.   Nose: Nose normal.   Mouth/Throat: Oropharynx is clear and moist. No oropharyngeal exudate.   Eyes: Conjunctivae and EOM are normal. Pupils are equal, round, and reactive to light. Right eye exhibits no discharge. Left eye exhibits no discharge. No scleral icterus.   Neck: Neck supple. No JVD present. No tracheal deviation present. No thyromegaly present.   Cardiovascular: Normal rate, regular rhythm, " normal heart sounds and intact distal pulses. No murmur heard.   Pulmonary/Chest: Effort normal and breath sounds normal. No stridor. No respiratory distress. Patient has no wheezes, no rales, exhibits no tenderness.   Neurological: Patient is alert and oriented to person, place, and time. Patient has normal reflexes. No cranial nerve deficit. Coordination normal.   Skin: Skin is warm and dry. No rash noted. Patient is not diaphoretic. No erythema. No pallor.       Results for orders placed or performed in visit on 08/14/17   HM2(CBC w/o Differential)   Result Value Ref Range    WBC 7.8 4.0 - 11.0 thou/uL    RBC 4.68 3.80 - 5.40 mill/uL    Hemoglobin 13.9 12.0 - 16.0 g/dL    Hematocrit 41.6 35.0 - 47.0 %    MCV 89 80 - 100 fL    MCH 29.7 27.0 - 34.0 pg    MCHC 33.4 32.0 - 36.0 g/dL    RDW 12.3 11.0 - 14.5 %    Platelets 212 140 - 440 thou/uL    MPV 7.4 7.0 - 10.0 fL         ADDITIONAL HISTORY SUMMARIZED (2): Reviewed note from [facility] from 7/11/2017.  DECISION TO OBTAIN EXTRA INFORMATION (1): Used surgical risk calculator.   RADIOLOGY TESTS (1): None.  LABS (1): Ordered labs today.  MEDICINE TESTS (1): None.  INDEPENDENT REVIEW (2 each): None.     INelida, am scribing for and in the presence of, Dr. Navarrete.    IShira MD , personally performed the services described in this documentation, as scribed by Nelida Lopez in my presence, and it is both accurate and complete.    MEDICATIONS:  Current Outpatient Prescriptions   Medication Sig Dispense Refill     albuterol (PROVENTIL HFA;VENTOLIN HFA) 90 mcg/actuation inhaler Inhale 2 puffs every 6 (six) hours as needed for wheezing. 2 Inhaler 0     buPROPion (WELLBUTRIN XL) 150 MG 24 hr tablet TK 1 T PO D  1     cholecalciferol, vitamin D3, 1,000 unit tablet Take 2,000 Units by mouth daily.       dextroamphetamine-amphetamine (ADDERALL) 5 mg Tab tablet Take 1 tablet by mouth daily.       EPIPEN 2-DMITRY 0.3 mg/0.3  mL (1:1,000) atIn        FLUoxetine (PROZAC) 20 MG capsule TAKE 1 CAPSULE BY MOUTH ONCE DAILY 30 capsule 0     gabapentin (NEURONTIN) 300 MG capsule TAKE 3 CAPSULES BY MOUTH TWICE DAILY 180 capsule 3     HYDROcodone-acetaminophen 5-325 mg per tablet TAKE 1 TO 2 TABLETS PO Q 4 TO 6 H PRN FOR PAIN  0     letrozole (FEMARA) 2.5 mg tablet TAKE 1 TABLET(2.5 MG) BY MOUTH DAILY 90 tablet 3     loratadine (CLARITIN) 10 mg tablet Take 10 mg by mouth 2 (two) times a day.        magnesium oxide 250 mg Tab Take 250 mg by mouth daily.       meloxicam (MOBIC) 7.5 MG tablet TAKE 1 TABLET(7.5 MG) BY MOUTH DAILY 30 tablet 2     methocarbamol (ROBAXIN) 500 MG tablet Take 1 tablet (500 mg total) by mouth as needed. 30 tablet 1     multivitamin with minerals (THERA-M) 9 mg iron-400 mcg Tab tablet Take 1 tablet by mouth daily.       omeprazole (PRILOSEC) 20 MG capsule TAKE 1 CAPSULE BY MOUTH DAILY 90 capsule 2     traZODone (DESYREL) 50 MG tablet TK 1/2 TO 1 T PO HS  1     No current facility-administered medications for this visit.        Total data points: 4

## 2021-06-12 NOTE — ANESTHESIA PROCEDURE NOTES
Peripheral Block    Patient location during procedure: pre-op  Start time: 8/28/2017 7:26 AM  End time: 8/28/2017 7:33 AM  post-op analgesia per surgeon order as noted in medical record  Staffing:  Performing  Anesthesiologist: MISTY MERLOS  Preanesthetic Checklist  Completed: patient identified, site marked, risks, benefits, and alternatives discussed, timeout performed, consent obtained, at patient's request, airway assessed, oxygen available, suction available, emergency drugs available and hand hygiene performed  Peripheral Block  Block type: other, tibial  Prep: ChloraPrep  Patient position: supine  Patient monitoring: cardiac monitor, continuous pulse oximetry, blood pressure and heart rate  Laterality: right  Injection technique: ultrasound guided    Ultrasound used to visualize needle placement in proximity to nerve being blocked: yes   Permanent ultrasound image captured for medical record    Needle  Needle type: Stimuplex   Needle gauge: 21 G  Needle length: 4 in  no peripheral nerve catheter placed  Assessment  Injection assessment: negative aspiration for heme, no difficulty with injection, no paresthesia on injection and incremental injection

## 2021-06-12 NOTE — TELEPHONE ENCOUNTER
Pt notified of lab results, [facility] Pt will hold NSAIDs and have labs rechecked on Friday 10/16. Pt has f/u appt Monday and changed this to virtual visit. Pt states she is having a lot of pain in her Right shoulder and would like an injection again, added pt to 10/26 with Dr Ramon, pt aware this will be at [facility] clinic.     Lab orders entered in chart.

## 2021-06-12 NOTE — ANESTHESIA PROCEDURE NOTES
Spinal Block    Patient location during procedure: OR  Start time: 8/28/2017 7:44 AM  End time: 8/28/2017 7:48 AM  Reason for block: primary anesthetic    Staffing:  Performing  Anesthesiologist: MISTY MERLOS    Preanesthetic Checklist  Completed: patient identified, risks, benefits, and alternatives discussed, timeout performed, consent obtained, airway assessed, oxygen available, suction available, emergency drugs available and hand hygiene performed  Spinal Block  Patient position: sitting  Prep: ChloraPrep and site prepped and draped  Patient monitoring: blood pressure, continuous pulse ox, cardiac monitor and heart rate  Approach: midline  Location: L3-4  Injection technique: single-shot  Needle type: pencil-tip     Assessment  Events: blood aspirated and Small amount of blood return with CSF, clearing after several drops of CSF

## 2021-06-12 NOTE — PATIENT INSTRUCTIONS - HE
Summary of Your Rheumatology Visit    Next Appointment:   4-6 Months for follow-up visit, next week for injections.    Medications:    Please follow directives on Diclofenac tube on how to utilize.    Referrals:      Tests:     Recheck kidney function level in 1 to 2 months.     Injections:        Other:

## 2021-06-12 NOTE — PROGRESS NOTES
Chief complaint: Follow-up allergies and asthma    History of present illness: This is a pleasant 52-year-old woman who is here today for follow-up of allergies and asthma.  She has been on allergy shots since April 2016.  No systemic reactions.  She reports she stopped her controller medication as she had been doing well and thought it was leading to weight gain.  She states she uses her albuterol 1-2 times per week now.  She denies any wheezing or shortness breath but does have a cough at times.  She denies coughing in her sleep.  She states she coughs up thick white mucus on occasion.  She states this happens a few times per week.  She does not use any allergy medication regularly except for Zyrtec.  She denies itchy eyes sneezing and congestion.  She feels that the allergy shots are helping.    Past medical history, social history, family medical history, meds and allergies reviewed and updated accordingly.  She is going to have a knee replacement on August 28.    Review of Systems performed as above and the remainder is negative.      Current Outpatient Prescriptions:      albuterol (PROVENTIL HFA;VENTOLIN HFA) 90 mcg/actuation inhaler, Inhale 2 puffs every 6 (six) hours as needed for wheezing., Disp: 2 Inhaler, Rfl: 0     buPROPion (WELLBUTRIN XL) 150 MG 24 hr tablet, TK 1 T PO D, Disp: , Rfl: 1     cholecalciferol, vitamin D3, 1,000 unit tablet, Take 2,000 Units by mouth daily., Disp: , Rfl:      dextroamphetamine-amphetamine (ADDERALL) 5 mg Tab tablet, Take 1 tablet by mouth daily., Disp: , Rfl:      EPIPEN 2-DMITRY 0.3 mg/0.3 mL (1:1,000) atIn, , Disp: , Rfl:      FLUoxetine (PROZAC) 20 MG capsule, TAKE 1 CAPSULE BY MOUTH ONCE DAILY, Disp: 30 capsule, Rfl: 0     gabapentin (NEURONTIN) 300 MG capsule, TAKE 3 CAPSULES BY MOUTH TWICE DAILY, Disp: 180 capsule, Rfl: 3     HYDROcodone-acetaminophen 5-325 mg per tablet, TAKE 1 TO 2 TABLETS PO Q 4 TO 6 H PRN FOR PAIN, Disp: , Rfl: 0     letrozole (FEMARA) 2.5 mg  "tablet, Take 1 tablet (2.5 mg total) by mouth daily., Disp: 90 tablet, Rfl: 3     letrozole (FEMARA) 2.5 mg tablet, TAKE 1 TABLET(2.5 MG) BY MOUTH DAILY, Disp: 90 tablet, Rfl: 3     loratadine (CLARITIN) 10 mg tablet, Take 10 mg by mouth 2 (two) times a day. , Disp: , Rfl:      magnesium oxide 250 mg Tab, Take 250 mg by mouth daily., Disp: , Rfl:      meloxicam (MOBIC) 7.5 MG tablet, TAKE 1 TABLET(7.5 MG) BY MOUTH DAILY, Disp: 30 tablet, Rfl: 0     meloxicam (MOBIC) 7.5 MG tablet, TAKE 1 TABLET(7.5 MG) BY MOUTH DAILY, Disp: 30 tablet, Rfl: 0     methocarbamol (ROBAXIN) 500 MG tablet, Take 1 tablet by mouth as needed., Disp: , Rfl: 1     multivitamin with minerals (THERA-M) 9 mg iron-400 mcg Tab tablet, Take 1 tablet by mouth daily., Disp: , Rfl:      omeprazole (PRILOSEC) 20 MG capsule, TAKE 1 CAPSULE BY MOUTH DAILY, Disp: 90 capsule, Rfl: 2     traZODone (DESYREL) 50 MG tablet, TK 1/2 TO 1 T PO HS, Disp: , Rfl: 1     montelukast (SINGULAIR) 10 mg tablet, TAKE 1 TABLET BY MOUTH EVERY NIGHT AT BEDTIME, Disp: 30 tablet, Rfl: 0    Allergies   Allergen Reactions     Cat/Feline Products      Egg Derived      Flu like symptoms--pain     Trees        /75  Ht 5' 7.5\" (1.715 m)  Wt (!) 261 lb (118.4 kg)  LMP 08/10/2012  BMI 40.28 kg/m2  Gen: Pleasant female not in acute distress  HEENT: Eyes no erythema of the bulbar or palpebral conjunctiva, no edema. Ears: TMs well visualized, no effusions. Nose: No congestion, mucosa normal. Mouth: Throat clear, no lip or tongue edema.   Cardiac: Regular rate and rhythm, no murmurs, rubs or gallops  Respiratory: Clear to auscultation bilaterally, no adventitious breath sounds    Skin: No rashes or lesions  Psych: Alert and oriented times 3    Impression report and plan:  1.  Allergic rhinitis  2.  Intermittent asthma    I am a little bit nervous about her stopping her controller.  I gave her strict parameters and stated if she is needing to use her albuterol inhaler greater than " 2 times a week outside exercise or she is having nighttime symptoms, she needs to let me know immediately and restart a controller medication.  For now, I will have her follow in 6 months.  Continue allergy shots.  The importance of not taking 56 day breaks from allergy shots was discussed.

## 2021-06-12 NOTE — PROGRESS NOTES
DFM/DPM/RW  1:1 V/V EXP 8/14/2018  POLLENS/CAT  1:1 V/V EXP 8/14/2018  TREES  1:1 V/V EXP 8/14/2018    HE WBY  CHECKED BY KM  CHARGED 30 UNITS

## 2021-06-12 NOTE — PROGRESS NOTES
Emani Vargas who presents today with a chief complaint of  No chief complaint on file.      Joint Pains: yes  Location: all over, mostly shoulder, lt knee, lt ankle, neck  Onset: chronic   Intensity:  6/10  AM Stiffness: 20 Minutes  Alleviating/Aggravating Factors: working or doing too much and walking make pain worse  Tolerating Meds: yes  Other:      ROS:  Patient denies having any chest pain, shortness of breath, cough, abdominal pain, nausea + (all the time, chronic), vomiting, rashes, fevers, oral ulcers and recent infections.  Patient admits to having a good appetite      Problem List:  Patient Active Problem List   Diagnosis     Vitamin D Deficiency     Endometriosis     Chronic Pain     Insomnia     Bipolar Disorder     Hearing Loss     Osteoarthrosis, unspecified whether generalized or localized, pelvic region and thigh     Allergic rhinitis     Compression fracture of vertebral column with routine healing     Malignant neoplasm of upper-outer quadrant of right breast in female, estrogen receptor positive (H)     Refusal of blood transfusions as patient is Judaism     Osteoporosis     Aromatase inhibitor use     Knee osteoarthritis     Bipolar affective disorder, remission status unspecified (H)     Chronic pain syndrome     Morbid obesity (H)        PMH:   Past Medical History:   Diagnosis Date     Allergic rhinitis      Asthma      Bipolar 1 disorder (H)      Breast cancer of upper-outer quadrant of right female breast (H) 4/11/2017     Chronic pain     Back pain due to arthritis.     COPD (chronic obstructive pulmonary disease) (H)      Endometriosis      Fibroid uterus      Hx of radiation therapy 2017     Insomnia      Osteoarthritis      Overweight      Refusal of blood transfusions as patient is Judaism 4/12/2017     Sensorineural hearing loss, bilateral      Vitamin D deficiency        Surgical History:  Past Surgical History:   Procedure Laterality Date     BREAST BIOPSY Right  2000's     BREAST BIOPSY Right 02/20/2017     BREAST LUMPECTOMY Right 03/10/2017    with sentinel lymph node biopsy.     KNEE ARTHROSCOPY Right 1996    Description: Arthroscopy Knee Right;  Recorded: 12/08/2011;     KNEE SURGERY Right 1997    lateral release     LAPAROSCOPIC ENDOMETRIOSIS FULGURATION  2002     MN TOTAL KNEE ARTHROPLASTY Right 8/28/2017    Procedure: RIGHT TOTAL KNEE ARTHROPLASTY;  Surgeon: Antony Elias MD;  Location: [facility] Main OR;  Service: Orthopedics     TOTAL HIP ARTHROPLASTY N/A 9/24/2014    Procedure: LEFT HIP TOTAL ARTHROPLASTY;  Surgeon: Antony Elias MD;  Location: [facility] Main OR;  Service:      TOTAL HIP ARTHROPLASTY Right 8/10/2015    Procedure: HIP TOTAL ARTHROPLASTY RIGHT;  Surgeon: Antony Elias MD;  Location: [facility] Main OR;  Service:        Family History:  Family History   Problem Relation Age of Onset     Depression Mother      COPD Mother      Diabetes Father      Skin cancer Father 45     Colon cancer Maternal Grandmother      Colon cancer Maternal Grandfather      Diabetes Paternal Grandmother      Colon cancer Paternal Grandmother 80     Breast cancer Cousin 48        Paternal side.     Testicular cancer Brother 44     Lung cancer Paternal Aunt 60     Anesthesia problems Neg Hx        Social History:   reports that she quit smoking about 35 years ago. She has a 8.00 pack-year smoking history. She has never used smokeless tobacco. She reports current alcohol use. She reports that she does not use drugs.    Allergies:  Allergies   Allergen Reactions     Cat/Feline Products Itching     Egg Derived      Flu like symptoms--pain     Trees Other (See Comments)     Grass, itching        Current Medications:  Current Outpatient Medications   Medication Sig Dispense Refill     acetaminophen (TYLENOL) 500 MG tablet Take 1,000 mg by mouth every 6 (six) hours as needed for pain.       albuterol (PROAIR HFA;PROVENTIL HFA;VENTOLIN HFA) 90 mcg/actuation inhaler  Inhale 1-2 puffs every 4 (four) hours as needed for wheezing or shortness of breath. 1 Inhaler 11     buPROPion (WELLBUTRIN XL) 150 MG 24 hr tablet TAKE 1 TABLET (150 MG TOTAL) BY MOUTH EVERY MORNING. 90 tablet 3     calcium-vitamin D (CALCIUM-VITAMIN D) 500 mg(1,250mg) -200 unit per tablet Take 1 tablet by mouth 2 (two) times a day with meals.  0     celecoxib (CELEBREX) 200 MG capsule Take one tab po qd, prn. 30 capsule 0     cholecalciferol, vitamin D3, 1,000 unit tablet Take 2,000 Units by mouth daily.       dextroamphetamine-amphetamine 20 mg Tab Take 1 tablet by mouth 2 (two) times a day.  0     diclofenac sodium (VOLTAREN) 1 % Gel Apply approximately 1/2-1 gm over affected hand and/or left elbow joints tid-qid, as needed. 1 Tube 2     FLUoxetine (PROZAC) 20 MG capsule Take 20 mg by mouth daily.       fluticasone furoate-vilanterol (BREO ELLIPTA) 200-25 mcg/dose DsDv inhaler Inhale 1 puff daily. (Patient taking differently: Inhale 1 puff daily as needed. ) 30 each 11     fluticasone propionate (FLONASE ALLERGY RELIEF) 50 mcg/actuation nasal spray One spray in each nostril twice daily. OFFICE VISIT NEEDED FOR ANY FURTHER REFILLS 16 g 0     gabapentin (NEURONTIN) 400 MG capsule 2400 mg daily  11     guaiFENesin ER (MUCINEX) 600 mg 12 hr tablet Take 1-2 tablets (600-1,200 mg total) by mouth 2 (two) times a day as needed for congestion. 120 tablet 11     letrozole (FEMARA) 2.5 mg tablet Take 1 tablet (2.5 mg total) by mouth daily. (Patient taking differently: Take 2.5 mg by mouth daily. Taking daily) 90 tablet 3     loratadine-pseudoephedrine (CLARITIN-D 24 HOUR)  mg per 24 hr tablet Take 1 tablet by mouth daily. 30 tablet 11     magnesium citrate solution Take 296 mL by mouth daily as needed.       methocarbamol (ROBAXIN) 500 MG tablet Take 1 tablet (500 mg total) by mouth 3 (three) times a day. As needed 90 tablet 3     montelukast (SINGULAIR) 10 mg tablet Take 1 tablet (10 mg total) by mouth at bedtime.  30 tablet 11     omeprazole (PRILOSEC) 40 MG capsule Take 1 capsule (40 mg total) by mouth daily before breakfast. 90 capsule 3     traZODone (DESYREL) 150 MG tablet 150 mg at bedtime as needed.  5     No current facility-administered medications for this visit.            Physical Exam:  Following up today via video visit, per Covid-19 pandemic requirements.    Verbal consent has been obtained for this service by care team member.    Video call start time:  2:30 PM    Video call end time: 2:45 PM    [employer] utilized for video call.    Phone number utilized: [[phone]]    Patient location for video visit: Home     Provider location for video visit:  Home (working remotely)      Summary/Assessment:    History that includes osteoarthritis, fibromyalgia, chronic low back pain.    Presents for a follow-up video visit.    Celebrex every other day prn some benefit.     Diclofenac gel as needed also provides some benefit.    Scheduled for right shoulder cortisone injection next week, also desires to have left knee injected with cortisone then.    Water therapy beneficial for neck and sacral pains, prescribed by spine clinic.    Denies history of GI bleed/peptic ulcer disease.    Decreased GFR has normalized with holding Celebrex, desires to retry.    Please see below for management plan.      Pertinent rheumatology/past medical history (please refer to above for more detailed history):      Osteoarthritis    Fibromyalgia    Chronic shoulder pains (likely rotator cuff tendinopathy, cortisone injections helpful)    Chronic low back pain and sacral pains (established with spine clinic)    Neck pain    History bilateral hip replacements    Chronic left knee pain (s/p cortisone injection)    History right knee replacement    History of vertebral compression fracture    History right breast cancer    History COPD/chronic bronchitis    Overweight    Rheumatology medications provided/suggested:    Voltaren  gel  Celebrex    Pertinent medication from other providers or from otc (please refer to above for more detailed med list):    Tylenol  Neurontin  Trazodone  Robaxin  Prozac  Letrozole (MSK pain present prior to initiation)      Pertinent medications already tried:     Tylenol (pruritus)  NSAIDs (upset stomach)  Tramadol (worsening nausea)  Lidoderm patches (ineffective)       Pertinent lab history:    Noted to have negative/unremarkable: Rheumatoid factor, CCP antibody, CONSTANTINE, Lyme antibody, HLA-B27.    Mildly elevated CRP, normal ESR.    Pertinent imaging/test history:      X-rays of right shoulder from May 2018 showed:  Very minimal degenerative irregularity at the rotator cuff insertion site. Glenohumeral joint appears normal. No fracture or dislocation.     X-rays of the left elbow showed some signs of advanced degenerative joint disease.  Some subchondral lucencies noted which may represent either erosive change or degenerative subcortical cystic change.  Small effusion likely. These findings may be related to inflammation or prior trauma.    Other:    Was a  for over 25 years.    On prior visit, suggested looking into obtaining a paraffin wax machine for hand pains.    Standing order for labs placed every 3-6 months good through May 2021.      Plan:      Continue Tylenol as needed.    We will retry Celebrex 200 mg daily limiting to 2-3 times per week as needed for pain not responsive to Tylenol.  Avoiding regular use due to mild low GFR when taking more often.    We will refill Voltaren gel twice daily as needed for hand and left elbow pain as needed.  Made aware can alternate with Celebrex, should avoid taking together.    Receives benefit from combination of Neurontin, trazodone, Robaxin prescribed by other providers.    Established with spine clinic for chronic pains involving: SI joints, coccyx and worsening neck pain.    Scheduled to come in next week for right shoulder cortisone injection and left  knee cortisone injection.    Recheck creatinine in 1 to 2 months.    Follow-up in 4-6  months.      Procedure note:          This note was transcribed using Dragon voice recognition software as a result unintentional grammatical errors or word substitutions may have occurred. Please contact our Rheumatology department if you need any clarification or if you have any related inquiries.    Major side effect profile of medications provided were discussed with the patient.    Kilo Ramon DO      ....................  10/19/2020   1:50 PM

## 2021-06-12 NOTE — ANESTHESIA PREPROCEDURE EVALUATION
Anesthesia Evaluation      Patient summary reviewed   No history of anesthetic complications     Airway   Mallampati: II  Neck ROM: full   Pulmonary - normal exam   (+) COPD moderate, asthma  moderate,  well controlled,                          Cardiovascular - negative ROS and normal exam  Exercise tolerance: > or = 4 METS  (-) past MI, CAD, CABG/stent   Neuro/Psych    (+) depression, chronic pain  Anxiety/panic attacks: [employer] refuses blood products.    Comments: Bipolar NOS, Hearing loss NOS    Endo/Other    (+) arthritis, obesity,      Comments: Vitamin D Deficiency; Breast cancer    GI/Hepatic/Renal    (+) GERD,             Dental - normal exam                        Anesthesia Plan  Planned anesthetic: spinal and peripheral nerve block  Tibial and saphenous nerve blocks for POP per surgeon request.  Decadron, Zofran.  Ketamine (0.5 mg/kg).  Diprivan infusion.  ASA 3     Anesthetic plan and risks discussed with: patient  Anesthesia plan special considerations: antiemetics,   Post-op plan: routine recovery

## 2021-06-12 NOTE — ANESTHESIA CARE TRANSFER NOTE
Last vitals:   Vitals:    08/28/17 0932   BP: 109/59   Pulse: 89   Resp: 16   Temp: 37.3  C (99.1  F)   SpO2: 96%     Patient's level of consciousness is drowsy  Spontaneous respirations: yes  Maintains airway independently: yes  Dentition unchanged: yes  Oropharynx: oropharynx clear of all foreign objects    QCDR Measures:  ASA# 20 - Surgical Safety Checklist: WHO surgical safety checklist completed prior to induction  PQRS# 430 - Adult PONV Prevention: 4558F - Pt received => 2 anti-emetic agents (different classes) preop & intraop  ASA# 8 - Peds PONV Prevention: NA - Not pediatric patient, not GA or 2 or more risk factors NOT present  PQRS# 424 - Quiana-op Temp Management: 4559F - At least one body temp DOCUMENTED => 35.5C or 95.9F within required timeframe  PQRS# 426 - PACU Transfer Protocol: - Transfer of care checklist used  ASA# 14 - Acute Post-op Pain: ASA14B - Patient did NOT experience pain >= 7 out of 10

## 2021-06-12 NOTE — PROGRESS NOTES
ASSESSMENT/PLAN:  S/P total knee arthroplasty, postop day 8, hospital discharge f/u  -     oxyCODONE (ROXICODONE) 5 MG immediate release tablet; Take one tablet three times as needed  Dispense: 30 tablet; Refill: 0  Schedule tylenol 500mg every 3 hours for the next few days then taper off.  Use oxycodone prn.  Keep knee elevated and apply ice as needed  Has f/u with ortho next week  I have reconciled all medications.     Acute blood loss as cause of postoperative anemia  -     HM2(CBC w/o Differential)    Leukocytosis  CBC    Orders Placed This Encounter   Procedures     HM2(CBC w/o Differential)       CHIEF COMPLAINT:  Chief Complaint   Patient presents with     Follow-up     knee replacement. Right leg is swollen, pain        HISTORY OF PRESENT ILLNESS:  Emani is a 52 y.o. female presenting to the clinic today for an inpatient follow up from 8/28-8/30/2017 at [facility] for a knee replacement. She underment the scheduled right total knee arthroplasty on 8/28/2017. She did well, and there was no complications. She was started on PT/OT during recovery. She was also started on prophylactic baby aspirin. She was discharged stable with instructions to follow up with orthopedics for a post op visit.     She is on post operative day 8. Her leg has been becoming more swollen for her. She has tried to keep it elevated. She is still having pain, but she is trying not to take any pain medications. She is having some nausea, but feels it is from her medications. She does take the medication with food. She has not started outpatient PT/OT. She has a follow up with orthopedics next week.     REVIEW OF SYSTEMS:   Constitutional: Negative.   HENT: Negative.   Eyes: Negative.   Respiratory: Negative.   Cardiovascular: Negative.   Gastrointestinal: Negative.   Endocrine: Negative.   Genitourinary: Negative.   Musculoskeletal: Negative.   Skin: Negative.   Allergic/Immunologic: Negative.   Neurological: Negative.   Hematological:  Negative.   Psychiatric/Behavioral: Negative.   All other systems are negative.    PFSH:  Recent surgical history of right total knee arthroplasty on 8/28/2017.     TOBACCO USE:  History   Smoking Status     Former Smoker     Packs/day: 1.00     Years: 7.00     Quit date: 1/1/1985   Smokeless Tobacco     Never Used       VITALS:  Vitals:    09/05/17 1339   BP: 118/66   Patient Site: Left Arm   Patient Position: Sitting   Cuff Size: Adult Regular   Pulse: 72   Weight: (!) 273 lb (123.8 kg)     Wt Readings from Last 3 Encounters:   09/05/17 (!) 273 lb (123.8 kg)   08/28/17 (!) 259 lb (117.5 kg)   08/14/17 (!) 260 lb 5 oz (118.1 kg)         PHYSICAL EXAM:  Constitutional: Patient is oriented to person, place, and time. Patient appears well-developed and well-nourished. No distress.   Cardiovascular: Normal rate.  Pulmonary/Chest: Effort normal. No respiratory distress.   Neurological: Patient is alert and oriented to person, place, and time.    Results for orders placed or performed during the hospital encounter of 08/28/17   INR (Baseline for all patients undergoing hip or knee procedures)   Result Value Ref Range    INR 0.96 0.90 - 1.10   HM2(CBC w/o Differential)   Result Value Ref Range    WBC 16.4 (H) 4.0 - 11.0 thou/uL    RBC 3.77 (L) 3.80 - 5.40 mill/uL    Hemoglobin 11.1 (L) 12.0 - 16.0 g/dL    Hematocrit 34.1 (L) 35.0 - 47.0 %    MCV 91 80 - 100 fL    MCH 29.4 27.0 - 34.0 pg    MCHC 32.6 32.0 - 36.0 g/dL    RDW 13.3 11.0 - 14.5 %    Platelets 186 140 - 440 thou/uL    MPV 10.1 8.5 - 12.5 fL   Basic Metabolic Panel   Result Value Ref Range    Sodium 139 136 - 145 mmol/L    Potassium 4.7 3.5 - 5.0 mmol/L    Chloride 103 98 - 107 mmol/L    CO2 28 22 - 31 mmol/L    Anion Gap, Calculation 8 5 - 18 mmol/L    Glucose 150 (H) 70 - 125 mg/dL    Calcium 8.5 8.5 - 10.5 mg/dL    BUN 11 8 - 22 mg/dL    Creatinine 0.85 0.60 - 1.10 mg/dL    GFR MDRD Af Amer >60 >60 mL/min/1.73m2    GFR MDRD Non Af Amer >60 >60 mL/min/1.73m2    Hemoglobin - Daily x 2   Result Value Ref Range    Hemoglobin 10.2 (L) 12.0 - 16.0 g/dL             ADDITIONAL HISTORY SUMMARIZED (2): Reviewed hospital discharge summary from 8/30/2017.  DECISION TO OBTAIN EXTRA INFORMATION (1): None.   RADIOLOGY TESTS (1): xray.  LABS (1): Reviewed and ordered labs today.  MEDICINE TESTS (1): None.  INDEPENDENT REVIEW (2 each): None.     Nelida BARNES, am scribing for and in the presence of, Shira Abbott MD , personally performed the services described in this documentation, as scribed by Nelida Lopez in my presence, and it is both accurate and complete.    MEDICATIONS:  Current Outpatient Prescriptions   Medication Sig Dispense Refill     acetaminophen (TYLENOL) 500 MG tablet Take 2 tablets (1,000 mg total) by mouth 3 (three) times a day.  0     albuterol (PROVENTIL HFA;VENTOLIN HFA) 90 mcg/actuation inhaler Inhale 2 puffs every 6 (six) hours as needed for wheezing. 2 Inhaler 0     aspirin 325 MG EC tablet TK 1 T PO BID  0     buPROPion (WELLBUTRIN XL) 150 MG 24 hr tablet Take 150 mg by mouth every morning.       cetirizine (ZYRTEC) 10 MG tablet Take 10 mg by mouth daily.       cholecalciferol, vitamin D3, 1,000 unit tablet Take 2,000 Units by mouth daily.       dextroamphetamine-amphetamine (ADDERALL) 5 mg Tab tablet Take 1 tablet by mouth daily.       EPIPEN 2-DMITRY 0.3 mg/0.3 mL (1:1,000) atIn Inject 0.3 mg into the shoulder, thigh, or buttocks once as needed.        FLUoxetine (PROZAC) 20 MG capsule Take 20 mg by mouth daily.       gabapentin (NEURONTIN) 300 MG capsule Take 900 mg by mouth 2 (two) times a day.       letrozole (FEMARA) 2.5 mg tablet Take 2.5 mg by mouth at bedtime.        magnesium oxide 250 mg Tab Take 250 mg by mouth as needed.        meloxicam (MOBIC) 7.5 MG tablet Take 7.5 mg by mouth daily.       methocarbamol (ROBAXIN) 500 MG tablet Take 1 tablet (500 mg total) by mouth as needed. 30 tablet 1      multivitamin with minerals (THERA-M) 9 mg iron-400 mcg Tab tablet Take 1 tablet by mouth daily.       omeprazole (PRILOSEC) 20 MG capsule Take 20 mg by mouth daily.       oxyCODONE (ROXICODONE) 5 MG immediate release tablet Take one tablet three times as needed 30 tablet 0     polyethylene glycol (MIRALAX) 17 gram packet Take 1 packet (17 g total) by mouth daily.  0     traZODone (DESYREL) 50 MG tablet Take 50 mg by mouth at bedtime.        aspirin 325 MG tablet Take 1 tablet (325 mg total) by mouth 2 (two) times a day. 60 tablet 0     No current facility-administered medications for this visit.        Total data points: 4

## 2021-06-12 NOTE — TELEPHONE ENCOUNTER
----- Message from Kilo Ramon DO sent at 10/12/2020  3:11 PM CDT -----  Please discuss findings/recommendations verbally with the patient.    Sorry for the delay in commenting on your test results, as I was away over the past few weeks.    Normal liver enzymes.    Mild low GFR consistent with having minimal diminished kidney function, Other component of kidney function called creatinine noted to be within normal limits.  Recommend holding Celebrex and Voltaren gel for now.    Avoid over-the-counter anti-inflammatory medications such as ibuprofen/Advil, Aleve etc.     Recommend drinking sufficient amount of fluids daily, at least 8 cups of 8 ounces (not counting caffeinated beverages).    Recommend rechecking kidney function level 1-2 days prior to follow-up visit with us on October 19, 2020.    Please place lab orders mentioned above.

## 2021-06-12 NOTE — ANESTHESIA POSTPROCEDURE EVALUATION
Patient: Emani Vargas  RIGHT TOTAL KNEE ARTHROPLASTY  Anesthesia type: spinal    Patient location: PACU  Last vitals:   Vitals:    08/28/17 1000   BP: 120/71   Pulse: 83   Resp: 22   Temp: 36.7  C (98  F)   SpO2: 95%     Post vital signs: stable  Level of consciousness: awake and responds to simple questions  Post-anesthesia pain: pain controlled  Post-anesthesia nausea and vomiting: no  Pulmonary: unassisted, return to baseline  Cardiovascular: stable and blood pressure at baseline  Hydration: adequate  Anesthetic events: no    QCDR Measures:  ASA# 11 - Quiana-op Cardiac Arrest: ASA11B - Patient did NOT experience unanticipated cardiac arrest  ASA# 12 - [place]-op Mortality Rate: ASA12B - Patient did NOT die  ASA# 13 - PACU Re-Intubation Rate: NA - No ETT / LMA used for case  ASA# 10 - Composite Anes Safety: ASA10A - No serious adverse event  ASA# 38 - New Corneal Injury: ASA38A - No new exposure keratitis or corneal abrasion in PACU    Additional Notes: doing well, no complaints; no nausea!

## 2021-06-13 NOTE — PROGRESS NOTES
Assessment/Plan:        Diagnoses and all orders for this visit:    Urticaria    Other orders  -     traZODone (DESYREL) 100 MG tablet;   -     HYDROcodone-acetaminophen 5-325 mg per tablet; TK 1 T PO Q 6 HOURS PRN; Refill: 0  -     predniSONE (DELTASONE) 20 MG tablet; Take 2 tablets orally daily for 5 days then decrease to 1 tablet daily for another 5 days  Dispense: 15 tablet; Refill: 0        Unclear of etiology.  We will treat her with prednisone.  Add Benadryl, Zantac 150 milligrams at bedtime.  Avoid scratching in these areas.  Monitor for changes.  Recheck with PCP if persistent or worse.  She was agreeable with the plans.  Subjective:    Patient ID: Emani Vargas is a 52 y.o. female.    HPI    Emani is here with concerns about rashes.  She was seen in ER for this on September 29.  She woke up and noted itching, rashes in her arms.  It was also involving her hands and her hand joints were painful.  She initially thought that she was bit by mosquitoes or spider.  Spread to her trunks, legs.  Tried Benadryl for 4 doses with minimal benefit.  Tried Aleve.  Went to ER and was given Decadron.  Symptoms did improve.  When she left, symptoms recurred after on a day.  Started with pain in her left elbow then noted itching and rashes.  They are-like.  She also noted lesion on her right upper lip, swelling and stinging sensation.  Denies any changes in her diet.  Notes new skin products.  No changes in her laundry detergents.  No new pets.  No recent travel or outdoor activities.  No exposure to anyone with similar rashes.  Denies any similar episodes in the past.    Review of Systems  As above otherwise negative.          Objective:    Physical Exam  /80 (Patient Site: Left Arm, Patient Position: Sitting, Cuff Size: Adult Large)  Pulse 74  Temp 98.4  F (36.9  C) (Oral)   Wt (!) 270 lb 1.6 oz (122.5 kg)  LMP 08/10/2012  SpO2 95%  Breastfeeding? No  BMI 39.89 kg/m2    Vital signs noted above. AAO ×3.  HEENT  no nasal discharge, moist oral mucosa. Neck: Supple neck, nonpalpable cervical lymph nodes.  Lungs: Clear to auscultation bilateral.  Heart: S1-S2 regular rate and rhythm, no murmurs were noted.  Abdomen:  with bowel sounds.  Skin: Multiple erythematous patches, some of them circular and her trunks, arms, thigh.  Biggest area were in her upper extremities.  Elevated and hives.  No blister, scales or discharge.

## 2021-06-13 NOTE — ANESTHESIA POSTPROCEDURE EVALUATION
Patient: Emani Vargas  Procedure(s):  HYSTEROSCOPY, WITH DILATION AND CURETTAGE  Anesthesia type: MAC    Patient location: PACU  Last vitals:   Vitals Value Taken Time   /64 12/15/20 0917   Temp 36.2  C (97.1  F) 12/15/20 0800   Pulse 71 12/15/20 0921   Resp 16 12/15/20 0800   SpO2 94 % 12/15/20 0921   Vitals shown include unvalidated device data.  Post vital signs: stable  Level of consciousness: awake and responds to simple questions  Post-anesthesia pain: pain controlled  Post-anesthesia nausea and vomiting: no  Pulmonary: unassisted, return to baseline  Cardiovascular: stable and blood pressure at baseline  Hydration: adequate  Anesthetic events: no    QCDR Measures:  ASA# 11 - Quiana-op Cardiac Arrest: ASA11B - Patient did NOT experience unanticipated cardiac arrest  ASA# 12 - Quiana-op Mortality Rate: ASA12B - Patient did NOT die  ASA# 13 - PACU Re-Intubation Rate: ASA13B - Patient did NOT require a new airway mgmt  ASA# 10 - Composite Anes Safety: ASA10A - No serious adverse event    Additional Notes:  
Yes

## 2021-06-13 NOTE — TELEPHONE ENCOUNTER
----- Message from Kilo Ramon DO sent at 11/29/2020  2:00 PM CST -----  GFR, component of kidney function decreased a bit, current level just slightly below normal.  Recommend rechecking level in about 3 weeks.  Recommend drinking sufficient amount of fluids daily (at least 8 cups of 8 ounces daily, not including caffeinated beverages).    Please place lab orders mentioned above.

## 2021-06-13 NOTE — PROGRESS NOTES
ASSESSMENT/PLAN:  Bleeding  Bleeding noticed during shower without identifiable source or recurrence  I suggest that she monitors for recurrence and other associated symptoms and location (urine, vaginal, rectal, and skin)    URI (upper respiratory infection)  Continue with supportive cares  F/u with not better in 1-2 wks      CHIEF COMPLAINT:  Chief Complaint   Patient presents with     Bleeding/Bruising     bleeding after she got out of the shower, not sure from where       HISTORY OF PRESENT ILLNESS:  Emani is a 52 y.o. female presenting to the clinic today for concerns for bleeding. She first notes that she has been low in energy and fatigued for about 3 weeks. She was standing in the shower, and was done and turning off of the water. She grabbed her towel, and looked down to find herself standing in a puddle of blood. She could see the blood down her legs. She did not shave in the shower, and she did not find any cuts along her legs. She has not had any bleeding episodes since. No hematuria, no blood in the stools. She has been having cramps that feel like menstrual cramps. One week prior to the bleeding episode, she had sudden onset of nausea and pallor that resolved on its own. She has not had a period in 5 years. She does have a history of break through bleeding one year ago, and a pelvic US showed ovarian cysts. Last colonoscopy was normal. PAP smear is up to date.     Asthma: She is recovering from a cold. She has been more short of breath since onset of cold. She has not noticed any wheezing. She has been using the albuterol.     REVIEW OF SYSTEMS:   Constitutional: Negative.   HENT: Negative.   Eyes: Negative.   Respiratory: Negative.   Cardiovascular: Negative.   Gastrointestinal: Negative.   Endocrine: Negative.   Genitourinary: Negative.   Musculoskeletal: Negative.   Skin: Negative.   Allergic/Immunologic: Negative.   Neurological: Negative.   Hematological: Negative.   Psychiatric/Behavioral:  "Negative.   All other systems are negative.    PFSH:  No new history.     TOBACCO USE:  History   Smoking Status     Former Smoker     Packs/day: 1.00     Years: 7.00     Quit date: 1/1/1985   Smokeless Tobacco     Never Used       VITALS:  Vitals:    10/31/17 1443   BP: 118/78   Pulse: 64   Resp: 18   Temp: 98.1  F (36.7  C)   TempSrc: Oral   Weight: (!) 271 lb 11.2 oz (123.2 kg)   Height: 5' 9\" (1.753 m)     Wt Readings from Last 3 Encounters:   10/31/17 (!) 271 lb 11.2 oz (123.2 kg)   10/02/17 (!) 270 lb 1.6 oz (122.5 kg)   09/29/17 (!) 260 lb (117.9 kg)       PHYSICAL EXAM:  Constitutional: Patient is oriented to person, place, and time. Patient appears well-developed and well-nourished. No distress.   Cardiovascular: Normal rate.  Pulmonary/Chest: Effort normal. No respiratory distress.   Neurological: Patient is alert and oriented to person, place, and time.      Results for orders placed or performed in visit on 09/05/17   HM2(CBC w/o Differential)   Result Value Ref Range    WBC 9.5 4.0 - 11.0 thou/uL    RBC 3.91 3.80 - 5.40 mill/uL    Hemoglobin 11.6 (L) 12.0 - 16.0 g/dL    Hematocrit 34.3 (L) 35.0 - 47.0 %    MCV 88 80 - 100 fL    MCH 29.8 27.0 - 34.0 pg    MCHC 33.9 32.0 - 36.0 g/dL    RDW 12.2 11.0 - 14.5 %    Platelets 256 140 - 440 thou/uL    MPV 7.2 7.0 - 10.0 fL         ADDITIONAL HISTORY SUMMARIZED (2): None.  DECISION TO OBTAIN EXTRA INFORMATION (1): None.   RADIOLOGY TESTS (1): None.  LABS (1): None.  MEDICINE TESTS (1): None.  INDEPENDENT REVIEW (2 each): None.     The visit lasted a total of 10 minutes face to face with the patient. Over 50% of the time was spent counseling and educating the patient about bleeding etiologies.    Nelida BARNES, am scribing for and in the presence of, Dr. Navarrete.    I, Shira Ypa MD , personally performed the services described in this documentation, as scribed by Nelida Lopez in my presence, and it is both accurate and " complete.    MEDICATIONS:  Current Outpatient Prescriptions   Medication Sig Dispense Refill     acetaminophen (TYLENOL) 500 MG tablet Take 2 tablets (1,000 mg total) by mouth 3 (three) times a day.  0     albuterol (PROVENTIL HFA;VENTOLIN HFA) 90 mcg/actuation inhaler Inhale 2 puffs every 6 (six) hours as needed for wheezing. 2 Inhaler 0     amoxicillin (AMOXIL) 500 MG capsule 2,000 mg.       buPROPion (WELLBUTRIN XL) 150 MG 24 hr tablet Take 150 mg by mouth every morning.       cetirizine (ZYRTEC) 10 MG tablet Take 10 mg by mouth daily.       cholecalciferol, vitamin D3, 1,000 unit tablet Take 2,000 Units by mouth daily.       dextroamphetamine-amphetamine (ADDERALL) 5 mg Tab tablet Take 1 tablet by mouth daily.       EPIPEN 2-DMITRY 0.3 mg/0.3 mL (1:1,000) atIn Inject 0.3 mg into the shoulder, thigh, or buttocks once as needed.        FLUoxetine (PROZAC) 20 MG capsule Take 20 mg by mouth daily.       gabapentin (NEURONTIN) 300 MG capsule Take 900 mg by mouth 2 (two) times a day.       letrozole (FEMARA) 2.5 mg tablet Take 2.5 mg by mouth at bedtime.        magnesium oxide 250 mg Tab Take 250 mg by mouth as needed.        meloxicam (MOBIC) 7.5 MG tablet Take 7.5 mg by mouth daily.       methocarbamol (ROBAXIN) 500 MG tablet Take 1 tablet (500 mg total) by mouth as needed. 30 tablet 1     multivitamin with minerals (THERA-M) 9 mg iron-400 mcg Tab tablet Take 1 tablet by mouth daily.       omeprazole (PRILOSEC) 20 MG capsule Take 20 mg by mouth daily.       polyethylene glycol (MIRALAX) 17 gram packet Take 1 packet (17 g total) by mouth daily.  0     traZODone (DESYREL) 100 MG tablet 50 mg.        predniSONE (DELTASONE) 20 MG tablet Take 2 tablets orally daily for 5 days then decrease to 1 tablet daily for another 5 days 15 tablet 0     No current facility-administered medications for this visit.        Total data points: 0

## 2021-06-13 NOTE — ANESTHESIA CARE TRANSFER NOTE
Last vitals:   Vitals:    12/15/20 0800   BP: (P) 131/81   Pulse: (P) 81   Resp: (P) 16   Temp: (P) 36.2  C (97.1  F)   SpO2: (P) 98%     Patient's level of consciousness is drowsy, responding to questions  Spontaneous respirations: yes  Maintains airway independently: yes  Dentition unchanged: yes  Oropharynx: oropharynx clear of all foreign objects    QCDR Measures:  ASA# 20 - Surgical Safety Checklist: WHO surgical safety checklist completed prior to induction    PQRS# 430 - Adult PONV Prevention: 4558F - Pt received => 2 anti-emetic agents (different classes) preop & intraop  ASA# 8 - Peds PONV Prevention: NA - Not pediatric patient, not GA or 2 or more risk factors NOT present  PQRS# 424 - Quiana-op Temp Management: 4559F - At least one body temp DOCUMENTED => 35.5C or 95.9F within required timeframe  PQRS# 426 - PACU Transfer Protocol: - Transfer of care checklist used  ASA# 14 - Acute Post-op Pain: ASA14B - Patient did NOT experience pain >= 7 out of 10

## 2021-06-13 NOTE — PROGRESS NOTES
[facility] Hematology and Oncology Progress Note    Patient: Emani Vargas  MRN: [id number]  Date of Service: 10/18/2017        Reason for Visit    Follow-up regarding breast cancer.    Assessment and Plan  Breast cancer of upper-outer quadrant of right female breast    Staging form: Breast, AJCC 7th Edition    - Pathologic: Stage IA (T1c, N0, cM0) - Signed by Christina Leigh MD on 4/11/2017    ECOG Performance   ECOG Performance Status: 0    Distress Assessment  Distress Assessment Score: 1    Pain  Pain Score (Initial OR Reassessment): 2    Ms. Emani Vargas is a 52 y.o. woman had a breast lumpectomy and sentinel lymph node biopsy done on 3/10/17 by Dr. Yoder.  Final pathology showed a 15 mm invasive ductal carcinoma, grade 2 with associated DCIS, ER AZ positive and HER-2 negative.  Margins were clear.  She had a fourth sentinel lymph nodes and none of them were involved.  Final stage was IA (T1c, N0, cM0).  She had an Oncotype DX score of 18 which corresponds to an approximately 11% risk of recurrence in the next 10 years on tamoxifen.  She has numerous comorbidities including bipolar disorder, COPD endometriosis and osteoarthritis.  After thorough discussion we mutually agreed not to go for adjuvant chemotherapy.  She completed adjuvant radiation and started Arimidex on 5/18/17.    1.  She appears to be doing well at this point.  At this point nothing to indicate recurrence of breast cancer.  She is tolerating Arimidex well.  I encouraged her to continue on Arimidex.  She says that she has enough refills left.  She will call when she needs a new prescription.    2.  I discussed with her about the utility of losing weight.  She said that she will try to work on it.  She thinks that she will be able to manage her diet and also become more physically active as the knee pain subsides after surgery.    3.  She was offered a flu shot today.  She declined.    4.  She is to continue with calcium and vitamin D tablets for  prevention of osteoporosis.  The DEXA scan in 2016 showed no osteoporosis.  We will plan on repeating a DEXA scan in 2019.    5.  She will need the annual mammogram in February or March 2018.  This is already been ordered by Dr. Yoder.  I asked her to be scheduled.    6.  Return to clinic with me or Chetan No NP in 3 months.    Time spend 25 minutes face to face with the patient. More than 50 % in counseling and coordination of care.      Problem List    1. Malignant neoplasm of upper-outer quadrant of right breast in female, estrogen receptor positive     2. Aromatase inhibitor use          CC: MD Shelia Campbell MD    ______________________________________________________________________________    History of Present Illness    Ms. Emani Vargas is here for follow-up for the breast cancer.    She has had a right knee replacement surgery done.  She is about 7 weeks out from surgery.  She feels that she is recovering quite well.  She says that the pain in the knee from the arthritis is much better.  She still has some residual pain at the knee after the surgery.  She is quite happy with the improvement.  She is back on her feet.  She says that she has completed a course of physical therapy.    She has had some allergic type skin rashes.  She was treated with antiallergy measures in the ED and the rash has gone away.  She is also has had some scratches on the skin from her cat.  They are healing.    She says that with the reduced activity after the knee replacement surgery and eating a high-calorie diet with a lot of junk food she has gained weight.  She is hoping to lose weight soon.    She says that she is tolerating the Arimidex well.  She does have some increase in hot flashes.  She says that the intensity has come down and she is not troubled too much weight.    She says that she has some chronic nausea which remains stable.  She also has some chronic loose stools which remains  stable.  She is recovering from a cold and has some cough and phlegm and a bit of shortness of breath which is also improving.    No fevers or night sweats.  No lumps or bumps anywhere.  No new aches or pains.  She is not losing weight.  No unusual headaches.  No mouth sores.  No swallowing difficulty.  No abdominal pain.  No blood in stool or black stools.  No vaginal bleeding.  No difficulty passing urine.    Please see below.  A 14 point review of system is otherwise completely negative.    Pain Status  Currently in Pain: Yes    Review of Systems    Constitutional  Constitutional (WDL): Exceptions to WDL  Fatigue: None (is tired)  Fever: None  Chills: Mild sensation of cold, shivering, chattering of teeth  Weight Gain: 5 - <10% from baseline (#17 since )  Weight Loss: None  Neurosensory  Neurosensory (WDL): Exceptions to WDL  Peripheral Motor Neuropathy: None  Ataxia: None  Peripheral Sensory Neuropathy: Asymptomatic, loss of deep tendon reflexes or paresthesia (bilateral feet, Lt hand > Rt)  Confusion: None  Syncope: None  Cardiovascular  Cardiovascular (WDL): All cardiovascular elements are within defined limits  Palpitations: None  Edema: No  Phlebitis: None  Superficial thrombophlebitis: None  Pulmonary  Respiratory (WDL): Exceptions to WDL  Cough: Mild symptoms, nonprescription intervention indicated (allergies)  Dyspnea: Shortness of breath with moderate exertion  Hypoxia: None  Gastrointestinal  Gastrointestinal (WDL): Exceptions to WDL  Anorexia: None  Constipation: None  Diarrhea: Increase of <4 stools per day over baseline, mild increase in ostomy output compared to baseline  Dysphagia: None  Esophagitis: Asymptomatic, clinical or diagnostic observations only, intervention not indicated  Nausea: Loss of appetite without alteration in eating habits (2-3 times/month)  Pharyngitis: None  Vomitin - 2 episodes ( by 5 minutes) in 24 hrs (2-3 times/month)  Dysgeusia: None  Dry Mouth:  Symptomatic (e.g., dry or thick saliva) without significant dietary alteration, unstimulated saliva flow >0.2 ml/min  Genitourinary  Genitourinary (WDL): All genitourinary elements are within defined limits  Integumentary  Integumentary (WDL): All integumentary elements are within defined limits  Patient Coping  Patient Coping: Accepting  Distress Assessment  Distress Assessment Score: 1  Accompanied by  Accompanied by: Alone    Past History  Past Medical History:   Diagnosis Date     Allergic rhinitis      Asthma      Bipolar 1 disorder      Breast cancer of upper-outer quadrant of right female breast 4/11/2017     Chronic pain     Back pain due to arthritis.     COPD (chronic obstructive pulmonary disease)      Endometriosis      Fibroid uterus      Insomnia      Osteoarthritis      Overweight      Refusal of blood transfusions as patient is Muslim 4/12/2017     Sensorineural hearing loss, bilateral      Vitamin D deficiency          Past Surgical History:   Procedure Laterality Date     BREAST BIOPSY Right 2000's     BREAST LUMPECTOMY Right 03/10/2017    with sentinel lymph node biopsy.     KNEE ARTHROSCOPY Right 1996    Description: Arthroscopy Knee Right;  Recorded: 12/08/2011;     KNEE SURGERY Right 1997    lateral release     LAPAROSCOPIC ENDOMETRIOSIS FULGURATION  2002     OK TOTAL KNEE ARTHROPLASTY Right 8/28/2017    Procedure: RIGHT TOTAL KNEE ARTHROPLASTY;  Surgeon: Antony Elias MD;  Location: [facility];  Service: Orthopedics     TOTAL HIP ARTHROPLASTY N/A 9/24/2014    Procedure: LEFT HIP TOTAL ARTHROPLASTY;  Surgeon: Antony Elias MD;  Location: [facility];  Service:      TOTAL HIP ARTHROPLASTY Right 8/10/2015    Procedure: HIP TOTAL ARTHROPLASTY RIGHT;  Surgeon: Antony Elias MD;  Location: [facility];  Service:        Physical Exam    Recent Vitals 10/18/2017   Height -   Weight -   BSA (m2) -   /71   Pulse 98   Temp -   Temp src -   SpO2 95    Some recent data might be hidden       GENERAL: Alert and oriented. Seated comfortably. In no distress.  Heavily built.    HEAD: Atraumatic and normocephalic.  Has a full head of hair.  She has hearing aids.    EYES: CORBY, EOMI.  No pallor.  No icterus.    Oral cavity: no mucosal lesion or tonsillar enlargement.    NECK: supple. JVP normal.  No thyroid enlargement.    LYMPH NODES: No palpable, cervical, axillary or inguinal lymphadenopathy.    CHEST: clear to auscultation bilaterally.  Resonant to percussion throughout bilaterally.  Symmetrical breath movements bilaterally.    CVS: S1 and S2 are heard. Regular rate and rhythm.  No murmur or gallop or rub heard.    ABDOMEN: Soft. Not tender. Not distended.  No palpable hepatomegaly or splenomegaly.  No other mass palpable.  Bowel sounds heard.    EXTREMITIES: Warm.  No peripheral edema.    SKIN: no rash, or bruising or purpura.  She has a few healing scratch marks on her arms which she says are from cat scratches.    BREASTS:  Right breast: Contour is normal.  Skin looks normal.  Surgical scar of the lumpectomy is healed very well.  Nipple looks normal.  No palpable mass in the right breast.  The right axilla is without mass or nodule.    Left breast: Contour looks normal.  Skin looks normal.  No palpable mass.  Nipple looks normal.  The left axilla is without mass or nodule.        Lab Results    No results found for this or any previous visit (from the past 168 hour(s)).    Imaging    No results found.      Signed by: Christina Leigh MD

## 2021-06-13 NOTE — ANESTHESIA PREPROCEDURE EVALUATION
Anesthesia Evaluation        Airway   Mallampati: II  Neck ROM: full   Pulmonary - normal exam   (+) COPD, asthma  shortness of breath,                          Cardiovascular - negative ROS and normal exam   Neuro/Psych    (+) depression,     Comments: Chronic pain    Endo/Other    (+) obesity,      GI/Hepatic/Renal       Other findings: Ketamine      Dental - normal exam                        Anesthesia Plan  Planned anesthetic: MAC    ASA 3   Induction: intravenous   Anesthetic plan and risks discussed with: patient    Post-op plan: routine recovery

## 2021-06-13 NOTE — PROGRESS NOTES
"ASSESSMENT/PLAN:  Emani was seen today for vaginal bleeding.    Diagnoses and all orders for this visit:    Vaginal bleeding  We spoke about differential diagnoses  Will start w/u  May need endometrial biopsy  Consider progesterone  -     HM2(CBC w/o Differential)  -     Follicle Stimulating Hormone (FSH)  -     Luteinizing Hormone (LH)  -     Progesterone  -     Estradiol  -     US Pelvis With Transvaginal Non OB; Future  -     Thyroid Stimulating Hormone (TSH)  -     T4, Free  -     T3 (Triiodothyronine), Free  -     Culture, Urine    SUBJECTIVE:    Emani Vargas is a 55 y.o. female who came in today     Lower abdominal cramp daily x 2-3 months  Vaginal bleeding x 7days. No spotting noted today  Postmenopausal for 8 years  No new meds  Not on HRT  Not trauma  No sexual activity  Has been fatigue  No dysuria  She doesn't thinks bleeding is coming from the urethra or anus    H/o right breast CA s/p lumpectomy & radiation 4 yrs ago  H/o endometriosis  FMH: \"pre-uterine cancer\"    Review of Systems (except those mentioned above)  Constitutional: Negative.   HENT: Negative.   Eyes: Negative.   Respiratory: Negative.   Cardiovascular: Negative.   Gastrointestinal: Negative.   Endocrine: Negative.   Genitourinary: Negative.   Musculoskeletal: Negative.   Skin: Negative.   Allergic/Immunologic: Negative.   Neurological: Negative.   Hematological: Negative.   Psychiatric/Behavioral: Negative.     Patient Active Problem List    Diagnosis Date Noted     Morbid obesity (H) 08/15/2018     Knee osteoarthritis 08/28/2017     Bipolar affective disorder, remission status unspecified (H)      Chronic pain syndrome      Osteoporosis 05/05/2017     Aromatase inhibitor use 05/05/2017     Refusal of blood transfusions as patient is Religion 04/12/2017     Malignant neoplasm of upper-outer quadrant of right breast in female, estrogen receptor positive (H) 04/11/2017     Compression fracture of vertebral column with routine " healing 09/09/2016     Osteoarthrosis, unspecified whether generalized or localized, pelvic region and thigh 09/24/2014     Allergic rhinitis      Vitamin D Deficiency      Endometriosis      Chronic Pain      Insomnia      Bipolar Disorder      Hearing Loss      Allergies   Allergen Reactions     Cat/Feline Products Itching     Trees Other (See Comments)     Grass, itching     Current Outpatient Medications   Medication Sig Dispense Refill     acetaminophen (TYLENOL) 500 MG tablet Take 1,000 mg by mouth every 6 (six) hours as needed for pain.       albuterol (PROAIR HFA;PROVENTIL HFA;VENTOLIN HFA) 90 mcg/actuation inhaler Inhale 1-2 puffs every 4 (four) hours as needed for wheezing or shortness of breath. 1 Inhaler 11     buPROPion (WELLBUTRIN XL) 150 MG 24 hr tablet TAKE 1 TABLET (150 MG TOTAL) BY MOUTH EVERY MORNING. 90 tablet 3     calcium-vitamin D (CALCIUM-VITAMIN D) 500 mg(1,250mg) -200 unit per tablet Take 1 tablet by mouth 2 (two) times a day with meals.  0     celecoxib (CELEBREX) 200 MG capsule Take one tab po qd, prn. 30 capsule 0     cholecalciferol, vitamin D3, 1,000 unit tablet Take 2,000 Units by mouth daily.       dextroamphetamine-amphetamine 20 mg Tab Take 1 tablet by mouth 2 (two) times a day.  0     diclofenac sodium (VOLTAREN) 1 % Gel Apply approximately 1/2-1 gm over affected hand and/or left elbow joints bid, as needed. 1 Tube 2     FLUoxetine (PROZAC) 20 MG capsule Take 20 mg by mouth daily.       fluticasone furoate-vilanterol (BREO ELLIPTA) 200-25 mcg/dose DsDv inhaler Inhale 1 puff daily. (Patient taking differently: Inhale 1 puff daily as needed. ) 30 each 11     fluticasone propionate (FLONASE ALLERGY RELIEF) 50 mcg/actuation nasal spray One spray in each nostril twice daily. OFFICE VISIT NEEDED FOR ANY FURTHER REFILLS 16 g 0     gabapentin (NEURONTIN) 400 MG capsule 2400 mg daily  11     magnesium citrate solution Take 296 mL by mouth daily as needed.       methocarbamol (ROBAXIN) 500  MG tablet Take 1 tablet (500 mg total) by mouth 3 (three) times a day. As needed 90 tablet 3     montelukast (SINGULAIR) 10 mg tablet Take 1 tablet (10 mg total) by mouth at bedtime. 30 tablet 11     omeprazole (PRILOSEC) 40 MG capsule Take 1 capsule (40 mg total) by mouth daily before breakfast. 90 capsule 3     traZODone (DESYREL) 150 MG tablet 150 mg at bedtime as needed.  5     No current facility-administered medications for this visit.      Past Medical History:   Diagnosis Date     Allergic rhinitis      Asthma      Bipolar 1 disorder (H)      Breast cancer of upper-outer quadrant of right female breast (H) 4/11/2017     Chronic pain     Back pain due to arthritis.     COPD (chronic obstructive pulmonary disease) (H)      Endometriosis      Fibroid uterus      Hx of radiation therapy 2017     Insomnia      Osteoarthritis      Overweight      Refusal of blood transfusions as patient is Denominational 4/12/2017     Sensorineural hearing loss, bilateral      Vitamin D deficiency      Past Surgical History:   Procedure Laterality Date     BREAST BIOPSY Right 2000's     BREAST BIOPSY Right 02/20/2017     BREAST LUMPECTOMY Right 03/10/2017    with sentinel lymph node biopsy.     KNEE ARTHROSCOPY Right 1996    Description: Arthroscopy Knee Right;  Recorded: 12/08/2011;     KNEE SURGERY Right 1997    lateral release     LAPAROSCOPIC ENDOMETRIOSIS FULGURATION  2002     [place] TOTAL KNEE ARTHROPLASTY Right 8/28/2017    Procedure: RIGHT TOTAL KNEE ARTHROPLASTY;  Surgeon: Antony Elias MD;  Location: [facility];  Service: Orthopedics     TOTAL HIP ARTHROPLASTY N/A 9/24/2014    Procedure: LEFT HIP TOTAL ARTHROPLASTY;  Surgeon: Antony Elias MD;  Location: [facility] OR;  Service:      TOTAL HIP ARTHROPLASTY Right 8/10/2015    Procedure: HIP TOTAL ARTHROPLASTY RIGHT;  Surgeon: Antnoy Elias MD;  Location: [facility] OR;  Service:      Social History     Socioeconomic History     Marital  status:      Spouse name: Not on file     Number of children: Not on file     Years of education: Not on file     Highest education level: Not on file   Occupational History     Occupation: Disabled.     Comment: Was an .   Social Needs     Financial resource strain: Not on file     Food insecurity     Worry: Not on file     Inability: Not on file     Transportation needs     Medical: Not on file     Non-medical: Not on file   Tobacco Use     Smoking status: Former Smoker     Packs/day: 1.00     Years: 8.00     Pack years: 8.00     Quit date: 1985     Years since quittin.9     Smokeless tobacco: Never Used   Substance and Sexual Activity     Alcohol use: Yes     Alcohol/week: 0.0 standard drinks     Comment: 1 drink/month     Drug use: No     Comment: past use of marijuana and hash.     Sexual activity: Not on file   Lifestyle     Physical activity     Days per week: Not on file     Minutes per session: Not on file     Stress: Not on file   Relationships     Social connections     Talks on phone: Not on file     Gets together: Not on file     Attends Evangelical service: Not on file     Active member of club or organization: Not on file     Attends meetings of clubs or organizations: Not on file     Relationship status: Not on file     Intimate partner violence     Fear of current or ex partner: Not on file     Emotionally abused: Not on file     Physically abused: Not on file     Forced sexual activity: Not on file   Other Topics Concern     Not on file   Social History Narrative     Not on file     Family History   Problem Relation Age of Onset     Depression Mother      COPD Mother      Diabetes Father      Skin cancer Father 45     Colon cancer Maternal Grandmother      Colon cancer Maternal Grandfather      Diabetes Paternal Grandmother      Colon cancer Paternal Grandmother 80     Breast cancer Cousin 48        Paternal side.     Testicular cancer Brother 44     Lung cancer  Paternal Aunt 60     Anesthesia problems Neg Hx          OBJECTIVE:    Vitals:    11/17/20 1444   BP: 128/90   Patient Site: Left Arm   Patient Position: Sitting   Cuff Size: Adult Large   Pulse: 93   SpO2: 96%   Weight: (!) 268 lb 11.2 oz (121.9 kg)     Body mass index is 39.97 kg/m .    Physical Exam:  Constitutional: Patient is oriented to person, place, and time. Patient appears well-developed and well-nourished. No distress.   Head: Normocephalic and atraumatic.   Right Ear: External ear normal.   Left Ear: External ear normal.   Nose: Nose normal.   Eyes: Conjunctivae and EOM are normal. Right eye exhibits no discharge. Left eye exhibits no discharge. No scleral icterus.   Neurological: Patient is alert and oriented to person, place, and time. No cranial nerve deficit. Coordination normal.   Skin: No rash noted. Patient is not diaphoretic. No erythema. No pallor.       Results for orders placed or performed in visit on 11/17/20   HM2(CBC w/o Differential)   Result Value Ref Range    WBC 12.2 (H) 4.0 - 11.0 thou/uL    RBC 4.98 3.80 - 5.40 mill/uL    Hemoglobin 14.7 12.0 - 16.0 g/dL    Hematocrit 44.9 35.0 - 47.0 %    MCV 90 80 - 100 fL    MCH 29.5 27.0 - 34.0 pg    MCHC 32.7 32.0 - 36.0 g/dL    RDW 13.1 11.0 - 14.5 %    Platelets 213 140 - 440 thou/uL    MPV 7.6 7.0 - 10.0 fL

## 2021-06-13 NOTE — PROGRESS NOTES
[address] [address]  Dept: [phone]  Dept Fax: [phone]  Primary Provider: Shira San MD  Pre-op Performing Provider: SHIRA SAN    PREOPERATIVE EVALUATION:  Today's date: 12/9/2020    Emani Vargas is a 55 y.o. female who presents for a preoperative evaluation.    Surgical Information:  Surgery/Procedure: D&C   Surgery Location: [facility]   Surgeon: Dr. Scottie Canas   Surgery Date: 12/15/2020  Time of Surgery: 7am   Where patient plans to recover: At home alone  Fax number for surgical facility: Note does not need to be faxed, will be available electronically in Epic.    Type of Anesthesia Anticipated: Choice    Subjective     HPI related to upcoming procedure:   Preop Questions 12/9/2020   Have you ever had a heart attack or stroke? No   Have you ever had surgery on your heart or blood vessels, such as a stent placement, a coronary artery bypass, or surgery on an artery in your head, neck, heart, or legs? No   Do you have chest pain with activity? No   Do you have a history of  heart failure? No   Do you currently have a cold, bronchitis or symptoms of other infection? No   Do you have a cough, shortness of breath, or wheezing? YES -  H/o asthma   Do you or anyone in your family have previous history of blood clots? No   Do you or does anyone in your family have a serious bleeding problem such as prolonged bleeding following surgeries or cuts? No   Have you ever had problems with anemia or been told to take iron pills? YES - not current   Have you had any abnormal blood loss such as black, tarry or bloody stools, or abnormal vaginal bleeding? YES - not current   Have you ever had a blood transfusion? No   Are you willing to have a blood transfusion if it is medically needed before, during, or after your surgery? NO    Have you or any of your relatives ever had problems with anesthesia? No   Do you have sleep  apnea, excessive snoring or daytime drowsiness? YES    Do you have a CPAP machine? No   Do you have any artifical heart valves or other implanted medical devices like a pacemaker, defibrillator, or continuous glucose monitor? No   Do you have artificial joints? YES - hips and right knee   Are you allergic to latex? No   Is there any chance that you may be pregnant? No     Health Care Directive:  Patient has a Health Care Directive on file.     Preoperative Review of :    reviewed - no record of controlled substances prescribed.  See problem list for active medical problems.  Problems all longstanding and stable, except as noted/documented.  See ROS for pertinent symptoms related to these conditions.      Review of Systems  Constitutional, neuro, ENT, endocrine, pulmonary, cardiac, gastrointestinal, genitourinary, musculoskeletal, integument and psychiatric systems are negative, except as otherwise noted.      Patient Active Problem List    Diagnosis Date Noted     Morbid obesity (H) 08/15/2018     Knee osteoarthritis 08/28/2017     Bipolar affective disorder, remission status unspecified (H)      Chronic pain syndrome      Osteoporosis 05/05/2017     Refusal of blood transfusions as patient is Mormon 04/12/2017     Malignant neoplasm of upper-outer quadrant of right breast in female, estrogen receptor positive (H) 04/11/2017     Compression fracture of vertebral column with routine healing 09/09/2016     Osteoarthrosis, unspecified whether generalized or localized, pelvic region and thigh 09/24/2014     Allergic rhinitis      Vitamin D Deficiency      Endometriosis      Chronic Pain      Insomnia      Bipolar Disorder      Hearing Loss      Past Medical History:   Diagnosis Date     Allergic rhinitis      Asthma      Bipolar 1 disorder (H)      Breast cancer of upper-outer quadrant of right female breast (H) 4/11/2017     Chronic pain     Back pain due to arthritis.     COPD (chronic obstructive  pulmonary disease) (H)      Endometriosis      Fibroid uterus      Hx of radiation therapy 2017     Insomnia      Osteoarthritis      Overweight      Refusal of blood transfusions as patient is Yarsanism 4/12/2017     Sensorineural hearing loss, bilateral      Vitamin D deficiency      Past Surgical History:   Procedure Laterality Date     BREAST BIOPSY Right 2000's     BREAST BIOPSY Right 02/20/2017     BREAST LUMPECTOMY Right 03/10/2017    with sentinel lymph node biopsy.     KNEE ARTHROSCOPY Right 1996    Description: Arthroscopy Knee Right;  Recorded: 12/08/2011;     KNEE SURGERY Right 1997    lateral release     LAPAROSCOPIC ENDOMETRIOSIS FULGURATION  2002     [place] TOTAL KNEE ARTHROPLASTY Right 8/28/2017    Procedure: RIGHT TOTAL KNEE ARTHROPLASTY;  Surgeon: Antony Elias MD;  Location: [facility] OR;  Service: Orthopedics     TOTAL HIP ARTHROPLASTY N/A 9/24/2014    Procedure: LEFT HIP TOTAL ARTHROPLASTY;  Surgeon: Antony Elias MD;  Location: [facility] OR;  Service:      TOTAL HIP ARTHROPLASTY Right 8/10/2015    Procedure: HIP TOTAL ARTHROPLASTY RIGHT;  Surgeon: Antony Elias MD;  Location: [facility] OR;  Service:      Current Outpatient Medications   Medication Sig Dispense Refill     buPROPion (WELLBUTRIN XL) 150 MG 24 hr tablet TAKE 1 TABLET (150 MG TOTAL) BY MOUTH EVERY MORNING. 90 tablet 3     celecoxib (CELEBREX) 200 MG capsule Take one tab po qd, prn. 30 capsule 0     dextroamphetamine-amphetamine 20 mg Tab Take 1 tablet by mouth 2 (two) times a day.  0     diclofenac sodium (VOLTAREN) 1 % Gel Apply approximately 1/2-1 gm over affected hand and/or left elbow joints bid, as needed. 1 Tube 2     FLUoxetine (PROZAC) 20 MG capsule Take 20 mg by mouth daily.       fluticasone propionate (FLONASE ALLERGY RELIEF) 50 mcg/actuation nasal spray One spray in each nostril twice daily. OFFICE VISIT NEEDED FOR ANY FURTHER REFILLS 16 g 0     gabapentin (NEURONTIN) 400 MG  capsule 2400 mg daily  11     magnesium citrate solution Take 296 mL by mouth daily as needed.       methocarbamol (ROBAXIN) 500 MG tablet Take 1 tablet (500 mg total) by mouth 3 (three) times a day. As needed 90 tablet 3     montelukast (SINGULAIR) 10 mg tablet Take 1 tablet (10 mg total) by mouth at bedtime. 30 tablet 11     omeprazole (PRILOSEC) 40 MG capsule Take 1 capsule (40 mg total) by mouth daily before breakfast. 90 capsule 3     traZODone (DESYREL) 150 MG tablet 150 mg at bedtime as needed.  5     acetaminophen (TYLENOL) 500 MG tablet Take 1,000 mg by mouth every 6 (six) hours as needed for pain.       albuterol (PROAIR HFA;PROVENTIL HFA;VENTOLIN HFA) 90 mcg/actuation inhaler Inhale 1-2 puffs every 4 (four) hours as needed for wheezing or shortness of breath. 1 Inhaler 11     calcium-vitamin D (CALCIUM-VITAMIN D) 500 mg(1,250mg) -200 unit per tablet Take 1 tablet by mouth 2 (two) times a day with meals.  0     cholecalciferol, vitamin D3, 1,000 unit tablet Take 2,000 Units by mouth daily.       fluticasone furoate-vilanterol (BREO ELLIPTA) 200-25 mcg/dose DsDv inhaler Inhale 1 puff daily. (Patient taking differently: Inhale 1 puff daily as needed. ) 30 each 11     No current facility-administered medications for this visit.        Allergies   Allergen Reactions     Cat/Feline Products Itching     Trees Other (See Comments)     Grass, itching       Social History     Tobacco Use     Smoking status: Former Smoker     Packs/day: 1.00     Years: 8.00     Pack years: 8.00     Quit date: 1985     Years since quittin.9     Smokeless tobacco: Never Used   Substance Use Topics     Alcohol use: Yes     Alcohol/week: 0.0 standard drinks     Comment: 1 drink/month      Family History   Problem Relation Age of Onset     Depression Mother      COPD Mother      Diabetes Father      Skin cancer Father 45     Colon cancer Maternal Grandmother      Colon cancer Maternal Grandfather      Diabetes Paternal  "Grandmother      Colon cancer Paternal Grandmother 80     Breast cancer Cousin 48        Paternal side.     Testicular cancer Brother 44     Lung cancer Paternal Aunt 60     Anesthesia problems Neg Hx      Social History     Substance and Sexual Activity   Drug Use No    Comment: past use of marijuana and hash.        Objective     /78   Pulse 84   Ht 5' 9\" (1.753 m)   Wt (!) 273 lb 9.6 oz (124.1 kg)   LMP 08/10/2012   BMI 40.40 kg/m    Physical Exam    GENERAL APPEARANCE: healthy, alert and no distress     EYES: EOMI, PERRL     HENT: ear canals and TM's normal and nose and mouth without ulcers or lesions     NECK: no adenopathy, no asymmetry, masses, or scars and thyroid normal to palpation     RESP: lungs clear to auscultation - no rales, rhonchi or wheezes     BREAST: normal without masses, tenderness or nipple discharge and no palpable axillary masses or adenopathy     CV: regular rates and rhythm, normal S1 S2, no S3 or S4 and no murmur, click or rub     ABDOMEN:  soft, nontender, no HSM or masses and bowel sounds normal     MS: extremities normal- no gross deformities noted, no evidence of inflammation in joints, FROM in all extremities.     SKIN: no suspicious lesions or rashes     NEURO: Normal strength and tone, sensory exam grossly normal, mentation intact and speech normal     PSYCH: mentation appears normal. and affect normal/bright     LYMPHATICS: No cervical adenopathy    Lab Results   Component Value Date    WBC 12.2 (H) 11/17/2020    HGB 14.7 11/17/2020    HCT 44.9 11/17/2020    MCV 90 11/17/2020     11/17/2020      Results for orders placed or performed in visit on 02/10/20   Comprehensive Metabolic Panel   Result Value Ref Range    Sodium 141 136 - 145 mmol/L    Potassium 4.5 3.5 - 5.0 mmol/L    Chloride 103 98 - 107 mmol/L    CO2 29 22 - 31 mmol/L    Anion Gap, Calculation 9 5 - 18 mmol/L    Glucose 106 70 - 125 mg/dL    BUN 15 8 - 22 mg/dL    Creatinine 0.82 0.60 - 1.10 mg/dL    " GFR MDRD Af Amer >60 >60 mL/min/1.73m2    GFR MDRD Non Af Amer >60 >60 mL/min/1.73m2    Bilirubin, Total 0.5 0.0 - 1.0 mg/dL    Calcium 9.2 8.5 - 10.5 mg/dL    Protein, Total 6.8 6.0 - 8.0 g/dL    Albumin 3.9 3.5 - 5.0 g/dL    Alkaline Phosphatase 90 45 - 120 U/L    AST 30 0 - 40 U/L    ALT 31 0 - 45 U/L        REVISED CARDIAC RISK INDEX (RCRI)   The patient has the following serious cardiovascular risks for perioperative complications:   - No serious cardiac risks = 0 points    RCRI INTERPRETATION: 0 points: Class I (very low risk - 0.4% complication rate)       Assessment & Plan      The proposed surgical procedure is considered INTERMEDIATE risk.    1.  Preop general physical exam, Endometrial hyperplasia, fibroids    Risks and Recommendations:  The patient has the following additional risks and recommendations for perioperative complications:   - No identified additional risk factors other than previously addressed     RECOMMENDATION:  APPROVAL GIVEN to proceed with proposed procedure, without further diagnostic evaluation.    2.  Bipolar affective disorder, remission status unspecified (H)  Stable on bupropion 150 mg, fluoxetine 20 mg, gabapentin 400 mg  No change in medication in preparation for surgery     3.  Primary insomnia  Stable on trazodone 150 mg  No change in medication in preparation for surgery     4.  Attention deficit disorder (ADD) without hyperactivity  Hold Adderall 2 days prior to surgery    5.  Chronic pain syndrome  Stable on gabapentin and diclofenac topical  Hold Celebrex and other NSAIDs prior to surgery    6.  Seasonal allergic rhinitis due to other allergic trigger, asthma  Stable on Singulair, as needed albuterol, as needed Brio Ellipta, Flonase  No change in medication in preparation for surgery     7.  Gastroesophageal reflux disease, unspecified whether esophagitis present  Stable on PPI  No change in medication in preparation for surgery     8.  Snores  -     Ambulatory referral  to Sleep Medicine  STOP BANG screen  Snore: y  Tired: y  Observe apnea: n  Pressure, blood: n  BMI >35: y  Age >50: y  Neck size large: n  Gender, male: n    Score:  4 (Intermediate risk)    Signed Electronically by: Shira Yap MD    Copy of this evaluation report is provided to requesting physician.    Preop [facility] Preop Guidelines    Revised Cardiac Risk Index

## 2021-06-13 NOTE — TELEPHONE ENCOUNTER
Retubed earmolds and devices are available for patient to  at the [facility]  at her convenience. Please call [phone] with any questions.

## 2021-06-14 NOTE — TELEPHONE ENCOUNTER
RN cannot approve Refill Request    RN can NOT refill this medication Protocol failed and NO refill given. Last office visit: 11/17/2020 Shira Miller MD Last Physical: 12/9/2020 Last MTM visit: Visit date not found Last visit same specialty: 11/17/2020 Shira Miller MD.  Next visit within 3 mo: Visit date not found  Next physical within 3 mo: Visit date not found      Yarelis [facility], [facility] Triage/Med Refill 1/16/2021    Requested Prescriptions   Pending Prescriptions Disp Refills     methocarbamoL (ROBAXIN) 500 MG tablet [Pharmacy Med Name: Methocarbamol 500 MG Oral Tablet] 90 tablet 0     Sig: Take 1 tablet by mouth three times daily as needed       There is no refill protocol information for this order

## 2021-06-15 PROBLEM — Z17.0 MALIGNANT NEOPLASM OF UPPER-OUTER QUADRANT OF RIGHT BREAST IN FEMALE, ESTROGEN RECEPTOR POSITIVE (H): Status: ACTIVE | Noted: 2017-04-11

## 2021-06-15 PROBLEM — M81.0 OSTEOPOROSIS: Status: ACTIVE | Noted: 2017-05-05

## 2021-06-15 PROBLEM — C50.411 MALIGNANT NEOPLASM OF UPPER-OUTER QUADRANT OF RIGHT BREAST IN FEMALE, ESTROGEN RECEPTOR POSITIVE (H): Status: ACTIVE | Noted: 2017-04-11

## 2021-06-15 NOTE — PROGRESS NOTES
Assessment and Plan:     Patient has been advised of split billing requirements and indicates understanding: Yes  Emani was seen today for annual wellness visit.    Routine general medical examination at a health care facility  -     Lipid [place]  -     Glucose  We discussed healthy lifestyle, nutrition, cardiovascular risk reduction, self care, safety, sunscreen, and timing of cancer screening.  Health maintenance screening and immunizations reviewed with the patient.  Follow up yearly for the annual physical.    BMI 40.0-44.9, adult (H)  Counseled healthy lifestyle modifications    Mild intermittent asthma with exacerbation  ACT=21  As needed albuterol    Gastroesophageal reflux disease without esophagitis  stable on omeprazole    Chronic pain syndrome, osteoarthritis  Management per rheumatology    Bipolar, depression, anxiety  Management per psychiatry    Breast cancer, right is post lumpectomy  Management per oncology       The patient's current medical problems were reviewed.    I have had an Advance Directives discussion with the patient.  The following health maintenance schedule was reviewed with the patient and provided in printed form in the after visit summary:   Health Maintenance   Topic Date Due     DEPRESSION ACTION PLAN  1965     Pneumococcal Vaccine: Pediatrics (0 to 5 Years) and At-Risk Patients (6 to 64 Years) (1 of 4 - PCV13) 02/20/1970     HIV SCREENING  02/20/1979     ZOSTER VACCINES (1 of 2) 02/20/2014     INFLUENZA VACCINE RULE BASED (1) 08/01/2020     MAMMOGRAM  02/28/2022     MEDICARE ANNUAL WELLNESS VISIT  03/03/2022     COLORECTAL CANCER SCREENING  01/05/2025     PAP SMEAR  02/10/2025     LIPID  02/10/2025     HPV TEST  02/10/2025     ADVANCE CARE PLANNING  03/03/2026     TD 18+ HE  07/11/2026     HEPATITIS C SCREENING  Completed     TDAP ADULT ONE TIME DOSE  Completed     HEPATITIS B VACCINES  Aged Out        Subjective:   Chief Complaint: Emani Vargas is an 56 y.o. female here  for an Annual Wellness visit.   HPI:      No new concerns today.  She did mention that last week she fell asleep while using the bathroom and hit her head against the magazine rack.  She has some mild neck pain otherwise doing okay.    She sees psychiatry for bipolar, depression, anxiety, insomnia, ADHD  She sees psychiatry for osteoarthritis and chronic pain  She sees oncology for breast cancer  Wears hearing aids    Review of Systems:  Please see above.  The rest of the review of systems are negative for all systems.    Patient Care Team:  Shira Miller MD as PCP - General  Christina Leigh MD as Physician (Hematology and Oncology)  Nelida Recio MD as Physician (Radiation Oncology)  Lombardi, Susan L, RN as RN, Care Manager  Shira Miller MD as Assigned PCP  Christina Leigh MD as Assigned Cancer Care Provider     Patient Active Problem List   Diagnosis     Insomnia     Hearing Loss     Osteoarthrosis, unspecified whether generalized or localized, pelvic region and thigh     Allergic rhinitis     Compression fracture of vertebral column with routine healing     Malignant neoplasm of upper-outer quadrant of right breast in female, estrogen receptor positive (H)     Refusal of blood transfusions as patient is Sikh     Osteoporosis     Knee osteoarthritis     Bipolar affective disorder, remission status unspecified (H)     Chronic pain syndrome     Morbid obesity (H)     Past Medical History:   Diagnosis Date     Allergic rhinitis      Anemia      Anxiety      Asthma      Bipolar 1 disorder (H)      Breast cancer of upper-outer quadrant of right female breast (H) 4/11/2017     Chronic pain     Back pain due to arthritis.     COPD (chronic obstructive pulmonary disease) (H)      Depression      Endometriosis      Fibroid uterus      Hx of radiation therapy 2017     Insomnia      Osteoarthritis      Overweight      Refusal of blood transfusions as patient is  Yarsani 4/12/2017     Sensorineural hearing loss, bilateral      Shortness of breath      Vitamin D deficiency       Past Surgical History:   Procedure Laterality Date     BREAST BIOPSY Right 2000's     BREAST BIOPSY Right 02/20/2017     BREAST LUMPECTOMY Right 03/10/2017    with sentinel lymph node biopsy.     KNEE ARTHROSCOPY Right 1996    Description: Arthroscopy Knee Right;  Recorded: 12/08/2011;     KNEE SURGERY Right 1997    lateral release     LAPAROSCOPIC ENDOMETRIOSIS FULGURATION  2002     [place] HYSTEROSCOPY,W/ENDO BX N/A 12/15/2020    Procedure: HYSTEROSCOPY, WITH DILATION AND CURETTAGE;  Surgeon: Scottie Canas MD;  Location: [facility] OR;  Service: Gynecology     [place] TOTAL KNEE ARTHROPLASTY Right 8/28/2017    Procedure: RIGHT TOTAL KNEE ARTHROPLASTY;  Surgeon: Antony Elias MD;  Location: [facility] OR;  Service: Orthopedics     TOTAL HIP ARTHROPLASTY N/A 9/24/2014    Procedure: LEFT HIP TOTAL ARTHROPLASTY;  Surgeon: Antony Elias MD;  Location: [facility] OR;  Service:      TOTAL HIP ARTHROPLASTY Right 8/10/2015    Procedure: HIP TOTAL ARTHROPLASTY RIGHT;  Surgeon: Antony Elias MD;  Location: [facility] OR;  Service:       Family History   Problem Relation Age of Onset     Depression Mother      COPD Mother      Diabetes Father      Skin cancer Father 45     Colon cancer Maternal Grandmother      Colon cancer Maternal Grandfather      Diabetes Paternal Grandmother      Colon cancer Paternal Grandmother 80     Breast cancer Cousin 48        Paternal side.     Testicular cancer Brother 44     Lung cancer Paternal Aunt 60     Anesthesia problems Neg Hx       Social History     Socioeconomic History     Marital status:      Spouse name: Not on file     Number of children: Not on file     Years of education: Not on file     Highest education level: Not on file   Occupational History     Occupation: Disabled.     Comment: Was an .    Social Needs     Financial resource strain: Not on file     Food insecurity     Worry: Not on file     Inability: Not on file     Transportation needs     Medical: Not on file     Non-medical: Not on file   Tobacco Use     Smoking status: Former Smoker     Packs/day: 1.00     Years: 8.00     Pack years: 8.00     Quit date: 1985     Years since quittin.1     Smokeless tobacco: Never Used   Substance and Sexual Activity     Alcohol use: Yes     Alcohol/week: 0.0 standard drinks     Comment: 1 drink/month     Drug use: No     Comment: past use of marijuana and hash.     Sexual activity: Not on file   Lifestyle     Physical activity     Days per week: Not on file     Minutes per session: Not on file     Stress: Not on file   Relationships     Social connections     Talks on phone: Not on file     Gets together: Not on file     Attends Jain service: Not on file     Active member of club or organization: Not on file     Attends meetings of clubs or organizations: Not on file     Relationship status: Not on file     Intimate partner violence     Fear of current or ex partner: Not on file     Emotionally abused: Not on file     Physically abused: Not on file     Forced sexual activity: Not on file   Other Topics Concern     Not on file   Social History Narrative     Not on file      Current Outpatient Medications   Medication Sig Dispense Refill     acetaminophen (TYLENOL) 500 MG tablet Take 1,000 mg by mouth every 6 (six) hours as needed for pain.       albuterol (PROAIR HFA;PROVENTIL HFA;VENTOLIN HFA) 90 mcg/actuation inhaler Inhale 1-2 puffs every 4 (four) hours as needed for wheezing or shortness of breath. 1 Inhaler 11     buPROPion (WELLBUTRIN XL) 150 MG 24 hr tablet TAKE 1 TABLET BY MOUTH EVERY DAY IN THE MORNING 90 tablet 3     celecoxib (CELEBREX) 200 MG capsule Take one tab po qd, prn. 30 capsule 0     dextroamphetamine-amphetamine 20 mg Tab Take 1 tablet by mouth 2 (two) times a day.  0      "diclofenac sodium (VOLTAREN) 1 % Gel Apply approximately 1/2-1 gm over affected hand and/or left elbow joints bid, as needed. 1 Tube 2     FLUoxetine (PROZAC) 20 MG capsule Take 20 mg by mouth daily.       fluticasone propionate (FLONASE ALLERGY RELIEF) 50 mcg/actuation nasal spray One spray in each nostril twice daily. OFFICE VISIT NEEDED FOR ANY FURTHER REFILLS 16 g 0     gabapentin (NEURONTIN) 400 MG capsule 2400 mg daily  11     magnesium citrate solution Take 296 mL by mouth daily as needed.       methocarbamoL (ROBAXIN) 500 MG tablet Take 1 tablet by mouth three times daily as needed 90 tablet 0     montelukast (SINGULAIR) 10 mg tablet Take 1 tablet (10 mg total) by mouth at bedtime. 30 tablet 11     omeprazole (PRILOSEC) 40 MG capsule TAKE 1 CAPSULE BY MOUTH EVERY DAY BEFORE BREAKFAST 90 capsule 3     traZODone (DESYREL) 150 MG tablet 150 mg at bedtime as needed.  5     No current facility-administered medications for this visit.       Objective:   Vital Signs:   Visit Vitals  /82 (Patient Site: Left Arm, Patient Position: Sitting, Cuff Size: Adult Regular)   Pulse 87   Ht 5' 9\" (1.753 m)   Wt (!) 278 lb (126.1 kg)   LMP 08/10/2012   SpO2 96%   BMI 41.05 kg/m           VisionScreening:  No exam data present     PHYSICAL EXAM    Objective:    Physical Exam   Vitals:    03/03/21 1439   BP: 130/82   Pulse: 87   SpO2: 96%      Constitutional: Patient is oriented to person, place, and time. Patient appears well-developed and well-nourished. No distress.   Head: Normocephalic and atraumatic.   Right Ear: External ear normal. Normal TM  Left Ear: External ear normal. Normal TM  Eyes: Conjunctivae and EOM are normal. Pupils are equal, round, and reactive to light. Right eye exhibits no discharge. Left eye exhibits no discharge. No scleral icterus.   Neck: Neck supple. No JVD present. No tracheal deviation present. No thyromegaly present.   Cardiovascular: Normal rate, regular rhythm, normal heart sounds and " intact distal pulses. No murmur heard.   Pulmonary/Chest: Effort normal and breath sounds normal. No stridor. No respiratory distress. Patient has no wheezes, no rales, exhibits no tenderness.   Abdominal: Soft. Patient exhibits no distension and no mass. There is no tenderness. There is no rebound and no guarding.   Lymphadenopathy:  Patient has no cervical adenopathy.   Neurological: Patient is alert and oriented to person, place, and time. No cranial nerve deficit. Coordination normal.   Skin: Skin is warm and dry. No rash noted. Patient is not diaphoretic. No erythema. No pallor.   Normal breast exam    Assessment Results 3/3/2021   Activities of Daily Living No help needed   Instrumental Activities of Daily Living No help needed   Get Up and Go Score Less than 12 seconds   Mini Cog Total Score 5   Some recent data might be hidden     A Mini-Cog score of 0-2 suggests the possibility of dementia, score of 3-5 suggests no dementia  Patient's advanced directive was discussed and I am comfortable with the patient's wishes.

## 2021-06-15 NOTE — PROGRESS NOTES
ASSESSMENT:  1. Injury of shoulder, left, initial encounter  - XR Shoulder Left 2 or More VWS; Future  - Slings    2. Rupture long head biceps tendon, left, initial encounter  - US Shoulder Left; Future      PLAN / MDM:  I think that there is a possibility that she may have ruptured the biceps tendon, but because she noted having osteoporosis and has an easy fracture we will go ahead and have her get an x-ray as well as some order for ultrasound to rule out the rupture.  In the meantime she does have oxycodone she can use those with Tylenol and also use ibuprofen as needed.  I will inform her of the report and determine what we will do next.    Orders Placed This Encounter   Procedures     Slings     XR Shoulder Left 2 or More VWS     Standing Status:   Future     Number of Occurrences:   1     Standing Expiration Date:   2/26/2022     Order Specific Question:   Reason for Exam (Describe Symptoms):     Answer:   Left Shoulder injury     Order Specific Question:   Is the patient pregnant?     Answer:   No     Order Specific Question:   Can the procedure be changed per Radiologist protocol?     Answer:   Yes     US Shoulder Left     Standing Status:   Future     Standing Expiration Date:   2/26/2022     Order Specific Question:   Reason for Exam (Describe Symptoms):     Answer:   Possible Biceps tendon Rupture.     Order Specific Question:   Is the patient pregnant?     Answer:   No     Order Specific Question:   Can the procedure be changed per Radiologist protocol?     Answer:   Yes     Medications Discontinued During This Encounter   Medication Reason     cholecalciferol, vitamin D3, 1,000 unit tablet      fluticasone furoate-vilanterol (BREO ELLIPTA) 200-25 mcg/dose DsDv inhaler Therapy completed     calcium-vitamin D (CALCIUM-VITAMIN D) 500 mg(1,250mg) -200 unit per tablet Therapy completed       No follow-ups on file.      CHIEF COMPLAINT:  Chief Complaint   Patient presents with     Shoulder Pain     left  shoulder, moved her dad in bed and she felt a crack on her left shoulder       HISTORY OF PRESENT ILLNESS:  Emani is a 56 y.o. female presenting to the clinic today noting that she had injured her left shoulder while she was moving her father and helping to hold him in bed.  She noted having pulled on her shoulder and noticing a crunching sound with immediate pain.  Noted no numbness.  Noted no bleeding.  She also noted no pain in the hands with .  She does have a history of multiple areas of joint arthritis.  Noted that she was able to take oxycodone that she had before to sleep.  This happened 2 nights ago.  No neck pain.    REVIEW OF SYSTEMS:   Review of system was done and is negative except as noted above.  All other systems are negative.    PFSH:  Reviewed, as below.    Social History     Tobacco Use   Smoking Status Former Smoker     Packs/day: 1.00     Years: 8.00     Pack years: 8.00     Quit date: 1985     Years since quittin.1   Smokeless Tobacco Never Used       Family History   Problem Relation Age of Onset     Depression Mother      COPD Mother      Diabetes Father      Skin cancer Father 45     Colon cancer Maternal Grandmother      Colon cancer Maternal Grandfather      Diabetes Paternal Grandmother      Colon cancer Paternal Grandmother 80     Breast cancer Cousin 48        Paternal side.     Testicular cancer Brother 44     Lung cancer Paternal Aunt 60     Anesthesia problems Neg Hx        Social History     Socioeconomic History     Marital status:      Spouse name: Not on file     Number of children: Not on file     Years of education: Not on file     Highest education level: Not on file   Occupational History     Occupation: Disabled.     Comment: Was an .   Social Needs     Financial resource strain: Not on file     Food insecurity     Worry: Not on file     Inability: Not on file     Transportation needs     Medical: Not on file     Non-medical: Not on file    Tobacco Use     Smoking status: Former Smoker     Packs/day: 1.00     Years: 8.00     Pack years: 8.00     Quit date: 1985     Years since quittin.1     Smokeless tobacco: Never Used   Substance and Sexual Activity     Alcohol use: Yes     Alcohol/week: 0.0 standard drinks     Comment: 1 drink/month     Drug use: No     Comment: past use of marijuana and hash.     Sexual activity: Not on file   Lifestyle     Physical activity     Days per week: Not on file     Minutes per session: Not on file     Stress: Not on file   Relationships     Social connections     Talks on phone: Not on file     Gets together: Not on file     Attends Taoism service: Not on file     Active member of club or organization: Not on file     Attends meetings of clubs or organizations: Not on file     Relationship status: Not on file     Intimate partner violence     Fear of current or ex partner: Not on file     Emotionally abused: Not on file     Physically abused: Not on file     Forced sexual activity: Not on file   Other Topics Concern     Not on file   Social History Narrative     Not on file       Past Surgical History:   Procedure Laterality Date     BREAST BIOPSY Right 's     BREAST BIOPSY Right 2017     BREAST LUMPECTOMY Right 03/10/2017    with sentinel lymph node biopsy.     KNEE ARTHROSCOPY Right     Description: Arthroscopy Knee Right;  Recorded: 2011;     KNEE SURGERY Right     lateral release     LAPAROSCOPIC ENDOMETRIOSIS FULGURATION       [place] HYSTEROSCOPY,W/ENDO BX N/A 12/15/2020    Procedure: HYSTEROSCOPY, WITH DILATION AND CURETTAGE;  Surgeon: Scottie Canas MD;  Location: [facility];  Service: Gynecology     RI TOTAL KNEE ARTHROPLASTY Right 2017    Procedure: RIGHT TOTAL KNEE ARTHROPLASTY;  Surgeon: Antony Elias MD;  Location: [facility] OR;  Service: Orthopedics     TOTAL HIP ARTHROPLASTY N/A 2014    Procedure: LEFT HIP TOTAL ARTHROPLASTY;  Surgeon:  Antony Elias MD;  Location: [facility];  Service:      TOTAL HIP ARTHROPLASTY Right 8/10/2015    Procedure: HIP TOTAL ARTHROPLASTY RIGHT;  Surgeon: Antony Elias MD;  Location: [facility];  Service:        Allergies   Allergen Reactions     Cat/Feline Products Itching     Trees Other (See Comments)     Grass, itching       Active Ambulatory Problems     Diagnosis Date Noted     Vitamin D Deficiency      Endometriosis      Chronic Pain      Insomnia      Bipolar Disorder      Hearing Loss      Osteoarthrosis, unspecified whether generalized or localized, pelvic region and thigh 09/24/2014     Allergic rhinitis      Compression fracture of vertebral column with routine healing 09/09/2016     Malignant neoplasm of upper-outer quadrant of right breast in female, estrogen receptor positive (H) 04/11/2017     Refusal of blood transfusions as patient is Religion 04/12/2017     Osteoporosis 05/05/2017     Knee osteoarthritis 08/28/2017     Bipolar affective disorder, remission status unspecified (H)      Chronic pain syndrome      Morbid obesity (H) 08/15/2018     Resolved Ambulatory Problems     Diagnosis Date Noted     Asthma      Overweight      Asthma      Acute blood loss as cause of postoperative anemia      Past Medical History:   Diagnosis Date     Anemia      Anxiety      Bipolar 1 disorder (H)      Breast cancer of upper-outer quadrant of right female breast (H) 4/11/2017     Chronic pain      COPD (chronic obstructive pulmonary disease) (H)      Depression      Fibroid uterus      Hx of radiation therapy 2017     Insomnia      Osteoarthritis      Sensorineural hearing loss, bilateral      Shortness of breath        Current Outpatient Medications   Medication Sig Dispense Refill     acetaminophen (TYLENOL) 500 MG tablet Take 1,000 mg by mouth every 6 (six) hours as needed for pain.       albuterol (PROAIR HFA;PROVENTIL HFA;VENTOLIN HFA) 90 mcg/actuation inhaler Inhale 1-2  puffs every 4 (four) hours as needed for wheezing or shortness of breath. 1 Inhaler 11     buPROPion (WELLBUTRIN XL) 150 MG 24 hr tablet TAKE 1 TABLET (150 MG TOTAL) BY MOUTH EVERY MORNING. 90 tablet 3     celecoxib (CELEBREX) 200 MG capsule Take one tab po qd, prn. 30 capsule 0     dextroamphetamine-amphetamine 20 mg Tab Take 1 tablet by mouth 2 (two) times a day.  0     diclofenac sodium (VOLTAREN) 1 % Gel Apply approximately 1/2-1 gm over affected hand and/or left elbow joints bid, as needed. 1 Tube 2     FLUoxetine (PROZAC) 20 MG capsule Take 20 mg by mouth daily.       fluticasone propionate (FLONASE ALLERGY RELIEF) 50 mcg/actuation nasal spray One spray in each nostril twice daily. OFFICE VISIT NEEDED FOR ANY FURTHER REFILLS 16 g 0     gabapentin (NEURONTIN) 400 MG capsule 2400 mg daily  11     methocarbamoL (ROBAXIN) 500 MG tablet Take 1 tablet by mouth three times daily as needed 90 tablet 0     montelukast (SINGULAIR) 10 mg tablet Take 1 tablet (10 mg total) by mouth at bedtime. 30 tablet 11     omeprazole (PRILOSEC) 40 MG capsule Take 1 capsule (40 mg total) by mouth daily before breakfast. 90 capsule 3     traZODone (DESYREL) 150 MG tablet 150 mg at bedtime as needed.  5     magnesium citrate solution Take 296 mL by mouth daily as needed.       No current facility-administered medications for this visit.        VITALS:  Vitals:    02/26/21 1500   BP: 100/72   Patient Site: Left Arm   Patient Position: Sitting   Cuff Size: Adult Large   Pulse: 80   Weight: (!) 280 lb 1.6 oz (127.1 kg)     Wt Readings from Last 3 Encounters:   02/26/21 (!) 280 lb 1.6 oz (127.1 kg)   12/15/20 (!) 273 lb (123.8 kg)   12/09/20 (!) 273 lb 9.6 oz (124.1 kg)     Body mass index is 41.36 kg/m .    PHYSICAL EXAM:  General Appearance: Alert, cooperative, no distress, appears stated age  HEENT: Pupils are equal and reactive, extraocular motions is normal.Neck is supple no notable thyromegaly.  External ears are normal.  Lungs:  Respiration is unlabored  Heart: Peripheral pulsation was normal.  Musculoskeletal: Examination of the left shoulder which shows some discomfort and tenderness noted in the anterior aspect of the shoulder.  There is also a small bruise noted in that area as well.  There is limited movement for internal and external rotation, there is also mild movement discomfort with flexion of the.  There appears to be an empty area around the upper arm where they should be the biceps heard.    Neurologic:  Alert and oriented times 3.   Psychiatric: Normal mood and affect.    MEDICATIONS:  Current Outpatient Medications   Medication Sig Dispense Refill     acetaminophen (TYLENOL) 500 MG tablet Take 1,000 mg by mouth every 6 (six) hours as needed for pain.       albuterol (PROAIR HFA;PROVENTIL HFA;VENTOLIN HFA) 90 mcg/actuation inhaler Inhale 1-2 puffs every 4 (four) hours as needed for wheezing or shortness of breath. 1 Inhaler 11     buPROPion (WELLBUTRIN XL) 150 MG 24 hr tablet TAKE 1 TABLET (150 MG TOTAL) BY MOUTH EVERY MORNING. 90 tablet 3     celecoxib (CELEBREX) 200 MG capsule Take one tab po qd, prn. 30 capsule 0     dextroamphetamine-amphetamine 20 mg Tab Take 1 tablet by mouth 2 (two) times a day.  0     diclofenac sodium (VOLTAREN) 1 % Gel Apply approximately 1/2-1 gm over affected hand and/or left elbow joints bid, as needed. 1 Tube 2     FLUoxetine (PROZAC) 20 MG capsule Take 20 mg by mouth daily.       fluticasone propionate (FLONASE ALLERGY RELIEF) 50 mcg/actuation nasal spray One spray in each nostril twice daily. OFFICE VISIT NEEDED FOR ANY FURTHER REFILLS 16 g 0     gabapentin (NEURONTIN) 400 MG capsule 2400 mg daily  11     methocarbamoL (ROBAXIN) 500 MG tablet Take 1 tablet by mouth three times daily as needed 90 tablet 0     montelukast (SINGULAIR) 10 mg tablet Take 1 tablet (10 mg total) by mouth at bedtime. 30 tablet 11     omeprazole (PRILOSEC) 40 MG capsule Take 1 capsule (40 mg total) by mouth daily before  breakfast. 90 capsule 3     traZODone (DESYREL) 150 MG tablet 150 mg at bedtime as needed.  5     magnesium citrate solution Take 296 mL by mouth daily as needed.       No current facility-administered medications for this visit.

## 2021-06-15 NOTE — PROGRESS NOTES
Emani Vargas is a 56 y.o. female who is being evaluated via a billable video visit.      How would you like to obtain your AVS? MyChart.  If dropped from the video visit, the video invitation should be resent by: Text to cell phone: [phone]  Will anyone else be joining your video visit? No    Video Start Time: 1538    Video-Visit Details    Type of service:  Video Visit    Video End Time (time video stopped): 1549  Originating Location (pt. Location): Home    [facility]     Platform used for Video Visit: [facility]     Pulmonary Clinic Follow-up Visit    Assessment and Plan:   56 year old female with a history of remote tobacco use, obesity, vitamin D deficiency, osteoarthritis s/p bilateral LALA and right TKA, insomnia, possible asthma, GERD, right breast cancer s/p lumpectomy and radiation, chronic low back pain, bipolar disorder, allergic rhinitis, endometriosis, and chronic cough, presenting for follow-up.     Chronic cough: Currently taking cetirizine 10 mg two times a day. No longer taking loratadine-pseudoephedrine. Taking montelukast. Taking omeprazole 40 mg daily. Already on gabapentin for other indication (may also help with chronic cough). Cough ongoing, about the same, occurs eg when laughing. No longer taking fluticasone-vilanterol; was on it for a couple months, then stopped due to fear of being dependent on it, but states that it helped somewhat when she was on it. She wants to use it only if her cough worsens.     Plan:  - she is no longer on the loratadine-pseudoephedrine I prescribed, currently taking cetirizine 10 mg two times a day  - continue nasal fluticasone one spray in each nostril two times a day  - continue montelukast 10 mg at bedtime  - continue omeprazole 40 mg daily  - she is holding the fluticasone-vilanterol 200-25 mcg, wants to restart this only if her cough worsens, though noted some improvement with it previously  -  follow up in one year or sooner if needed  - encouraged her to call any time with questions or concerning symptoms    Marques Mary MD  Pulmonary and Critical Care Medicine  [facility]  Cell [phone]  Office [phone]  Pager [phone]    CCx: chronic cough    HPI: 56 year old female with a history of remote tobacco use, obesity, vitamin D deficiency, osteoarthritis s/p bilateral LALA and right TKA, insomnia, possible asthma, GERD, right breast cancer s/p lumpectomy and radiation, chronic low back pain, bipolar disorder, allergic rhinitis, endometriosis, and chronic cough, presenting for follow-up. Currently taking cetirizine 10 mg two times a day. No longer taking loratadine-pseudoephedrine. Taking montelukast. Taking omeprazole 40 mg daily. Already on gabapentin for other indication (may also help with chronic cough). Cough ongoing, about the same, occurs eg when laughing. No longer taking fluticasone-vilanterol; was on it for a couple months, then stopped due to fear of being dependent on it, but states that it helped somewhat when she was on it. She wants to use it only if her cough worsens.    ROS:  A 12-system review was obtained and was negative with the exception of the symptoms endorsed in the history of present illness.    PMH:  BMI 41.1  Hearing loss  OA s/p bilateral LALA, right TKA  Insomnia  Possible asthma  Back pain  Right breast cancer s/p lumpectomy and radiation  Bipolar  allergic rhinitis  Multiple allergies  Endometriosis  Likely chronic intermittent sinusitis  Chronic cough  GERD    PSH:  Past Surgical History:   Procedure Laterality Date     BREAST BIOPSY Right 2000's     BREAST BIOPSY Right 02/20/2017     BREAST LUMPECTOMY Right 03/10/2017    with sentinel lymph node biopsy.     KNEE ARTHROSCOPY Right 1996    Description: Arthroscopy Knee Right;  Recorded: 12/08/2011;     KNEE SURGERY Right 1997    lateral release     LAPAROSCOPIC ENDOMETRIOSIS FULGURATION  2002      NH HYSTEROSCOPY,W/ENDO BX N/A 12/15/2020    Procedure: HYSTEROSCOPY, WITH DILATION AND CURETTAGE;  Surgeon: Scottie Canas MD;  Location: [place] [facility] OR;  Service: Gynecology     NH TOTAL KNEE ARTHROPLASTY Right 8/28/2017    Procedure: RIGHT TOTAL KNEE ARTHROPLASTY;  Surgeon: Antony Elias MD;  Location: [facility] OR;  Service: Orthopedics     TOTAL HIP ARTHROPLASTY N/A 9/24/2014    Procedure: LEFT HIP TOTAL ARTHROPLASTY;  Surgeon: Antony Elias MD;  Location: [facility] OR;  Service:      TOTAL HIP ARTHROPLASTY Right 8/10/2015    Procedure: HIP TOTAL ARTHROPLASTY RIGHT;  Surgeon: Antony Elias MD;  Location: [facility] OR;  Service:        Allergies:  Allergies   Allergen Reactions     Cat/Feline Products Itching     Trees Other (See Comments)     Grass, itching       Family HX:  Family History   Problem Relation Age of Onset     Depression Mother      COPD Mother      Diabetes Father      Skin cancer Father 45     Colon cancer Maternal Grandmother      Colon cancer Maternal Grandfather      Diabetes Paternal Grandmother      Colon cancer Paternal Grandmother 80     Breast cancer Cousin 48        Paternal side.     Testicular cancer Brother 44     Lung cancer Paternal Aunt 60     Anesthesia problems Neg Hx        Social Hx:  Social History     Socioeconomic History     Marital status:      Spouse name: Not on file     Number of children: Not on file     Years of education: Not on file     Highest education level: Not on file   Occupational History     Occupation: Disabled.     Comment: Was an .   Social Needs     Financial resource strain: Not on file     Food insecurity     Worry: Not on file     Inability: Not on file     Transportation needs     Medical: Not on file     Non-medical: Not on file   Tobacco Use     Smoking status: Former Smoker     Packs/day: 1.00     Years: 8.00     Pack years: 8.00     Quit date: 1/1/1985     Years since quitting:  36.2     Smokeless tobacco: Never Used   Substance and Sexual Activity     Alcohol use: Yes     Alcohol/week: 0.0 standard drinks     Comment: 1 drink/month     Drug use: No     Comment: past use of marijuana and hash.     Sexual activity: Not on file   Lifestyle     Physical activity     Days per week: Not on file     Minutes per session: Not on file     Stress: Not on file   Relationships     Social connections     Talks on phone: Not on file     Gets together: Not on file     Attends Islam service: Not on file     Active member of club or organization: Not on file     Attends meetings of clubs or organizations: Not on file     Relationship status: Not on file     Intimate partner violence     Fear of current or ex partner: Not on file     Emotionally abused: Not on file     Physically abused: Not on file     Forced sexual activity: Not on file   Other Topics Concern     Not on file   Social History Narrative     Not on file       Current Meds:  Current Outpatient Medications   Medication Sig Dispense Refill     acetaminophen (TYLENOL) 500 MG tablet Take 1,000 mg by mouth every 6 (six) hours as needed for pain.       albuterol (PROAIR HFA;PROVENTIL HFA;VENTOLIN HFA) 90 mcg/actuation inhaler Inhale 1-2 puffs every 4 (four) hours as needed for wheezing or shortness of breath. 1 Inhaler 11     buPROPion (WELLBUTRIN XL) 150 MG 24 hr tablet TAKE 1 TABLET BY MOUTH EVERY DAY IN THE MORNING 90 tablet 3     celecoxib (CELEBREX) 200 MG capsule Take one tab po qd, prn. 30 capsule 0     dextroamphetamine-amphetamine 20 mg Tab Take 1 tablet by mouth 2 (two) times a day.  0     diclofenac sodium (VOLTAREN) 1 % Gel Apply approximately 1/2-1 gm over affected hand and/or left elbow joints bid, as needed. 1 Tube 2     FLUoxetine (PROZAC) 20 MG capsule Take 20 mg by mouth daily.       fluticasone propionate (FLONASE ALLERGY RELIEF) 50 mcg/actuation nasal spray One spray in each nostril twice daily. OFFICE VISIT NEEDED FOR ANY  FURTHER REFILLS 16 g 0     gabapentin (NEURONTIN) 100 MG capsule 2 capsules. In the morning       gabapentin (NEURONTIN) 800 MG tablet 2 tablets at bedtime.       magnesium citrate solution Take 296 mL by mouth daily as needed.       methocarbamoL (ROBAXIN) 500 MG tablet Take 1 tablet by mouth three times daily as needed 90 tablet 0     montelukast (SINGULAIR) 10 mg tablet Take 1 tablet (10 mg total) by mouth at bedtime. 30 tablet 0     omeprazole (PRILOSEC) 40 MG capsule TAKE 1 CAPSULE BY MOUTH EVERY DAY BEFORE BREAKFAST 90 capsule 3     traZODone (DESYREL) 100 MG tablet 2 tablets at bedtime.       No current facility-administered medications for this visit.        Physical Exam:  no exam due to virtual visit    Labs:  reviewed    Imaging studies:  CXR (March 2019):  - directly reviewed  - clear lungs  - no effusions     CT chest/abdomen/pelvis with oral/IV contrast (April 2017):  - images directly reviewed, formal interpretation follows:  FINDINGS:   CHEST: The lungs are clear. The pleural spaces appear normal. No mediastinal or hilar lymphadenopathy. Postoperative changes are seen in the right breast and right axilla.     ABDOMEN: The liver is diffusely low in attenuation consistent with fatty infiltration. The gallbladder, spleen, adrenal glands, kidneys, and pancreas appear normal. The abdominal aorta is normal in caliber. The appendix is visualized and appears normal.     PELVIS: There are sigmoid diverticula without evidence of diverticulitis. Beam hardening artifact from bilateral total hip arthroplasties compromises evaluation. No definite mass.     MUSCULOSKELETAL: Bilateral total hip arthroplasties.     IMPRESSION:   CONCLUSION:  1.  No evidence of metastatic disease.     2.  Colonic diverticulosis without evidence of diverticulitis.     3.  Fatty infiltration of the liver.     4.  Postoperative changes in the right axilla and right breast.     CT head (January 2019):  - sinuses appear fairly clear, mild  maxillary mucosal thickening     Pulmonary Function Testing  April 2019:  FEV1/FVC  is normal  FEV1 is normal  FVC is normal  There was no improvement in spirometry after a single inhaled dose of bronchodilator  TLC is normal  DLCO is normal when it is corrected for hemoglobin.

## 2021-06-15 NOTE — PROGRESS NOTES
ASSESSMENT/PLAN:  Pleasant 52 y.o.  female presents with gastroenteritis.  Symptoms are improving.  The patient may continue with OTC symptomatic treatment.  Rest, hydration, nutritional support, and time were counseled.  Follow up if the patient is not better in 1-2 weeks.  The patient verbalized understanding and agreed with the plan.    Gastroenteritis    CHIEF COMPLAINT:  Chief Complaint   Patient presents with     Diarrhea     diarrhea x 6 days came [place] 6 days ago     Medication Refill       HISTORY OF PRESENT ILLNESS:  Emani is a 52 y.o. female presenting to the clinic today with diarrhea.    She returned from the [place] 6 days ago. She has had diarrhea beginning five days ago. For the first 3-4 days she had watery stools every hour with accompanying abdominal cramping. Over the past two days her bowel movements slowed to every other hour. Today she had a loose stool this morning and a more formed one later on. She has had a minimal appetite and been eating a bland diet. She denies hematochezia. She has been maintaining good hydration and urinates several times per day. She does not think she was febrile but had difficulty getting warm for the first 48 hours after returning. She denies emesis.    REVIEW OF SYSTEMS:   Constitutional: Negative.   HENT: Negative.   Eyes: Negative.   Respiratory: Negative.   Cardiovascular: Negative.  Endocrine: Negative.   Genitourinary: Negative.   Musculoskeletal: Negative.   Skin: Negative.   Allergic/Immunologic: Negative.   Neurological: Negative.   Hematological: Negative.   Psychiatric/Behavioral: Negative.   All other systems are negative.    PFSH:  No other pertinent history reviewed.    TOBACCO USE:  History   Smoking Status     Former Smoker     Packs/day: 1.00     Years: 7.00     Quit date: 1/1/1985   Smokeless Tobacco     Never Used     VITALS:  Vitals:    01/15/18 1409   BP: 126/80   Patient Site: Left Arm   Patient Position: Sitting   Cuff  Size: Adult Large   Pulse: 68   Weight: (!) 267 lb 1 oz (121.1 kg)     Wt Readings from Last 3 Encounters:   01/15/18 (!) 267 lb 1 oz (121.1 kg)   10/31/17 (!) 271 lb 11.2 oz (123.2 kg)   10/02/17 (!) 270 lb 1.6 oz (122.5 kg)     PHYSICAL EXAM:  Constitutional: Patient is oriented to person, place, and time. Patient appears well-developed and well-nourished. No distress.  Neck: Neck supple. No JVD present. No tracheal deviation present. No thyromegaly present.   Cardiovascular: Normal rate, regular rhythm, normal heart sounds and intact distal pulses. No murmur heard.   Pulmonary/Chest: Effort normal and breath sounds normal. No stridor. No respiratory distress. Patient has no wheezes, no rales, exhibits no tenderness.   Abdominal: Soft. Bowel sounds are normal. Patient exhibits no distension and no mass. There is no tenderness. There is no rebound and no guarding.     Results for orders placed or performed in visit on 09/05/17   HM2(CBC w/o Differential)   Result Value Ref Range    WBC 9.5 4.0 - 11.0 thou/uL    RBC 3.91 3.80 - 5.40 mill/uL    Hemoglobin 11.6 (L) 12.0 - 16.0 g/dL    Hematocrit 34.3 (L) 35.0 - 47.0 %    MCV 88 80 - 100 fL    MCH 29.8 27.0 - 34.0 pg    MCHC 33.9 32.0 - 36.0 g/dL    RDW 12.2 11.0 - 14.5 %    Platelets 256 140 - 440 thou/uL    MPV 7.2 7.0 - 10.0 fL     ADDITIONAL HISTORY SUMMARIZED (2): None.  DECISION TO OBTAIN EXTRA INFORMATION (1): None.   RADIOLOGY TESTS (1): None.  LABS (1): None.  MEDICINE TESTS (1): None.  INDEPENDENT REVIEW (2 each): None.     The visit lasted a total of 5 minutes face to face with the patient. Over 50% of the time was spent counseling and educating the patient about her diarrhea and home cares.    Herberth BARNES, am scribing for and in the presence of, Dr. Navarrete.    Shira BARNES MD , personally performed the services described in this documentation, as scribed by Herberth Granado in my presence, and it is both accurate and  complete.    MEDICATIONS:  Current Outpatient Prescriptions   Medication Sig Dispense Refill     acetaminophen (TYLENOL) 500 MG tablet Take 2 tablets (1,000 mg total) by mouth 3 (three) times a day.  0     albuterol (PROVENTIL HFA;VENTOLIN HFA) 90 mcg/actuation inhaler Inhale 2 puffs every 6 (six) hours as needed for wheezing. 2 Inhaler 0     buPROPion (WELLBUTRIN XL) 150 MG 24 hr tablet Take 150 mg by mouth every morning.       cetirizine (ZYRTEC) 10 MG tablet Take 10 mg by mouth daily.       cholecalciferol, vitamin D3, 1,000 unit tablet Take 2,000 Units by mouth daily.       dextroamphetamine-amphetamine (ADDERALL) 5 mg Tab tablet Take 1 tablet by mouth daily.       EPIPEN 2-DMITRY 0.3 mg/0.3 mL (1:1,000) atIn Inject 0.3 mg into the shoulder, thigh, or buttocks once as needed.        FLUoxetine (PROZAC) 20 MG capsule Take 20 mg by mouth daily.       gabapentin (NEURONTIN) 300 MG capsule Take 900 mg by mouth 2 (two) times a day.       gabapentin (NEURONTIN) 300 MG capsule TAKE 3 CAPSULES BY MOUTH TWICE DAILY 180 capsule 8     letrozole (FEMARA) 2.5 mg tablet Take 2.5 mg by mouth at bedtime.        magnesium oxide 250 mg Tab Take 250 mg by mouth as needed.        meloxicam (MOBIC) 7.5 MG tablet Take 7.5 mg by mouth daily.       methocarbamol (ROBAXIN) 500 MG tablet Take 1 tablet (500 mg total) by mouth as needed. 30 tablet 1     multivitamin with minerals (THERA-M) 9 mg iron-400 mcg Tab tablet Take 1 tablet by mouth daily.       omeprazole (PRILOSEC) 20 MG capsule Take 1 capsule (20 mg total) by mouth daily. 90 capsule 0     polyethylene glycol (MIRALAX) 17 gram packet Take 1 packet (17 g total) by mouth daily.  0     predniSONE (DELTASONE) 20 MG tablet Take 2 tablets orally daily for 5 days then decrease to 1 tablet daily for another 5 days 15 tablet 0     traZODone (DESYREL) 100 MG tablet 50 mg.        amoxicillin (AMOXIL) 500 MG capsule 2,000 mg.       traZODone (DESYREL) 50 MG tablet TK 1/2 TO 1 T PO HS  5     No  current facility-administered medications for this visit.        Total data points: 0

## 2021-06-15 NOTE — TELEPHONE ENCOUNTER
Refill Approved    Rx renewed per Medication Renewal Policy. Medication was last renewed on 2/10/20.    Chetan Aiken, [facility] Triage/Med Refill 3/1/2021     Requested Prescriptions   Pending Prescriptions Disp Refills     omeprazole (PRILOSEC) 40 MG capsule [Pharmacy Med Name: OMEPRAZOLE DR 40 MG CAPSULE] 90 capsule 3     Sig: TAKE 1 CAPSULE BY MOUTH EVERY DAY BEFORE BREAKFAST       GI Medications Refill Protocol Passed - 3/1/2021 12:32 AM        Passed - PCP or prescribing provider visit in last 12 or next 3 months.     Last office visit with prescriber/PCP: 11/17/2020 Shira Miller MD OR same dept: 2/26/2021 Khushi Arita MD OR same specialty: 2/26/2021 Khushi Arita MD  Last physical: 12/9/2020 Last MTM visit: Visit date not found   Next visit within 3 mo: Visit date not found  Next physical within 3 mo: Visit date not found  Prescriber OR PCP: Shira Yap MD  Last diagnosis associated with med order: 1. Chronic cough  - omeprazole (PRILOSEC) 40 MG capsule [Pharmacy Med Name: OMEPRAZOLE DR 40 MG CAPSULE]; TAKE 1 CAPSULE BY MOUTH EVERY DAY BEFORE BREAKFAST  Dispense: 90 capsule; Refill: 3    2. Gastroesophageal reflux disease without esophagitis  - omeprazole (PRILOSEC) 40 MG capsule [Pharmacy Med Name: OMEPRAZOLE DR 40 MG CAPSULE]; TAKE 1 CAPSULE BY MOUTH EVERY DAY BEFORE BREAKFAST  Dispense: 90 capsule; Refill: 3    If protocol passes may refill for 12 months if within 3 months of last provider visit (or a total of 15 months).

## 2021-06-15 NOTE — TELEPHONE ENCOUNTER
Refill Approved    Rx renewed per Medication Renewal Policy. Medication was last renewed on 3/4/20.    Chetan Aiken, [facility] Triage/Med Refill 2/28/2021     Requested Prescriptions   Pending Prescriptions Disp Refills     buPROPion (WELLBUTRIN XL) 150 MG 24 hr tablet [Pharmacy Med Name: BUPROPION HCL  MG TABLET] 90 tablet 3     Sig: TAKE 1 TABLET BY MOUTH EVERY DAY IN THE MORNING       Tricyclics/Misc Antidepressant/Antianxiety Meds Refill Protocol Passed - 2/28/2021  9:14 AM        Passed - PCP or prescribing provider visit in last year     Last office visit with prescriber/PCP: 11/17/2020 Shira Miller MD OR same dept: 2/26/2021 Khushi Arita MD OR same specialty: 2/26/2021 Khushi Arita MD  Last physical: 12/9/2020 Last MTM visit: Visit date not found   Next visit within 3 mo: Visit date not found  Next physical within 3 mo: Visit date not found  Prescriber OR PCP: Shira Yap MD  Last diagnosis associated with med order: 1. Bipolar affective disorder, remission status unspecified (H)  - buPROPion (WELLBUTRIN XL) 150 MG 24 hr tablet [Pharmacy Med Name: BUPROPION HCL  MG TABLET]; TAKE 1 TABLET BY MOUTH EVERY DAY IN THE MORNING  Dispense: 90 tablet; Refill: 3    If protocol passes may refill for 12 months if within 3 months of last provider visit (or a total of 15 months).

## 2021-06-16 PROBLEM — M17.9 KNEE OSTEOARTHRITIS: Status: ACTIVE | Noted: 2017-08-28

## 2021-06-16 PROBLEM — E66.01 MORBID OBESITY (H): Status: ACTIVE | Noted: 2018-08-15

## 2021-06-16 NOTE — PROGRESS NOTES
[facility]    Patient: Emani Vargas  MRN: [id number]  Date of Service: 04/07/2021      Reason for Visit    Follow-up regarding breast cancer.    Assessment and Plan  Cancer Staging  Malignant neoplasm of upper-outer quadrant of right breast in female, estrogen receptor positive (H)  Staging form: Breast, AJCC 7th Edition  - Pathologic: Stage IA (T1c, N0, cM0) - Signed by Christina Leigh MD on 4/11/2017      ECOG Performance   ECOG Performance Status: 1    Distress Assessment  Distress Assessment Score: 9(care taking for dad in hospice): Emotional support was given.  She feels that the family is well supported by the hospice services.    Pain   : Continue with current management of multiple joint pains.  She has follow-up with rheumatology already.    Ms. Emani Vargas is a 56 y.o. woman had a breast lumpectomy and sentinel lymph node biopsy done on 3/10/17 by Dr. Yoder.  Final pathology showed a 15 mm invasive ductal carcinoma, grade 2 with associated DCIS, ER OH positive and HER-2 negative.  Margins were clear.  She had a fourth sentinel lymph nodes and none of them were involved.  Final stage was IA (T1c, N0, cM0).  She had an Oncotype DX score of 18 which corresponds to an approximately 11% risk of recurrence in the next 10 years on tamoxifen.  She has numerous comorbidities including bipolar disorder, COPD endometriosis and osteoarthritis.  After thorough discussion we mutually agreed not to go for adjuvant chemotherapy.  She completed adjuvant radiation and started letrozole on 5/18/17.  She stopped letrozole temporarily in July 2019 and then restarted after 2 months.  She was mainly troubled by body aches and fatigue.  She quit letrozole completely in early 2020.    1.  She appears to be doing well overall from the breast cancer point of view.  She really has no new symptoms.  On physical examination there is no suggestion of recurrence of breast cancer as far as we  can make out based on symptoms and physical examination.    2.  She had her annual mammogram done on 4/2/2021.  I reviewed the results with her.  There are scattered areas of fibroglandular densities but nothing suggesting breast cancer recurrence.  She was very happy to see that.  We will check the mammogram again in April 2022.  This was ordered today.    3.  She had a DEXA scan done on 4/5/2021 to monitor for osteoporosis.  It showed a normal bone mineral density.  She was very happy to see that.  I advised her to continue calcium and vitamin D tablets and also to continue with weightbearing exercise to prevent osteoporosis developing in the future.  She can consider another DEXA scan to be done 2 years later.  She voiced understanding.    4.  She says she has decided not to continue with letrozole for adjuvant treatment of breast cancer and she feels better with that overall.  She really does not want to restart any antihormonal therapy.  She would like to have a follow-up only once a year for breast cancer.  When she reaches 5 years of follow-up next year, we can consider whether to go back to primary care for follow-up for the breast cancer also.  She was in agreement with the plan.    5.  Return to clinic with MD or NP in 1 year after having the annual mammogram done.    Time spend >30 minutes total time for the patient.     Problem List    1. Malignant neoplasm of upper-outer quadrant of right breast in female, estrogen receptor positive (H)  Mammo Screening Bilateral   2. Encounter for screening mammogram for malignant neoplasm of breast   Mammo Screening Bilateral        CC: Rosalba Yap, Shira Gutierrez MD        ______________________________________________________________________________    History of Present Illness    Ms. Emani Vargas is here for follow-up alone.    She states that currently she is taking care of her father who is on hospice.  Apparently he had several TIAs and is essentially  bedbound now.  This is causing some stress and anxiety for her.    She states that recently she had a ruptured tendon in her left shoulder after she tried to lift up and move her father.  She did follow-up with primary care.  She decided not to go and see orthopedics.    She has some ache in the left shoulder from that.  She also has a previously known arthritic aches and pains.  No new pains.    She has not noticed any lumps or bumps anywhere.    She says that she quit taking letrozole completely about a year ago.  She says that she felt much better after stopping letrozole.  She has no interest in trying it again.    Weight has been stable.  No unusual headaches.  No eyesight problems.  Breathing is about the same.  She still has occasional cough for which she follows up with pulmonary.  That has not changed.  No chest pain or palpitation.  No abdominal pain.  No nausea or vomiting.  No constipation or diarrhea.  No blood in stool or black stools.  No vaginal bleeding.  No difficulty with urination.  No skin rashes.  No numbness or tingling.    Please see below.  A 14 point review of system is otherwise completely negative.    Pain Status  Currently in Pain: No/denies    Review of Systems    Constitutional  Fatigue: Fatigue relieved by rest  Fever: None  Chills: None  Weight Gain: None  Weight Loss: None  Neurosensory  Peripheral Motor Neuropathy: None  Ataxia: Asymptomatic, clinical or diagnostic observations only, intervention not indicated(at times)  Peripheral Sensory Neuropathy: Asymptomatic, loss of deep tendon reflexes or paresthesia(left hand)  Confusion: None  Syncope: None  Cardiovascular  Cardiovascular (WDL): All cardiovascular elements are within defined limits  Pulmonary  Cough: Mild symptoms, nonprescription intervention indicated(prod at times, light brown)  Dyspnea: None  Hypoxia: None  Gastrointestinal  Gastrointestinal (WDL): Exceptions to WDL  Anorexia: Loss of appetite without alteration in  eating habits  Constipation: None  Diarrhea: None  Dysphagia: Symptomatic, able to eat regular diet  Esophagitis: Asymptomatic, clinical or diagnostic observations only, intervention not indicated(controlled)  Nausea: None  Pharyngitis: None  Vomiting: None  Dysgeusia: None  Genitourinary  Genitourinary (WDL): All genitourinary elements are within defined limits  Integumentary  Integumentary (WDL): All integumentary elements are within defined limits  Patient Coping  Patient Coping: Accepting  Distress Assessment  Distress Assessment Score: 9(care taking for dad in hospice)  Accompanied by  Accompanied by: Alone    Past History  Past Medical History:   Diagnosis Date     Allergic rhinitis      Anemia      Anxiety      Asthma      Bipolar 1 disorder (H)      Breast cancer of upper-outer quadrant of right female breast (H) 4/11/2017     Chronic pain     Back pain due to arthritis.     COPD (chronic obstructive pulmonary disease) (H)      Depression      Endometriosis      Fibroid uterus      Hx of radiation therapy 2017     Insomnia      Osteoarthritis      Overweight      Refusal of blood transfusions as patient is Latter day 4/12/2017     Sensorineural hearing loss, bilateral      Shortness of breath      Vitamin D deficiency          Past Surgical History:   Procedure Laterality Date     BREAST BIOPSY Right 2000's     BREAST BIOPSY Right 02/20/2017     BREAST LUMPECTOMY Right 03/10/2017    with sentinel lymph node biopsy.     KNEE ARTHROSCOPY Right 1996    Description: Arthroscopy Knee Right;  Recorded: 12/08/2011;     KNEE SURGERY Right 1997    lateral release     LAPAROSCOPIC ENDOMETRIOSIS FULGURATION  2002     IN HYSTEROSCOPY,W/ENDO BX N/A 12/15/2020    Procedure: HYSTEROSCOPY, WITH DILATION AND CURETTAGE;  Surgeon: Scottie Canas MD;  [facility];  Service: Gynecology     IN TOTAL KNEE ARTHROPLASTY Right 8/28/2017    Procedure: RIGHT TOTAL KNEE ARTHROPLASTY;  Surgeon: Antony HANDY  MD Jada;  Location: [facility];  Service: Orthopedics     TOTAL HIP ARTHROPLASTY N/A 9/24/2014    Procedure: LEFT HIP TOTAL ARTHROPLASTY;  Surgeon: Antony Elias MD;  Location: [facility] OR;  Service:      TOTAL HIP ARTHROPLASTY Right 8/10/2015    Procedure: HIP TOTAL ARTHROPLASTY RIGHT;  Surgeon: Antony Elias MD;  Location: [facility];  Service:        Physical Exam    Recent Vitals 4/7/2021   Height -   Weight 272 lbs 13 oz   BSA (m2) 2.45 m2   /89   Pulse 84   Temp 98.1   Temp src 1   SpO2 94   Some recent data might be hidden       GENERAL: Alert and oriented to time place and person. Seated comfortably. In no distress.  Heavyset.  She looks well overall.    HEAD: Atraumatic and normocephalic.    EYES: CORBY, EOMI.  No pallor.  No icterus.    Oral cavity: no mucosal lesion or tonsillar enlargement.    NECK: supple. JVP normal.  No thyroid enlargement.    LYMPH NODES: No palpable, cervical, axillary or inguinal lymphadenopathy.    CHEST: clear to auscultation bilaterally.  Resonant to percussion throughout bilaterally.  Symmetrical breath movements bilaterally.    CVS: S1 and S2 are heard. Regular rate and rhythm.  No murmur or gallop or rub heard.  No peripheral edema.    ABDOMEN: Soft. Not tender. Not distended.  No palpable hepatomegaly or splenomegaly.  No other mass palpable.  Bowel sounds heard.    EXTREMITIES: Warm.    SKIN: no rash, or bruising or purpura.  Has a full head of hair.    BREASTS:  Right breast: Contour looks normal.  Skin looks normal.  Surgical scar has healed well.  Nipple looks normal.  No palpable mass in the right breast.  Right axilla is without mass or nodule.    Left breast: Contour looks normal.  Skin looks normal.  No palpable mass.  Nipple looks normal.  Left axilla is without mass or nodule.    Lab Results    No results found for this or any previous visit (from the past 168 hour(s)).    Imaging    Dxa Bone Density Scan    Result Date:  4/6/2021 4/5/2021 RE: Emani Vargas YOB: 1965 Dear Christina Leigh, Patient Profile: 56 y.o. female, postmenopausal, is here for the follow up bone density test. History of fractures - Yes;  Vertebra. Family history of osteoporosis - Yes;  grandparent.  Family history of hip fracture: None. Smoking history - Past. Osteoporosis treatment past -  No. Osteoporosis treatment current - No.  Chronic medical problems - Breast cancer, Chronic low back problems, Hip replacement  and Radiation treatment. High risk medications -  Blood thinner (Coumadin or Heparin);  Yes, in the Past, Chemotherapy;  Yes, in the Past, Depo-provera;  Yes, in the Past and Aromatase Inhibitor;  Yes, in the Past. Assessment: 1. The spine bone density L1-L4(L2,L3) with T-score -0.6, with no statistically significant change compared to the previous DXA scan done in 2019. 2. Left forearm bone density with T-score 0.2. 3. Trabecular bone score indicates moderate trabecular bone architecture. 56 y.o. female with NORMAL BONE DENSITY. Recommendations: Appropriate calcium and vitamin D supplements recommended with follow up bone density scan in 3 years. Bone densitometry was performed on your patient using our [employer] densitometer. The results are summarized and a copy of the actual scans are included for your review. In conformity with the International Society of Clinical Densitometry's most recent position statement for DXA interpretation (2015), the diagnosis will be made on the lowest measured T-score of the lumbar spine, femoral neck, total proximal femur or 33% radius. Note the change in terminology for diagnostic classification from OSTEOPENIA to LOW BONE MASS. All trending for sequential exams will be done using multiple vertebrae or the total proximal femur. Fracture risk is based on the WHO Fracture Risk Assessment Tool (FRAX). If additional information is needed or if you would like to discuss the results, please do not  hesitate to call me. Thank you for referring this patient to [facility]. We are happy to be of service in support of you and your practice. If you have any questions or suggestions to improve our service, please call me at [phone]. Sincerely, Trista Johns M.D. JESUS. [facility]    Mammo Screening Bilateral    Result Date: 4/5/2021  BILATERAL FULL FIELD DIGITAL SCREENING MAMMOGRAM Performed on: 4/2/21 Compared to: 02/28/2020 Mammo Screening Bilateral, 02/25/2019 Mammo Screening Bilateral, 02/23/2018 Mammo Diagnostic Bilateral, 02/20/2017 Mammo Diagnostic Right, and 02/15/2017 Mammo Screening Bilateral Findings: The breasts have scattered areas of fibroglandular densities. There is no radiographic evidence of malignancy. This study was evaluated with the assistance of Computer-Aided Detection. Continue routine screening mammogram as recommended. There are breast conservation changes in the right breast. ACR BI-RADS Category 2: Benign    Us Shoulder Left    Result Date: 4/6/2021  EXAM: US SHOULDER LEFT LOCATION: [facility] DATE/TIME: 4/5/2021 2:45 PM INDICATION: Possible biceps tendon rupture. COMPARISON: None. TECHNIQUE: Routine. FINDINGS: Rupture of the long head of the biceps tendon with a 4.3 cm in length hematoma in the region of the proximal tendon.     1.  Findings are consistent with rupture of the long head of the biceps tendon.        Signed by: Christina Leigh MD

## 2021-06-16 NOTE — PROGRESS NOTES
Emani is here for follow up and has gen joint pains from arthritis. Her father is in hospice and she is the main care taker. NIECY Gomez, OCN, CBCN

## 2021-06-16 NOTE — TELEPHONE ENCOUNTER
Unable to connect with patient for video visit (3 attempts).  Please contact patient and see if interested in rescheduling.  If patient has a tight schedule with regards to performing a video visit then suggest patient schedule a video visit as first patient of the morning or afternoon.

## 2021-06-16 NOTE — TELEPHONE ENCOUNTER
RN cannot approve Refill Request    RN can NOT refill this medication med is not covered by policy/route to provider. Last office visit: 11/17/2020 Shira Miller MD Last Physical: 3/3/2021 Last MTM visit: Visit date not found Last visit same specialty: 2/26/2021 Khushi Arita MD.  Next visit within 3 mo: Visit date not found  Next physical within 3 mo: Visit date not found      Yarelis Del Cid, [facility] Triage/Med Refill 4/16/2021    Requested Prescriptions   Pending Prescriptions Disp Refills     methocarbamoL (ROBAXIN) 500 MG tablet [Pharmacy Med Name: Methocarbamol 500 MG Oral Tablet] 90 tablet 0     Sig: Take 1 tablet by mouth three times daily as needed       There is no refill protocol information for this order

## 2021-06-16 NOTE — PROGRESS NOTES
[facility] Progress Note    Patient: Emani Vargas  MRN: [id number]  Date of Service: 02/28/2018      Reason for Visit    Follow-up regarding breast cancer.    Assessment and Plan  Malignant neoplasm of upper-outer quadrant of right breast in female, estrogen receptor positive    Staging form: Breast, AJCC 7th Edition    - Pathologic: Stage IA (T1c, N0, cM0) - Signed by Christina Leigh MD on 4/11/2017    ECOG Performance   ECOG Performance Status: 1    Distress Assessment  Distress Assessment Score: 3    Pain  Pain Score (Initial OR Reassessment): 5    Ms. Emani Vargas is a 53 y.o. woman had a breast lumpectomy and sentinel lymph node biopsy done on 3/10/17 by Dr. Yoder.  Final pathology showed a 15 mm invasive ductal carcinoma, grade 2 with associated DCIS, ER AK positive and HER-2 negative.  Margins were clear.  She had a fourth sentinel lymph nodes and none of them were involved.  Final stage was IA (T1c, N0, cM0).  She had an Oncotype DX score of 18 which corresponds to an approximately 11% risk of recurrence in the next 10 years on tamoxifen.  She has numerous comorbidities including bipolar disorder, COPD endometriosis and osteoarthritis.  After thorough discussion we mutually agreed not to go for adjuvant chemotherapy.  She completed adjuvant radiation and started letrozole on 5/18/17.    1.  At this time she appears to be doing well overall.  She has had chronic pains due to arthritis.  She feels that the main pain is in the left knee and somewhat down the line she would like to have a knee replacement done for the left knee.  The right knee is doing very well after the knee replacement surgery.  She feels that she is tolerating letrozole well.  She had a mammogram done on 2/23/18.  The results were discussed with her.  No suspicious lesions were seen.  She was very happy with that.  She would like to continue with letrozole.  Our plan is to continue for at least 5 years.  A new  prescription was given to her today.    2.  We will plan on obtaining another mammogram in February 2019.    3.  She is to continue with calcium and vitamin D tablets for prevention of osteoporosis.  The DEXA scan from 2016 showed no evidence of osteoporosis.  We will plan on repeating a DEXA scan in 2019.    4.  We discussed about our plan for follow-up.  We will continue with 3 monthly follow-up under she is at least 2 years out from surgery.  She was agreeable to the plan.  Return to clinic with me or Chetan No NP in 3 months.    Time spend >25 minutes face to face with the patient. More than 50 % in counseling and coordination of care.      Problem List    1. Malignant neoplasm of upper-outer quadrant of right breast in female, estrogen receptor positive  letrozole (FEMARA) 2.5 mg tablet   2. Aromatase inhibitor use          CC: MD Shelia Campbell MD    ______________________________________________________________________________    History of Present Illness    Ms. Emani Vargas is here for follow-up alone.  She says that she is doing well.  Recently she went on a trip to the [place] to attend her niece's wedding.  That trip went well and she enjoyed it.  She got tanned nicely when she was there.  She says that did a very good job improving her mood.    She is taking letrozole daily and she says that she is tolerating it well.  She does not think that it is giving her any unusual side effects.  She does have some aches and pains here and there from arthritis which predated letrozole.  She does not think that they are getting any worse with letrozole.  She says that she is not troubled by hot flashes.    She has some shortness of breath on effort and a chronic cough as a result of COPD.  She believes that her breathing is slightly better nowadays.  The character of the cough has not changed.    No fevers or night sweats.  No lumps or bumps anywhere.  No new aches or  pains.  No unusual headaches.  No eyesight problems.  No mouth sores.  No swallowing difficulty.  Weight remains stable.  No abdominal pain.  No constipation or diarrhea.  No blood in stool or black stools.  No vaginal bleeding.  No difficulty passing urine.  No skin rashes.    Please see below.  A 14 point review of system is otherwise completely negative.      Pain Status  Currently in Pain: Yes    Review of Systems    Constitutional  Constitutional (WDL): Exceptions to WDL  Fatigue: Fatigue relieved by rest  Fever: None  Chills: None  Weight Gain: 5 - <10% from baseline (5# in two months)  Weight Loss: None  Neurosensory  Neurosensory (WDL): Exceptions to WDL  Peripheral Motor Neuropathy: Asymptomatic, clinical or diagnostic observations only, intervention not indicated  Ataxia: Asymptomatic, clinical or diagnostic observations only, intervention not indicated  Peripheral Sensory Neuropathy: Asymptomatic, loss of deep tendon reflexes or paresthesia (feet and hands)  Confusion: None  Syncope: None  Cardiovascular  Cardiovascular (WDL): All cardiovascular elements are within defined limits  Pulmonary  Respiratory (WDL): Exceptions to WDL  Cough: Mild symptoms, nonprescription intervention indicated  Dyspnea: Shortness of breath with moderate exertion  Gastrointestinal  Gastrointestinal (WDL): Exceptions to WDL  Anorexia: None  Constipation: None  Diarrhea: None  Dysphagia: None  Esophagitis: Asymptomatic, clinical or diagnostic observations only, intervention not indicated (uses PPI)  Nausea: None  Pharyngitis: None  Vomiting: None  Dysgeusia: None  Dry Mouth: None  Genitourinary  Genitourinary (WDL): All genitourinary elements are within defined limits  Integumentary  Integumentary (WDL): All integumentary elements are within defined limits  Patient Coping  Patient Coping: Open/discussion  Distress Assessment  Distress Assessment Score: 3  Accompanied by  Accompanied by: Alone    Past History  Past Medical  "History:   Diagnosis Date     Allergic rhinitis      Asthma      Bipolar 1 disorder      Breast cancer of upper-outer quadrant of right female breast 4/11/2017     Chronic pain     Back pain due to arthritis.     COPD (chronic obstructive pulmonary disease)      Endometriosis      Fibroid uterus      Hx of radiation therapy 2017     Insomnia      Osteoarthritis      Overweight      Refusal of blood transfusions as patient is Confucianism 4/12/2017     Sensorineural hearing loss, bilateral      Vitamin D deficiency          Past Surgical History:   Procedure Laterality Date     BREAST BIOPSY Right 2000's     BREAST LUMPECTOMY Right 03/10/2017    with sentinel lymph node biopsy.     KNEE ARTHROSCOPY Right 1996    Description: Arthroscopy Knee Right;  Recorded: 12/08/2011;     KNEE SURGERY Right 1997    lateral release     LAPAROSCOPIC ENDOMETRIOSIS FULGURATION  2002     [place] TOTAL KNEE ARTHROPLASTY Right 8/28/2017    Procedure: RIGHT TOTAL KNEE ARTHROPLASTY;  Surgeon: Antony Elias MD;  Location: [facility] OR;  Service: Orthopedics     TOTAL HIP ARTHROPLASTY N/A 9/24/2014    Procedure: LEFT HIP TOTAL ARTHROPLASTY;  Surgeon: Antony Elias MD;  Location: [facility] OR;  Service:      TOTAL HIP ARTHROPLASTY Right 8/10/2015    Procedure: HIP TOTAL ARTHROPLASTY RIGHT;  Surgeon: Antony Elias MD;  Location: [facility] OR;  Service:        Physical Exam    Recent Vitals 2/28/2018   Height 5' 9\"   Weight 272 lbs 2 oz   BSA (m2) 2.45 m2   /74   Pulse 92   Temp -   Temp src -   SpO2 94   Some recent data might be hidden       GENERAL: Alert and oriented. Seated comfortably. In no distress.  Large build.  She looks well. Tanned.    HEAD: Atraumatic and normocephalic.  Has a full head of hair.    EYES: CORBY, EOMI.  No pallor.  No icterus.    Oral cavity: no mucosal lesion or tonsillar enlargement.    NECK: supple. JVP normal.  No thyroid enlargement.    LYMPH NODES: No palpable, " cervical, axillary or inguinal lymphadenopathy.    CHEST: clear to auscultation bilaterally.  Resonant to percussion throughout bilaterally.  Symmetrical breath movements bilaterally.    CVS: S1 and S2 are heard. Regular rate and rhythm.  No murmur or gallop or rub heard.    ABDOMEN: Soft. Not tender. Not distended.  No palpable hepatomegaly or splenomegaly.  No other mass palpable.  Bowel sounds heard.    EXTREMITIES: Warm.  No peripheral edema.    SKIN: no rash, or bruising or purpura.    BREASTS:  Right breast: Contour is normal.  Skin looks normal.  The surgical scar is very faint now.  Nipple looks normal.  No palpable mass in the right breast.  The right axilla is without mass or nodule.    Left breast: Contour looks normal.  Skin looks normal.  Nipple looks normal.  No palpable mass.  The left axilla is without mass or nodule.      Lab Results    No results found for this or any previous visit (from the past 168 hour(s)).    Imaging    Mammo Diagnostic Bilateral    Result Date: 2/23/2018  MAMMO DIAGNOSTIC 2D BILATERAL 2/23/2018 2:14 PM INDICATION: One year out from right upper outer quadrant lumpectomy and radiation therapy. Follow-up. COMPARISON: March and February of 2017 as well as older studies. MAMMOGRAPHIC FINDINGS: Bilateral full-field digital diagnostic mammograms performed. There are scattered areas of fibroglandular density. Left breast is stable and negative. Postoperative changes on the right after resection of localizing clips and small malignant nodule. No worrisome lesions identified. Images evaluated with the assistance of CAD.     No mammographic evidence for malignancy. ACR BI-RADS Category 2: Benign. Results given to the patient who should resume routine screening mammography.        Signed by: Christina Leigh MD

## 2021-06-17 NOTE — PROGRESS NOTES
Chief Complaint   Patient presents with     Generalized Body Aches     x 6 days      Cough     hard to breathe      History of Present Illness    Emani Vargas is a 3 y/o female who presents to [facility] with 5-6 day history of body aches, low-grade fevers and productive cough.  The patient stated that she has generalized weakness, fatigue and body aches for which she has been taking naproxen.  She is also taken Robitussin for cough suppression.  The patient has history of COPD and has noticed some shortness of breathing over the past day, denies any wheezing.      Review of systems: See history of present illness, otherwise negative.   Current Outpatient Prescriptions   Medication Sig Dispense Refill     acetaminophen (TYLENOL) 500 MG tablet Take 2 tablets (1,000 mg total) by mouth 3 (three) times a day.  0     albuterol (PROVENTIL HFA;VENTOLIN HFA) 90 mcg/actuation inhaler Inhale 2 puffs every 6 (six) hours as needed for wheezing. 2 Inhaler 0     amoxicillin (AMOXIL) 500 MG capsule 2,000 mg.       buPROPion (WELLBUTRIN XL) 150 MG 24 hr tablet Take 150 mg by mouth every morning.       cetirizine (ZYRTEC) 10 MG tablet Take 10 mg by mouth daily.       cholecalciferol, vitamin D3, 1,000 unit tablet Take 2,000 Units by mouth daily.       dextroamphetamine-amphetamine (ADDERALL) 5 mg Tab tablet Take 1 tablet by mouth daily.       EPIPEN 2-DMITRY 0.3 mg/0.3 mL (1:1,000) atIn Inject 0.3 mg into the shoulder, thigh, or buttocks once as needed.        FLUoxetine (PROZAC) 20 MG capsule Take 20 mg by mouth daily.       gabapentin (NEURONTIN) 300 MG capsule TAKE 3 CAPSULES BY MOUTH TWICE DAILY 180 capsule 8     gabapentin (NEURONTIN) 300 MG capsule Take 3 capsules (900 mg total) by mouth 3 (three) times a day. 270 capsule 1     letrozole (FEMARA) 2.5 mg tablet Take 1 tablet (2.5 mg total) by mouth daily. 90 tablet 3     lidocaine (XYLOCAINE) 5 % ointment Apply to the affected area BID prn for pain. 35.44 g 1      magnesium oxide 250 mg Tab Take 250 mg by mouth as needed.        meloxicam (MOBIC) 7.5 MG tablet Take 7.5 mg by mouth daily.       methocarbamol (ROBAXIN) 500 MG tablet Take 1 tablet (500 mg total) by mouth as needed. 30 tablet 1     multivitamin with minerals (THERA-M) 9 mg iron-400 mcg Tab tablet Take 1 tablet by mouth daily.       omeprazole (PRILOSEC) 20 MG capsule Take 1 capsule (20 mg total) by mouth daily. 90 capsule 2     omeprazole (PRILOSEC) 20 MG capsule TAKE 1 CAPSULE BY MOUTH DAILY 90 capsule 0     traZODone (DESYREL) 100 MG tablet Take 1/2 to one tablet at bedtime 90 tablet 0     oseltamivir (TAMIFLU) 75 MG capsule Take 1 capsule (75 mg total) by mouth 2 (two) times a day for 5 days. 10 capsule 0     No current facility-administered medications for this visit.       Past Medical History:   Diagnosis Date     Allergic rhinitis      Asthma      Bipolar 1 disorder      Breast cancer of upper-outer quadrant of right female breast 4/11/2017     Chronic pain     Back pain due to arthritis.     COPD (chronic obstructive pulmonary disease)      Endometriosis      Fibroid uterus      Hx of radiation therapy 2017     Insomnia      Osteoarthritis      Overweight      Refusal of blood transfusions as patient is Jainism 4/12/2017     Sensorineural hearing loss, bilateral      Vitamin D deficiency       Past Surgical History:   Procedure Laterality Date     BREAST BIOPSY Right 2000's     BREAST LUMPECTOMY Right 03/10/2017    with sentinel lymph node biopsy.     KNEE ARTHROSCOPY Right 1996    Description: Arthroscopy Knee Right;  Recorded: 12/08/2011;     KNEE SURGERY Right 1997    lateral release     LAPAROSCOPIC ENDOMETRIOSIS FULGURATION  2002     [place] TOTAL KNEE ARTHROPLASTY Right 8/28/2017    Procedure: RIGHT TOTAL KNEE ARTHROPLASTY;  Surgeon: Antony Elias MD;  Location: [facility];  Service: Orthopedics     TOTAL HIP ARTHROPLASTY N/A 9/24/2014    Procedure: LEFT HIP TOTAL ARTHROPLASTY;   Surgeon: Antony Elias MD;  Location: [facility];  Service:      TOTAL HIP ARTHROPLASTY Right 8/10/2015    Procedure: HIP TOTAL ARTHROPLASTY RIGHT;  Surgeon: Antony Elias MD;  Location: [facility];  Service:       Social History     Social History     Marital status:      Spouse name: N/A     Number of children: N/A     Years of education: N/A     Occupational History     Disabled.      Was an .     Social History Main Topics     Smoking status: Former Smoker     Packs/day: 1.00     Years: 7.00     Quit date: 1/1/1985     Smokeless tobacco: Never Used     Alcohol use 0.0 oz/week     0 Standard drinks or equivalent per week      Comment: 1 drink/month     Drug use: No      Comment: past use of marijuana and hash.     Sexual activity: Not Asked     Other Topics Concern     None     Social History Narrative      History   Smoking Status     Former Smoker     Packs/day: 1.00     Years: 7.00     Quit date: 1/1/1985   Smokeless Tobacco     Never Used      Exam:   Blood pressure 122/86, pulse (!) 104, temperature 100.2  F (37.9  C), temperature source Oral, resp. rate 24, weight (!) 273 lb (123.8 kg), last menstrual period 08/10/2012, SpO2 96 %, not currently breastfeeding.   General: Pleasant 54 y/o in NAD.  HEENT:  Tympanic membranes clear. Oropharynx clear.  NECK: No lymphadenopathy.  Respiratory: Lungs with coarse breath sounds, no wheezing.  Breathing is nonlabored.  CVS: Sinus tachycardia, no peripheral edema.    Recent Results (from the past 24 hour(s))   Influenza A/B Rapid Test   Result Value Ref Range    Influenza  A, Rapid Antigen No Influenza A antigen detected No Influenza A antigen detected    Influenza B, Rapid Antigen Influenza B antigen detected (!) No Influenza B antigen detected   Rapid Strep A Screen-Throat   Result Value Ref Range    Rapid Strep A Antigen No Group A Strep detected, presumptive negative No Group A Strep detected, presumptive negative       Assessment/Plan   1. Influenza B  Influenza A/B Rapid Test    Rapid Strep A Screen-Throat    Group A Strep, RNA Direct Detection, Throat      Patient Instructions   Tamiflu 75 mg BID prescribed.  Dicussed contagious droplet precautions.  Recommended supportive cares;   Advised plenty of fluids and rest. Tylenol or Ibuprofen prn for fever/discomfort.  Tessalon for cough.   If symptoms not improved in next 5-7 days, f/u with PCP, sooner if worsening.            Kedar Mohamud, DO  Internal Medicine  [facility]

## 2021-06-17 NOTE — PATIENT INSTRUCTIONS - HE
A nurse will call you in 10 days to see how you are doing. If you are doing better at that time, you are welcome to follow-up as needed. If you are not doing better, the nurse will relay this information to the physician and then further recommendations can be made. Please do not hesitate to contact the clinic at [phone] if you have any questions/concerns or any worsening of your pain prior to that time. You are also welcome to contact Flavia Camarena via [employer].

## 2021-06-17 NOTE — TELEPHONE ENCOUNTER
----- Message from Flavia Camarena PA-C sent at 5/17/2021  3:14 PM CDT -----  Regarding: don't take prednisone  Please call this patient and let her know that after she left the clinic I saw that she is scheduled for his second Covid vaccine May 21, 2021.  She should not start the prednisone now.  The prednisone could make the vaccine less effective.  Recommend that she wait until 2 weeks after her vaccine to start the prednisone.  Continue Celebrex as needed in the meantime.  I would like you to still follow-up with the patient next week to check on her status.

## 2021-06-17 NOTE — TELEPHONE ENCOUNTER
Telephone Encounter by Alivia Stein at 6/18/2020 10:46 AM     Author: Alivia Stein Service: -- Author Type: --    Filed: 6/18/2020 10:46 AM Encounter Date: 6/17/2020 Status: Signed    : Alivia Stein APPROVED:    Approval start date: 6/15/2020  Approval end date:  NO EXP DATE    Pharmacy has been notified of approval and will contact patient when medication is ready for pickup.

## 2021-06-17 NOTE — TELEPHONE ENCOUNTER
Second call placed to patient to explain she should not take the Prednisone at this time due to her scheduled 2nd COVID-19 vaccination. She is instructed to continue with Celbrex as needed. Informed her we will be calling her next week to check on status. Stated understanding.

## 2021-06-17 NOTE — TELEPHONE ENCOUNTER
Phone call to patient to discuss. Patient in car and unable to hear. Asked for another call in 5 minutes or so.

## 2021-06-17 NOTE — PROGRESS NOTES
Assessment:   Emani Vargas is a 56 y.o. y.o. female with past medical history significant for vitamin D deficiency, chronic pain, bipolar disorder, insomnia, mild intermittent asthma, breast cancer status post lumpectomy and radiation (follows with Dr. Al), COPD, obesity who presents today for follow-up regarding of a flare of chronic bilateral low back pain with intermittent radiation into the left greater than right lower extremity with associated numbness, tingling, weakness.  Flare began 3 days ago with no trauma.  Lower extremity symptoms do not follow a clear dermatomal pattern.  My review of been MRI lumbar spine from 2019 shows a posterior annular bulge and facet hypertrophy at L4-5 resulting in mild spinal canal stenosis.  There is facet hypertrophy L3-L5.  X-rays of the sacroiliac joint shows mild to moderate degenerative change.  Pain is likely multifactorial.  She did have reproduction of pain with lumbar facet loading maneuvers.  She had tenderness to palpation over the sacroiliac joints.  She reported a subjective sensory deficit left lateral foot.  -Patient also feels that her chronic mid back pain and chronic neck pain and headaches have flared up since Friday, but pain is not as severe in these areas as it is in the lower back.  MRI cervical spine shows scattered facet hypertrophy and moderate right foraminal stenosis C4-5.  MRI thoracic spine shows mild posterior annular bulge T7-T8 but no high-grade spinal canal or neuroforaminal stenosis.       Plan:     A shared decision making plan was used.  The patient's values and choices were respected.  The following represents what was discussed and decided upon by the physician assistant and the patient.      1.  DIAGNOSTIC TESTS: I reviewed the MRI scans of the cervical, thoracic, and lumbar spine from 2019.  No further diagnostic tests were ordered.  If pain persist she may need updated scans.  She may also need EMG of the lower extremities for  further evaluation of numbness and tingling if that persists.    2.  PHYSICAL THERAPY: Patient did physical therapy at [facility] ending June 7, 2019.  She felt this was very helpful.  I offered her referral back to pool therapy.  She declined.  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    3.  MEDICATIONS:   -Prednisone 40 mg daily x5 days then 20 mg daily x5 days was prescribed.  After the patient left the clinic so that she is scheduled for her second Covid vaccine May 21, 2021.  First dose was April 30, 2021.  Would not recommend corticosteroids so close to her vaccine because a good make the vaccine less effective.  I will have my nurse call the patient and advise her to hold off on starting the medication.  Recommendation would be to start the prednisone 2 weeks after her second Covid vaccine.  -Oxycodone was not helpful.  -Methocarbamol was not helpful.  -Patient takes gabapentin 1600 mg at bedtime and 200 mg in the morning.  She will continue with medication.  -Patient continues on Celebrex 200 g daily as needed.  -Patient can continue using Voltaren gel as needed.    4.  INTERVENTIONS:    -No interventions were ordere today.  Patient may benefit from interventional pain management if pain fails to improve.  - In regards to the lower back pain, I would likely begin with bilateral L3, L4, L5 medial branch blocks.  She had tried lumbar facet joint injections in 2016 which did not provide relief of her pain.  -If she had a positive response to the medial branch blocks, recommend a bilateral L4-5, L5-S1 facet joint injection.  -If she fails the medial branch blocks, we could try a bilateral L4-5 transforaminal epidural steroid injection to address the spinal stenosis at this level.  -If that did not help, we could try bilateral sacroiliac joint injections.    5.  PATIENT EDUCATION: Patient is in agreement the above plan.  All questions  were answered.    6.  FOLLOW-UP: A nurse will call the patient in 10 days to check in and see how she is feeling.  If she has any questions or concerns, she should not hesitate to call.  This patient always needs a 40-minute follow-up.      Subjective:     Emani Vargas is a 56 y.o. female who presents today for follow-up regarding an acute flare of chronic bilateral low back pain with intermittent radiation into left greater than right lower extremity with associated numbness and tingling.  I have not seen this patient since May 23, 2019.  Patient reports that she did not return sooner because she has been busy taking care of her dad who is now on hospice care.  She states the pain flared up 3 days ago (May 14, 2021).  She states that she had done some grocery shopping on that day which typically exacerbates her pain.  She also takes 3 weeds.  She states that she developed severe pain that day and the pain persisted over the weekend.  Today she is actually feeling better.  She took Celebrex today.    Patient complains of bilateral low back pain.  Pain spans across low back in a broadband distribution at the belt line.  Pain radiates into the upper buttocks.  Pain radiates into left greater than right anterior thigh.  Denies pain radiating distal to the knees.  She has numbness and tingling in the left greater than right lower extremity which is chronic but has been worse since Friday.  She states it affects the anterior and posterior aspects of her legs as well as the dorsal and plantar foot.  She states that her legs feel weak.    Patient states that since her low back pain has flared up she also feels like her neck pain and headaches are flared up and her mid back pain.  These are also chronic areas of pain.    Patient rates her pain today is a 10 out of 10.  At its worst it is a 10-10.  At its best it is a 4-10.  Pain is aggravated with doing just about anything.  Lying on her side aggravates the pain.  Pain is  alleviated if she can rest in a certain way while lying down or sitting.  She denies any loss of bowel or bladder control.  Denies any recent fevers.      Patient last had physical therapy at [facility] ending June 7, 2019.  This was very helpful.  She admits she is not doing any home exercises.  Patient takes gabapentin 1600 mg at bedtime and 200 mg in the morning.  She is a Celebrex 200 mg daily as needed.  She also uses Voltaren gel.  She tried oxycodone and methocarbamol over the weekend and they were not helpful.    Past medical history is reviewed.    Review of Systems:  Positive for numbness/tingling, weakness, wetting pants, headache, night pain, falls, difficulty swallowing, difficulty with hand skills.  Negative for loss of bowel/bladder control, inability to urinate, fevers, unintentional weight loss.     Objective:   CONSTITUTIONAL:  Vital signs as above.  No acute distress.  The patient is well nourished and well groomed.    PSYCHIATRIC:  The patient is awake, alert, oriented to person, place and time.  The patient is answering questions appropriately with clear speech.  Normal affect.  HEENT: Normocephalic, atraumatic.  Sclera clear.  Neck is supple.  SKIN:  Skin over the face, neck bilateral upper extremities is clean, dry, intact without rashes.  MUSCULOSKELETAL: Significant tenderness palpation bilateral lower lumbar paraspinous muscles.  Tender to palpation bilateral sacroiliac joints.  Mild tenderness palpation bilateral greater trochanters.  Lumbar flexion within normal notes.  Lumbar extension significantly restricted with increased pain.  Lumbar facet maneuvers increased low back pain bilaterally.  5/5 strength right shoulder abductors, 4/5 strength left shoulder abductors (has rotator cuff tear), 5/5 strength bilateral elbow flexors/extensors, wrist extensors, finger flexors/abductors, hip flexors, knee flexors/extensors, ankle dorsi/plantar flexors, EHL.  NEUROLOGICAL: 1-2+ biceps,  brachioradialis, triceps, patellar, and Achilles reflexes bilaterally.  Negative Ulloa's bilaterally.  Babinski's bilaterally.  No ankle clonus.  Subjective diminished sensation left lateral foot.    RESULTS:    I reviewed the MRI cervical spine from [facility] dated April 25, 2019.  There are multilevel posterior and right foraminal disc bulges resulting in minimal right foraminal stenosis C3-4 and moderate right foraminal stenosis C4-5.  No additional neural impingement.  There is scattered cervical facet hypertrophy, greater on the right.    I reviewed the MRI thoracic spine from [facility] dated April 25, 2019.  This shows a mild posterior annular bulge at T7-T8 there are Schmorl's nodes on both sides of the T11-T12 interspace and along the superior endplate of L1 with minimal edema.    I reviewed the MRI lumbar spine from [facility] dated April 25, 2019.  This shows a posterior annular bulge and facet hypertrophy at L4-5 resulting in mild spinal canal stenosis.  There is no neuroforaminal stenosis.  There are Schmorl's nodes about the upper lumbar interspaces and facet hypertrophy L3-L5.

## 2021-06-18 NOTE — LETTER
Letter by Ekaterina Holman AuD at      Author: Ekaterina Holman AuD Service: -- Author Type: --    Filed:  Encounter Date: 3/5/2019 Status: (Other)       [facility]- Audiology Benefit Letter    [place]  1965  [address] [facility] [address]    Insurance Company: Payor: MEDICARE / Plan: MEDICARE A AND B / Product Type: Medicare /      [place] Product: YES    ID # :  MEDICARE   [id number]             [place]   [id number]    Group#:  N/A    Estimate of Benefits  Consult Visit Benefits:  MEDICARE 12/16 ELIG, [place] 03/19 ELIG    HAE, HAF, HAC Benefits:   [facility] - COVERED SERVICE. PATIENT HAS NOT RCVD ANY HEARING AID BENEFITS WITHIN THE LAST 5 YEARS PER MNITS.    Batteries/Accessories Coverage:  COVERED SERVICE    Representative name: VIA RTE AND MNITS  Call reference:   Date of contact: 03/05/2019    Insurance verified today by Briseyda Cisse    Additional Information:      The information provided is an estimate of benefits. This does not guarantee coverage; the insurance company will make the final determination of coverage to include patient responsibility of payment by the patient.   [facility] is not responsible for the decisions made by the insurance company regarding coverage.  Any changes to coverage (new plan or new policy) or procedures may void the contents provided in this letter.     Term Definitions  Patient responsibility: Can include but not limited to: co-pays, co-insurance, deductibles, out-of-pocket and non-covered and/or policy exclusions.

## 2021-06-18 NOTE — PROGRESS NOTES
[facility] Hematology and Oncology Progress Note    Patient: Emani Vargas  MRN: [id number]  Date of Service: 05/23/2018      Reason for Visit    Follow-up regarding breast cancer.    Assessment and Plan  Malignant neoplasm of upper-outer quadrant of right breast in female, estrogen receptor positive    Staging form: Breast, AJCC 7th Edition    - Pathologic: Stage IA (T1c, N0, cM0) - Signed by Christina Leigh MD on 4/11/2017    ECOG Performance   ECOG Performance Status: 1    Distress Assessment  Distress Assessment Score: 5: Please see the discussion below.    Pain   : Please see the discussion below.    Ms. Emani Vargas is a 53 y.o. woman had a breast lumpectomy and sentinel lymph node biopsy done on 3/10/17 by Dr. Yoder.  Final pathology showed a 15 mm invasive ductal carcinoma, grade 2 with associated DCIS, ER AL positive and HER-2 negative.  Margins were clear.  She had a fourth sentinel lymph nodes and none of them were involved.  Final stage was IA (T1c, N0, cM0).  She had an Oncotype DX score of 18 which corresponds to an approximately 11% risk of recurrence in the next 10 years on tamoxifen.  She has numerous comorbidities including bipolar disorder, COPD endometriosis and osteoarthritis.  After thorough discussion we mutually agreed not to go for adjuvant chemotherapy.  She completed adjuvant radiation and started letrozole on 5/18/17.    1.  At this point the main concern is about the right shoulder pain which is a.  In the last couple of months.  She has multiple joint aches and pains in multiple areas due to osteoarthritis.  The question is whether the right shoulder arthritis is causing the pain or whether she is developing some amount of lymphedema in the right arm.  At this point I do not appreciate any swelling but she says that she has noticed some swelling in the right arm.  We will start by obtaining an x-ray of the right shoulder.  If that shows arthritis, I plan to refer her to rheumatology.  On  the other hand if there is no arthritis seen in the x-ray I will refer her to the lymphedema clinic for physical therapy etc.  She was agreeable to the plan.    2.  Otherwise she seems to be doing okay with only the same old chronic symptoms that she has had.  At this point I see nothing that suggests that there is breast cancer recurrence.  She is taking the letrozole daily.  I encouraged her to continue.  She has some does not think that it is causing much problem for her.  Our plan is to continue for at least 5 years.    3.  Her next mammogram will be due in February 2017.    4.  To prevent osteoporosis she is to continue with calcium and vitamin D tablets.  The last DEXA scan from 2016 showed no evidence of osteoporosis.  We are planning to repeat the DEXA scan in 2019.    5.  Return to clinic with me or Chetan No NP in 3 months.    Time spend >25 minutes face to face with the patient. More than 50 % in counseling and coordination of care.    Problem List    1. Malignant neoplasm of upper-outer quadrant of right breast in female, estrogen receptor positive  XR Shoulder Right 2 or More VWS   2. Aromatase inhibitor use     3. Chronic right shoulder pain  XR Shoulder Right 2 or More VWS        CC: MD Shelia Campbell MD    ______________________________________________________________________________    History of Present Illness    Ms. Emani Vargas is here for follow-up alone.    She says that she continues to have multiple aches and pains here and there.  She has some allergic rhinitis going on and she is taking except as in for that.  She is taking Arimidex daily and she feels that she is tolerating it well.  Her multiple chronic symptoms have all been fairly stable.    The only new complaint that she has is of right shoulder pain.  She says that has been going on for a couple of months now.  She says it is now and then she has also noticed some swelling of the right  hand.    She has not noticed any lumps or bumps anywhere.  Weight has remained stable.  No unusual headaches.  No shortness of breath.  No chest pain or cough.  She says that no one that she has nausea and she throws up at times.  This pattern has not changed.  However she is not losing weight.  No abdominal pain.  No problems with bowel movements a different from earlier.  No vaginal bleeding.  No skin rashes.    Please see below.  A 14 point review of system is otherwise completely negative.      Pain Status  Currently in Pain: Yes    Review of Systems    Constitutional  Constitutional (WDL): Exceptions to WDL  Fever: 38.0 - 39.0 degrees C (100.4 - 102.2 degrees F) (occ)  Chills: Mild sensation of cold, shivering, chattering of teeth (occ)  Weight Gain: None  Weight Loss: to <10% from baseline, intervention not indicated (down 7#)  Neurosensory  Neurosensory (WDL): Exceptions to WDL  Peripheral Motor Neuropathy: Asymptomatic, clinical or diagnostic observations only, intervention not indicated (Rt arm)  Ataxia: Asymptomatic, clinical or diagnostic observations only, intervention not indicated  Peripheral Sensory Neuropathy: Asymptomatic, loss of deep tendon reflexes or paresthesia  Confusion: Mild disorientation  Syncope: None  Cardiovascular  Cardiovascular (WDL): All cardiovascular elements are within defined limits  Pulmonary  Respiratory (WDL): Exceptions to WDL  Cough: Mild symptoms, nonprescription intervention indicated  Dyspnea: Shortness of breath with moderate exertion  Hypoxia: None  Gastrointestinal  Gastrointestinal (WDL): Exceptions to WDL  Anorexia: None  Constipation: None  Diarrhea: None  Dysphagia: None  Esophagitis: None  Nausea: Loss of appetite without alteration in eating habits (two episodes in past week while on toilet)  Pharyngitis: None  Vomitin - 2 episodes ( by 5 minutes) in 24 hrs (two episodes in past week while on toilet)  Dysgeusia: None  Dry Mouth:  None  Genitourinary  Genitourinary (WDL): Exceptions to WDL  Urinary Frequency: Present  Urinary Retention: None  Urinary Tract Pain: None  Integumentary  Integumentary (WDL): All integumentary elements are within defined limits  Patient Coping  Patient Coping: Open/discussion  Distress Assessment  Distress Assessment Score: 5  Accompanied by  Accompanied by: Alone    Past History  Past Medical History:   Diagnosis Date     Allergic rhinitis      Asthma      Bipolar 1 disorder      Breast cancer of upper-outer quadrant of right female breast 4/11/2017     Chronic pain     Back pain due to arthritis.     COPD (chronic obstructive pulmonary disease)      Endometriosis      Fibroid uterus      Hx of radiation therapy 2017     Insomnia      Osteoarthritis      Overweight      Refusal of blood transfusions as patient is Qu[employer] 4/12/2017     Sensorineural hearing loss, bilateral      Vitamin D deficiency          Past Surgical History:   Procedure Laterality Date     BREAST BIOPSY Right 2000's     BREAST LUMPECTOMY Right 03/10/2017    with sentinel lymph node biopsy.     KNEE ARTHROSCOPY Right 1996    Description: Arthroscopy Knee Right;  Recorded: 12/08/2011;     KNEE SURGERY Right 1997    lateral release     LAPAROSCOPIC ENDOMETRIOSIS FULGURATION  2002     OK TOTAL KNEE ARTHROPLASTY Right 8/28/2017    Procedure: RIGHT TOTAL KNEE ARTHROPLASTY;  Surgeon: Antony Elias MD;  Location: [facility];  Service: Orthopedics     TOTAL HIP ARTHROPLASTY N/A 9/24/2014    Procedure: LEFT HIP TOTAL ARTHROPLASTY;  Surgeon: Antony Elias MD;  Location: [facility] OR;  Service:      TOTAL HIP ARTHROPLASTY Right 8/10/2015    Procedure: HIP TOTAL ARTHROPLASTY RIGHT;  Surgeon: Antony Elias MD;  Location: [facility];  Service:        Physical Exam    Recent Vitals 5/23/2018   Height -   Weight 266 lbs   BSA (m2) -   /76   Pulse 92   Temp 98.6   Temp src -   SpO2 95   Some recent data  might be hidden       GENERAL: Alert and oriented. Seated comfortably. In no distress.  Large build.  She looks well overall.  She is wearing hearing aids.    HEAD: Atraumatic and normocephalic.  Has a full head of hair.    EYES: CORBY, EOMI.  No pallor.  No icterus.    Oral cavity: no mucosal lesion or tonsillar enlargement.    NECK: supple. JVP normal.  No thyroid enlargement.    LYMPH NODES: No palpable, cervical, axillary or inguinal lymphadenopathy.    CHEST: clear to auscultation bilaterally.  Resonant to percussion throughout bilaterally.  Symmetrical breath movements bilaterally.    CVS: S1 and S2 are heard. Regular rate and rhythm.  No murmur or gallop or rub heard.    ABDOMEN: Soft. Not tender. Not distended.  Some obesity at the abdomen.  No palpable hepatomegaly or splenomegaly.  No other mass palpable.  Bowel sounds heard.    EXTREMITIES: Warm.  No peripheral edema.    SKIN: no rash, or bruising or purpura.    The right shoulder and arm were examined.  I cannot really make out any swelling of the right arm.  No swelling or tenderness of the right shoulder.  However she winces a bit when the right shoulder is passively moved.  No crepitus elicited.    BREASTS:  Right breast: Contour is normal.  Skin looks normal.  The surgical scar is very faint.  Nipple looks normal.  No palpable mass in the right breast.  The right axilla is without mass or nodule.    Left breast: Contour looks normal.  Skin looks normal.  Nipple looks normal.  No palpable mass in the left breast.  Left axilla is without mass or nodule.      Lab Results    No results found for this or any previous visit (from the past 168 hour(s)).    Imaging    No results found.      Signed by: Christina Leigh MD

## 2021-06-18 NOTE — PROGRESS NOTES
Patient is here for follow-up of   Encounter Diagnoses   Name Primary?     Malignant neoplasm of upper-outer quadrant of right breast in female, estrogen receptor positive Yes     Aromatase inhibitor use      Chronic right shoulder pain    Hilaria Bloom RN

## 2021-06-19 NOTE — PROGRESS NOTES
"Emani RUDDY Vargas who presents today with a chief complaint of Consult      Joint Pains: yes  Location: right shoulder, wrists, hands, knee   Onset: 1978  Intensity:  6/10  AM Stiffness: couple hrs.  Alleviating/Aggravating Factors: Activity exacerbates pains however next day can be better.  Tolerating Meds: yes  Other:      ROS:  Patient denies having any dry eyes, + dry mouth, no: oral ulcers, alopecia, rashes, photosensitivity, history of psoriasis, chest pain, + stable shortness of breath with exertion, no:cough, +dysuria, no: history of kidney stones, abdominal pain, +diarrhea (\"ibs\" related), no: hematochezia, +dysphagia, no: history of peptic ulcer disease, history of HIV, tuberculosis,\"+ Lyme disease and hepatitis (unsure of type) with electrodermal/energy medicine testing, no: seizure history, raynaud's, fevers, recent infections, +difficulty sleeping, no: involuntary weight loss, + fatigue, +depression, +anxiety, occasional loss of appetite, no: temporal headaches, sinusitis,  +double vision has seen ophtho no:  loss of vision, painful vision.      Problem List:  Patient Active Problem List   Diagnosis     Vitamin D Deficiency     Endometriosis     Chronic Pain     Insomnia     Bipolar Disorder     Hearing Loss     Osteoarthrosis, unspecified whether generalized or localized, pelvic region and thigh     Allergic rhinitis     Mild intermittent asthma without complication     Compression fracture of vertebral column with routine healing     Malignant neoplasm of upper-outer quadrant of right breast in female, estrogen receptor positive (H)     Refusal of blood transfusions as patient is V[employer]     Osteoporosis     Aromatase inhibitor use     Knee osteoarthritis     Chronic obstructive pulmonary disease, unspecified COPD type (H)     Bipolar affective disorder, remission status unspecified (H)     Chronic pain syndrome        PMH:   Past Medical History:   Diagnosis Date     Allergic rhinitis      " Asthma      Bipolar 1 disorder (H)      Breast cancer of upper-outer quadrant of right female breast (H) 4/11/2017     Chronic pain     Back pain due to arthritis.     COPD (chronic obstructive pulmonary disease) (H)      Endometriosis      Fibroid uterus      Hx of radiation therapy 2017     Insomnia      Osteoarthritis      Overweight      Refusal of blood transfusions as patient is Yarsani 4/12/2017     Sensorineural hearing loss, bilateral      Vitamin D deficiency        Surgical History:  Past Surgical History:   Procedure Laterality Date     BREAST BIOPSY Right 2000's     BREAST LUMPECTOMY Right 03/10/2017    with sentinel lymph node biopsy.     KNEE ARTHROSCOPY Right 1996    Description: Arthroscopy Knee Right;  Recorded: 12/08/2011;     KNEE SURGERY Right 1997    lateral release     LAPAROSCOPIC ENDOMETRIOSIS FULGURATION  2002     SD TOTAL KNEE ARTHROPLASTY Right 8/28/2017    Procedure: RIGHT TOTAL KNEE ARTHROPLASTY;  Surgeon: Antony Elias MD;  Location: [facility] Main OR;  Service: Orthopedics     TOTAL HIP ARTHROPLASTY N/A 9/24/2014    Procedure: LEFT HIP TOTAL ARTHROPLASTY;  Surgeon: Antony Elias MD;  Location: [facility] Main OR;  Service:      TOTAL HIP ARTHROPLASTY Right 8/10/2015    Procedure: HIP TOTAL ARTHROPLASTY RIGHT;  Surgeon: Antony Elias MD;  Location: [facility] Main OR;  Service:        Family History:  Family History   Problem Relation Age of Onset     Depression Mother      COPD Mother      Diabetes Father      Skin cancer Father 45     Colon cancer Maternal Grandmother      Colon cancer Maternal Grandfather      Diabetes Paternal Grandmother      Colon cancer Paternal Grandmother 80     Breast cancer Cousin 48     Paternal side.     Testicular cancer Brother 44     Lung cancer Paternal Aunt 60     Anesthesia problems Neg Hx        Social History:   reports that she quit smoking about 33 years ago. She has a 7.00 pack-year smoking history. She has never  used smokeless tobacco. She reports that she drinks alcohol. She reports that she does not use illicit drugs.    Allergies:  Allergies   Allergen Reactions     Cat/Feline Products Itching     Egg Derived      Flu like symptoms--pain     Trees Other (See Comments)     Grass, itching        Current Medications:  Current Outpatient Prescriptions   Medication Sig Dispense Refill     magnesium citrate solution Take 296 mL by mouth daily as needed.       acetaminophen (TYLENOL) 500 MG tablet Take 2 tablets (1,000 mg total) by mouth 3 (three) times a day.  0     albuterol (PROVENTIL HFA;VENTOLIN HFA) 90 mcg/actuation inhaler Inhale 2 puffs every 6 (six) hours as needed for wheezing. 2 Inhaler 0     amoxicillin (AMOXIL) 500 MG capsule 2,000 mg.       buPROPion (WELLBUTRIN XL) 150 MG 24 hr tablet Take 1 tablet (150 mg total) by mouth every morning. 90 tablet 3     cetirizine (ZYRTEC) 10 MG tablet Take 10 mg by mouth daily.       cholecalciferol, vitamin D3, 1,000 unit tablet Take 2,000 Units by mouth daily.       dextroamphetamine-amphetamine 20 mg Tab Take 1 tablet by mouth 2 (two) times a day.  0     EPIPEN 2-DMITRY 0.3 mg/0.3 mL (1:1,000) atIn Inject 0.3 mg into the shoulder, thigh, or buttocks once as needed.        FLUoxetine (PROZAC) 20 MG capsule Take 20 mg by mouth daily.       gabapentin (NEURONTIN) 300 MG capsule TAKE 3 CAPSULES BY MOUTH TWICE DAILY 180 capsule 8     gabapentin (NEURONTIN) 300 MG capsule Take 3 capsules (900 mg total) by mouth 3 (three) times a day. 270 capsule 1     lidocaine (XYLOCAINE) 5 % ointment Apply to the affected area BID prn for pain. 35.44 g 1     meloxicam (MOBIC) 7.5 MG tablet Take 7.5 mg by mouth daily.       methocarbamol (ROBAXIN) 500 MG tablet TAKE 1 TABLET (500 MG TOTAL) BY MOUTH AS NEEDED. 30 tablet 1     multivitamin with minerals (THERA-M) 9 mg iron-400 mcg Tab tablet Take 1 tablet by mouth daily.       omeprazole (PRILOSEC) 20 MG capsule Take 1 capsule (20 mg total) by mouth  "daily. 90 capsule 2     omeprazole (PRILOSEC) 20 MG capsule TAKE 1 CAPSULE BY MOUTH DAILY 90 capsule 0     traZODone (DESYREL) 100 MG tablet TAKE 1/2 TO 1 TABLET BY MOUTH NIGHTLY AT BEDTIME 90 tablet 0     No current facility-administered medications for this visit.            Physical Exam:  /80  Pulse 90  Resp 8  Ht 5' 9\" (1.753 m)  Wt (!) 264 lb (119.7 kg)  LMP 08/10/2012  SpO2 95%  BMI 38.99 kg/m2  General: A & O x 3 in NAD, overweight.  HEENT: EOMI, Non injected/non icteric sclera, no oral lesions noted  Neck: Supple, no cervical LAD or thyromegaly noted  Derm: No malar rash, psoriatic lesions or nail pitting appreciated  CV: s1s2 with RRR, no rubs appreciated   Lungs: CTA B/L, no wheezing , rales or rhonci appreciated  GI: Soft, NT/ND, no rebound, no guarding noted, no hepatomegally appreciated  MS: Mild discomfort involving left fifth PIP, right first MCP/PIP, right second DIP joint on palpation with no clear signs of synovitis noted.  Positive discomfort involving shoulders and passive/active range of motion testing.  Positive emptying can test bilaterally.  Some limitation noted involving knees on passive flexion/extension, nontender today on range of motion testing.  Greater than 11/18 myofascial tender points.  Otherwise patient demonstrated good passive/active ROM over other joints with no warmth, erythema, tenderness or synovitis noted over these joints.  Back:   Straight leg raising reproduced low back pain, nonradiating.  Some focal discomfort palpating bilateral SI joints.  Some limitation noted involving C-spine on range of motion testing.  Neuro: 5/5 strength in upper and lower extremities b/l, good sensation b/l,  1-2+ bicep and patella reflexes b/l    Assessment/plan:    1. Multiple joint pain    Likely mechanical nature, will correlate with labs sent off.      States cannot tolerate Tylenol as contributes to pruritus and NSAID cause an upset stomach ( including meloxicam on med " list).    Desires to try tramadol for pain relief.  We will have patient sign a controlled substance agreement.       - Lyme Antibody Cascade  - Hepatitis B Surface Antigen (HBsAG)  - Hepatitis C Antibody (Anti-HCV)  - Urinalysis-UC if Indicated  - Creatinine  - ALT (SGPT)  - AST (SGOT)  - Albumin  - Rheumatoid Factor Quant  - CCP Antibodies  - Antinuclear Antibody (CONSTANTINE) Cascade  - Erythrocyte Sedimentation Rate  - C-Reactive Protein  - HLA-B27 Typing  - traMADol (ULTRAM) 50 mg tablet; Take 1-2 tab po q6hr prn for pain. 1 tab for pain levels of 4-6/10. 2 tabs for pain levels of 7-10/10.  Dispense: 90 tablet; Refill: 1    2. Chronic pain of both shoulders    May have component of rotator cuff tendinopathy playing a role.    Was a  for over 25 years, which may have contributed.    Had right shoulder x-ray, which showed very minimal irregularity at the rotator cuff insertion site.  GH joint appears normal.    .- Erythrocyte Sedimentation Rate  - C-Reactive Protein    3. Osteoarthritis of multiple joints, unspecified osteoarthritis type    - traMADol (ULTRAM) 50 mg tablet; Take 1-2 tab po q6hr prn for pain. 1 tab for pain levels of 4-6/10. 2 tabs for pain levels of 7-10/10.  Dispense: 90 tablet; Refill: 1    4. Chronic bilateral low back pain with bilateral sciatica    Takes Neurontin.    - XR Sacroliac Joints 3 Or More VWS; Future    5. Fibromyalgia    - Lyme Antibody Cascade  - Hepatitis B Surface Antigen (HBsAG)  - Hepatitis C Antibody (Anti-HCV)  - Creatinine  - ALT (SGPT)  - AST (SGOT)  - Albumin  - Rheumatoid Factor Quant  - CCP Antibodies  - Antinuclear Antibody (CONSTANTINE) Cascade  - Erythrocyte Sedimentation Rate  - C-Reactive Protein  - HLA-B27 Typing  - traMADol (ULTRAM) 50 mg tablet; Take 1-2 tab po q6hr prn for pain. 1 tab for pain levels of 4-6/10. 2 tabs for pain levels of 7-10/10.  Dispense: 90 tablet; Refill: 1  - Ambulatory referral to Sleep Medicine    6. Status post bilateral hip  replacements      7. Status post right knee replacement      8. Non-restorative sleep    - Ambulatory referral to Sleep Medicine    9. Overweight    - Ambulatory referral to Sleep Medicine    10. Dysuria  Made aware that regardless of urinalysis results patient should discuss further with PCP if symptoms persist.      (Also made aware that if her symptoms of dysphagia persist she should discuss further PCP).    - Urinalysis-UC if Indicated    Spent 60 minutes with greater than half of this time spent with the patient going over differential diagnosis, prognosis, treatment plan, medication side effects and  answering questions.    Major side effect profile of medications provided/suggested were discussed with the patient.    This note was transcribed using Dragon voice recognition software as a result unintentional grammatical errors or word substitutions may have occurred. Please contact our Rheumatology department if you need any clarification or if you have any related inquiries.    Thank you for referring this patient to our clinic.      Kilo Ramon ....................  7/30/2018   2:30 PM

## 2021-06-19 NOTE — PROGRESS NOTES
ASSESSMENT/PLAN:    Dysuria, urinary incontinence, nocturia  This is a pleasant 53-year-old female who presented today at the advice of her rheumatologist to follow-up on her urinalysis.  Her previous urinalysis showed moderate leukocytes with 3-5 RBC and at 10-25 WBC.  Today her urinalysis was completely unremarkable.  I have a urine culture pending.  I will refer her to urology for the urinary incontinence and nocturia.  -     Urinalysis-UC if Indicated  -     Culture, Urine    Morbid obesity (H)  Counseled on healthy lifestyle modifications    Bipolar I disorder, single manic episode (H)  Stable at this time.  No change in medications.  She sees a psychiatrist    Chronic obstructive pulmonary disease, unspecified COPD type (H)  Unsure if she has COPD or asthma.  ACT=19  May need to see pulmonology    SUBJECTIVE:    Emani Vargas is a 53 y.o. female who came in today to discuss a urinalysis that was done by her rheumatologist for the evaluation of multiple joint pain.  The urinalysis showed moderate leukocytes with 3-5 RBC, 10-25 WBC.  Urine culture was negative.  For the last 2 months now the patient has had intermittent dysuria with intermittent lower abdominal pain.  She has also noted urinary frequency, urinary urgency, and nocturia.  She has had stress incontinence for over a year now.    The patient has bipolar disorder for which she sees psychiatry.  She also has questionable COPD and asthma.  ACT=19      Review of Systems (except those mentioned above)  Constitutional: Negative.   HENT: Negative.   Eyes: Negative.   Respiratory: Negative.   Cardiovascular: Negative.   Gastrointestinal: Negative.   Endocrine: Negative.   Genitourinary: Negative.   Musculoskeletal: Negative.   Skin: Negative.   Allergic/Immunologic: Negative.   Neurological: Negative.   Hematological: Negative.   Psychiatric/Behavioral: Negative.     Patient Active Problem List    Diagnosis Date Noted     Morbid obesity (H) 08/15/2018      Knee osteoarthritis 08/28/2017     Chronic obstructive pulmonary disease, unspecified COPD type (H)      Bipolar affective disorder, remission status unspecified (H)      Chronic pain syndrome      Osteoporosis 05/05/2017     Aromatase inhibitor use 05/05/2017     Refusal of blood transfusions as patient is Christian 04/12/2017     Malignant neoplasm of upper-outer quadrant of right breast in female, estrogen receptor positive (H) 04/11/2017     Compression fracture of vertebral column with routine healing 09/09/2016     Mild intermittent asthma without complication 02/24/2015     Osteoarthrosis, unspecified whether generalized or localized, pelvic region and thigh 09/24/2014     Allergic rhinitis      Vitamin D Deficiency      Endometriosis      Chronic Pain      Insomnia      Bipolar Disorder      Hearing Loss      Allergies   Allergen Reactions     Cat/Feline Products Itching     Egg Derived      Flu like symptoms--pain     Trees Other (See Comments)     Grass, itching     Current Outpatient Prescriptions   Medication Sig Dispense Refill     acetaminophen (TYLENOL) 500 MG tablet Take 2 tablets (1,000 mg total) by mouth 3 (three) times a day.  0     albuterol (PROVENTIL HFA;VENTOLIN HFA) 90 mcg/actuation inhaler Inhale 2 puffs every 6 (six) hours as needed for wheezing. 2 Inhaler 0     buPROPion (WELLBUTRIN XL) 150 MG 24 hr tablet Take 1 tablet (150 mg total) by mouth every morning. 90 tablet 3     cetirizine (ZYRTEC) 10 MG tablet Take 10 mg by mouth daily.       cholecalciferol, vitamin D3, 1,000 unit tablet Take 2,000 Units by mouth daily.       dextroamphetamine-amphetamine 20 mg Tab Take 1 tablet by mouth 2 (two) times a day.  0     EPIPEN 2-DMITRY 0.3 mg/0.3 mL (1:1,000) atIn Inject 0.3 mg into the shoulder, thigh, or buttocks once as needed.        FLUoxetine (PROZAC) 20 MG capsule Take 20 mg by mouth daily.       gabapentin (NEURONTIN) 300 MG capsule TAKE 3 CAPSULES BY MOUTH TWICE DAILY 180 capsule 8      gabapentin (NEURONTIN) 300 MG capsule Take 3 capsules (900 mg total) by mouth 3 (three) times a day. 270 capsule 1     lidocaine (XYLOCAINE) 5 % ointment Apply to the affected area BID prn for pain. 35.44 g 1     magnesium citrate solution Take 296 mL by mouth daily as needed.       meloxicam (MOBIC) 7.5 MG tablet Take 7.5 mg by mouth daily.       methocarbamol (ROBAXIN) 500 MG tablet TAKE 1 TABLET (500 MG TOTAL) BY MOUTH AS NEEDED. 30 tablet 1     multivitamin with minerals (THERA-M) 9 mg iron-400 mcg Tab tablet Take 1 tablet by mouth daily.       omeprazole (PRILOSEC) 20 MG capsule Take 1 capsule (20 mg total) by mouth daily. 90 capsule 2     omeprazole (PRILOSEC) 20 MG capsule TAKE 1 CAPSULE BY MOUTH DAILY 90 capsule 0     traMADol (ULTRAM) 50 mg tablet Take 1-2 tab po q6hr prn for pain. 1 tab for pain levels of 4-6/10. 2 tabs for pain levels of 7-10/10. 90 tablet 1     traZODone (DESYREL) 100 MG tablet Take 1/2 to 1 tablet by mouth nightly at bedtime. 90 tablet 0     amoxicillin (AMOXIL) 500 MG capsule 2,000 mg.       No current facility-administered medications for this visit.      Past Medical History:   Diagnosis Date     Allergic rhinitis      Asthma      Bipolar 1 disorder (H)      Breast cancer of upper-outer quadrant of right female breast (H) 4/11/2017     Chronic pain     Back pain due to arthritis.     COPD (chronic obstructive pulmonary disease) (H)      Endometriosis      Fibroid uterus      Hx of radiation therapy 2017     Insomnia      Osteoarthritis      Overweight      Refusal of blood transfusions as patient is Roman Catholic 4/12/2017     Sensorineural hearing loss, bilateral      Vitamin D deficiency      Past Surgical History:   Procedure Laterality Date     BREAST BIOPSY Right 2000's     BREAST LUMPECTOMY Right 03/10/2017    with sentinel lymph node biopsy.     KNEE ARTHROSCOPY Right 1996    Description: Arthroscopy Knee Right;  Recorded: 12/08/2011;     KNEE SURGERY Right 1997     lateral release     LAPAROSCOPIC ENDOMETRIOSIS FULGURATION  2002     AR TOTAL KNEE ARTHROPLASTY Right 8/28/2017    Procedure: RIGHT TOTAL KNEE ARTHROPLASTY;  Surgeon: Antony Elias MD;  Location: [facility] OR;  Service: Orthopedics     TOTAL HIP ARTHROPLASTY N/A 9/24/2014    Procedure: LEFT HIP TOTAL ARTHROPLASTY;  Surgeon: Antony Elias MD;  Location: [facility] Main OR;  Service:      TOTAL HIP ARTHROPLASTY Right 8/10/2015    Procedure: HIP TOTAL ARTHROPLASTY RIGHT;  Surgeon: Antony Elias MD;  Location: [facility] OR;  Service:      Social History     Social History     Marital status:      Spouse name: N/A     Number of children: N/A     Years of education: N/A     Occupational History     Disabled.      Was an .     Social History Main Topics     Smoking status: Former Smoker     Packs/day: 1.00     Years: 7.00     Quit date: 1/1/1985     Smokeless tobacco: Never Used     Alcohol use 0.0 oz/week     0 Standard drinks or equivalent per week      Comment: 1 drink/month     Drug use: No      Comment: past use of marijuana and hash.     Sexual activity: Not Asked     Other Topics Concern     None     Social History Narrative     Family History   Problem Relation Age of Onset     Depression Mother      COPD Mother      Diabetes Father      Skin cancer Father 45     Colon cancer Maternal Grandmother      Colon cancer Maternal Grandfather      Diabetes Paternal Grandmother      Colon cancer Paternal Grandmother 80     Breast cancer Cousin 48     Paternal side.     Testicular cancer Brother 44     Lung cancer Paternal Aunt 60     Anesthesia problems Neg Hx          OBJECTIVE:    Vitals:    08/15/18 1316   BP: 118/78   Pulse: 64   Weight: (!) 259 lb 5 oz (117.6 kg)     Body mass index is 38.29 kg/(m^2).    Physical Exam:  Constitutional: Patient is oriented to person, place, and time. Patient appears well-developed and well-nourished. No distress.   Head:  Normocephalic and atraumatic.   Right Ear: External ear normal.   Left Ear: External ear normal.   Nose: Nose normal.   Mouth/Throat: Oropharynx is clear and moist. No oropharyngeal exudate.   Eyes: Conjunctivae and EOM are normal. Right eye exhibits no discharge. Left eye exhibits no discharge. No scleral icterus.   Neurological: Patient is alert and oriented to person, place, and time. Patient has normal reflexes. No cranial nerve deficit. Coordination normal.   Skin: No rash noted. Patient is not diaphoretic. No erythema. No pallor.           Results for orders placed or performed in visit on 08/15/18   Urinalysis-UC if Indicated   Result Value Ref Range    Color, UA Yellow Colorless, Yellow, Straw, Light Yellow    Clarity, UA Clear Clear    Glucose, UA Negative Negative    Bilirubin, UA Negative Negative    Ketones, UA Negative Negative    Specific Gravity, UA 1.020 1.005 - 1.030    Blood, UA Negative Negative    pH, UA 7.5 5.0 - 8.0    Protein, UA Negative Negative mg/dL    Urobilinogen, UA 0.2 E.U./dL 0.2 E.U./dL, 1.0 E.U./dL    Nitrite, UA Negative Negative    Leukocytes, UA Negative Negative

## 2021-06-19 NOTE — LETTER
"Letter by Marques Mary MD at      Author: Marques Mary MD Service: -- Author Type: --    Filed:  Encounter Date: 3/13/2019 Status: (Other)       Shira Yap MD  [address] [facility] [address]                                  March 14, 2019    Patient: Emani Vargas   MR Number: [id number]   YOB: 1965   Date of Visit: 3/13/2019     Dear Dr. Rosalba Yap MD:    I saw Emani Vargas on the above date at the [facility] [place]. Below are the relevant portions of my assessment and plan of care.    If you have questions, please do not hesitate to call me. I look forward to following Emani along with you.    Sincerely,        Marques Mary MD          CC  No Recipients  Marques Mary MD  3/14/2019  6:04 PM  Sign at close encounter  Pulmonary Clinic Outpatient Consultation    Assessment and Plan:   54 year old female with a history of remote tobacco use, obesity, vitamin D deficiency, osteoarthritis s/p bilateral LALA and right TKA, insomnia, possible asthma, right breast cancer s/p lumpectomy and radiation, chronic low back pain, bipolar disorder, allergic rhinitis, and endometriosis, presenting for evaluation of chronic cough and hemoptysis.    Chronic cough, hemoptysis: Has had troublesome cough for \"at least six months, possibly years.\" Amount of blood is minimal and infrequent; mostly nonbloody phlegm when cough is productive. Negative IGRA and CXR is completely clear; at this point I am less concerned about occasional small-volme hemoptysis than the chronic cough itself. Broad DDx. 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 DDx includes GERD, and she endorses ongoing chest burning at night despite 20 mg of daily omeprazole. DDx includes asthma; has history of wheezing and allergies; was previously on " "mometasone-formoterol but felt that it did not help; that was two years ago by her reckoning.    Plan:  - obtain pulmonary function testing  - start nasal fluticasone one spray in each nostril daily  - start montelukast 10 mg at bedtime  - increase omeprazole from 20 mg daily to 40 mg daily  - continue cetirizine 10 mg daily  - follow up in six weeks  - encouraged her to call any time with questions or concerning symptoms    I appreciate the opportunity to participate in the care of Ms. Vargas.  Please feel free to contact me at any time.    CCx: chronic cough    HPI: 54 year old female with a history of remote tobacco use, obesity, vitamin D deficiency, osteoarthritis s/p bilateral LALA and right TKA, insomnia, COPD/asthma, right breast cancer s/p lumpectomy and radiation, chronic low back pain, bipolar disorder, allergic rhinitis, and endometriosis, presenting for evaluation of chronic cough. Started in January, woke up coughing. Initially pink, then some dark phlegm with some blood streaking. No emesis. Had negative IGRA (Quantiferon). Coughs \"all the time,\" for at least six months, possibly years. Has lots of sinus pressure and congestion, especially this winter. Has heaviness in the chest, occasional chills and sweats. Chest burning at night despite 20 mg omeprazole. Saw allergist in the past.Frequent dyspnea and exhaustion, occasional wheezing. Has \"many allergies.\" Started smoking at age 13, up to 1 ppd, quit in 1985. 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 Uses albuterol; occasionally helps. Was on mometasone-formoterol at least two years ago. On cetirizine. Thinks she was on montelukast in the past. Mother smoked and had COPD. Paternal grandmother had lung cancer. Father had lung problems from working in a print shop.    ROS:  A 12-system review was obtained and was negative with the exception of the symptoms endorsed in the history of present illness.    PMH:  BMI " 38.5  Hearing loss  OA s/p bilateral LALA, right TKA  Insomnia  Possible asthma  Back pain  Right breast cancer s/p lumpectomy and radiation  Bipolar  Allergic rhinitis  Multiple allergies  endometriosis    PSH:  Past Surgical History:   Procedure Laterality Date   ? BREAST BIOPSY Right 2000's   ? BREAST BIOPSY Right 02/20/2017   ? BREAST LUMPECTOMY Right 03/10/2017    with sentinel lymph node biopsy.   ? KNEE ARTHROSCOPY Right 1996    Description: Arthroscopy Knee Right;  Recorded: 12/08/2011;   ? KNEE SURGERY Right 1997    lateral release   ? LAPAROSCOPIC ENDOMETRIOSIS FULGURATION  2002   ? [place] TOTAL KNEE ARTHROPLASTY Right 8/28/2017    Procedure: RIGHT TOTAL KNEE ARTHROPLASTY;  Surgeon: Antony Elias MD;  Location: [facility];  Service: Orthopedics   ? TOTAL HIP ARTHROPLASTY N/A 9/24/2014    Procedure: LEFT HIP TOTAL ARTHROPLASTY;  Surgeon: Antony Elias MD;  Location: [facility] OR;  Service:    ? TOTAL HIP ARTHROPLASTY Right 8/10/2015    Procedure: HIP TOTAL ARTHROPLASTY RIGHT;  Surgeon: Antony Elias MD;  Location: [facility] OR;  Service:        Allergies:  Allergies   Allergen Reactions   ? Cat/Feline Products Itching   ? Egg Derived      Flu like symptoms--pain   ? Trees Other (See Comments)     Grass, itching       Family HX:  Family History   Problem Relation Age of Onset   ? Depression Mother    ? COPD Mother    ? Diabetes Father    ? Skin cancer Father 45   ? Colon cancer Maternal Grandmother    ? Colon cancer Maternal Grandfather    ? Diabetes Paternal Grandmother    ? Colon cancer Paternal Grandmother 80   ? Breast cancer Cousin 48        Paternal side.   ? Testicular cancer Brother 44   ? Lung cancer Paternal Aunt 60   ? Anesthesia problems Neg Hx        Social Hx:  Social History     Socioeconomic History   ? Marital status:      Spouse name: Not on file   ? Number of children: Not on file   ? Years of education: Not on file   ? Highest education level: Not  on file   Occupational History   ? Occupation: Disabled.     Comment: Was an .   Social Needs   ? Financial resource strain: Not on file   ? Food insecurity:     Worry: Not on file     Inability: Not on file   ? Transportation needs:     Medical: Not on file     Non-medical: Not on file   Tobacco Use   ? Smoking status: Former Smoker     Packs/day: 1.00     Years: 7.00     Pack years: 7.00     Last attempt to quit: 1985     Years since quittin.2   ? Smokeless tobacco: Never Used   Substance and Sexual Activity   ? Alcohol use: Yes     Alcohol/week: 0.0 oz     Comment: 1 drink/month   ? Drug use: No     Comment: past use of marijuana and hash.   ? Sexual activity: Not on file   Lifestyle   ? Physical activity:     Days per week: Not on file     Minutes per session: Not on file   ? Stress: Not on file   Relationships   ? Social connections:     Talks on phone: Not on file     Gets together: Not on file     Attends Judaism service: Not on file     Active member of club or organization: Not on file     Attends meetings of clubs or organizations: Not on file     Relationship status: Not on file   ? Intimate partner violence:     Fear of current or ex partner: Not on file     Emotionally abused: Not on file     Physically abused: Not on file     Forced sexual activity: Not on file   Other Topics Concern   ? Not on file   Social History Narrative   ? Not on file       Current Meds:  Current Outpatient Medications   Medication Sig Dispense Refill   ? acetaminophen (TYLENOL) 500 MG tablet Take 1,000 mg by mouth every 6 (six) hours as needed for pain.     ? albuterol (PROVENTIL HFA;VENTOLIN HFA) 90 mcg/actuation inhaler Inhale 2 puffs every 6 (six) hours as needed for wheezing. 2 Inhaler 0   ? buPROPion (WELLBUTRIN XL) 150 MG 24 hr tablet Take 1 tablet (150 mg total) by mouth every morning. 90 tablet 3   ? cetirizine (ZYRTEC) 10 MG tablet Take 10 mg by mouth 2 (two) times a day.            ?  "cholecalciferol, vitamin D3, 1,000 unit tablet Take 2,000 Units by mouth daily.     ? dextroamphetamine-amphetamine 20 mg Tab Take 1 tablet by mouth 2 (two) times a day.  0   ? FLUoxetine (PROZAC) 20 MG capsule Take 20 mg by mouth daily.     ? gabapentin (NEURONTIN) 400 MG capsule 2400 mg daily  11   ? letrozole (FEMARA) 2.5 mg tablet Take 1 tablet (2.5 mg total) by mouth daily. 90 tablet 3   ? lidocaine (XYLOCAINE) 5 % ointment Apply to the affected area BID prn for pain. 35.44 g 1   ? magnesium citrate solution Take 296 mL by mouth daily as needed.     ? methocarbamol (ROBAXIN) 500 MG tablet TAKE 1 TABLET (500 MG TOTAL) BY MOUTH AS NEEDED. 30 tablet 1   ? traMADol (ULTRAM) 50 mg tablet Take 1-2 tab po q6hr prn for pain. 1 tab for pain levels of 4-6/10. 2 tabs for pain levels of 7-10/10. 90 tablet 1   ? traZODone (DESYREL) 100 MG tablet TAKE 1/2 TO 1 TABLET BY MOUTH NIGHTLY AT BEDTIME. 90 tablet 3   ? fluticasone (FLONASE ALLERGY RELIEF) 50 mcg/actuation nasal spray One spray in each nostril daily 16 g 12   ? montelukast (SINGULAIR) 10 mg tablet Take 1 tablet (10 mg total) by mouth at bedtime. 30 tablet 11   ? omeprazole (PRILOSEC) 40 MG capsule Take 1 capsule (40 mg total) by mouth daily before breakfast. 30 capsule 11     No current facility-administered medications for this visit.      Physical Exam:  /80   Pulse 84   Resp 12   Ht 5' 10\" (1.778 m)   Wt (!) 268 lb (121.6 kg)   LMP 08/10/2012   SpO2 95%   Breastfeeding? No   BMI 38.45 kg/m     Gen: alert, oriented, no distress  HEENT: nasal turbinates are unremarkable, no oropharyngeal lesions, no cervical or supraclavicular lymphadenopathy  CV: RRR, no M/G/R  Resp: CTAB, no focal crackles or wheezes  Abd: soft, nontender, no palpable organomegaly  Skin: no apparent rashes  Ext: no cyanosis, clubbing or edema  Neuro: alert, nonfocal    Labs:  reviewed    Imaging studies:  CXR (3/12/19):  - directly reviewed  - clear lungs  - no effusions    Marques" MD Usman  [facility] [place]  Cell [phone]  Office [phone]  Pager [phone]

## 2021-06-19 NOTE — LETTER
"Letter by Marques Mary MD at      Author: Marques Mary MD Service: -- Author Type: --    Filed:  Encounter Date: 4/26/2019 Status: (Other)         Shira Yap MD  [address] [facility] [address]                                  April 27, 2019    Patient: Emani Vargas   MR Number: [id number]   YOB: 1965   Date of Visit: 4/26/2019     Dear Dr. Rosalba Yap MD:    I saw Emani Vargas on the above date at the [facility] [place]. Below are the relevant portions of my assessment and plan of care.    If you have questions, please do not hesitate to call me. I look forward to following Emani along with you.    Sincerely,        Marques Mary MD          CC  No Recipients  Marques Mary MD  4/27/2019  1:17 PM  Sign at close encounter  Pulmonary Clinic Follow-up Visit    Assessment and Plan:   54 year old female with a history of remote tobacco use, obesity, vitamin D deficiency, osteoarthritis s/p bilateral LALA and right TKA, insomnia, possible asthma, GERD, right breast cancer s/p lumpectomy and radiation, chronic low back pain, bipolar disorder, allergic rhinitis, endometriosis, and chronic cough, presenting for follow-up.     Chronic cough: Present for years. Empiric therapy for upper airway cough syndrome (\"postnasal drip\" due to chronic rhinosinusitis) with nasal fluticasone and montelukast in addition to cetirizine, and increase in omeprazole from 20 mg daily to 40 mg daily (was having persistent nocturnal chest burning despite the lower dose), led to some improvement (from 6/10 to 4/10 with 10 being worse), but still has bothersome cough. Most prominent when lauging, with occasional white to off-white phlegm. Not worse at any particular time of day. With the cough she occasionally has dyspnea. Has \"constant drainage in the back of the throat.\" Has history of wheezing and allergies; was previously on mometasone-formoterol but felt that it did not help; that was " "about two years ago by her reckoning. Complete PFTs are normal. Use of albuterol HFA at home does sometimes help with the cough and associated dyspnea. 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     Plan:  - discussed options of possible empiric therapies, possible additional testing  - will try more aggressive therapy for chronic rhinosinusitis and possible asthma first before additional testing  - stop cetirizine  - start loratadine-pseudoephedrine 24-hr tab one daily  - start fluticasone-vilanterol 200-25 mcg one inhalation daily; rinse/gargle/spit water after use  - increase nasal fluticasone from one spray in each nostril daily to one spray in each nostril two times a day  - continue montelukast 10 mg at bedtime  - follow up in 6 weeks  - if refractory, consider methacholine challenge, possible sinus CT and chest CT, possible nasal antihistamine  - encouraged her to call any time with questions or concerning symptoms     I appreciate the opportunity to participate in the care of Ms. Vargas.  Please feel free to contact me at any time.     CCx: chronic cough    HPI: 54 year old female with a history of remote tobacco use, obesity, vitamin D deficiency, osteoarthritis s/p bilateral LALA and right TKA, insomnia, possible asthma, GERD, right breast cancer s/p lumpectomy and radiation, chronic low back pain, bipolar disorder, allergic rhinitis, endometriosis, and chronic cough, presenting for follow-up. Present for years. Empiric therapy for upper airway cough syndrome (\"postnasal drip\" due to chronic rhinosinusitis) with nasal fluticasone and montelukast in addition to cetirizine, and increase in omeprazole from 20 mg daily to 40 mg daily (was having persistent nocturnal chest burning despite the lower dose), led to some improvement (from 6/10 to 4/10 with 10 being worse), but still has bothersome cough. Most prominent when lauging, with occasional white to off-white phlegm. Not worse " "at any particular time of day. With the cough she occasionally has dyspnea. Has \"constant drainage in the back of the throat.\" Has history of wheezing and allergies; was previously on mometasone-formoterol but felt that it did not help; that was about two years ago by her reckoning. Complete PFTs are normal. Use of albuterol HFA at home does sometimes help with the cough and associated dyspnea. Feels an occasional tightness in the center of her chest. 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    ROS:  A 12-system review was obtained and was negative with the exception of the symptoms endorsed in the history of present illness.    PMH:  BMI 38.2  Hearing loss  OA s/p bilateral LALA, right TKA  Insomnia  Possible asthma  Back pain  Right breast cancer s/p lumpectomy and radiation  Bipolar  Allergic rhinitis  Multiple allergies  endometriosis    PSH:  Past Surgical History:   Procedure Laterality Date   ? BREAST BIOPSY Right 2000's   ? BREAST BIOPSY Right 02/20/2017   ? BREAST LUMPECTOMY Right 03/10/2017    with sentinel lymph node biopsy.   ? KNEE ARTHROSCOPY Right 1996    Description: Arthroscopy Knee Right;  Recorded: 12/08/2011;   ? KNEE SURGERY Right 1997    lateral release   ? LAPAROSCOPIC ENDOMETRIOSIS FULGURATION  2002   ? [place] TOTAL KNEE ARTHROPLASTY Right 8/28/2017    Procedure: RIGHT TOTAL KNEE ARTHROPLASTY;  Surgeon: Antony Elias MD;  Location: [facility];  Service: Orthopedics   ? TOTAL HIP ARTHROPLASTY N/A 9/24/2014    Procedure: LEFT HIP TOTAL ARTHROPLASTY;  Surgeon: Antony Elias MD;  Location: [facility] OR;  Service:    ? TOTAL HIP ARTHROPLASTY Right 8/10/2015    Procedure: HIP TOTAL ARTHROPLASTY RIGHT;  Surgeon: Anotny Elias MD;  Location: [facility] OR;  Service:        Allergies:  Allergies   Allergen Reactions   ? Cat/Feline Products Itching   ? Egg Derived      Flu like symptoms--pain   ? Trees Other (See Comments)     Grass, " itching       Family HX:  Family History   Problem Relation Age of Onset   ? Depression Mother    ? COPD Mother    ? Diabetes Father    ? Skin cancer Father 45   ? Colon cancer Maternal Grandmother    ? Colon cancer Maternal Grandfather    ? Diabetes Paternal Grandmother    ? Colon cancer Paternal Grandmother 80   ? Breast cancer Cousin 48        Paternal side.   ? Testicular cancer Brother 44   ? Lung cancer Paternal Aunt 60   ? Anesthesia problems Neg Hx        Social Hx:  Social History     Socioeconomic History   ? Marital status:      Spouse name: Not on file   ? Number of children: Not on file   ? Years of education: Not on file   ? Highest education level: Not on file   Occupational History   ? Occupation: Disabled.     Comment: Was an .   Social Needs   ? Financial resource strain: Not on file   ? Food insecurity:     Worry: Not on file     Inability: Not on file   ? Transportation needs:     Medical: Not on file     Non-medical: Not on file   Tobacco Use   ? Smoking status: Former Smoker     Packs/day: 1.00     Years: 8.00     Pack years: 8.00     Last attempt to quit: 1985     Years since quittin.3   ? Smokeless tobacco: Never Used   Substance and Sexual Activity   ? Alcohol use: Yes     Alcohol/week: 0.0 oz     Comment: 1 drink/month   ? Drug use: No     Comment: past use of marijuana and hash.   ? Sexual activity: Not on file   Lifestyle   ? Physical activity:     Days per week: Not on file     Minutes per session: Not on file   ? Stress: Not on file   Relationships   ? Social connections:     Talks on phone: Not on file     Gets together: Not on file     Attends Worship service: Not on file     Active member of club or organization: Not on file     Attends meetings of clubs or organizations: Not on file     Relationship status: Not on file   ? Intimate partner violence:     Fear of current or ex partner: Not on file     Emotionally abused: Not on file     Physically  abused: Not on file     Forced sexual activity: Not on file   Other Topics Concern   ? Not on file   Social History Narrative   ? Not on file       Current Meds:  Current Outpatient Medications   Medication Sig Dispense Refill   ? acetaminophen (TYLENOL) 500 MG tablet Take 1,000 mg by mouth every 6 (six) hours as needed for pain.     ? albuterol (PROAIR HFA;PROVENTIL HFA;VENTOLIN HFA) 90 mcg/actuation inhaler Inhale 1-2 puffs every 4 (four) hours as needed for wheezing or shortness of breath. 1 Inhaler 11   ? buPROPion (WELLBUTRIN XL) 150 MG 24 hr tablet Take 1 tablet (150 mg total) by mouth every morning. 90 tablet 3   ? cholecalciferol, vitamin D3, 1,000 unit tablet Take 2,000 Units by mouth daily.     ? dextroamphetamine-amphetamine 20 mg Tab Take 1 tablet by mouth 2 (two) times a day.  0   ? diclofenac sodium (VOLTAREN) 1 % Gel Apply approximately 1/2-1 gm over affected hand and/or left elbow joints tid-qid, as needed. 1 Tube 2   ? FLUoxetine (PROZAC) 20 MG capsule Take 20 mg by mouth daily.     ? fluticasone furoate-vilanterol (BREO ELLIPTA) 200-25 mcg/dose DsDv inhaler Inhale 1 puff daily. 30 each 11   ? fluticasone propionate (FLONASE ALLERGY RELIEF) 50 mcg/actuation nasal spray One spray in each nostril twice daily. 16 g 12   ? gabapentin (NEURONTIN) 400 MG capsule 2400 mg daily  11   ? letrozole (FEMARA) 2.5 mg tablet Take 1 tablet (2.5 mg total) by mouth daily. 90 tablet 3   ? loratadine-pseudoephedrine (CLARITIN-D 24 HOUR)  mg per 24 hr tablet Take 1 tablet by mouth daily. 30 tablet 11   ? magnesium citrate solution Take 296 mL by mouth daily as needed.     ? methocarbamol (ROBAXIN) 500 MG tablet Take 1 tablet (500 mg total) by mouth 3 (three) times a day. As needed 90 tablet 6   ? montelukast (SINGULAIR) 10 mg tablet Take 1 tablet (10 mg total) by mouth at bedtime. 30 tablet 11   ? omeprazole (PRILOSEC) 40 MG capsule Take 1 capsule (40 mg total) by mouth daily before breakfast. 30 capsule 11   ?  "traZODone (DESYREL) 100 MG tablet TAKE 1/2 TO 1 TABLET BY MOUTH NIGHTLY AT BEDTIME. 90 tablet 3     No current facility-administered medications for this visit.        Physical Exam:  /78   Pulse 86   Ht 5' 10\" (1.778 m)   Wt (!) 266 lb (120.7 kg)   LMP 08/10/2012   SpO2 95%   Breastfeeding? No   BMI 38.17 kg/m     Gen: alert, oriented, no distress  HEENT: nasal turbinates are mildly edematous, no oropharyngeal lesions, no cervical or supraclavicular lymphadenopathy  CV: RRR, no M/G/R  Resp: CTAB, no focal crackles or wheezes  Abd: soft, nontender, no palpable organomegaly  Skin: no apparent rashes  Ext: no cyanosis, clubbing or edema  Neuro: alert, nonfocal    Labs:  reviewed     Imaging studies:  CXR (3/12/19):  - directly reviewed  - clear lungs  - no effusions    PFTs (4/26/19):  FEV1/FVC  is normal  FEV1 is normal  FVC is normal  There was no improvement in spirometry after a single inhaled dose of bronchodilator  TLC is normal  DLCO is normal when it is corrected for hemoglobin.    Marques Mary MD  Pulmonary and Critical Care Medicine  [facility]  Cell [phone]  Office [phone]  Pager [phone]         "

## 2021-06-20 NOTE — PROGRESS NOTES
"Emani FOX Sam who presents today with a chief complaint of Follow-up, joint pains        Joint Pains:  yes  Location: Mainly knee's, shoulders (r>l).  Wrists, elbows.  Onset: chronic  Intensity:   5-6/10, \"liveable\"  AM Stiffness: few hrs  Alleviating/Aggravating Factors: tramadol only provides mild relief, takes seldomly.  Tolerating Meds: yes  Other:      ROS:  Patient has occasional chest pain and cough (pmd aware, per pt), no: shortness of breath, abdominal pain, + chronic nausea, no: vomiting, rashes, fevers, oral ulcers and recent infections.  Patient admits to having a good appetite      Problem List:  Patient Active Problem List   Diagnosis     Vitamin D Deficiency     Endometriosis     Chronic Pain     Insomnia     Bipolar Disorder     Hearing Loss     Osteoarthrosis, unspecified whether generalized or localized, pelvic region and thigh     Allergic rhinitis     Mild intermittent asthma without complication     Compression fracture of vertebral column with routine healing     Malignant neoplasm of upper-outer quadrant of right breast in female, estrogen receptor positive (H)     Refusal of blood transfusions as patient is Sabianist     Osteoporosis     Aromatase inhibitor use     Knee osteoarthritis     Chronic obstructive pulmonary disease, unspecified COPD type (H)     Bipolar affective disorder, remission status unspecified (H)     Chronic pain syndrome     Morbid obesity (H)        PMH:   Past Medical History:   Diagnosis Date     Allergic rhinitis      Asthma      Bipolar 1 disorder (H)      Breast cancer of upper-outer quadrant of right female breast (H) 4/11/2017     Chronic pain     Back pain due to arthritis.     COPD (chronic obstructive pulmonary disease) (H)      Endometriosis      Fibroid uterus      Hx of radiation therapy 2017     Insomnia      Osteoarthritis      Overweight      Refusal of blood transfusions as patient is Sabianist 4/12/2017     Sensorineural hearing loss, " bilateral      Vitamin D deficiency        Surgical History:  Past Surgical History:   Procedure Laterality Date     BREAST BIOPSY Right 2000's     BREAST LUMPECTOMY Right 03/10/2017    with sentinel lymph node biopsy.     KNEE ARTHROSCOPY Right 1996    Description: Arthroscopy Knee Right;  Recorded: 12/08/2011;     KNEE SURGERY Right 1997    lateral release     LAPAROSCOPIC ENDOMETRIOSIS FULGURATION  2002     NM TOTAL KNEE ARTHROPLASTY Right 8/28/2017    Procedure: RIGHT TOTAL KNEE ARTHROPLASTY;  Surgeon: Antony Elias MD;  Location: [facility] Main OR;  Service: Orthopedics     TOTAL HIP ARTHROPLASTY N/A 9/24/2014    Procedure: LEFT HIP TOTAL ARTHROPLASTY;  Surgeon: Antony Elias MD;  Location: [facility] Main OR;  Service:      TOTAL HIP ARTHROPLASTY Right 8/10/2015    Procedure: HIP TOTAL ARTHROPLASTY RIGHT;  Surgeon: Antony Elias MD;  Location: [facility] Main OR;  Service:        Family History:  Family History   Problem Relation Age of Onset     Depression Mother      COPD Mother      Diabetes Father      Skin cancer Father 45     Colon cancer Maternal Grandmother      Colon cancer Maternal Grandfather      Diabetes Paternal Grandmother      Colon cancer Paternal Grandmother 80     Breast cancer Cousin 48     Paternal side.     Testicular cancer Brother 44     Lung cancer Paternal Aunt 60     Anesthesia problems Neg Hx        Social History:   reports that she quit smoking about 33 years ago. She has a 7.00 pack-year smoking history. She has never used smokeless tobacco. She reports that she drinks alcohol. She reports that she does not use illicit drugs.    Allergies:  Allergies   Allergen Reactions     Cat/Feline Products Itching     Egg Derived      Flu like symptoms--pain     Trees Other (See Comments)     Grass, itching        Current Medications:  Current Outpatient Prescriptions   Medication Sig Dispense Refill     acetaminophen (TYLENOL) 500 MG tablet Take 2 tablets (1,000 mg total)  by mouth 3 (three) times a day.  0     albuterol (PROVENTIL HFA;VENTOLIN HFA) 90 mcg/actuation inhaler Inhale 2 puffs every 6 (six) hours as needed for wheezing. 2 Inhaler 0     buPROPion (WELLBUTRIN XL) 150 MG 24 hr tablet Take 1 tablet (150 mg total) by mouth every morning. 90 tablet 3     cetirizine (ZYRTEC) 10 MG tablet Take 10 mg by mouth daily.       cholecalciferol, vitamin D3, 1,000 unit tablet Take 2,000 Units by mouth daily.       dextroamphetamine-amphetamine 20 mg Tab Take 1 tablet by mouth 2 (two) times a day.  0     FLUoxetine (PROZAC) 20 MG capsule Take 20 mg by mouth daily.       FLUZONE QUAD 6916-6718, PF, 60 mcg (15 mcg x 4)/0.5 mL Syrg injection TO BE ADMINISTERED BY PHARMACIST FOR IMMUNIZATION  0     gabapentin (NEURONTIN) 400 MG capsule TAKE 2 CAPSULES 3 TIMES DAILY.  11     letrozole (FEMARA) 2.5 mg tablet Take 2.5 mg by mouth daily.  3     lidocaine (XYLOCAINE) 5 % ointment Apply to the affected area BID prn for pain. 35.44 g 1     magnesium citrate solution Take 296 mL by mouth daily as needed.       methocarbamol (ROBAXIN) 500 MG tablet TAKE 1 TABLET (500 MG TOTAL) BY MOUTH AS NEEDED. 30 tablet 1     multivitamin with minerals (THERA-M) 9 mg iron-400 mcg Tab tablet Take 1 tablet by mouth daily.       omeprazole (PRILOSEC) 20 MG capsule Take 1 capsule (20 mg total) by mouth daily. 90 capsule 2     traMADol (ULTRAM) 50 mg tablet Take 1-2 tab po q6hr prn for pain. 1 tab for pain levels of 4-6/10. 2 tabs for pain levels of 7-10/10. 90 tablet 1     traZODone (DESYREL) 100 MG tablet Take 1/2 to 1 tablet by mouth nightly at bedtime. 90 tablet 0     amoxicillin (AMOXIL) 500 MG capsule 2,000 mg.       EPIPEN 2-DMITRY 0.3 mg/0.3 mL (1:1,000) atIn Inject 0.3 mg into the shoulder, thigh, or buttocks once as needed.        gabapentin (NEURONTIN) 300 MG capsule TAKE 3 CAPSULES (900 MG TOTAL) BY MOUTH 3 (THREE) TIMES A DAY. 270 capsule 1     No current facility-administered medications for this visit.             Physical Exam:  /68 (Patient Site: Right Arm, Patient Position: Sitting, Cuff Size: Adult Large)  Pulse 94  Temp 98.9  F (37.2  C) (Oral)   Wt (!) 256 lb 14.4 oz (116.5 kg)  LMP 08/10/2012  BMI 37.94 kg/m2  General: A & O x 3 in NAD  HEENT: EOMI, Non injected/non icteric sclera, no oral lesions noted  CV: s1s2 with RRR, no rubs appreciated   Lungs: CTA B/L, no wheezing , rales or rhonci appreciated  GI: Soft, NT/ND, no rebound, no guarding noted  MS: Passive range of motion testing of wrists and elbows did not reproduce any pains, no synovitis noted.  Positive discomfort involving right shoulder passive range of motion testing.  Positive discomfort involving left knee on passive flexion/extension, no synovitis noted.  Otherwise patient demonstrated good passive/active ROM over other joints with no warmth, erythema, tenderness or synovitis noted over these joints.          Summary/Assessment:    History that includes osteoarthritis, fibromyalgia, chronic low back pain.    Presents for a follow-up visit.    States still experiencing joint pains which overall finds tolerable.  Lately mainly having discomfort involving right shoulder and left knee.  Desires to try cortisone injections for these pains as has had good experience with cortisone injections in the past.    Although has tramadol, has not been taking as desires to avoid narcotic meds unless experiencing moderate to severe pain.    No clear signs of synovitis noted on exam today.    Please see below for management plan.      Pertinent rheumatology/past medical history (please refer to above for more detailed history):      Osteoarthritis    Fibromyalgia    Chronic right > left shoulder pains (likely rotator cuff tendinopathy)    Chronic low back pain    History bilateral hip replacements    History right knee replacement    History of vertebral compression fracture    History right breast cancer    History COPD    Overweight    Rheumatology  medications provided/suggested:    Tramadol as needed      Pertinent medication from other providers or from otc (please refer to above for more detailed med list):    Tylenol  Neurontin  Trazodone  Robaxin  Prozac  Letrozole (MSK pain present prior to initiation)      Pertinent medications already tried:     Tylenol (pruritus)  NSAIDs (upset stomach)    Pertinent lab history:    Noted to have negative/unremarkable: Rheumatoid factor, CCP antibody, CONSTANTINE, Lyme antibody, HLA-B27.    Mildly elevated CRP, normal ESR.    Pertinent imaging/test history:      X-rays of right shoulder from May 2018 showed:  Very minimal degenerative irregularity at the rotator cuff insertion site. Glenohumeral joint appears normal. No fracture or dislocation.     Other:    Was a  for over 25 years.    Plan:      We will inject right shoulder with cortisone today.    We will inject left knee with cortisone today.    Has tramadol for pain relief, takes very seldomly.    Follow-up in 4 months.      Procedure note:      Consent to treat form signed by the patient.  Patient's right shoulder joint was prepped in a sterile manner with an alcohol swab and ChloraPrep applicator.  Injected Kenalog 40 mg 1:1 with lidocaine into right shoulder joint.  Patient tolerated the procedure well with no complications noted.  Patient was advised to place ice over injection sites if sore over the next 24-48 hours. Lucretia MARTINEZ assisted with procedure      Consent to treat form signed by the patient.  Patient's left knee was prepped in a sterile manner with an alcohol swab and ChloraPrep applicator.  Injected Kenalog 40 mg 1:1 with lidocaine into left knee joint.  Patient tolerated the procedure well with no complications noted.  Patient was advised to place ice over injection sites if sore over the next 24-48 hours.  Lucretia CASTORENA assisted with procedure      Spent 30 minutes with greater than half of this time spent with the patient going over differential  diagnosis, prognosis, treatment plan, medication side effects and  answering questions.    Above time noted, does not include time involved with procedure(s).      This note was transcribed using Dragon voice recognition software as a result unintentional grammatical errors or word substitutions may have occurred. Please contact our Rheumatology department if you need any clarification or if you have any related inquiries.    Major side effect profile of medications provided were discussed with the patient.    Kilo Ramon ....................  10/8/2018   4:23 PM

## 2021-06-20 NOTE — PROGRESS NOTES
[facility] Hematology and Oncology Progress Note    Patient: Emani Vargas  MRN: [id number]  Date of Service: 8/24/2018         DNR and DNI.    Adventism and does not want any blood transfusion or blood fractions.     Reason for Visit:    Scheduled routine follow up.    Assessment and Plan:    1) Breast cancer of upper-outer quadrant of right female breast       Pathologic Stage IA (T1c, N0, cM0)    ER and IL positive   HER-2 negative   Oncotype DX score of 18    53 y.o.     2017, February - diagnosed with screening mammogram showing a suspicious lesion in the upper outer quadrant of the right breast.      03/10/17 - right breast biopsy followed by lumpectomy and sentinel lymph node procedure by Dr. Yoder.      Final pathology showed a 15 mm invasive ductal carcinoma, grade 2 with associated DCIS, ER IL positive and HER-2 negative.  Margins were clear.  Four sentinel lymph nodes negative.  Oncotype DX score of 18 which corresponds to an approximately 11% risk of recurrence in the next 10 years on tamoxifen.      CT CAP - no evidence of metastatic disease.  Colonic diverticulosis without evidence of diverticulitis.  Fatty infiltration of the liver.  Postoperative changes in the right axilla and right breast.    Numerous comorbidities including bipolar disorder, COPD, endometriosis and osteoarthritis.      Opted against genetic counselor referral.      Mutually decided to forego adjuvant chemotherapy.    05/17/17 completed 4256 cGy / 16 fx breast conservation EBRT.    05/18/17 - began daily adjuvant aromatase inhibitor (AI) therapy with letrozole 2.5 mg p.o.    History of endometriosis, fibroids and occasional vaginal bleeding suggesting increased risk of second gynecological cancers including endometrial cancer.        02/23/18 diagnostic (B) mammogram - benign findings.    08/23/17:    The patient looks and feels quite good.  Notes occasional mild warm sensations since starting AI therapy.  Otherwise  "tolerating AI therapy very well.      Clinically ROXANA.      Continue monthly self-breast examinations.    Continue daily AI therapy with letrozole, anticipating 5 years adjuvant therapy.    Continue regular follow-up with gynecology with annual examinations.  Pap smears according to standard schedule.  If she has any suspicious symptoms, especially increased vaginal bleeding she will need prompt gynecology evaluation.    12/22/18 - 4-month follow up without labs.    2019, February next mammogram due    2) Osteoporosis prevention:    2016 DEXA scan showed no evidence of osteoporosis.     Quit smoking 30 years ago.      Encouraged her to be physically active and start a walking program.      Continue calcium 4566-7544 mg and vitamin D 1972-7949 iu supplements daily.      Repeat a DEXA scan in 2019.    ECOG Performance:     ECOG Performance Status: 1    Distress Assessment:    Distress Assessment Score: 3        TT > 25 minutes face to face with > 50% counseling and care coordination.    Chetan No, CNP     CC: Shira Yap MD  ____________________________________________    Interim History:    The patient presents looking and feeling quite good other than her chronic arthritic joint discomforts.  She is s/p (B)LALA and states that she needs (B)TKAs.  She continues to note occasional \"twitching\" pains in her (R) breast since surgery and EBRT.  She continues to note occasional mild warm sensations since starting AI therapy, no hot flashes with any diaphoretic episodes.  She denies cough, fever, chills, unusual headaches, visual or mentation disturbance, bowel or bladder issues.  Her appetite and performance status are quite stable.  Her weight is quite stable after loosing a few pounds intentionally by watching portions.    Pain Status:    Currently in Pain: Yes    Review of Systems    Constitutional  Constitutional (WDL): Exceptions to WDL  Fatigue: Fatigue not relieved by rest - Limiting " instrumental ADL (fibromyalgia)  Fever: None  Chills: Mild sensation of cold, shivering, chattering of teeth  Weight Gain: None  Weight Loss: None  Neurosensory  Neurosensory (WDL): Exceptions to WDL  Peripheral Motor Neuropathy: Asymptomatic, clinical or diagnostic observations only, intervention not indicated  Ataxia: Asymptomatic, clinical or diagnostic observations only, intervention not indicated  Peripheral Sensory Neuropathy: Asymptomatic, loss of deep tendon reflexes or paresthesia (Rt side)  Confusion: None  Syncope: None  Cardiovascular  Cardiovascular (WDL): All cardiovascular elements are within defined limits  Pulmonary  Respiratory (WDL): Exceptions to WDL (hx asthma)  Cough: Mild symptoms, nonprescription intervention indicated  Dyspnea: Shortness of breath with moderate exertion  Hypoxia: None  Gastrointestinal  Anorexia: Loss of appetite without alteration in eating habits  Constipation: Occasional or intermittent symptoms, occasional use of stool softeners, laxatives, dietary modification, or enema (varies, IBS)  Diarrhea: Increase of <4 stools per day over baseline, mild increase in ostomy output compared to baseline (few times a month, hx IBS)  Dysphagia: None  Esophagitis: Asymptomatic, clinical or diagnostic observations only, intervention not indicated  Nausea: Oral intake decreased without significant weight loss, dehydration or malnutrition  Pharyngitis: None  Vomitin - 2 episodes ( by 5 minutes) in 24 hrs (2 x's month)  Dysgeusia: None  Dry Mouth: None  Genitourinary  Genitourinary (WDL): Exceptions to WDL  Urinary Frequency: Present (recent UA, UC)  Urinary Retention: None  Urinary Tract Pain: Mild pain  Integumentary  Integumentary (WDL): All integumentary elements are within defined limits  Patient Coping     Distress Assessment  Distress Assessment Score: 3  Accompanied by       Past Histories:    Past Medical History:   Diagnosis Date     Allergic rhinitis      Asthma       "Bipolar 1 disorder (H)      Breast cancer of upper-outer quadrant of right female breast (H) 4/11/2017     Chronic pain     Back pain due to arthritis.     COPD (chronic obstructive pulmonary disease) (H)      Endometriosis      Fibroid uterus      Hx of radiation therapy 2017     Insomnia      Osteoarthritis      Overweight      Refusal of blood transfusions as patient is [place] 4/12/2017     Sensorineural hearing loss, bilateral      Vitamin D deficiency          Past Surgical History:   Procedure Laterality Date     BREAST BIOPSY Right 2000's     BREAST LUMPECTOMY Right 03/10/2017    with sentinel lymph node biopsy.     KNEE ARTHROSCOPY Right 1996    Description: Arthroscopy Knee Right;  Recorded: 12/08/2011;     KNEE SURGERY Right 1997    lateral release     LAPAROSCOPIC ENDOMETRIOSIS FULGURATION  2002     CO TOTAL KNEE ARTHROPLASTY Right 8/28/2017    Procedure: RIGHT TOTAL KNEE ARTHROPLASTY;  Surgeon: Antony Elias MD;  Location: [facility];  Service: Orthopedics     TOTAL HIP ARTHROPLASTY N/A 9/24/2014    Procedure: LEFT HIP TOTAL ARTHROPLASTY;  Surgeon: Antony Elias MD;  Location: [facility] OR;  Service:      TOTAL HIP ARTHROPLASTY Right 8/10/2015    Procedure: HIP TOTAL ARTHROPLASTY RIGHT;  Surgeon: Antony Elias MD;  Location: [facility] OR;  Service:        Physical Exam:    Recent Vitals 8/23/2018   Height 5' 9\"   Weight 261 lbs 10 oz   BSA (m2) 2.4 m2   /56   Pulse 77   Temp 98.7   Temp src 1   SpO2 93   Some recent data might be hidden       GENERAL:   Pleasant.  Alert and oriented.  No distress.    HEENT:   Full head of hair.  SARA.  EOMI.  No pallor.  No icterus.  No mucosal lesions.    LYMPH NODES:  No palpable cervical, axillary or inguinal lymphadenopathy.    CHEST:   Lungs clear.    CVS:    S1 and S2 heard.  Regular rate and rhythm.  No murmur, gallop or rub heard.    ABDOMEN:   Soft.  Not tender. Not distended.  No palpable hepatomegaly or " splenomegaly, masses or ascites.    EXTREMITIES:  Warm.  No peripheral edema.    SKIN:    No rash, bruising or purpura.    BREASTS:  Right c/w lumpectomy and mild post-EBRT hyperpigmentation.  No suspect lumps, bumps or skin changes.  Axilla negative.       Left breast without suspect lumps, bumps or skin changes.  Axilla negative.       Patient declined offer for female  during examination.    Lab Results:    No results found for this or any previous visit (from the past 24 hour(s)).    Imaging:    No new imaging.

## 2021-06-20 NOTE — PROGRESS NOTES
Patient is here for four month follow-up of breast cancer. On letrozole 2.5 mg daily.  Hilaria Bloom RN

## 2021-06-21 NOTE — LETTER
Letter by Marques Mary MD at      Author: Marques Mary MD Service: -- Author Type: --    Filed:  Encounter Date: 3/9/2021 Status: (Other)       10 March 2021    Dear Dr. Rosalba De La Rosa,    See below for documentation of a virtual visit that I had with Emani Vargas ( 1965). Please feel free to call me at any time if needed.    Sincerely,    Marques Mary MD  Pulmonary and Critical Care Medicine  [facility]  Cell [phone]  Office [phone]  Pager [phone]    Emani Vargas is a 56 y.o. female who is being evaluated via a billable video visit.      How would you like to obtain your AVS? MyChart.  If dropped from the video visit, the video invitation should be resent by: Text to cell phone: [phone]  Will anyone else be joining your video visit? No      Video Start Time:     Video-Visit Details    Type of service:  Video Visit    Video End Time (time video stopped): 154  Originating Location (pt. Location): Home    Distant Location (provider location):  [facility]     Platform used for Video Visit: [facility]     Pulmonary Clinic Follow-up Visit    Assessment and Plan:   56 year old female with a history of remote tobacco use, obesity, vitamin D deficiency, osteoarthritis s/p bilateral LALA and right TKA, insomnia, possible asthma, GERD, right breast cancer s/p lumpectomy and radiation, chronic low back pain, bipolar disorder, allergic rhinitis, endometriosis, and chronic cough, presenting for follow-up.     Chronic cough: Currently taking cetirizine 10 mg two times a day. No longer taking loratadine-pseudoephedrine. Taking montelukast. Taking omeprazole 40 mg daily. Already on gabapentin for other indication (may also help with chronic cough). Cough ongoing, about the same, occurs eg when laughing. No longer taking fluticasone-vilanterol; was on it for a couple months, then stopped due to fear of being dependent on it, but states that it helped  somewhat when she was on it. She wants to use it only if her cough worsens.     Plan:  - she is no longer on the loratadine-pseudoephedrine I prescribed, currently taking cetirizine 10 mg two times a day  - continue nasal fluticasone one spray in each nostril two times a day  - continue montelukast 10 mg at bedtime  - continue omeprazole 40 mg daily  - she is holding the fluticasone-vilanterol 200-25 mcg, wants to restart this only if her cough worsens, though noted some improvement with it previously  - follow up in one year or sooner if needed  - encouraged her to call any time with questions or concerning symptoms    Marques Mary MD  Pulmonary and Critical Care Medicine  [facility]  Cell [phone]  Office [phone]  Pager [phone]    CCx: chronic cough    HPI: 56 year old female with a history of remote tobacco use, obesity, vitamin D deficiency, osteoarthritis s/p bilateral LALA and right TKA, insomnia, possible asthma, GERD, right breast cancer s/p lumpectomy and radiation, chronic low back pain, bipolar disorder, allergic rhinitis, endometriosis, and chronic cough, presenting for follow-up. Currently taking cetirizine 10 mg two times a day. No longer taking loratadine-pseudoephedrine. Taking montelukast. Taking omeprazole 40 mg daily. Already on gabapentin for other indication (may also help with chronic cough). Cough ongoing, about the same, occurs eg when laughing. No longer taking fluticasone-vilanterol; was on it for a couple months, then stopped due to fear of being dependent on it, but states that it helped somewhat when she was on it. She wants to use it only if her cough worsens.    ROS:  A 12-system review was obtained and was negative with the exception of the symptoms endorsed in the history of present illness.    PMH:  BMI 41.1  Hearing loss  OA s/p bilateral LALA, right TKA  Insomnia  Possible asthma  Back pain  Right breast cancer s/p lumpectomy and  radiation  Bipolar  allergic rhinitis  Multiple allergies  Endometriosis  Likely chronic intermittent sinusitis  Chronic cough  GERD    PSH:  Past Surgical History:   Procedure Laterality Date   ? BREAST BIOPSY Right 2000's   ? BREAST BIOPSY Right 02/20/2017   ? BREAST LUMPECTOMY Right 03/10/2017    with sentinel lymph node biopsy.   ? KNEE ARTHROSCOPY Right 1996    Description: Arthroscopy Knee Right;  Recorded: 12/08/2011;   ? KNEE SURGERY Right 1997    lateral release   ? LAPAROSCOPIC ENDOMETRIOSIS FULGURATION  2002   ? KS HYSTEROSCOPY,W/ENDO BX N/A 12/15/2020    Procedure: HYSTEROSCOPY, WITH DILATION AND CURETTAGE;  Surgeon: Scottie Canas MD;  Location: [facility] OR;  Service: Gynecology   ? KS TOTAL KNEE ARTHROPLASTY Right 8/28/2017    Procedure: RIGHT TOTAL KNEE ARTHROPLASTY;  Surgeon: Antony Elias MD;  Location: [facility] Main OR;  Service: Orthopedics   ? TOTAL HIP ARTHROPLASTY N/A 9/24/2014    Procedure: LEFT HIP TOTAL ARTHROPLASTY;  Surgeon: Antony Elias MD;  Location: [facility] Main OR;  Service:    ? TOTAL HIP ARTHROPLASTY Right 8/10/2015    Procedure: HIP TOTAL ARTHROPLASTY RIGHT;  Surgeon: Antony Elias MD;  Location: [facility] Main OR;  Service:        Allergies:  Allergies   Allergen Reactions   ? Cat/Feline Products Itching   ? Trees Other (See Comments)     Grass, itching       Family HX:  Family History   Problem Relation Age of Onset   ? Depression Mother    ? COPD Mother    ? Diabetes Father    ? Skin cancer Father 45   ? Colon cancer Maternal Grandmother    ? Colon cancer Maternal Grandfather    ? Diabetes Paternal Grandmother    ? Colon cancer Paternal Grandmother 80   ? Breast cancer Cousin 48        Paternal side.   ? Testicular cancer Brother 44   ? Lung cancer Paternal Aunt 60   ? Anesthesia problems Neg Hx        Social Hx:  Social History     Socioeconomic History   ? Marital status:      Spouse name: Not on file   ? Number of children: Not on file    ? Years of education: Not on file   ? Highest education level: Not on file   Occupational History   ? Occupation: Disabled.     Comment: Was an .   Social Needs   ? Financial resource strain: Not on file   ? Food insecurity     Worry: Not on file     Inability: Not on file   ? Transportation needs     Medical: Not on file     Non-medical: Not on file   Tobacco Use   ? Smoking status: Former Smoker     Packs/day: 1.00     Years: 8.00     Pack years: 8.00     Quit date: 1985     Years since quittin.2   ? Smokeless tobacco: Never Used   Substance and Sexual Activity   ? Alcohol use: Yes     Alcohol/week: 0.0 standard drinks     Comment: 1 drink/month   ? Drug use: No     Comment: past use of marijuana and hash.   ? Sexual activity: Not on file   Lifestyle   ? Physical activity     Days per week: Not on file     Minutes per session: Not on file   ? Stress: Not on file   Relationships   ? Social connections     Talks on phone: Not on file     Gets together: Not on file     Attends Jewish service: Not on file     Active member of club or organization: Not on file     Attends meetings of clubs or organizations: Not on file     Relationship status: Not on file   ? Intimate partner violence     Fear of current or ex partner: Not on file     Emotionally abused: Not on file     Physically abused: Not on file     Forced sexual activity: Not on file   Other Topics Concern   ? Not on file   Social History Narrative   ? Not on file       Current Meds:  Current Outpatient Medications   Medication Sig Dispense Refill   ? acetaminophen (TYLENOL) 500 MG tablet Take 1,000 mg by mouth every 6 (six) hours as needed for pain.     ? albuterol (PROAIR HFA;PROVENTIL HFA;VENTOLIN HFA) 90 mcg/actuation inhaler Inhale 1-2 puffs every 4 (four) hours as needed for wheezing or shortness of breath. 1 Inhaler 11   ? buPROPion (WELLBUTRIN XL) 150 MG 24 hr tablet TAKE 1 TABLET BY MOUTH EVERY DAY IN THE MORNING 90 tablet 3    ? celecoxib (CELEBREX) 200 MG capsule Take one tab po qd, prn. 30 capsule 0   ? dextroamphetamine-amphetamine 20 mg Tab Take 1 tablet by mouth 2 (two) times a day.  0   ? diclofenac sodium (VOLTAREN) 1 % Gel Apply approximately 1/2-1 gm over affected hand and/or left elbow joints bid, as needed. 1 Tube 2   ? FLUoxetine (PROZAC) 20 MG capsule Take 20 mg by mouth daily.     ? fluticasone propionate (FLONASE ALLERGY RELIEF) 50 mcg/actuation nasal spray One spray in each nostril twice daily. OFFICE VISIT NEEDED FOR ANY FURTHER REFILLS 16 g 0   ? gabapentin (NEURONTIN) 100 MG capsule 2 capsules. In the morning     ? gabapentin (NEURONTIN) 800 MG tablet 2 tablets at bedtime.     ? magnesium citrate solution Take 296 mL by mouth daily as needed.     ? methocarbamoL (ROBAXIN) 500 MG tablet Take 1 tablet by mouth three times daily as needed 90 tablet 0   ? montelukast (SINGULAIR) 10 mg tablet Take 1 tablet (10 mg total) by mouth at bedtime. 30 tablet 0   ? omeprazole (PRILOSEC) 40 MG capsule TAKE 1 CAPSULE BY MOUTH EVERY DAY BEFORE BREAKFAST 90 capsule 3   ? traZODone (DESYREL) 100 MG tablet 2 tablets at bedtime.       No current facility-administered medications for this visit.        Physical Exam:  no exam due to virtual visit    Labs:  reviewed    Imaging studies:  CXR (March 2019):  - directly reviewed  - clear lungs  - no effusions     CT chest/abdomen/pelvis with oral/IV contrast (April 2017):  - images directly reviewed, formal interpretation follows:  FINDINGS:   CHEST: The lungs are clear. The pleural spaces appear normal. No mediastinal or hilar lymphadenopathy. Postoperative changes are seen in the right breast and right axilla.     ABDOMEN: The liver is diffusely low in attenuation consistent with fatty infiltration. The gallbladder, spleen, adrenal glands, kidneys, and pancreas appear normal. The abdominal aorta is normal in caliber. The appendix is visualized and appears normal.     PELVIS: There are sigmoid  diverticula without evidence of diverticulitis. Beam hardening artifact from bilateral total hip arthroplasties compromises evaluation. No definite mass.     MUSCULOSKELETAL: Bilateral total hip arthroplasties.     IMPRESSION:   CONCLUSION:  1.  No evidence of metastatic disease.     2.  Colonic diverticulosis without evidence of diverticulitis.     3.  Fatty infiltration of the liver.     4.  Postoperative changes in the right axilla and right breast.     CT head (January 2019):  - sinuses appear fairly clear, mild maxillary mucosal thickening     Pulmonary Function Testing  April 2019:  FEV1/FVC  is normal  FEV1 is normal  FVC is normal  There was no improvement in spirometry after a single inhaled dose of bronchodilator  TLC is normal  DLCO is normal when it is corrected for hemoglobin.

## 2021-06-21 NOTE — PROGRESS NOTES
[facility] Hematology and Oncology Progress Note    Patient: Emani Vargas  MRN: [id number]  Date of Service: 11/23/2018      Reason for Visit    Follow-up regarding breast cancer.    Assessment and Plan  Cancer Staging  Malignant neoplasm of upper-outer quadrant of right breast in female, estrogen receptor positive (H)  Staging form: Breast, AJCC 7th Edition  - Pathologic: Stage IA (T1c, N0, cM0) - Signed by Christina Leigh MD on 4/11/2017      ECOG Performance   ECOG Performance Status: 0    Distress Assessment  Distress Assessment Score: 8: Please see the discussion below.    Pain  Pain Score (Initial OR Reassessment): 4: Please see the discussion below.    Ms. Emani Vargas is a 53 y.o. woman had a breast lumpectomy and sentinel lymph node biopsy done on 3/10/17 by Dr. Yoder.  Final pathology showed a 15 mm invasive ductal carcinoma, grade 2 with associated DCIS, ER OK positive and HER-2 negative.  Margins were clear.  She had a fourth sentinel lymph nodes and none of them were involved.  Final stage was IA (T1c, N0, cM0).  She had an Oncotype DX score of 18 which corresponds to an approximately 11% risk of recurrence in the next 10 years on tamoxifen.  She has numerous comorbidities including bipolar disorder, COPD endometriosis and osteoarthritis.  After thorough discussion we mutually agreed not to go for adjuvant chemotherapy.  She completed adjuvant radiation and started letrozole on 5/18/17.    1.  Overall she appears to be doing okay.  She has the usual aches on her body due to fibromyalgia but nothing new otherwise.  I think concerning found on physical examination.  She feels that now that she has an explanation for the multiple muscle aches that she had.  But she feels that is being managed fairly well even though she is not symptomatic.    2.  She requested for me to send a new prescription for letrozole to the pharmacy.  I did so with 90 tablets and 3 refills.  This should last for 1 year.    3.  She  will be due for her annual mammogram in February.  I have ordered a bilateral screening mammogram.    4.  She had a DEXA scan in 2016 which did not show osteoporosis.  Now that she has been on an edematous inhibitor for 2 years we will have a DEXA scan done also around the same time as her mammogram in 2019.  Meanwhile she is to continue with the calcium and vitamin D tablets.    5.  She has already received the seasonal flu vaccine.    6.  Follow-up: Return to clinic with me on Chetan No NP in 3 months after having the mammogram and DEXA scan done.    Time spend >25 minutes face to face with the patient. More than 50 % in counseling and coordination of care.    Problem List    1. Malignant neoplasm of upper-outer quadrant of right breast in female, estrogen receptor positive (H)  Mammo Screening Bilateral    DXA Bone Density Scan    letrozole (FEMARA) 2.5 mg tablet   2. Aromatase inhibitor use  DXA Bone Density Scan   3. Post-menopausal  DXA Bone Density Scan   4. Encounter for screening mammogram for malignant neoplasm of breast   Mammo Screening Bilateral        CC: Shira Yap MD        ______________________________________________________________________________    History of Present Illness    Ms. Emani Vargas is here for follow-up alone.    She is under some stress because she is taking care of her elderly father.  He has not been doing well over the last few weeks.  There has been some lack of support from the rest of the family also.  She is a bit stressed out because of that.  Otherwise she feels that she is doing okay.    She is taking letrozole daily and she feels that she is tolerating it well.    She still has a fibromyalgia pains.  Mostly she feels it in the muscles around her shoulders.  She is glad that she had a rheumatology consultation.  Apart from the irregular pains in the usual areas due to fibromyalgia she has not experienced any new pain.    She says that off and on  she feels a bit congested in her chest from COPD.  She is not feeling likewise at this time.  No cough either.    She says that occasionally she feels a bit nauseated.  Rarely she throws up.  However this is not a regular occurrence.    No fevers or night sweats.  No lumps or bumps anywhere.  No unusual headaches.  No vision changes.  No mouth sores.  No swallowing difficulty.  Weight has remained stable.  No chest pain or palpitation.  No abdominal pain.  No constipation and no diarrhea.  No vaginal bleeding.  No difficulty with urination.  No skin rashes.    Please see below.  A 14 point review of system is otherwise completely negative.      Pain Status  Currently in Pain: Yes    Review of Systems    Constitutional  Constitutional (WDL): Exceptions to WDL  Fatigue: Fatigue relieved by rest  Fever: None  Chills: None  Weight Gain: 5 - <10% from baseline(6#)  Weight Loss: None  Neurosensory  Neurosensory (WDL): Exceptions to WDL  Peripheral Motor Neuropathy: Asymptomatic, clinical or diagnostic observations only, intervention not indicated  Ataxia: Asymptomatic, clinical or diagnostic observations only, intervention not indicated  Peripheral Sensory Neuropathy: Asymptomatic, loss of deep tendon reflexes or paresthesia(hands, feet)  Confusion: Mild disorientation  Syncope: None  Cardiovascular  Cardiovascular (WDL): Exceptions to WDL  Palpitations: None  Edema: No  Phlebitis: None  Superficial thrombophlebitis: None  Pulmonary  Respiratory (WDL): Exceptions to WDL  Cough: Mild symptoms, nonprescription intervention indicated  Dyspnea: Shortness of breath with moderate exertion  Hypoxia: None  Gastrointestinal  Gastrointestinal (WDL): Exceptions to WDL  Anorexia: None  Constipation: None  Diarrhea: None  Dysphagia: None  Esophagitis: None  Nausea: Loss of appetite without alteration in eating habits  Pharyngitis: None  Vomiting: None  Dysgeusia: None  Dry Mouth: Symptomatic (e.g., dry or thick saliva) without  "significant dietary alteration, unstimulated saliva flow >0.2 ml/min  Genitourinary  Genitourinary (WDL): Exceptions to WDL  Urinary Frequency: Present  Urinary Retention: Urinary, suprapubic or intermittent catheter placement not indicated, able to void with some residual  Urinary Tract Pain: Mild pain(burning \"comes and goes\")  Integumentary  Integumentary (WDL): All integumentary elements are within defined limits  Patient Coping  Patient Coping: Open/discussion  Distress Assessment  Distress Assessment Score: 8  Accompanied by  Accompanied by: Alone    Past History  Past Medical History:   Diagnosis Date     Allergic rhinitis      Asthma      Bipolar 1 disorder (H)      Breast cancer of upper-outer quadrant of right female breast (H) 4/11/2017     Chronic pain     Back pain due to arthritis.     COPD (chronic obstructive pulmonary disease) (H)      Endometriosis      Fibroid uterus      Hx of radiation therapy 2017     Insomnia      Osteoarthritis      Overweight      Refusal of blood transfusions as patient is Orthodox 4/12/2017     Sensorineural hearing loss, bilateral      Vitamin D deficiency          Past Surgical History:   Procedure Laterality Date     BREAST BIOPSY Right 2000's     BREAST LUMPECTOMY Right 03/10/2017    with sentinel lymph node biopsy.     KNEE ARTHROSCOPY Right 1996    Description: Arthroscopy Knee Right;  Recorded: 12/08/2011;     KNEE SURGERY Right 1997    lateral release     LAPAROSCOPIC ENDOMETRIOSIS FULGURATION  2002     NH TOTAL KNEE ARTHROPLASTY Right 8/28/2017    Procedure: RIGHT TOTAL KNEE ARTHROPLASTY;  Surgeon: Antony Elias MD;  Location: [facility];  Service: Orthopedics     TOTAL HIP ARTHROPLASTY N/A 9/24/2014    Procedure: LEFT HIP TOTAL ARTHROPLASTY;  Surgeon: Antony Elias MD;  Location: [facility] OR;  Service:      TOTAL HIP ARTHROPLASTY Right 8/10/2015    Procedure: HIP TOTAL ARTHROPLASTY RIGHT;  Surgeon: Antony Elias MD;  " Location: [facility] Main OR;  Service:        Physical Exam    Recent Vitals 11/23/2018   Height -   Weight 262 lbs 6 oz   BSA (m2) 2.41 m2   /76   Pulse 89   Temp 98.8   Temp src -   SpO2 95   Some recent data might be hidden       GENERAL: Alert and oriented. Seated comfortably. In no distress.  Large build.  She is wearing hearing aids.    HEAD: Atraumatic and normocephalic.  Has a full head of hair.    EYES: CORBY, EOMI.  No pallor.  No icterus.    Oral cavity: no mucosal lesion or tonsillar enlargement.    NECK: supple. JVP normal.  No thyroid enlargement.    LYMPH NODES: No palpable, cervical, axillary or inguinal lymphadenopathy.    CHEST: clear to auscultation bilaterally.  Resonant to percussion throughout bilaterally.  Symmetrical breath movements bilaterally.    CVS: S1 and S2 are heard. Regular rate and rhythm.  No murmur or gallop or rub heard.    ABDOMEN: Soft. Not tender. Not distended.  No palpable hepatomegaly or splenomegaly.  No other mass palpable.  Bowel sounds heard.    EXTREMITIES: Warm.  No peripheral edema.    SPINE: No deformity.  Not tender.    SKIN: no rash, or bruising or purpura.    BREASTS:  Right breast: Contour looks normal.  Skin looks normal.  Surgical scar is very faint.  No underlying induration.  Nipple looks normal.  No palpable mass in the right breast.  The right axilla is without mass or nodule.    Left breast: Contour looks normal.  Skin looks normal.  Nipple looks normal.  No palpable mass in the left breast.  Left axilla is without mass or nodule.      Lab Results    No results found for this or any previous visit (from the past 168 hour(s)).    Imaging    No results found.      Signed by: Christina Leigh MD

## 2021-06-23 NOTE — TELEPHONE ENCOUNTER
RN cannot approve Refill Request    RN can NOT refill this medication med is not covered by policy/route to provider. Last office visit: 8/15/2018 Shira Miller MD Last Physical: 8/14/2017 Last MTM visit: Visit date not found Last visit same specialty: 8/15/2018 Shira Miller MD.  Next visit within 3 mo: Visit date not found  Next physical within 3 mo: Visit date not found      Shanelle Johnson, [facility] Triage/Med Refill 1/16/2019    Requested Prescriptions   Pending Prescriptions Disp Refills     methocarbamol (ROBAXIN) 500 MG tablet [Pharmacy Med Name: METHOCARBAMOL 500 MG TABLET] 30 tablet 1     Sig: TAKE 1 TABLET (500 MG TOTAL) BY MOUTH AS NEEDED.    There is no refill protocol information for this order

## 2021-06-23 NOTE — TELEPHONE ENCOUNTER
Triage note:    53 year old female, with chronic cough/bronchitis, reports that she has been vomiting blood periodically over the last 2-3 weeks and it is increasing.  She has vomited twice yesterday.  She reports some mild shortness of breath and worsening cough the last few months.  She states that she feels like she is drunk and doesn't know why.      RN triaged to go to ED now.  She agreed and will have a friend bring her.    Jyoti Marcos RN, [facility] Med Refill/Triage, 1/24/2019 8:55 AM      Reason for Disposition    Vomiting red blood or black (coffee ground) material    Protocols used: VOMITING-A-OH

## 2021-06-24 NOTE — PROGRESS NOTES
Audiology only; hearing aid check    Patient's earmold tubing had snapped off at entry into both earmolds and needed replacement. Tubing had been cemented into silicone molds, and required soaking to release. Earmolds were retained in office for tubing replacement at a later time. Listening check of both hearing aids yielded strong signals, free from distortion. Ms. Vargas kept both hearing aids, and will return with them when she is notified earmolds are ready, so that tubing may be cut to fit. She also inquired about whether insurance would cover new earmolds at this time; insurance verifiers will be tasked to look into this, and patient will be notified by phone of the outcome. Ms. Vargas expressed verbal understanding of this information and plan.    Carter Peng, [facility]  [place] Licensed Audiologist [phone]

## 2021-06-24 NOTE — TELEPHONE ENCOUNTER
Earmold tubing has been replaced and earmolds have been cleaned; please call [phone] to scheduled a hearing aid check appointment for tubing to be cut to fit. Insurance benefits (per an insurance verification check) should also cover new earmolds at this time; impressions for new earmolds could be taken at the same appointment to  current earmolds. Call [phone] to schedule hearing aid check appointment, or with any questions.

## 2021-06-24 NOTE — TELEPHONE ENCOUNTER
After talking with dr. Rosario we will wait to make sure she does not have TB then order a cxr then decide on how to schedule from there.

## 2021-06-24 NOTE — PROGRESS NOTES
[facility] Hematology and Oncology Progress Note    Patient: Emani Vargas  MRN: [id number]  Date of Service: 3/1/2019         DNR and DNI.    Adventism and does not want any blood transfusion or blood fractions.     Reason for Visit:    Scheduled routine follow up.    Assessment and Plan:    1) Breast cancer of upper-outer quadrant of right female breast       Pathologic Stage IA (T1c, N0, cM0)    ER and WA positive   HER-2 negative   Oncotype DX score of 18    54 y.o.     2017, February - diagnosed with screening mammogram showing a suspicious lesion in the upper outer quadrant of the right breast.      03/10/17 - right breast biopsy followed by lumpectomy and sentinel lymph node procedure by Dr. Yoder.      Final pathology showed a 15 mm invasive ductal carcinoma, grade 2 with associated DCIS, ER WA positive and HER-2 negative.  Margins were clear.  Four sentinel lymph nodes negative.  Oncotype DX score of 18 which corresponds to an approximately 11% risk of recurrence in the next 10 years on tamoxifen.      CT CAP - no evidence of metastatic disease.  Colonic diverticulosis without evidence of diverticulitis.  Fatty infiltration of the liver.  Postoperative changes in the right axilla and right breast.    Numerous comorbidities including bipolar disorder, COPD, endometriosis and osteoarthritis.      Opted against genetic counselor referral.      Mutually decided to forego adjuvant chemotherapy.    05/17/17 - completed 4256 cGy / 16 fx breast conservation EBRT.    05/18/17 - began daily adjuvant aromatase inhibitor (AI) therapy with letrozole 2.5 mg p.o.    History of endometriosis, fibroids and occasional vaginal bleeding suggesting increased risk of second gynecological cancers including endometrial cancer.        02/23/18 diagnostic (B) mammogram - benign findings.    01/24/19 ED for several eisodes bloody sputum over the prior couple weeks and dizziness:     Prior smoker @ 7 py.  Quit 35 years  ago.    CT head - negative mass, infarct or hemorrhage    Chest x-ray - negative.    Hematologies WNL.    Follow up PCP    02/25/19 DXA scan - results pending.    02/25/19 (B) screening mammogram - benign findings.    03/01/19:    The patient looks and feels quite good.  Notes occasional mild warm sensations, once a week, since starting AI therapy.       Clinically and radiographically (reviewed recent mammogram with patient) ROXANA.      Will call patient with results DXA.    Continue monthly self-breast examinations.    Tolerating AI therapy very well.  Continue daily AI therapy with letrozole, anticipating 5 years adjuvant therapy.    Reminded to continue regular follow-up with gynecology with annual examinations.  Pap smears according to standard schedule.  If she has any suspicious symptoms, especially increased vaginal bleeding she will need prompt gynecology evaluation.    No further bloody sputum episodes since seen in ED.  Patient believes r/t GERD.  Encouraged f/u with PCP.    07/01/19 - 4-month follow up without labs.    2020, February next mammogram due    2) Osteoporosis prevention:    2016 DEXA scan showed no evidence of osteoporosis.     Quit smoking 35 years ago.      Encouraged her to be physically active and start a walking program.      Taking 2 Tums/day plus drinks a lot of milk.  Instructed on calcium 7257-1992 mg and vitamin D 3452-0081 iu as daily supplements daily.      02/25/19 DEXA scan - results pending.    ECOG Performance:     ECOG Performance Status: 1    Distress Assessment:    Distress Assessment Score: 2        TT > 25 minutes face to face with > 50% counseling and care coordination.    Chetan No, CNP     CC: Rosalba Yap, Shira Gutierrez MD  _______________________________________    Interim History:    The patient presents looking and feeling quite good other than her chronic arthritic joint discomforts.  She is s/p (B)LALA and states that she needs (B)TKAs.  She continues to  note occasional mild warm sensations since starting AI therapy, perhaps once weekly, no hot flashes or any diaphoretic episodes.  She denies s/s thromboses or unusual vaginal discharge.  She denies cough, fever, chills, unusual headaches, visual or mentation disturbance, bowel or bladder issues.  Her appetite and performance status are quite stable.  Her weight is up a few pounds again after loosing a few pounds intentionally by watching portions.    Pain Status:    Currently in Pain: Yes    Review of Systems    Constitutional  Constitutional (WDL): Exceptions to WDL  Fatigue: Fatigue not relieved by rest - Limiting instrumental ADL  Neurosensory  Neurosensory (WDL): Exceptions to WDL  Peripheral Motor Neuropathy: Asymptomatic, clinical or diagnostic observations only, intervention not indicated  Ataxia: Asymptomatic, clinical or diagnostic observations only, intervention not indicated  Peripheral Sensory Neuropathy: Asymptomatic, loss of deep tendon reflexes or paresthesia(top of left foot and left hand)  Cardiovascular  Cardiovascular (WDL): Exceptions to WDL  Edema: Yes  Pulmonary  Respiratory (WDL): Exceptions to WDL  Cough: Mild symptoms, nonprescription intervention indicated(dry)  Dyspnea: Shortness of breath with moderate exertion(distance and steps)  Gastrointestinal  Gastrointestinal (WDL): Exceptions to WDL  Dysphagia: Symptomatic, able to eat regular diet  Dry Mouth: Symptomatic (e.g., dry or thick saliva) without significant dietary alteration, unstimulated saliva flow >0.2 ml/min  Genitourinary  Genitourinary (WDL): All genitourinary elements are within defined limits  Integumentary  Integumentary (WDL): Exceptions to WDL  Pruritus: Mild or localized, topical intervention indicated(dry skin)  Patient Coping  Patient Coping: Accepting;Open/discussion  Distress Assessment  Distress Assessment Score: 2  Accompanied by  Accompanied by: Alone    Past Histories:    Past Medical History:   Diagnosis Date      Allergic rhinitis      Asthma      Bipolar 1 disorder (H)      Breast cancer of upper-outer quadrant of right female breast (H) 4/11/2017     Chronic pain     Back pain due to arthritis.     COPD (chronic obstructive pulmonary disease) (H)      Endometriosis      Fibroid uterus      Hx of radiation therapy 2017     Insomnia      Osteoarthritis      Overweight      Refusal of blood transfusions as patient is Congregation 4/12/2017     Sensorineural hearing loss, bilateral      Vitamin D deficiency          Past Surgical History:   Procedure Laterality Date     BREAST BIOPSY Right 2000's     BREAST BIOPSY Right 02/20/2017     BREAST LUMPECTOMY Right 03/10/2017    with sentinel lymph node biopsy.     KNEE ARTHROSCOPY Right 1996    Description: Arthroscopy Knee Right;  Recorded: 12/08/2011;     KNEE SURGERY Right 1997    lateral release     LAPAROSCOPIC ENDOMETRIOSIS FULGURATION  2002     [place] TOTAL KNEE ARTHROPLASTY Right 8/28/2017    Procedure: RIGHT TOTAL KNEE ARTHROPLASTY;  Surgeon: Antony Elias MD;  Location: [facility];  Service: Orthopedics     TOTAL HIP ARTHROPLASTY N/A 9/24/2014    Procedure: LEFT HIP TOTAL ARTHROPLASTY;  Surgeon: Antony Elias MD;  Location: [facility] OR;  Service:      TOTAL HIP ARTHROPLASTY Right 8/10/2015    Procedure: HIP TOTAL ARTHROPLASTY RIGHT;  Surgeon: Antony Elias MD;  Location: [facility] OR;  Service:        Physical Exam:    Recent Vitals 3/1/2019   Height -   Weight 268 lbs 14 oz   BSA (m2) 2.45 m2   /81   Pulse 88   Temp 98   Temp src 1   SpO2 91   Some recent data might be hidden       GENERAL:   Pleasant.  Alert and oriented.  No distress.    HEENT:   Full head of hair.  SARA.  EOMI.  No pallor.  No icterus.  No mucosal lesions.    LYMPH NODES:  No palpable cervical, axillary or inguinal lymphadenopathy.    CHEST:   Lungs clear.    CVS:    S1 and S2 heard.  Regular rate and rhythm.  No murmur, gallop or rub heard.    ABDOMEN:    Soft.  Obese.  Not tender.  No palpable hepatomegaly or splenomegaly, masses or ascites.    EXTREMITIES:  Warm.  No peripheral edema.    SKIN:    No rash, bruising or purpura noted.    BREASTS:  Right breast c/w lumpectomy and mild post-EBRT hyperpigmentation.  No suspect lumps, bumps or skin changes.  Nipple chronically retracted.  Axilla negative.       Left breast without suspect lumps, bumps or skin changes.  Axilla negative.       Patient declined offer for female  during examination.    Lab Results:    No results found for this or any previous visit (from the past 24 hour(s)).    Imaging:    Reviewed with patient.    BILATERAL FULL FIELD DIGITAL SCREENING MAMMOGRAM     Performed on: 2/25/19     Compared to: 02/23/2018 Mammo Diagnostic Bilateral and 02/15/2017 Mammo Screening Bilateral     Findings: The breasts have scattered areas of fibroglandular densities.  There are postsurgical lumpectomy changes in the right breast. No evidence for malignancy and no change from the previous exam(s). This study was evaluated with the assistance of Computer-Aided Detection. Continue routine screening mammogram as recommended.     ACR BI-RADS Category 2: Benign Findings

## 2021-06-24 NOTE — PROGRESS NOTES
ASSESSMENT/PLAN:    Hemoptysis  This is a 54-year-old female with chronic cough, COPD, mild intermittent asthma presented today for follow-up from her emergency department whereby she was seen for hemoptysis.  Checks x-ray and CBC were unremarkable.  The patient has had a couple more episodes of hemoptysis.  I would like her to see pulmonology.  I am doing a QTF-TB Gold test.  I will communicate the results to the patient.  -     Ambulatory referral to Pulmonology  -     QTF-Mycobacterium tuberculosis by QuantiFERON-TB Gold Plus    SUBJECTIVE:    Emani Vargas is a 54 y.o. female who came in today for follow-up from the emergency department.  Patient was seen about a month ago for hemoptysis.  There she had a chest x-ray that was unremarkable, head CT that was negative for breast cancer metastasis, and normal CBC.  The patient was given azithromycin for which she completed.  To ER notes, imagings, and lab results were reviewed.    Since then she has had a couple more episodes of hemoptysis.  The patient has a history of chronic cough in the setting of COPD and mild intermittent asthma.  She was born in [place] but has traveled outside of the [place] to [place], [place], [place].  Her former roommate was formerly homeless and had tuberculosis.  The patient endorses night sweats and chills.  She has not had any unintentional weight loss.  She denies any fever.  She has been feeling fatigued but she attributes that to poor sleep due to pain of her hand, shoulder, and knees.    Review of Systems (except those mentioned above)  Constitutional: Negative.   HENT: Negative.   Eyes: Negative.   Respiratory: Negative.   Cardiovascular: Negative.   Gastrointestinal: Negative.   Endocrine: Negative.   Genitourinary: Negative.   Musculoskeletal: Negative.   Skin: Negative.   Allergic/Immunologic: Negative.   Neurological: Negative.   Hematological: Negative.   Psychiatric/Behavioral: Negative.     Patient Active Problem  List    Diagnosis Date Noted     Morbid obesity (H) 08/15/2018     Knee osteoarthritis 08/28/2017     Chronic obstructive pulmonary disease, unspecified COPD type (H)      Bipolar affective disorder, remission status unspecified (H)      Chronic pain syndrome      Osteoporosis 05/05/2017     Aromatase inhibitor use 05/05/2017     Refusal of blood transfusions as patient is Hoahaoism 04/12/2017     Malignant neoplasm of upper-outer quadrant of right breast in female, estrogen receptor positive (H) 04/11/2017     Compression fracture of vertebral column with routine healing 09/09/2016     Mild intermittent asthma without complication 02/24/2015     Osteoarthrosis, unspecified whether generalized or localized, pelvic region and thigh 09/24/2014     Allergic rhinitis      Vitamin D Deficiency      Endometriosis      Chronic Pain      Insomnia      Bipolar Disorder      Hearing Loss      Allergies   Allergen Reactions     Cat/Feline Products Itching     Egg Derived      Flu like symptoms--pain     Trees Other (See Comments)     Grass, itching     Current Outpatient Medications   Medication Sig Dispense Refill     acetaminophen (TYLENOL) 500 MG tablet Take 1,000 mg by mouth every 6 (six) hours as needed for pain.       albuterol (PROVENTIL HFA;VENTOLIN HFA) 90 mcg/actuation inhaler Inhale 2 puffs every 6 (six) hours as needed for wheezing. 2 Inhaler 0     buPROPion (WELLBUTRIN XL) 150 MG 24 hr tablet Take 1 tablet (150 mg total) by mouth every morning. 90 tablet 3     cetirizine (ZYRTEC) 10 MG tablet Take 10 mg by mouth 2 (two) times a day.              cholecalciferol, vitamin D3, 1,000 unit tablet Take 2,000 Units by mouth daily.       dextroamphetamine-amphetamine 20 mg Tab Take 1 tablet by mouth 2 (two) times a day.  0     FLUoxetine (PROZAC) 20 MG capsule Take 20 mg by mouth daily.       gabapentin (NEURONTIN) 400 MG capsule TAKE 2 CAPSULES daily  11     gabapentin (NEURONTIN) 400 MG capsule Take 1,600 mg by  mouth at bedtime.       letrozole (FEMARA) 2.5 mg tablet Take 1 tablet (2.5 mg total) by mouth daily. 90 tablet 3     lidocaine (XYLOCAINE) 5 % ointment Apply to the affected area BID prn for pain. 35.44 g 1     magnesium citrate solution Take 296 mL by mouth daily as needed.       methocarbamol (ROBAXIN) 500 MG tablet TAKE 1 TABLET (500 MG TOTAL) BY MOUTH AS NEEDED. 30 tablet 1     omeprazole (PRILOSEC) 20 MG capsule TAKE 1 CAPSULE (20 MG TOTAL) BY MOUTH DAILY. 90 capsule 2     traMADol (ULTRAM) 50 mg tablet Take 1-2 tab po q6hr prn for pain. 1 tab for pain levels of 4-6/10. 2 tabs for pain levels of 7-10/10. 90 tablet 1     traZODone (DESYREL) 100 MG tablet TAKE 1/2 TO 1 TABLET BY MOUTH NIGHTLY AT BEDTIME. 90 tablet 3     No current facility-administered medications for this visit.      Past Medical History:   Diagnosis Date     Allergic rhinitis      Asthma      Bipolar 1 disorder (H)      Breast cancer of upper-outer quadrant of right female breast (H) 4/11/2017     Chronic pain     Back pain due to arthritis.     COPD (chronic obstructive pulmonary disease) (H)      Endometriosis      Fibroid uterus      Hx of radiation therapy 2017     Insomnia      Osteoarthritis      Overweight      Refusal of blood transfusions as patient is Hinduism 4/12/2017     Sensorineural hearing loss, bilateral      Vitamin D deficiency      Past Surgical History:   Procedure Laterality Date     BREAST BIOPSY Right 2000's     BREAST BIOPSY Right 02/20/2017     BREAST LUMPECTOMY Right 03/10/2017    with sentinel lymph node biopsy.     KNEE ARTHROSCOPY Right 1996    Description: Arthroscopy Knee Right;  Recorded: 12/08/2011;     KNEE SURGERY Right 1997    lateral release     LAPAROSCOPIC ENDOMETRIOSIS FULGURATION  2002     [place] TOTAL KNEE ARTHROPLASTY Right 8/28/2017    Procedure: RIGHT TOTAL KNEE ARTHROPLASTY;  Surgeon: Antony Elias MD;  Location: [facility];  Service: Orthopedics     TOTAL HIP ARTHROPLASTY N/A  2014    Procedure: LEFT HIP TOTAL ARTHROPLASTY;  Surgeon: Antony Elias MD;  Location: [facility] OR;  Service:      TOTAL HIP ARTHROPLASTY Right 8/10/2015    Procedure: HIP TOTAL ARTHROPLASTY RIGHT;  Surgeon: Antony Elias MD;  Location: [facility];  Service:      Social History     Socioeconomic History     Marital status:      Spouse name: None     Number of children: None     Years of education: None     Highest education level: None   Occupational History     Occupation: Disabled.     Comment: Was an .   Social Needs     Financial resource strain: None     Food insecurity:     Worry: None     Inability: None     Transportation needs:     Medical: None     Non-medical: None   Tobacco Use     Smoking status: Former Smoker     Packs/day: 1.00     Years: 7.00     Pack years: 7.00     Last attempt to quit: 1985     Years since quittin.1     Smokeless tobacco: Never Used   Substance and Sexual Activity     Alcohol use: Yes     Alcohol/week: 0.0 oz     Comment: 1 drink/month     Drug use: No     Comment: past use of marijuana and hash.     Sexual activity: None   Lifestyle     Physical activity:     Days per week: None     Minutes per session: None     Stress: None   Relationships     Social connections:     Talks on phone: None     Gets together: None     Attends Jain service: None     Active member of club or organization: None     Attends meetings of clubs or organizations: None     Relationship status: None     Intimate partner violence:     Fear of current or ex partner: None     Emotionally abused: None     Physically abused: None     Forced sexual activity: None   Other Topics Concern     None   Social History Narrative     None     Family History   Problem Relation Age of Onset     Depression Mother      COPD Mother      Diabetes Father      Skin cancer Father 45     Colon cancer Maternal Grandmother      Colon cancer Maternal Grandfather       Diabetes Paternal Grandmother      Colon cancer Paternal Grandmother 80     Breast cancer Cousin 48        Paternal side.     Testicular cancer Brother 44     Lung cancer Paternal Aunt 60     Anesthesia problems Neg Hx          OBJECTIVE:    Vitals:    03/05/19 1122   BP: 120/60   Patient Site: Left Arm   Patient Position: Sitting   Cuff Size: Adult Large   Pulse: 80   SpO2: 95%   Weight: (!) 273 lb 5 oz (124 kg)     Body mass index is 39.22 kg/m .    Physical Exam:  Constitutional: Patient is oriented to person, place, and time. Patient appears well-developed and well-nourished. No distress.   Head: Normocephalic and atraumatic.   Right Ear: External ear normal.   Left Ear: External ear normal.   Nose: Nose normal.   Mouth/Throat: Oropharynx is clear and moist. No oropharyngeal exudate.   Eyes: Conjunctivae and EOM are normal. Right eye exhibits no discharge. Left eye exhibits no discharge. No scleral icterus.   Neurological: Patient is alert and oriented to person, place, and time. Patient has normal reflexes. No cranial nerve deficit. Coordination normal.   Skin: No rash noted. Patient is not diaphoretic. No erythema. No pallor.    Results for orders placed or performed during the hospital encounter of 01/24/19   HM2(CBC w/o Differential)   Result Value Ref Range    WBC 8.3 4.0 - 11.0 thou/uL    RBC 4.22 3.80 - 5.40 mill/uL    Hemoglobin 12.5 12.0 - 16.0 g/dL    Hematocrit 38.6 35.0 - 47.0 %    MCV 92 80 - 100 fL    MCH 29.6 27.0 - 34.0 pg    MCHC 32.4 32.0 - 36.0 g/dL    RDW 12.6 11.0 - 14.5 %    Platelets 201 140 - 440 thou/uL    MPV 10.4 8.5 - 12.5 fL   INR   Result Value Ref Range    INR 0.90 0.90 - 1.10

## 2021-06-24 NOTE — PROGRESS NOTES
Audiology only; hearing aid check (MA patient)    Earmold tubing on existing earmolds was trimmed to fit (see chart note dated 3-5-19 for background information). Impressions were taken for new earmolds, bilaterally; both pre- and post-impression otoscopy were unremarkable in both ears. Ms. Vargas will be notified by phone when new earmolds have arrived to schedule a fitting appointment. Ms. Vargas expressed verbal understanding of this information and plan.    Carter Peng, [facility]  [place] Licensed Audiologist [id number]

## 2021-06-24 NOTE — PATIENT INSTRUCTIONS - HE
It was good to meet you in clinic today. This is what we discussed:    1. Start Flonase (fluticasone) one spray in each nostril daily.  2. Continue Zyrtec (cetirizine) one pill daily.  3. Start Singulair (montelukast) one pill at bedtime.  4. Increase Prilosec (omeprazole) from 20 mg daily to 40 mg daily.  5. We will get lung functions tests done.  6. I will see you in six weeks.  7. Call any time with questions or concerning symptoms.    Marques Mary MD  Pulmonary and Critical Care Medicine  [facility]  Office [phone]

## 2021-06-25 NOTE — PROGRESS NOTES
"[facility] Outpatient Consultation    Assessment and Plan:   54 year old female with a history of remote tobacco use, obesity, vitamin D deficiency, osteoarthritis s/p bilateral LALA and right TKA, insomnia, possible asthma, right breast cancer s/p lumpectomy and radiation, chronic low back pain, bipolar disorder, allergic rhinitis, and endometriosis, presenting for evaluation of chronic cough and hemoptysis.    Chronic cough, hemoptysis: Has had troublesome cough for \"at least six months, possibly years.\" Amount of blood is minimal and infrequent; mostly nonbloody phlegm when cough is productive. Negative IGRA and CXR is completely clear; at this point I am less concerned about occasional small-volme hemoptysis than the chronic cough itself. Broad DDx. 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 DDx includes GERD, and she endorses ongoing chest burning at night despite 20 mg of daily omeprazole. DDx includes asthma; has history of wheezing and allergies; was previously on mometasone-formoterol but felt that it did not help; that was two years ago by her reckoning.    Plan:  - obtain pulmonary function testing  - start nasal fluticasone one spray in each nostril daily  - start montelukast 10 mg at bedtime  - increase omeprazole from 20 mg daily to 40 mg daily  - continue cetirizine 10 mg daily  - follow up in six weeks  - encouraged her to call any time with questions or concerning symptoms    I appreciate the opportunity to participate in the care of Ms. Vargas.  Please feel free to contact me at any time.    CCx: chronic cough    HPI: 54 year old female with a history of remote tobacco use, obesity, vitamin D deficiency, osteoarthritis s/p bilateral LALA and right TKA, insomnia, COPD/asthma, right breast cancer s/p lumpectomy and radiation, chronic low back " "pain, bipolar disorder, allergic rhinitis, and endometriosis, presenting for evaluation of chronic cough. Started in January, woke up coughing. Initially pink, then some dark phlegm with some blood streaking. No emesis. Had negative IGRA (Quantiferon). Coughs \"all the time,\" for at least six months, possibly years. Has lots of sinus pressure and congestion, especially this winter. Has heaviness in the chest, occasional chills and sweats. Chest burning at night despite 20 mg omeprazole. Saw allergist in the past.Frequent dyspnea and exhaustion, occasional wheezing. Has \"many allergies.\" Started smoking at age 13, up to 1 ppd, quit in 1985. 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 Uses albuterol; occasionally helps. Was on mometasone-formoterol at least two years ago. On cetirizine. Thinks she was on montelukast in the past. Mother smoked and had COPD. Paternal grandmother had lung cancer. Father had lung problems from working in a print shop.    ROS:  A 12-system review was obtained and was negative with the exception of the symptoms endorsed in the history of present illness.    PMH:  BMI 38.5  Hearing loss  OA s/p bilateral LALA, right TKA  Insomnia  Possible asthma  Back pain  Right breast cancer s/p lumpectomy and radiation  Bipolar  Allergic rhinitis  Multiple allergies  endometriosis    PSH:  Past Surgical History:   Procedure Laterality Date     BREAST BIOPSY Right 2000's     BREAST BIOPSY Right 02/20/2017     BREAST LUMPECTOMY Right 03/10/2017    with sentinel lymph node biopsy.     KNEE ARTHROSCOPY Right 1996    Description: Arthroscopy Knee Right;  Recorded: 12/08/2011;     KNEE SURGERY Right 1997    lateral release     LAPAROSCOPIC ENDOMETRIOSIS FULGURATION  2002     IN TOTAL KNEE ARTHROPLASTY Right 8/28/2017    Procedure: RIGHT TOTAL KNEE ARTHROPLASTY;  Surgeon: Antony Elias MD;  Location: [facility];  Service: Orthopedics     TOTAL HIP ARTHROPLASTY " N/A 2014    Procedure: LEFT HIP TOTAL ARTHROPLASTY;  Surgeon: Antony Elias MD;  Location: [facility] Main OR;  Service:      TOTAL HIP ARTHROPLASTY Right 8/10/2015    Procedure: HIP TOTAL ARTHROPLASTY RIGHT;  Surgeon: Antony Elias MD;  Location: [facility] Main OR;  Service:        Allergies:  Allergies   Allergen Reactions     Cat/Feline Products Itching     Egg Derived      Flu like symptoms--pain     Trees Other (See Comments)     Grass, itching       Family HX:  Family History   Problem Relation Age of Onset     Depression Mother      COPD Mother      Diabetes Father      Skin cancer Father 45     Colon cancer Maternal Grandmother      Colon cancer Maternal Grandfather      Diabetes Paternal Grandmother      Colon cancer Paternal Grandmother 80     Breast cancer Cousin 48        Paternal side.     Testicular cancer Brother 44     Lung cancer Paternal Aunt 60     Anesthesia problems Neg Hx        Social Hx:  Social History     Socioeconomic History     Marital status:      Spouse name: Not on file     Number of children: Not on file     Years of education: Not on file     Highest education level: Not on file   Occupational History     Occupation: Disabled.     Comment: Was an .   Social Needs     Financial resource strain: Not on file     Food insecurity:     Worry: Not on file     Inability: Not on file     Transportation needs:     Medical: Not on file     Non-medical: Not on file   Tobacco Use     Smoking status: Former Smoker     Packs/day: 1.00     Years: 7.00     Pack years: 7.00     Last attempt to quit: 1985     Years since quittin.2     Smokeless tobacco: Never Used   Substance and Sexual Activity     Alcohol use: Yes     Alcohol/week: 0.0 oz     Comment: 1 drink/month     Drug use: No     Comment: past use of marijuana and hash.     Sexual activity: Not on file   Lifestyle     Physical activity:     Days per week: Not on file     Minutes per session:  Not on file     Stress: Not on file   Relationships     Social connections:     Talks on phone: Not on file     Gets together: Not on file     Attends Mandaeism service: Not on file     Active member of club or organization: Not on file     Attends meetings of clubs or organizations: Not on file     Relationship status: Not on file     Intimate partner violence:     Fear of current or ex partner: Not on file     Emotionally abused: Not on file     Physically abused: Not on file     Forced sexual activity: Not on file   Other Topics Concern     Not on file   Social History Narrative     Not on file       Current Meds:  Current Outpatient Medications   Medication Sig Dispense Refill     acetaminophen (TYLENOL) 500 MG tablet Take 1,000 mg by mouth every 6 (six) hours as needed for pain.       albuterol (PROVENTIL HFA;VENTOLIN HFA) 90 mcg/actuation inhaler Inhale 2 puffs every 6 (six) hours as needed for wheezing. 2 Inhaler 0     buPROPion (WELLBUTRIN XL) 150 MG 24 hr tablet Take 1 tablet (150 mg total) by mouth every morning. 90 tablet 3     cetirizine (ZYRTEC) 10 MG tablet Take 10 mg by mouth 2 (two) times a day.              cholecalciferol, vitamin D3, 1,000 unit tablet Take 2,000 Units by mouth daily.       dextroamphetamine-amphetamine 20 mg Tab Take 1 tablet by mouth 2 (two) times a day.  0     FLUoxetine (PROZAC) 20 MG capsule Take 20 mg by mouth daily.       gabapentin (NEURONTIN) 400 MG capsule 2400 mg daily  11     letrozole (FEMARA) 2.5 mg tablet Take 1 tablet (2.5 mg total) by mouth daily. 90 tablet 3     lidocaine (XYLOCAINE) 5 % ointment Apply to the affected area BID prn for pain. 35.44 g 1     magnesium citrate solution Take 296 mL by mouth daily as needed.       methocarbamol (ROBAXIN) 500 MG tablet TAKE 1 TABLET (500 MG TOTAL) BY MOUTH AS NEEDED. 30 tablet 1     traMADol (ULTRAM) 50 mg tablet Take 1-2 tab po q6hr prn for pain. 1 tab for pain levels of 4-6/10. 2 tabs for pain levels of 7-10/10. 90  "tablet 1     traZODone (DESYREL) 100 MG tablet TAKE 1/2 TO 1 TABLET BY MOUTH NIGHTLY AT BEDTIME. 90 tablet 3     fluticasone (FLONASE ALLERGY RELIEF) 50 mcg/actuation nasal spray One spray in each nostril daily 16 g 12     montelukast (SINGULAIR) 10 mg tablet Take 1 tablet (10 mg total) by mouth at bedtime. 30 tablet 11     omeprazole (PRILOSEC) 40 MG capsule Take 1 capsule (40 mg total) by mouth daily before breakfast. 30 capsule 11     No current facility-administered medications for this visit.      Physical Exam:  /80   Pulse 84   Resp 12   Ht 5' 10\" (1.778 m)   Wt (!) 268 lb (121.6 kg)   LMP 08/10/2012   SpO2 95%   Breastfeeding? No   BMI 38.45 kg/m    Gen: alert, oriented, no distress  HEENT: nasal turbinates are unremarkable, no oropharyngeal lesions, no cervical or supraclavicular lymphadenopathy  CV: RRR, no M/G/R  Resp: CTAB, no focal crackles or wheezes  Abd: soft, nontender, no palpable organomegaly  Skin: no apparent rashes  Ext: no cyanosis, clubbing or edema  Neuro: alert, nonfocal    Labs:  reviewed    Imaging studies:  CXR (3/12/19):  - directly reviewed  - clear lungs  - no effusions    Marques Mary MD  [facility]  Cell [phone]  Office [phone]  Pager [phone]    "

## 2021-06-25 NOTE — TELEPHONE ENCOUNTER
RN cannot approve Refill Request    RN can NOT refill this medication med is not covered by policy/route to provider. Last office visit: 11/17/2020 Shira Miller MD Last Physical: 3/3/2021 Last MTM visit: Visit date not found Last visit same specialty: 2/26/2021 Khushi Arita MD.  Next visit within 3 mo: Visit date not found  Next physical within 3 mo: Visit date not found      Laura Saenz, [facility] Connection Triage/Med Refill 6/7/2021    Requested Prescriptions   Pending Prescriptions Disp Refills     methocarbamoL (ROBAXIN) 500 MG tablet [Pharmacy Med Name: Methocarbamol 500 MG Oral Tablet] 90 tablet 0     Sig: Take 1 tablet by mouth three times daily as needed       There is no refill protocol information for this order

## 2021-06-25 NOTE — TELEPHONE ENCOUNTER
"----- Message from Radha Vila RN sent at 5/17/2021  3:13 PM CDT -----  Regarding: F/U call  Per Flavia ORTIZ, \"Can you please call this patient on 5/27 and ask how she is feeling?  If symptoms have improved with prednisone, follow up as needed.  If she is still having severe pain, she should follow up with me.\"    "

## 2021-06-26 NOTE — PROGRESS NOTES
Progress Notes by Giselle Londono PA-C at 2/14/2018 11:00 AM     Author: Giselle Londono PA-C Service: -- Author Type: Physician Assistant    Filed: 2/14/2018  4:10 PM Date of Service: 2/14/2018 11:00 AM Status: Signed    : Giselle Londono PA-C (Physician Assistant)       Assessment:     Diagnoses and all orders for this visit:    Chronic pain syndrome  -     Discontinue: lidocaine (XYLOCAINE) 5 % ointment; Apply to the affected area.  Dispense: 35.44 g; Refill: 0  -     Ambulatory referral to Orthopedic Surgery  -     lidocaine (XYLOCAINE) 5 % ointment; Apply to the affected area BID prn for pain.  Dispense: 35.44 g; Refill: 1  -     gabapentin (NEURONTIN) 300 MG capsule; Take 3 capsules (900 mg total) by mouth 3 (three) times a day.  Dispense: 270 capsule; Refill: 1    Paresthesia of hand, bilateral    Carpal tunnel syndrome    Myofascial pain    Lumbalgia    Paresthesia of both feet        Emani Vargas is a 52 y.o. y.o. female with past medical history significant for bipolar disorder, obesity, vitamin D deficiency, insomnia,  who presents today for follow-up bilateral hand numbness and tingling.  Differential diagnosis carpal tunnel syndrome, cervical radiculopathy.  Bilateral foot numbness and tingling, differential diagnosis lumbar radiculopathy versus polyneuropathy.  No red flags.           Plan:     A shared decision making plan was used.  The patient's values and choices were respected.  The following represents what was discussed and decided upon by the physician and the patient.      1.  DIAGNOSTIC TESTS: cervical x-ray that was done back in April 2016 that showed mild space narrowing at the C3-C4 and mild to moderate space narrowing at the C5-C6.  Cannot rule out cervical radiculopathy causing her numbness and tingling.  Patient declines EMG testing to start with a left upper extremity to determine source of the numbness and tingling in the left hand.  Patient also declines EMG testing for the  lower extremity to determine the source of the numbness and tingling in both feet.    2.  PHYSICAL THERAPY: Patient will continue on home exercises.   3.  MEDICATIONS: Patient will continue on gabapentin 300 mg.  I recommend patient increase gabapentin 300 mg,  into 3 tablets 3 times a day.  Side effects of the medication discussed again with the patient today.  4.  INTERVENTIONS: No therapeutic steroid injection at this time.    5.  PATIENT EDUCATION: I thoroughly discussed the plan with the patient today.  I recommend patient to wear her wrist braces especially at night.  I provided patient with a ambulatory referral to [facility] with Dr. Anaya for surgical evaluation.  Patient will let us know if she decides to proceed with EMG testing for the upper or to the lower extremity. She verbalizes understanding and agrees with the plan.     6.  FOLLOW-UP: Patient will follow up with me in as needed.   All questions were answered.      LEIGHA Rice, MPA-C    Subjective:     Emani Vargas is a 52 y.o. female who presents today for follow-up regarding bilateral hand numbness and tingling, mild neck pain, bilateral low back pain, numbness and tingling in both feet and occasionally in the left lower extremity.  Today patient stated that the numbness and tingling in her both hands with the left side being worse than the right side is worse.  She tried wearing the braces at night and feels that it is helping a little bit.  She had cervical x-ray that was done back in April 2016 that showed mild space narrowing at the C3-C4 and mild to moderate space narrowing at the C5-C6.  Patient states that the numbness and tingling in her hands are mostly in the middle 3 fingers.  The numbness and tingling in her feet is less intensity than the hand numbness and tingling.  Patient had lumbar MRI back in July 2016 that showed mild to moderate central canal stenosis at the L3-L4, L4-L5.  There was no neural foraminal  narrowing at the L4-L5, or the L5-S1.  Patient has been taking gabapentin 300 mg 3 tablets in the morning and 4 tablets at night.  Patient denies history of diabetes mellitus. Patient denies urinary or bowel incontinence, unintentional weight loss, saddle anesthesia, fever or chills, balance difficulties.      Review of Systems:  Bilateral hands, bilateral feet numbness and tingling. Patient denies urinary or bowel incontinence, unintentional weight loss, saddle anesthesia, fever or chills, balance difficulties.       Objective:   CONSTITUTIONAL:  Vital signs as above.  No acute distress.  The patient is well nourished and well groomed.    PSYCHIATRIC:  The patient is awake, alert, oriented to person, place and time.  The patient is answering questions appropriately with clear speech.  Normal affect.  SKIN:  Skin over the face, neck bilateral upper extremities is clean, dry, intact without rashes.  MUSCULOSKELETAL: The patient has 5/5 strength for the bilateral shoulder abductors, elbow flexors/extensors, wrist extensors, finger flexors/abductors.   NEUROLOGICAL:  2/4  Biceps, brachioradialis, triceps reflexes bilaterally.  Negative Ulloa's bilaterally.  Sensation to pinprick is intact.    Positive Phalen and Tinel test bilaterally at both wrists.    MUSCULOSKELETAL:  Gait is non-antalgic.  The patient is able to heel and toe walk without any difficulty.  Mild tenderness over the L4-L5 L5-S1 lumbar paraspinal muscles.      The patient has 5/5 strength for the bilateral hip flexors, knee flexors/extensors, ankle dorsiflexors/plantar flexors, ankle evertors/invertors.    NEUROLOGICAL:  2/4 patellar, medial hamstring, achilles reflexes which are symmetric bilaterally.  No ankle clonus bilaterally.  Sensation to light touch is intact in the bilateral L4, L5, and S1 dermatomes.        RESULTS:      XR LUMBAR SPINE 2 OR 3 VWS4/26/2016 2:51 PMINDICATION: Back pain.COMPARISON: None.FINDINGS: 5 lumbar type vertebral  bodies. Patient rotated on the lateral projection. Normal lordotic curve. Mild left convex asymmetry mid lumbar spine. Slight   anterolisthesis of L4 on L5. Slight right subluxation of L3 on L4 and slight left subluxation of L4 on L5. Vertebral body heights are normal. Moderate disc space narrowing L2-S1, and mild disc space narrowing L1-2. Moderate facet arthropathy lower lumbar  spine. No significant superimposed bony finding.Mild degenerative change SI joints. Bilateral hip arthroplasties.This report was electronically interpreted by: Dr. Sam Dunlap MD ON 04/27/2016 at 13:17           Spine Care Office Visit on 2/28/2017         [facility]  MR LUMBAR SPINE WO CONTRAST  7/2/2016 2:31 PM      INDICATION: Low back pain.  TECHNIQUE: Performed without IV contrast.  SEDATION: None.  COMPARISON: Lumbar spine radiographs 04/26/2016.     FINDINGS: Nomenclature is based on 5 lumbar type vertebral bodies. Levoconvex curvature of the lumbar spine with the apex at L4. Lumbar spine lordosis maintained. 2 mm anterolisthesis of L4 on L5. Superior endplate compression fracture of the L1   vertebral body with approximately 30% height loss. Mild L1 superior endplate edema. No L1 retropulsion. No additional fractures visualized. No pars defect. The conus tip is identified at L1. Developmental narrowing of the lumbar spinal canal. Grossly   normal paraspinal soft tissues. Normal aortic diameter. Bilateral total hip arthroplasties.     T12-L1: Normal disc height. Slight ballooning of the intervertebral disc space. No protrusion or extrusion. Minimal facet arthropathy. No spinal canal stenosis. No right neural foraminal stenosis. No left neural foraminal stenosis.     L1-L2: Normal disc signal. Minimal loss of disc space height. No protrusion or extrusion. Mild facet arthropathy. No spinal canal stenosis. No right neural foraminal stenosis. No left neural foraminal stenosis.     L2-L3: Disc desiccation and mild loss of  disc space height. Minimal posterior disc interbody spurring. No protrusion or extrusion. Mild facet arthropathy. No spinal canal stenosis. No right neural foraminal stenosis. No left neural foraminal stenosis.     L3-L4: Disc desiccation and moderate loss of disc space height. Mild posterior annular bulge with central annular tear. Mild facet arthropathy. Mild to moderate spinal canal stenosis. Mild right neural foraminal stenosis. No left neural foraminal   stenosis.     L4-L5: Disc desiccation and moderate loss of disc space height. Minimal posterior annular bulge. Moderate facet arthropathy with bilateral facet effusions. Mild to moderate spinal canal stenosis. No right neural foraminal stenosis. No left neural   foraminal stenosis.     L5-S1: Minimal loss T2 disc signal. Normal disc space height. No protrusion or extrusion. Mild to moderate facet arthropathy. No spinal canal stenosis. No right neural foraminal stenosis. No left neural foraminal stenosis.     IMPRESSION:   CONCLUSION:  1.  Acute or subacute superior endplate compression fracture of the L1 vertebral body with mild superior endplate edema and 30% height loss. No L1 retropulsion.  2.  Developmental narrowing of the lumbar spinal canal.  3.  Mild multilevel lumbar spondylitic change.  4.  Mild to moderate central canal stenosis at L3-L4 and L4-L5.      I spent over 25 minutes with the patient today, with more than 50% spent on counseling and coordinating of care.

## 2021-06-27 ENCOUNTER — HEALTH MAINTENANCE LETTER (OUTPATIENT)
Age: 56
End: 2021-06-27

## 2021-06-27 NOTE — PROGRESS NOTES
"Progress Notes by Ekaterina Holman AuD at 4/17/2019  1:30 PM     Author: Ekaterina Holman AuD Service: -- Author Type: Audiologist    Filed: 4/17/2019  5:48 PM Encounter Date: 4/17/2019 Status: Signed    : Ekaterina Holman AuD (Audiologist)       Audiology only; annual hearing testing and hearing aid evaluation ([facility] patient)    Summary:  Audiology visit completed. Please see audiogram below or under \"media\" tab for history and results.    Transducer:  Both insert phones and circumaural headphones were used.    Reliability:    Good    Recommendations:  Retest hearing annually (to monitor) or per medical management.  Wear hearing protection consistently in noise to preserve residual hearing sensitivity.  Emani Vargas is a potential binaural amplification candidate, if patient motivation exists and medical clearance is granted. ENT referral is recommended to shifts in hearing thresholds since last audiogram (9-27-16) and more limited dynamic range in the right ear, as well as for medical clearance for new amplification. Ms. Vargas was scheduled with Abe Rodriguez MD, for ENT consult 4-30-19.    Hearing Aid Evaluation: Ms. Vargas's left hearing aid (fit 6-17-14) recently became nonfunctional, and she is eligible at this time for new hearing aids through her insurance carrier. Medical clearance for amplification will be requested at upcoming ENT consult 4-30-19. The benefits and limitations of amplification were discussed, and a mutual decision was made for a binaural fit with Phonak Koinifyero B50-OK rechargeable behind-the-ear devices (in color P3), coupled to the ears with the recently acquired custom silicone earmolds obtained for her previous devices (fit 3-29-19). Order will be placed online and paid via purchasing card. Fitting was scheduled 5-8-19 at [facility]. Ms. Vargas expressed verbal understanding of this information and plan.    Carter Peng, [facility]  [place] Licensed Audiologist " [phone]

## 2021-07-22 ENCOUNTER — VIRTUAL VISIT (OUTPATIENT)
Dept: RHEUMATOLOGY | Facility: CLINIC | Age: 56
End: 2021-07-22
Payer: MEDICARE

## 2021-07-22 DIAGNOSIS — M25.562 CHRONIC PAIN OF LEFT KNEE: ICD-10-CM

## 2021-07-22 DIAGNOSIS — M79.622 PAIN IN LEFT UPPER ARM: Primary | ICD-10-CM

## 2021-07-22 DIAGNOSIS — G89.29 CHRONIC PAIN OF BOTH SHOULDERS: ICD-10-CM

## 2021-07-22 DIAGNOSIS — M15.9 OSTEOARTHRITIS OF MULTIPLE JOINTS, UNSPECIFIED OSTEOARTHRITIS TYPE: ICD-10-CM

## 2021-07-22 DIAGNOSIS — G89.29 CHRONIC PAIN OF LEFT KNEE: ICD-10-CM

## 2021-07-22 DIAGNOSIS — M25.511 CHRONIC PAIN OF BOTH SHOULDERS: ICD-10-CM

## 2021-07-22 DIAGNOSIS — M79.7 FIBROMYALGIA: ICD-10-CM

## 2021-07-22 DIAGNOSIS — M25.512 CHRONIC PAIN OF BOTH SHOULDERS: ICD-10-CM

## 2021-07-22 PROCEDURE — 99214 OFFICE O/P EST MOD 30 MIN: CPT | Mod: 95 | Performed by: INTERNAL MEDICINE

## 2021-07-22 RX ORDER — CELECOXIB 200 MG/1
CAPSULE ORAL
Qty: 60 CAPSULE | Refills: 0 | Status: SHIPPED | OUTPATIENT
Start: 2021-07-22 | End: 2023-01-26

## 2021-07-22 NOTE — PROGRESS NOTES
Emani Vargas who presents today with a chief complaint of  No chief complaint on file.      Joint Pains: yes  Location: shoulders and knees  Onset: couple months  Intensity:  4-5/10  AM Stiffness: couple Minutes  Alleviating/Aggravating Factors: lifting things increase pain. Med helps.  Tolerating Meds:yes  Other:      ROS:  Patient denies having any chest pain, +shortness of breath, + cough, +abdominal pain, +nausea, vomiting, rashes, fevers, oral ulcers and recent infections.  Patient admits to having a good appetite      Problem List:  Patient Active Problem List   Diagnosis     Insomnia     Hearing Loss     Osteoarthrosis, unspecified whether generalized or localized, pelvic region and thigh     Allergic rhinitis     Compression fracture of vertebral column with routine healing     Malignant neoplasm of upper-outer quadrant of right breast in female, estrogen receptor positive (H)     Refusal of blood transfusions as patient is [employer]     Osteoporosis     Knee osteoarthritis     Bipolar affective disorder, remission status unspecified (H)     Chronic pain syndrome     Morbid obesity (H)        PMH:   Past Medical History:   Diagnosis Date     Allergic rhinitis      Anemia      Anxiety      Asthma      Bipolar 1 disorder (H)      Breast cancer of upper-outer quadrant of right female breast (H) 4/11/2017     Chronic pain     Back pain due to arthritis.     COPD (chronic obstructive pulmonary disease) (H)      Depression      Endometriosis      Fibroid uterus      Hx of radiation therapy 2017     Insomnia      Osteoarthritis      Overweight      Refusal of blood transfusions as patient is [employer] 4/12/2017     Sensorineural hearing loss, bilateral      Shortness of breath      Vitamin D deficiency        Surgical History:  Past Surgical History:   Procedure Laterality Date     ARTHROSCOPY KNEE Right 1996    Description: Arthroscopy Knee Right;  Recorded: 12/08/2011;     BIOPSY BREAST Right  2000's     BIOPSY BREAST Right 02/20/2017     C TOTAL KNEE ARTHROPLASTY Right 8/28/2017    Procedure: RIGHT TOTAL KNEE ARTHROPLASTY;  Surgeon: Antony Elias MD;  Location: [facility];  Service: Orthopedics     KNEE SURGERY Right 1997    lateral release     LAPAROSCOPIC ENDOMETRIOSIS FULGURATION  2002     LUMPECTOMY BREAST Right 03/10/2017    with sentinel lymph node biopsy.     MI HYSTEROSCOPY,W/ENDO BX N/A 12/15/2020    Procedure: HYSTEROSCOPY, WITH DILATION AND CURETTAGE;  Surgeon: Scottie Canas MD;  Location: [facility] OR;  Service: Gynecology     TOTAL HIP ARTHROPLASTY N/A 9/24/2014    Procedure: LEFT HIP TOTAL ARTHROPLASTY;  Surgeon: Antony Elias MD;  Location: [facility];  Service:      TOTAL HIP ARTHROPLASTY Right 8/10/2015    Procedure: HIP TOTAL ARTHROPLASTY RIGHT;  Surgeon: Antony Elias MD;  Location: [facility];  Service:        Family History:  Family History   Problem Relation Age of Onset     Depression Mother      Chronic Obstructive Pulmonary Disease Mother      Diabetes Father      Skin Cancer Father 45.00     Colon Cancer Maternal Grandmother      Colon Cancer Maternal Grandfather      Diabetes Paternal Grandmother      Colon Cancer Paternal Grandmother 80.00     Breast Cancer Cousin 48.00        Paternal side.     Testicular cancer Brother 44.00     Lung Cancer Paternal Aunt 60.00     Anesthesia Reaction No family hx of        Social History:   reports that she quit smoking about 36 years ago. She has a 8.00 pack-year smoking history. She has never used smokeless tobacco. She reports current alcohol use. She reports that she does not use drugs.    Allergies:  Allergies   Allergen Reactions     Cat/Feline Products [Cat Hair Extract] Itching     Trees Other (See Comments)     Grass, itching        Current Medications:  Current Outpatient Medications   Medication Sig Dispense Refill     acetaminophen (TYLENOL) 500 MG tablet [ACETAMINOPHEN (TYLENOL)  500 MG TABLET] Take 1,000 mg by mouth every 6 (six) hours as needed for pain.       albuterol (PROAIR HFA;PROVENTIL HFA;VENTOLIN HFA) 90 mcg/actuation inhaler [ALBUTEROL (PROAIR HFA;PROVENTIL HFA;VENTOLIN HFA) 90 MCG/ACTUATION INHALER] Inhale 1-2 puffs every 4 (four) hours as needed for wheezing or shortness of breath. 1 Inhaler 11     buPROPion (WELLBUTRIN XL) 150 MG 24 hr tablet [BUPROPION (WELLBUTRIN XL) 150 MG 24 HR TABLET] TAKE 1 TABLET BY MOUTH EVERY DAY IN THE MORNING 90 tablet 3     celecoxib (CELEBREX) 200 MG capsule [CELECOXIB (CELEBREX) 200 MG CAPSULE] Take one tab po qd, prn. 30 capsule 0     dextroamphetamine-amphetamine 20 mg Tab [DEXTROAMPHETAMINE-AMPHETAMINE 20 MG TAB] Take 1 tablet by mouth 2 (two) times a day.  0     diclofenac sodium (VOLTAREN) 1 % Gel [DICLOFENAC SODIUM (VOLTAREN) 1 % GEL] Apply approximately 1/2-1 gm over affected hand and/or left elbow joints bid, as needed. 1 Tube 2     FLUoxetine (PROZAC) 20 MG capsule [FLUOXETINE (PROZAC) 20 MG CAPSULE] Take 20 mg by mouth daily.       fluticasone propionate (FLONASE ALLERGY RELIEF) 50 mcg/actuation nasal spray [FLUTICASONE PROPIONATE (FLONASE ALLERGY RELIEF) 50 MCG/ACTUATION NASAL SPRAY] One spray in each nostril twice daily. OFFICE VISIT NEEDED FOR ANY FURTHER REFILLS 16 g 0     gabapentin (NEURONTIN) 100 MG capsule [GABAPENTIN (NEURONTIN) 100 MG CAPSULE] 2 capsules. In the morning       gabapentin (NEURONTIN) 800 MG tablet [GABAPENTIN (NEURONTIN) 800 MG TABLET] 2 tablets at bedtime.       magnesium citrate solution [MAGNESIUM CITRATE SOLUTION] Take 296 mL by mouth daily as needed.       methocarbamoL (ROBAXIN) 500 MG tablet [METHOCARBAMOL (ROBAXIN) 500 MG TABLET] Take 1 tablet by mouth three times daily as needed 90 tablet 0     montelukast (SINGULAIR) 10 mg tablet [MONTELUKAST (SINGULAIR) 10 MG TABLET] Take 1 tablet (10 mg total) by mouth at bedtime. 90 tablet 3     omeprazole (PRILOSEC) 40 MG capsule [OMEPRAZOLE (PRILOSEC) 40 MG  CAPSULE] TAKE 1 CAPSULE BY MOUTH EVERY DAY BEFORE BREAKFAST 90 capsule 3     predniSONE (DELTASONE) 20 MG tablet [PREDNISONE (DELTASONE) 20 MG TABLET] Take 40 mg daily x 5 days, then take 20 mg daily x 5 days.. 15 tablet 0     traZODone (DESYREL) 100 MG tablet [TRAZODONE (DESYREL) 100 MG TABLET] 2 tablets at bedtime.             Physical Exam:  Following up today via video visit, per Covid-19 pandemic requirements.    Verbal consent has been obtained for this service by care team member.    Video call start time:.  :41 AM, failed [place]well, successful with D[employer] at 8:47 AM    Video call end time: 9:02 AM    Doximity utilized for video call.    Phone number utilized: [phone]     Patient location for video visit: Home     Provider location for video visit:  Home (working remotely)      Summary/Assessment:    History that includes osteoarthritis, fibromyalgia, chronic low back pain.    Presents for a follow-up video visit.    Celebrex every other day prn some benefit.     Lately not utilizing diclofenac gel.    States left knee and right shoulder cortisone injections on last visit lasted about a month, less than usual.    States in trying to help her ill father/hospice care, to her left biceps tendon verified via ultrasound per patient, this occurred about 4 months ago.  Desire not to pursue surgical intervention or PT.  Patient would be interested in having evaluated nonsurgically by orthopedist given some residual discomfort, some improvement noted since onset.    From prior note(s):     In the past, received water therapy, beneficial for neck and sacral pains, prescribed by spine clinic.    Denies history of GI bleed/peptic ulcer disease.    Decreased GFR has normalized with holding Celebrex, desires to retry.      Pertinent rheumatology/past medical history (please refer to above for more detailed history):      Osteoarthritis    Fibromyalgia    Chronic shoulder pains (likely rotator cuff tendinopathy,  cortisone injections helpful)    Chronic low back pain and sacral pains (established with spine clinic)    Neck pain    History bilateral hip replacements    Chronic left knee pain (s/p cortisone injection)    History right knee replacement    History of vertebral compression fracture    History right breast cancer    History COPD/chronic bronchitis    Overweight    Left biceps tendon rupture (Winter 2021)    Rheumatology medications provided/suggested:    Voltaren gel  Celebrex    Pertinent medication from other providers or from otc (please refer to above for more detailed med list):    Tylenol  Neurontin  Trazodone  Robaxin  Prozac  Letrozole (MSK pain present prior to initiation)      Pertinent medications already tried:     Tylenol (pruritus)  NSAIDs (upset stomach)  Tramadol (worsening nausea)  Lidoderm patches (ineffective)       Pertinent lab history:    Noted to have negative/unremarkable: Rheumatoid factor, CCP antibody, CONSTANTINE, Lyme antibody, HLA-B27.    Mildly elevated CRP, normal ESR.    Pertinent imaging/test history:      X-rays of right shoulder from May 2018 showed:  Very minimal degenerative irregularity at the rotator cuff insertion site. Glenohumeral joint appears normal. No fracture or dislocation.     X-rays of the left elbow showed some signs of advanced degenerative joint disease.  Some subchondral lucencies noted which may represent either erosive change or degenerative subcortical cystic change.  Small effusion likely. These findings may be related to inflammation or prior trauma.    Other:    Was a  for over 25 years.    On prior visit, suggested looking into obtaining a paraffin wax machine for hand pains.    Standing order for labs placed every 3-6 months good through May 2021.      Plan:      Continue Tylenol as needed.    Continue Celebrex 200 mg daily limiting to 2-3 times per week as needed for pain not responsive to Tylenol.  Avoiding regular use due to mild low GFR when taking  more often.    Continue Voltaren gel twice daily as needed for hand and left elbow pain as needed.  Already made aware can alternate with Celebrex, should avoid taking together.    Neurontin, trazodone, Robaxin prescribed by other providers.    Established with spine clinic for chronic pains involving: SI joints, coccyx and  neck pain.    Made aware if necessary/desires can try coming in sooner for repeat right shoulder cortisone injection and left knee cortisone injection (at higher dosing).    Will place referral to nonsurgical orthopedics (desires nonsurgical) for left biceps tendon rupture/residual discomfort.    Recheck labs this month    Follow-up in 6  months.      This note was transcribed using Dragon voice recognition software as a result unintentional grammatical errors or word substitutions may have occurred. Please contact our Rheumatology department if you need any clarification or if you have any related inquiries.        Kilo Ramon DO ....................  7/22/2021   8:09 AM

## 2021-07-22 NOTE — PATIENT INSTRUCTIONS
Summary of Your Rheumatology Visit    Next Appointment:  6 Months    Medications:     Please follow directives on pill bottle on how to take medication(s) provided.      Referrals:    Nonsurgical orthopedics    Tests:     Please have labs that were ordered performed.       Injections:      Other:

## 2021-07-30 ENCOUNTER — LAB (OUTPATIENT)
Dept: LAB | Facility: CLINIC | Age: 56
End: 2021-07-30
Payer: MEDICARE

## 2021-07-30 DIAGNOSIS — M15.9 OSTEOARTHRITIS OF MULTIPLE JOINTS, UNSPECIFIED OSTEOARTHRITIS TYPE: ICD-10-CM

## 2021-07-30 LAB
ALT SERPL W P-5'-P-CCNC: 27 U/L (ref 0–45)
AST SERPL W P-5'-P-CCNC: 28 U/L (ref 0–40)
CREAT SERPL-MCNC: 1.04 MG/DL (ref 0.6–1.1)
GFR SERPL CREATININE-BSD FRML MDRD: 60 ML/MIN/1.73M2

## 2021-07-30 PROCEDURE — 36415 COLL VENOUS BLD VENIPUNCTURE: CPT

## 2021-07-30 PROCEDURE — 84460 ALANINE AMINO (ALT) (SGPT): CPT

## 2021-07-30 PROCEDURE — 82565 ASSAY OF CREATININE: CPT

## 2021-07-30 PROCEDURE — 84450 TRANSFERASE (AST) (SGOT): CPT

## 2021-08-06 NOTE — PATIENT INSTRUCTIONS - HE
Patient Instructions by Batsheva Wilkins Scribe at 2/10/2020  1:00 PM     Author: Batsheva Wilkins Scribe Service: -- Author Type: Terrell    Filed: 2/10/2020  1:26 PM Encounter Date: 2/10/2020 Status: Signed    : Batsheva Wilkins Scribe (Terrell)         Patient Education     Your Health Risk Assessment indicates you feel you are not in good physical health.    A healthy lifestyle helps keep the body fit and the mind alert. It helps protect you from disease, helps you fight disease, and helps prevent chronic disease (disease that doesn't go away) from getting worse. This is important as you get older and begin to notice twinges in muscles and joints and a decline in the strength and stamina you once took for granted. A healthy lifestyle includes good healthcare, good nutrition, weight control, recreation, and regular exercise. Avoid harmful substances and do what you can to keep safe. Another part of a healthy lifestyle is stay mentally active and socially involved.    Good healthcare     Have a wellness visit every year.     If you have new symptoms, let us know right away. Don't wait until the next checkup.     Take medicines exactly as prescribed and keep your medicines in a safe place. Tell us if your medicine causes problems.   Healthy diet and weight control     Eat 3 or 4 small, nutritious, low-fat, high-fiber meals a day. Include a variety of fruits, vegetables, and whole-grain foods.     Make sure you get enough calcium in your diet. Calcium, vitamin D, and exercise help prevent osteoporosis (bone thinning).     If you live alone, try eating with others when you can. That way you get a good meal and have company while you eat it.     Try to keep a healthy weight. If you eat more calories than your body uses for energy, it will be stored as fat and you will gain weight.     Recreation   Recreation is not limited to sports and team events. It includes any activity that provides relaxation,  interest, enjoyment, and exercise. Recreation provides an outlet for physical, mental, and social energy. It can give a sense of worth and achievement. It can help you stay healthy.       Patient Education     Exercise for a Healthier Heart  You may wonder how you can improve the health of your heart. If youre thinking about exercise, youre on the right track. You dont need to become an athlete, but you do need a certain amount of brisk exercise to help strengthen your heart. If you have been diagnosed with a heart condition, your doctor may recommend exercise to help stabilize your condition. To help make exercise a habit, choose safe, fun activities.       Be sure to check with your health care provider before starting an exercise program.    Why exercise?  Exercising regularly offers many healthy rewards. It can help you do all of the following:    Improve your blood cholesterol levels to help prevent further heart trouble    Lower your blood pressure to help prevent a stroke or heart attack    Control diabetes, or reduce your risk of getting this disease    Improve your heart and lung function    Reach and maintain a healthy weight    Make your muscles stronger and more limber so you can stay active    Prevent falls and fractures by slowing the loss of bone mass (osteoporosis)    Manage stress better  Exercise tips  Ease into your routine. Set small goals. Then build on them.  Exercise on most days. Aim for a total of 150 or more minutes of moderate to  vigorous intensity activity each week. Consider 40 minutes, 3 to 4 times a week. For best results, activity should last for 40 minutes on average. It is OK to work up to the 40 minute period over time. Examples of moderate-intensity activity is walking one mile in 15 minutes or 30 to 45 minutes of yard work.  Step up your daily activity level. Along with your exercise program, try being more active throughout the day. Walk instead of drive. Do more household  tasks or yard work.  Choose one or more activities you enjoy. Walking is one of the easiest things you can do. You can also try swimming, riding a bike, or taking an exercise class.  Stop exercising and call your doctor if you:    Have chest pain or feel dizzy or lightheaded    Feel burning, tightness, pressure, or heaviness in your chest, neck, shoulders, back, or arms    Have unusual shortness of breath    Have increased joint or muscle pain    Have palpitations or an irregular heartbeat      6701-0533 [employer]. [address] [place] [id number]. All rights reserved. This information is not intended as a substitute for professional medical care. Always follow your healthcare professional's instructions.         Patient Education   Understanding [employer] MyPlate  The USDA (US Department of Agriculture) has guidelines to help you make healthy food choices. These are called MyPlate. MyPlate shows the food groups that make up healthy meals using the image of a place setting. Before you eat, think about the healthiest choices for what to put onto your plate or into your cup or bowl. To learn more about building a healthy plate, visit www.choosemyplate.gov.       The Food Groups    Fruits: Any fruit or 100% fruit juice counts as part of the Fruit Group. Fruits may be fresh, canned, frozen, or dried, and may be whole, cut-up, or pureed. Make half your plate fruits and vegetables.    Vegetables: Any vegetable or 100% vegetable juice counts as a member of the Vegetable Group. Vegetables may be fresh, frozen, canned, or dried. They can be served raw or cooked and may be whole, cut-up, or mashed. Make half your plate fruits and vegetables.     Grains: All foods made from grains are part of the Grains Group. These include wheat, rice, oats, cornmeal, and barley such as bread, pasta, oatmeal, cereal, tortillas, and grits. Grains should be no more than a quarter of your plate. At least half of your grains  should be whole grains.    Protein: This group includes meat, poultry, seafood, beans and peas, eggs, processed soy products (like tofu), nuts (including nut butters), and seeds. Make protein choices no more than a quarter of your plate. Meat and poultry choices should be lean or low fat.    Dairy: All fluid milk products and foods made from milk that contain calcium, like yogurt and cheese are part of the Dairy Group. (Foods that have little calcium, such as cream, butter, and cream cheese, are not part of the group.) Most dairy choices should be low-fat or fat-free.    Oils: These are fats that are liquid at room temperature. They include canola, corn, olive, soybean, and sunflower oil. Foods that are mainly oil include mayonnaise, certain salad dressings, and soft margarines. You should have only 5 to 7 teaspoons of oils a day. You probably already get this much from the food you eat.  Use [employer] to Help Build Your Meals  The SuperTracker can help you plan and track your meals and activity. You can look up individual foods to see or compare their nutritional value. You can get guidelines for what and how much you should eat. You can compare your food choices. And you can assess personal physical activities and see ways you can improve. Go to www.[employer].gov/supertracker/.    4979-0333 [employer]. [address] [place] [address], [place] [address]. All rights reserved. This information is not intended as a substitute for professional medical care. Always follow your healthcare professional's instructions.           Patient Education   Signs of Hearing Loss  Hearing loss is a problem shared by many people. In fact, it is one of the most common health conditions, particularly as people age. Most people over age 65 have some hearing loss, and by age 80, almost everyone does. Because hearing loss usually occurs slowly over the years, you may not realize your hearing ability has gotten  worse.       Have your hearing checked  Contact your [facility] care provider if you:    Have to strain to hear normal conversation.    Have to watch other peoples faces very carefully to follow what theyre saying.    Need to ask people to repeat what theyve said.    Often misunderstand what people are saying.    Turn the volume of the television or radio up so high that others complain.    Feel that people are mumbling when theyre talking to you.    Find that the effort to hear leaves you feeling tired and irritated.    Notice, when using the phone, that you hear better with 1 ear than the other.    8147-5944 [employer]. [address] [place] [address]. All rights reserved. This information is not intended as a substitute for professional medical care. Always follow your healthcare professional's instructions.         Patient Education   Urinary Incontinence, Female (Adult)  Urinary incontinence means loss of control of the bladder. This problem affects many women, especially as they get older. If you have incontinence, you may be embarrassed to ask for help. But know that this problem can be treated.  Types of Incontinence  There are different types of incontinence. Two of the main types are described here. You can have more than one type.    Stress incontinence. With this type, urine leaks when pressure (stress) is put on the bladder. This may happen when you cough, sneeze, or laugh. Stress incontinence most often occurs because the pelvic floor muscles that support the bladder and urethra are weak. This can happen after pregnancy and vaginal childbirth or a hysterectomy. It can also be due to excess body weight or hormone changes.    Urge incontinence (also called overactive bladder). With this type, a sudden urge to urinate is felt often. This may happen even though there may not be much urine in the bladder. The need to urinate often during the night is common. Urge incontinence most often  occurs because of bladder spasms. This may be due to bladder irritation or infection. Damage to bladder nerves or pelvic muscles, constipation, and certain medicines can also lead to urge incontinence.  Treatment of urinary incontinence depends on the cause. Further evaluation is needed to find the type you have. This will likely include an exam and certain tests. Based on the results, you and your healthcare provider can then plan treatment. Until a diagnosis is made, the home care tips below can help relieve symptoms.  Home care    Do pelvic floor muscle exercises, if they are prescribed. The pelvic floor muscles help support the bladder and urethra. Many women find that their symptoms improve when doing special exercises that strengthen these muscles. To do the exercises contract the muscles you would use to stop your stream of urine, but do this when youre not urinating. Hold for 10 seconds, then relax. Repeat 10 to 20 times in a row, at least 3 times a day. Your provider may give you other instructions for how to do the exercises and how often.    Keep a bladder diary. This helps track how often and how much you urinate over a set period of time. Bring this diary with you to your next visit with the provider. The information can help your provider learn more about your bladder problem.    Lose weight, if advised to by your provider. Excess weight puts pressure on the bladder. Your provider can help you create a weight-loss plan thats right for you. This may include exercising more and making certain diet changes.    Don't consume foods and drinks that may irritate the bladder. These can include alcohol and caffeinated drinks.    Quit smoking. Smoking and other tobacco use can lead to chronic cough that strains the pelvic floor muscles. Smoking may also damage the bladder and urethra. Talk with your provider about treatments or methods you can use to quit smoking.    If drinking large amounts of fluid causes you  to have symptoms, you may be advised to limit your fluid intake. You may also be advised to drink most of your fluids during the day and to limit fluids at night.    If youre worried about urine leakage or accidents, you may wear absorbent pads to catch urine. Change the pads often. This helps reduce discomfort. It may also reduce the risk of skin or bladder infections.  Follow-up care  Follow up with your healthcare provider, or as directed. It may take some to find the right treatment for your problem. Your treatment plan may include special therapies or medicines. Certain procedures or surgery may also be options. Be sure to discuss any questions you have with your provider.  When to seek medical advice  Call the healthcare provider right away if any of these occur:    Fever of 100.4 F (38 C) or higher, or as directed by your provider    Bladder pain or fullness    Abdominal swelling    Nausea or vomiting    Back pain    Weakness, dizziness or fainting  Date Last Reviewed: 10/1/2017    8858-8935 [employer]. [address] [place] [address]. All rights reserved. This information is not intended as a substitute for professional medical care. Always follow your healthcare professional's instructions.     Patient Education   Your Health Risk Assessment indicates you feel you are not in good emotional health.    Recreation   Recreation is not limited to sports and team events. It includes any activity that provides relaxation, interest, enjoyment, and exercise. Recreation provides an outlet for physical, mental, and social energy. It can give a sense of worth and achievement. It can help you stay healthy.    Mental Exercise and Social Involvement  Mental and emotional health is as important as physical health. Keep in touch with friends and family. Stay as active as possible. Continue to learn and challenge yourself.   Things you can do to stay mentally active are:    Learn something new, like  a foreign language or musical instrument.     Play SCRABBLE or do crossword puzzles. If you cannot find people to play these games with you at home, you can play them with others on your computer through the Internet.     Join a games club--anything from card games to chess or checkers or lawn bowling.     Start a new hobby.     Go back to school.     Volunteer.     Read.     Keep up with world events.       Patient Education   Depression and Suicide in Older Adults  Nearly 2 million older Americans have some type of depression. Sadly, some of them even take their own lives. Yet depression among older adults is often ignored. Learn the warning signs. You may help spare a loved one needless pain. You may also save a life.       What Is Depression?  Depression is a mood disorder that affects the way you think and feel. The most common symptom is a feeling of deep sadness. People who are depressed also may seem tired and listless. And nothing seems to give them pleasure. Its normal to grieve or be sad sometimes. But sadness lessens or passes with time. Depression rarely goes away or improves on its own. Other symptoms of depression are:    Sleeping more or less than normal    Eating more or less than normal    Having headaches, stomachaches, or other pains that dont go away    Feeling nervous, empty, or worthless    Crying a great deal    Thinking or talking about suicide or death    Feeling confused or forgetful  What Causes It?  The causes of depression arent fully known. Certain chemicals in the brain play a role. Depression does run in families. And life stresses can also trigger depression in some people. That may be the case with older adults. They often face great burdens, such as the death of friends or a spouse. They may have failing health. And they are more likely to be alone, lonely, or poor.  How You Can Help  Often, depressed people may not want to ask for help. When they do, they may be ignored. Or, they  may receive the wrong treatment. You can help by showing parents and older friends love and support. If they seem depressed, help them find the right treatment. Talk to your doctor. Or contact a local mental health center, social service agency, or hospital. With modern treatment, no one has to suffer from depression.  Resources:    National [place] of Mental Health  251.472.7755  www.nimh.nih.gov    National [place] on Mental Illness  887.277.2778  www.henrietta.org    [facility]  873.289.2462  www.[facility].org    National Suicide Hotline  866.606.4949 (800-SUICIDE)      5715-3524 [employer]. [address] [place] [address]. All rights reserved. This information is not intended as a substitute for professional medical care. Always follow your healthcare professional's instructions.           Advance Directive  Patients advance directive was discussed and I am comfortable with the patients wishes.  Patient Education   Personalized Prevention Plan  You are due for the preventive services outlined below.  Your care team is available to assist you in scheduling these services.  If you have already completed any of these items, please share that information with your care team to update in your medical record.  Health Maintenance   Topic Date Due   ? ASTHMA ACTION PLAN  1965   ? DEPRESSION ACTION PLAN  1965   ? COPD ACTION PLAN  1965   ? HIV SCREENING  02/20/1979   ? MEDICARE ANNUAL WELLNESS VISIT  02/20/1982   ? ADVANCE CARE PLANNING  02/20/1982   ? PAP SMEAR  02/20/1985   ? ZOSTER VACCINES (1 of 2) 02/20/2014   ? LIPID  12/08/2016   ? INFLUENZA VACCINE RULE BASED (1) 08/01/2019   ? ASTHMA CONTROL TEST  05/07/2020   ? MAMMOGRAM  02/25/2021   ? COLONOSCOPY  01/05/2025   ? TD 18+ HE  07/11/2026   ? HEPATITIS C SCREENING  Completed   ? SPIROMETRY  Completed   ? TDAP ADULT ONE TIME DOSE  Completed

## 2021-08-06 NOTE — PATIENT INSTRUCTIONS - HE
Patient Instructions by Shira Miller MD at 3/3/2021  2:40 PM     Author: Shira Miller MD Service: -- Author Type: Physician    Filed: 3/3/2021  2:56 PM Encounter Date: 3/3/2021 Status: Signed    : Shira Miller MD (Physician)         Patient Education     Your Health Risk Assessment indicates you feel you are not in good physical health.    A healthy lifestyle helps keep the body fit and the mind alert. It helps protect you from disease, helps you fight disease, and helps prevent chronic disease (disease that doesn't go away) from getting worse. This is important as you get older and begin to notice twinges in muscles and joints and a decline in the strength and stamina you once took for granted. A healthy lifestyle includes good healthcare, good nutrition, weight control, recreation, and regular exercise. Avoid harmful substances and do what you can to keep safe. Another part of a healthy lifestyle is stay mentally active and socially involved.    Good healthcare     Have a wellness visit every year.     If you have new symptoms, let us know right away. Don't wait until the next checkup.     Take medicines exactly as prescribed and keep your medicines in a safe place. Tell us if your medicine causes problems.   Healthy diet and weight control     Eat 3 or 4 small, nutritious, low-fat, high-fiber meals a day. Include a variety of fruits, vegetables, and whole-grain foods.     Make sure you get enough calcium in your diet. Calcium, vitamin D, and exercise help prevent osteoporosis (bone thinning).     If you live alone, try eating with others when you can. That way you get a good meal and have company while you eat it.     Try to keep a healthy weight. If you eat more calories than your body uses for energy, it will be stored as fat and you will gain weight.     Recreation   Recreation is not limited to sports and team events. It includes any activity that  provides relaxation, interest, enjoyment, and exercise. Recreation provides an outlet for physical, mental, and social energy. It can give a sense of worth and achievement. It can help you stay healthy.       Patient Education     Exercise for a Healthier Heart  You may wonder how you can improve the health of your heart. If youre thinking about exercise, youre on the right track. You dont need to become an athlete, but you do need a certain amount of brisk exercise to help strengthen your heart. If you have been diagnosed with a heart condition, your doctor may recommend exercise to help stabilize your condition. To help make exercise a habit, choose safe, fun activities.       Be sure to check with your health care provider before starting an exercise program.    Why exercise?  Exercising regularly offers many healthy rewards. It can help you do all of the following:    Improve your blood cholesterol levels to help prevent further heart trouble    Lower your blood pressure to help prevent a stroke or heart attack    Control diabetes, or reduce your risk of getting this disease    Improve your heart and lung function    Reach and maintain a healthy weight    Make your muscles stronger and more limber so you can stay active    Prevent falls and fractures by slowing the loss of bone mass (osteoporosis)    Manage stress better  Exercise tips  Ease into your routine. Set small goals. Then build on them.  Exercise on most days. Aim for a total of 150 or more minutes of moderate to  vigorous intensity activity each week. Consider 40 minutes, 3 to 4 times a week. For best results, activity should last for 40 minutes on average. It is OK to work up to the 40 minute period over time. Examples of moderate-intensity activity is walking one mile in 15 minutes or 30 to 45 minutes of yard work.  Step up your daily activity level. Along with your exercise program, try being more active throughout the day. Walk instead of drive.  Do more household tasks or yard work.  Choose one or more activities you enjoy. Walking is one of the easiest things you can do. You can also try swimming, riding a bike, or taking an exercise class.  Stop exercising and call your doctor if you:    Have chest pain or feel dizzy or lightheaded    Feel burning, tightness, pressure, or heaviness in your chest, neck, shoulders, back, or arms    Have unusual shortness of breath    Have increased joint or muscle pain    Have palpitations or an irregular heartbeat      2466-5760 [employer]. [address] [place] [address]. All rights reserved. This information is not intended as a substitute for professional medical care. Always follow your healthcare professional's instructions.         Patient Education   Understanding [employer] MyPlate  The USDA (US Department of Agriculture) has guidelines to help you make healthy food choices. These are called MyPlate. MyPlate shows the food groups that make up healthy meals using the image of a place setting. Before you eat, think about the healthiest choices for what to put onto your plate or into your cup or bowl. To learn more about building a healthy plate, visit www.choosemyplate.gov.       The Food Groups    Fruits: Any fruit or 100% fruit juice counts as part of the Fruit Group. Fruits may be fresh, canned, frozen, or dried, and may be whole, cut-up, or pureed. Make half your plate fruits and vegetables.    Vegetables: Any vegetable or 100% vegetable juice counts as a member of the Vegetable Group. Vegetables may be fresh, frozen, canned, or dried. They can be served raw or cooked and may be whole, cut-up, or mashed. Make half your plate fruits and vegetables.     Grains: All foods made from grains are part of the Grains Group. These include wheat, rice, oats, cornmeal, and barley such as bread, pasta, oatmeal, cereal, tortillas, and grits. Grains should be no more than a quarter of your plate. At least  half of your grains should be whole grains.    Protein: This group includes meat, poultry, seafood, beans and peas, eggs, processed soy products (like tofu), nuts (including nut butters), and seeds. Make protein choices no more than a quarter of your plate. Meat and poultry choices should be lean or low fat.    Dairy: All fluid milk products and foods made from milk that contain calcium, like yogurt and cheese are part of the Dairy Group. (Foods that have little calcium, such as cream, butter, and cream cheese, are not part of the group.) Most dairy choices should be low-fat or fat-free.    Oils: These are fats that are liquid at room temperature. They include canola, corn, olive, soybean, and sunflower oil. Foods that are mainly oil include mayonnaise, certain salad dressings, and soft margarines. You should have only 5 to 7 teaspoons of oils a day. You probably already get this much from the food you eat.  Use [employer] to Help Build Your Meals  The SuperTracker can help you plan and track your meals and activity. You can look up individual foods to see or compare their nutritional value. You can get guidelines for what and how much you should eat. You can compare your food choices. And you can assess personal physical activities and see ways you can improve. Go to www.[employer].gov/supertracker/.    1853-2558 [employer]. [address] [place] [address], [place] [address]. All rights reserved. This information is not intended as a substitute for professional medical care. Always follow your healthcare professional's instructions.           Patient Education   Signs of Hearing Loss  Hearing loss is a problem shared by many people. In fact, it is one of the most common health conditions, particularly as people age. Most people over age 65 have some hearing loss, and by age 80, almost everyone does. Because hearing loss usually occurs slowly over the years, you may not realize your hearing ability has  gotten worse.       Have your hearing checked  Contact your [facility] care provider if you:    Have to strain to hear normal conversation.    Have to watch other peoples faces very carefully to follow what theyre saying.    Need to ask people to repeat what theyve said.    Often misunderstand what people are saying.    Turn the volume of the television or radio up so high that others complain.    Feel that people are mumbling when theyre talking to you.    Find that the effort to hear leaves you feeling tired and irritated.    Notice, when using the phone, that you hear better with 1 ear than the other.    4129-6563 [employer]. [address] [place] [address]. All rights reserved. This information is not intended as a substitute for professional medical care. Always follow your healthcare professional's instructions.         Patient Education   Urinary Incontinence, Female (Adult)  Urinary incontinence means loss of control of the bladder. This problem affects many women, especially as they get older. If you have incontinence, you may be embarrassed to ask for help. But know that this problem can be treated.  Types of Incontinence  There are different types of incontinence. Two of the main types are described here. You can have more than one type.    Stress incontinence. With this type, urine leaks when pressure (stress) is put on the bladder. This may happen when you cough, sneeze, or laugh. Stress incontinence most often occurs because the pelvic floor muscles that support the bladder and urethra are weak. This can happen after pregnancy and vaginal childbirth or a hysterectomy. It can also be due to excess body weight or hormone changes.    Urge incontinence (also called overactive bladder). With this type, a sudden urge to urinate is felt often. This may happen even though there may not be much urine in the bladder. The need to urinate often during the night is common. Urge incontinence most  often occurs because of bladder spasms. This may be due to bladder irritation or infection. Damage to bladder nerves or pelvic muscles, constipation, and certain medicines can also lead to urge incontinence.  Treatment of urinary incontinence depends on the cause. Further evaluation is needed to find the type you have. This will likely include an exam and certain tests. Based on the results, you and your healthcare provider can then plan treatment. Until a diagnosis is made, the home care tips below can help relieve symptoms.  Home care    Do pelvic floor muscle exercises, if they are prescribed. The pelvic floor muscles help support the bladder and urethra. Many women find that their symptoms improve when doing special exercises that strengthen these muscles. To do the exercises contract the muscles you would use to stop your stream of urine, but do this when youre not urinating. Hold for 10 seconds, then relax. Repeat 10 to 20 times in a row, at least 3 times a day. Your provider may give you other instructions for how to do the exercises and how often.    Keep a bladder diary. This helps track how often and how much you urinate over a set period of time. Bring this diary with you to your next visit with the provider. The information can help your provider learn more about your bladder problem.    Lose weight, if advised to by your provider. Excess weight puts pressure on the bladder. Your provider can help you create a weight-loss plan thats right for you. This may include exercising more and making certain diet changes.    Don't consume foods and drinks that may irritate the bladder. These can include alcohol and caffeinated drinks.    Quit smoking. Smoking and other tobacco use can lead to chronic cough that strains the pelvic floor muscles. Smoking may also damage the bladder and urethra. Talk with your provider about treatments or methods you can use to quit smoking.    If drinking large amounts of fluid  causes you to have symptoms, you may be advised to limit your fluid intake. You may also be advised to drink most of your fluids during the day and to limit fluids at night.    If youre worried about urine leakage or accidents, you may wear absorbent pads to catch urine. Change the pads often. This helps reduce discomfort. It may also reduce the risk of skin or bladder infections.  Follow-up care  Follow up with your healthcare provider, or as directed. It may take some to find the right treatment for your problem. Your treatment plan may include special therapies or medicines. Certain procedures or surgery may also be options. Be sure to discuss any questions you have with your provider.  When to seek medical advice  Call the healthcare provider right away if any of these occur:    Fever of 100.4 F (38 C) or higher, or as directed by your provider    Bladder pain or fullness    Abdominal swelling    Nausea or vomiting    Back pain    Weakness, dizziness or fainting  Date Last Reviewed: 10/1/2017    3160-2090 [employer]. [address] [place] [address]. All rights reserved. This information is not intended as a substitute for professional medical care. Always follow your healthcare professional's instructions.     Patient Education   Your Health Risk Assessment indicates you feel you are not in good emotional health.    Recreation   Recreation is not limited to sports and team events. It includes any activity that provides relaxation, interest, enjoyment, and exercise. Recreation provides an outlet for physical, mental, and social energy. It can give a sense of worth and achievement. It can help you stay healthy.    Mental Exercise and Social Involvement  Mental and emotional health is as important as physical health. Keep in touch with friends and family. Stay as active as possible. Continue to learn and challenge yourself.   Things you can do to stay mentally active are:    Learn something  new, like a foreign language or musical instrument.     Play SCRABBLE or do crossword puzzles. If you cannot find people to play these games with you at home, you can play them with others on your computer through the Internet.     Join a games club--anything from card games to chess or checkers or lawn bowling.     Start a new hobby.     Go back to school.     Volunteer.     Read.     Keep up with world events.       Patient Education   Depression and Suicide in Older Adults  Nearly 2 million older Americans have some type of depression. Sadly, some of them even take their own lives. Yet depression among older adults is often ignored. Learn the warning signs. You may help spare a loved one needless pain. You may also save a life.       What Is Depression?  Depression is a mood disorder that affects the way you think and feel. The most common symptom is a feeling of deep sadness. People who are depressed also may seem tired and listless. And nothing seems to give them pleasure. Its normal to grieve or be sad sometimes. But sadness lessens or passes with time. Depression rarely goes away or improves on its own. Other symptoms of depression are:    Sleeping more or less than normal    Eating more or less than normal    Having headaches, stomachaches, or other pains that dont go away    Feeling nervous, empty, or worthless    Crying a great deal    Thinking or talking about suicide or death    Feeling confused or forgetful  What Causes It?  The causes of depression arent fully known. Certain chemicals in the brain play a role. Depression does run in families. And life stresses can also trigger depression in some people. That may be the case with older adults. They often face great burdens, such as the death of friends or a spouse. They may have failing health. And they are more likely to be alone, lonely, or poor.  How You Can Help  Often, depressed people may not want to ask for help. When they do, they may be  ignored. Or, they may receive the wrong treatment. You can help by showing parents and older friends love and support. If they seem depressed, help them find the right treatment. Talk to your doctor. Or contact a local mental health center, social service agency, or hospital. With modern treatment, no one has to suffer from depression.  Resources:    National [place] of Mental Health  945.119.3335  www.nimh.nih.gov    National [place] on Mental Illness  678.773.4765  www.[place]rietta.org    [facility]  310.132.8514  www.[facility].org    National Suicide Hotline  728.597.4801 (800-SUICIDE)      0150-6322 [employer]. [address] [place] [address], [place] [address]. All rights reserved. This information is not intended as a substitute for professional medical care. Always follow your healthcare professional's instructions.           Advance Directive  Patients advance directive was discussed and I am comfortable with the patients wishes.  Patient Education   Personalized Prevention Plan  You are due for the preventive services outlined below.  Your care team is available to assist you in scheduling these services.  If you have already completed any of these items, please share that information with your care team to update in your medical record.  Health Maintenance   Topic Date Due   ? DEPRESSION ACTION PLAN  1964   ? Pneumococcal Vaccine: Pediatrics (0 to 5 Years) and At-Risk Patients (6 to 64 Years) (1 of 4 - PCV13) 02/20/1970   ? HIV SCREENING  02/20/1979   ? ZOSTER VACCINES (1 of 2) 02/20/2014   ? INFLUENZA VACCINE RULE BASED (1) 08/01/2020   ? MAMMOGRAM  02/28/2022   ? MEDICARE ANNUAL WELLNESS VISIT  03/03/2022   ? COLORECTAL CANCER SCREENING  01/05/2025   ? PAP SMEAR  02/10/2025   ? LIPID  02/10/2025   ? HPV TEST  02/10/2025   ? ADVANCE CARE PLANNING  02/10/2025   ? TD 18+ HE  07/11/2026   ? HEPATITIS C SCREENING  Completed   ? TDAP ADULT ONE TIME DOSE  Completed   ? HEPATITIS B VACCINES  Aged  Out

## 2021-10-12 DIAGNOSIS — G89.29 OTHER CHRONIC PAIN: ICD-10-CM

## 2021-10-13 NOTE — TELEPHONE ENCOUNTER
Routing refill request to provider for review/approval because:  Drug not on the [facility] refill protocol     Last Written Prescription Date:  6/8/21  Last Fill Quantity: 90,  # refills: 0   Last office visit provider:  3/3/21     Requested Prescriptions   Pending Prescriptions Disp Refills     methocarbamol (ROBAXIN) 500 MG tablet [Pharmacy Med Name: Methocarbamol 500 MG Oral Tablet] 90 tablet 0     Sig: Take 1 tablet by mouth three times daily as needed       There is no refill protocol information for this order          Chetan Aiken RN 10/13/21 3:26 PM

## 2021-10-14 RX ORDER — METHOCARBAMOL 500 MG/1
TABLET, FILM COATED ORAL
Qty: 90 TABLET | Refills: 0 | Status: SHIPPED | OUTPATIENT
Start: 2021-10-14 | End: 2021-12-22

## 2021-10-16 ENCOUNTER — HEALTH MAINTENANCE LETTER (OUTPATIENT)
Age: 56
End: 2021-10-16

## 2021-12-20 DIAGNOSIS — G89.29 OTHER CHRONIC PAIN: ICD-10-CM

## 2021-12-22 RX ORDER — METHOCARBAMOL 500 MG/1
TABLET, FILM COATED ORAL
Qty: 90 TABLET | Refills: 0 | Status: SHIPPED | OUTPATIENT
Start: 2021-12-22 | End: 2022-04-25

## 2021-12-22 NOTE — TELEPHONE ENCOUNTER
Routing refill request to provider for review/approval because:  Drug not on the [facility] refill protocol     Last Written Prescription Date:  10/14/2021  Last Fill Quantity: 90,  # refills: 0   Last office visit provider:   03/03/2021 with Dr Navarrete.    Requested Prescriptions   Pending Prescriptions Disp Refills     methocarbamol (ROBAXIN) 500 MG tablet [Pharmacy Med Name: Methocarbamol 500 MG Oral Tablet] 90 tablet 0     Sig: Take 1 tablet by mouth three times daily as needed       There is no refill protocol information for this order          Elsa Cagle 12/22/21 12:17 PM

## 2021-12-24 ENCOUNTER — IMMUNIZATION (OUTPATIENT)
Dept: NURSING | Facility: CLINIC | Age: 56
End: 2021-12-24
Payer: MEDICARE

## 2021-12-24 PROCEDURE — 91300 PR COVID VAC PFIZER DIL RECON 30 MCG/0.3 ML IM: CPT

## 2021-12-24 PROCEDURE — 0004A PR COVID VAC PFIZER DIL RECON 30 MCG/0.3 ML IM: CPT

## 2022-02-25 DIAGNOSIS — R05.3 CHRONIC COUGH: ICD-10-CM

## 2022-02-25 DIAGNOSIS — K21.9 GASTROESOPHAGEAL REFLUX DISEASE WITHOUT ESOPHAGITIS: ICD-10-CM

## 2022-02-27 NOTE — TELEPHONE ENCOUNTER
"Routing refill request to provider for review/approval because:  Patient has diagnosis of osteoporosis    Last Written Prescription Date:  3/1/2021  Last Fill Quantity: 90,  # refills: 3   Last office visit provider:  3/3/2021     Requested Prescriptions   Pending Prescriptions Disp Refills     omeprazole (PRILOSEC) 40 MG DR capsule [Pharmacy Med Name: OMEPRAZOLE DR 40 MG CAPSULE] 90 capsule 3     Sig: TAKE 1 CAPSULE BY MOUTH EVERY DAY BEFORE BREAKFAST       PPI Protocol Failed - 2/25/2022  2:10 AM        Failed - No diagnosis of osteoporosis on record        Passed - Not on Clopidogrel (unless Pantoprazole ordered)        Passed - Recent (12 mo) or future (30 days) visit within the authorizing provider's specialty     Patient has had an office visit with the authorizing provider or a provider within the authorizing providers department within the previous 12 mos or has a future within next 30 days. See \"Patient Info\" tab in inbasket, or \"Choose Columns\" in Meds & Orders section of the refill encounter.              Passed - Medication is active on med list        Passed - Patient is age 18 or older        Passed - No active pregnacy on record        Passed - No positive pregnancy test in past 12 months             Briseyda Trejo RN 02/27/22 3:42 PM  "

## 2022-02-28 RX ORDER — OMEPRAZOLE 40 MG/1
CAPSULE, DELAYED RELEASE ORAL
Qty: 90 CAPSULE | Refills: 3 | Status: SHIPPED | OUTPATIENT
Start: 2022-02-28 | End: 2022-12-15

## 2022-03-21 DIAGNOSIS — J32.9 CHRONIC SINUSITIS, UNSPECIFIED LOCATION: ICD-10-CM

## 2022-03-21 DIAGNOSIS — R05.3 CHRONIC COUGH: ICD-10-CM

## 2022-03-21 RX ORDER — MONTELUKAST SODIUM 10 MG/1
10 TABLET ORAL AT BEDTIME
Qty: 30 TABLET | Refills: 0 | Status: SHIPPED | OUTPATIENT
Start: 2022-03-21 | End: 2022-06-08

## 2022-04-20 DIAGNOSIS — G89.29 OTHER CHRONIC PAIN: ICD-10-CM

## 2022-04-22 NOTE — TELEPHONE ENCOUNTER
Routing refill request to provider for review/approval because:  Drug not on the [facility] refill protocol   Patient needs to be seen because it has been more than 1 year since last office visit.    Last Written Prescription Date:  12/22/21  Last Fill Quantity: 90,  # refills: 0   Last office visit provider:  3/3/21     Requested Prescriptions   Pending Prescriptions Disp Refills     methocarbamol (ROBAXIN) 500 MG tablet [Pharmacy Med Name: Methocarbamol 500 MG Oral Tablet] 90 tablet 0     Sig: Take 1 tablet by mouth three times daily as needed       There is no refill protocol information for this order          Chetan Aiken RN 04/22/22 10:22 AM

## 2022-04-25 RX ORDER — METHOCARBAMOL 500 MG/1
TABLET, FILM COATED ORAL
Qty: 90 TABLET | Refills: 0 | Status: SHIPPED | OUTPATIENT
Start: 2022-04-25 | End: 2022-10-26

## 2022-05-23 ENCOUNTER — OFFICE VISIT (OUTPATIENT)
Dept: RHEUMATOLOGY | Facility: CLINIC | Age: 57
End: 2022-05-23
Payer: MEDICARE

## 2022-05-23 VITALS
BODY MASS INDEX: 40.73 KG/M2 | HEIGHT: 69 IN | HEART RATE: 76 BPM | SYSTOLIC BLOOD PRESSURE: 110 MMHG | DIASTOLIC BLOOD PRESSURE: 90 MMHG | WEIGHT: 275 LBS

## 2022-05-23 DIAGNOSIS — M25.511 CHRONIC PAIN OF BOTH SHOULDERS: Primary | ICD-10-CM

## 2022-05-23 DIAGNOSIS — G89.29 CHRONIC PAIN OF LEFT KNEE: ICD-10-CM

## 2022-05-23 DIAGNOSIS — M15.9 OSTEOARTHRITIS OF MULTIPLE JOINTS, UNSPECIFIED OSTEOARTHRITIS TYPE: ICD-10-CM

## 2022-05-23 DIAGNOSIS — M25.562 CHRONIC PAIN OF LEFT KNEE: ICD-10-CM

## 2022-05-23 DIAGNOSIS — G89.29 CHRONIC PAIN OF BOTH SHOULDERS: Primary | ICD-10-CM

## 2022-05-23 DIAGNOSIS — M79.7 FIBROMYALGIA: ICD-10-CM

## 2022-05-23 DIAGNOSIS — M25.512 CHRONIC PAIN OF BOTH SHOULDERS: Primary | ICD-10-CM

## 2022-05-23 PROCEDURE — 99214 OFFICE O/P EST MOD 30 MIN: CPT | Mod: 25 | Performed by: INTERNAL MEDICINE

## 2022-05-23 PROCEDURE — 20610 DRAIN/INJ JOINT/BURSA W/O US: CPT | Mod: RT | Performed by: INTERNAL MEDICINE

## 2022-05-23 RX ORDER — TRIAMCINOLONE ACETONIDE 40 MG/ML
40 INJECTION, SUSPENSION INTRA-ARTICULAR; INTRAMUSCULAR ONCE
Status: COMPLETED | OUTPATIENT
Start: 2022-05-23 | End: 2022-05-23

## 2022-05-23 RX ADMIN — TRIAMCINOLONE ACETONIDE 40 MG: 40 INJECTION, SUSPENSION INTRA-ARTICULAR; INTRAMUSCULAR at 16:40

## 2022-05-23 ASSESSMENT — PAIN SCALES - GENERAL: PAINLEVEL: MODERATE PAIN (5)

## 2022-05-23 NOTE — PROGRESS NOTES
Emani Vargas who presents today with a chief complaint of  No chief complaint on file.      Joint Pains: yes  Location: shoulders and knees  Onset: ongoing  Intensity:  5/10  AM Stiffness: 3-5 Minutes  Alleviating/Aggravating Factors: sitting for too long increase pain  Tolerating Meds: yes  Other:      ROS:  Patient denies having any chest pain, +shortness of breath, +cough, abdominal pain, nausea, vomiting, rashes, fevers, oral ulcers and recent infections.  Patient admits to having a good appetite      Problem List:  Patient Active Problem List   Diagnosis     Insomnia     Hearing Loss     Osteoarthrosis, unspecified whether generalized or localized, pelvic region and thigh     Allergic rhinitis     Compression fracture of vertebral column with routine healing     Malignant neoplasm of upper-outer quadrant of right breast in female, estrogen receptor positive (H)     Refusal of blood transfusions as patient is [employer]     Osteoporosis     Knee osteoarthritis     Bipolar affective disorder, remission status unspecified (H)     Chronic pain syndrome     Morbid obesity (H)        PMH:   Past Medical History:   Diagnosis Date     Allergic rhinitis      Anemia      Anxiety      Asthma      Bipolar 1 disorder (H)      Breast cancer of upper-outer quadrant of right female breast (H) 4/11/2017     Chronic pain     Back pain due to arthritis.     COPD (chronic obstructive pulmonary disease) (H)      Depression      Endometriosis      Fibroid uterus      Hx of radiation therapy 2017     Insomnia      Osteoarthritis      Overweight      Refusal of blood transfusions as patient is Catholic 4/12/2017     Sensorineural hearing loss, bilateral      Shortness of breath      Vitamin D deficiency        Surgical History:  Past Surgical History:   Procedure Laterality Date     ARTHROSCOPY KNEE Right 1996    Description: Arthroscopy Knee Right;  Recorded: 12/08/2011;     BIOPSY BREAST Right 2000's     BIOPSY  BREAST Right 02/20/2017     KNEE SURGERY Right 1997    lateral release     LAPAROSCOPIC ENDOMETRIOSIS FULGURATION  2002     LUMPECTOMY BREAST Right 03/10/2017    with sentinel lymph node biopsy.     VT HYSTEROSCOPY,W/ENDO BX N/A 12/15/2020    Procedure: HYSTEROSCOPY, WITH DILATION AND CURETTAGE;  Surgeon: Scottie Canas MD;  Location: [facility];  Service: Gynecology     TOTAL HIP ARTHROPLASTY N/A 9/24/2014    Procedure: LEFT HIP TOTAL ARTHROPLASTY;  Surgeon: Antony Elias MD;  Location: [facility] OR;  Service:      TOTAL HIP ARTHROPLASTY Right 8/10/2015    Procedure: HIP TOTAL ARTHROPLASTY RIGHT;  Surgeon: Antony Elias MD;  Location: [facility] OR;  Service:      [facility] TOTAL KNEE ARTHROPLASTY Right 8/28/2017    Procedure: RIGHT TOTAL KNEE ARTHROPLASTY;  Surgeon: Antony Elias MD;  Location: [facility] OR;  Service: Orthopedics       Family History:  Family History   Problem Relation Age of Onset     Depression Mother      Chronic Obstructive Pulmonary Disease Mother      Diabetes Father      Skin Cancer Father 45.00     Colon Cancer Maternal Grandmother      Colon Cancer Maternal Grandfather      Diabetes Paternal Grandmother      Colon Cancer Paternal Grandmother 80.00     Breast Cancer Cousin 48.00        Paternal side.     Testicular cancer Brother 44.00     Lung Cancer Paternal Aunt 60.00     Anesthesia Reaction No family hx of        Social History:   reports that she quit smoking about 37 years ago. She has a 8.00 pack-year smoking history. She has never used smokeless tobacco. She reports current alcohol use. She reports that she does not use drugs.    Allergies:  Allergies   Allergen Reactions     Cat/Feline Products [Cat Hair Extract] Itching     Trees Other (See Comments)     Grass, itching        Current Medications:  Current Outpatient Medications   Medication Sig Dispense Refill     acetaminophen (TYLENOL) 500 MG tablet [ACETAMINOPHEN (TYLENOL) 500 MG TABLET]  Take 1,000 mg by mouth every 6 (six) hours as needed for pain.       albuterol (PROAIR HFA;PROVENTIL HFA;VENTOLIN HFA) 90 mcg/actuation inhaler [ALBUTEROL (PROAIR HFA;PROVENTIL HFA;VENTOLIN HFA) 90 MCG/ACTUATION INHALER] Inhale 1-2 puffs every 4 (four) hours as needed for wheezing or shortness of breath. 1 Inhaler 11     buPROPion (WELLBUTRIN XL) 150 MG 24 hr tablet [BUPROPION (WELLBUTRIN XL) 150 MG 24 HR TABLET] TAKE 1 TABLET BY MOUTH EVERY DAY IN THE MORNING 90 tablet 3     celecoxib (CELEBREX) 200 MG capsule Take one tab po qd, prn. Take with food. 60 capsule 0     dextroamphetamine-amphetamine 20 mg Tab [DEXTROAMPHETAMINE-AMPHETAMINE 20 MG TAB] Take 1 tablet by mouth 2 (two) times a day.  0     diclofenac sodium (VOLTAREN) 1 % Gel [DICLOFENAC SODIUM (VOLTAREN) 1 % GEL] Apply approximately 1/2-1 gm over affected hand and/or left elbow joints bid, as needed. 1 Tube 2     FLUoxetine (PROZAC) 20 MG capsule [FLUOXETINE (PROZAC) 20 MG CAPSULE] Take 20 mg by mouth daily.       fluticasone propionate (FLONASE ALLERGY RELIEF) 50 mcg/actuation nasal spray [FLUTICASONE PROPIONATE (FLONASE ALLERGY RELIEF) 50 MCG/ACTUATION NASAL SPRAY] One spray in each nostril twice daily. OFFICE VISIT NEEDED FOR ANY FURTHER REFILLS 16 g 0     gabapentin (NEURONTIN) 100 MG capsule [GABAPENTIN (NEURONTIN) 100 MG CAPSULE] 2 capsules. In the morning       gabapentin (NEURONTIN) 800 MG tablet [GABAPENTIN (NEURONTIN) 800 MG TABLET] 2 tablets at bedtime.       magnesium citrate solution [MAGNESIUM CITRATE SOLUTION] Take 296 mL by mouth daily as needed.       methocarbamol (ROBAXIN) 500 MG tablet Take 1 tablet by mouth three times daily as needed 90 tablet 0     montelukast (SINGULAIR) 10 MG tablet Take 1 tablet (10 mg) by mouth At Bedtime OFFICE VISIT NEEDED FOR ANY FURTHER REFILLS 30 tablet 0     omeprazole (PRILOSEC) 40 MG DR capsule TAKE 1 CAPSULE BY MOUTH EVERY DAY BEFORE BREAKFAST 90 capsule 3     predniSONE (DELTASONE) 20 MG tablet  [PREDNISONE (DELTASONE) 20 MG TABLET] Take 40 mg daily x 5 days, then take 20 mg daily x 5 days.. 15 tablet 0     traZODone (DESYREL) 100 MG tablet [TRAZODONE (DESYREL) 100 MG TABLET] 2 tablets at bedtime.             Physical Exam:  There were no vitals taken for this visit.  General: A & O x 3 in NAD  HEENT: EOMI, Non injected/non icteric sclera, no oral lesions noted  CV: s1s2 with RRR, no rubs appreciated   Lungs: CTA B/L, no wheezing , rales or rhonci appreciated  GI: Soft, NT/ND, no rebound, no guarding noted  MS: Passive range of motion testing of shoulders reproduced discomfort.  Otherwise patient demonstrated good passive/active ROM over other joints with no warmth, erythema, tenderness or synovitis noted over these joints.        Summary/Assessment:    History that includes osteoarthritis, fibromyalgia, chronic low back pain.    Presents for a follow-up video visit.  Patient last seen July 2021.    Complaining of having bilateral shoulder and knee pains.    Takes Celebrex a few times per month, has been limiting dose due to sensitive stomach.    Plans on having left knee replacement surgery.  After having left knee surgery is planning on having hip replacement surgeries.    [facility] orthopedics for left shoulder pain, has a history of bicipital tear.  Deferred surgery at this time.  [facility] received cortisone injection by another provider with insufficient benefit.    [facility] right shoulder responded well to cortisone injection provided in the past, is interested in having repeat injection.    Please see below for management plan.      From prior note(s):     In the past, received water therapy, beneficial for neck and sacral pains, prescribed by spine clinic.    Denies history of GI bleed/peptic ulcer disease.    Decreased GFR has normalized with holding Celebrex, desires to retry.      Pertinent rheumatology/past medical history (please refer to above for more detailed  history):      Osteoarthritis    Fibromyalgia    Chronic shoulder pains (likely rotator cuff tendinopathy, cortisone injections helpful)    Chronic low back pain and sacral pains (established with spine clinic)    Neck pain    History bilateral hip replacements    Chronic left knee pain (s/p cortisone injection)    History right knee replacement    History of vertebral compression fracture    History right breast cancer    History COPD/chronic bronchitis    Overweight    Left biceps tendon rupture (Winter 2021)    Rheumatology medications provided/suggested:    Voltaren gel  Celebrex    Pertinent medication from other providers or from otc (please refer to above for more detailed med list):    Tylenol  Neurontin  Trazodone  Robaxin  Prozac  Letrozole (MSK pain present prior to initiation)      Pertinent medications already tried:     Tylenol (pruritus)  NSAIDs (upset stomach)  Tramadol (worsening nausea)  Lidoderm patches (ineffective)       Pertinent lab history:    Noted to have negative/unremarkable: Rheumatoid factor, CCP antibody, CONSTANTINE, Lyme antibody, HLA-B27.    Mildly elevated CRP, normal ESR.    Pertinent imaging/test history:      X-rays of right shoulder from May 2018 showed:  Very minimal degenerative irregularity at the rotator cuff insertion site. Glenohumeral joint appears normal. No fracture or dislocation.     X-rays of the left elbow showed some signs of advanced degenerative joint disease.  Some subchondral lucencies noted which may represent either erosive change or degenerative subcortical cystic change.  Small effusion likely. These findings may be related to inflammation or prior trauma.    Other:    Was a  for over 25 years.    On prior visit, suggested looking into obtaining a paraffin wax machine for hand pains.    Standing order for labs placed every 3-6 months good through May 2021.      Plan:      Continue Tylenol as needed.    Continue Celebrex 200 mg daily as needed, limits to few  times per month.  Avoiding regular use due to mild low GFR and sensitive stomach when taking more often.    Continue Voltaren gel twice daily as needed for hand and left elbow pain as needed.  Already made aware can alternate with Celebrex, should avoid taking together.    On Neurontin, trazodone, Robaxin, prescribed by other providers.    Established with spine clinic for chronic pains involving: SI joints, coccyx and  neck pain.    We will inject right shoulder with cortisone today.    Established with orthopedics for left shoulder (left biceps tendon rupture/residual discomfort), left knee and hip pains.    Follow-up in 6 months.      PROCEDURE NOTE:    Consent to treat form signed by the patient.  Patient's right shoulder joint was prepped in a sterile manner with an alcohol swab and ChloraPrep applicator.  Injected Kenalog 40 mg 1:1 with lidocaine into right shoulder joint.  Patient tolerated the procedure well with no complications noted.  Patient was advised to place ice over injection sites if sore over the next 24-48 hours. Carline CASTORENA assisted with procedure    Post injections lungs were clear to auscultation.      This note was transcribed using Dragon voice recognition software as a result unintentional grammatical errors or word substitutions may have occurred. Please contact our Rheumatology department if you need any clarification or if you have any related inquiries.    Major side effect profile of medications provided were discussed with the patient.      Kilo Rmaon DO  ....................  5/23/2022   2:05 PM

## 2022-05-25 ENCOUNTER — DOCUMENTATION ONLY (OUTPATIENT)
Dept: LAB | Facility: CLINIC | Age: 57
End: 2022-05-25
Payer: MEDICARE

## 2022-05-25 DIAGNOSIS — Z13.220 SCREENING CHOLESTEROL LEVEL: Primary | ICD-10-CM

## 2022-05-25 DIAGNOSIS — Z13.1 SCREENING FOR DIABETES MELLITUS: ICD-10-CM

## 2022-05-26 ENCOUNTER — ANCILLARY PROCEDURE (OUTPATIENT)
Dept: MAMMOGRAPHY | Facility: CLINIC | Age: 57
End: 2022-05-26
Attending: INTERNAL MEDICINE
Payer: MEDICARE

## 2022-05-26 DIAGNOSIS — C50.411 MALIGNANT NEOPLASM OF UPPER-OUTER QUADRANT OF RIGHT BREAST IN FEMALE, ESTROGEN RECEPTOR POSITIVE (H): ICD-10-CM

## 2022-05-26 DIAGNOSIS — Z17.0 MALIGNANT NEOPLASM OF UPPER-OUTER QUADRANT OF RIGHT BREAST IN FEMALE, ESTROGEN RECEPTOR POSITIVE (H): ICD-10-CM

## 2022-05-26 DIAGNOSIS — Z12.31 ENCOUNTER FOR SCREENING MAMMOGRAM FOR MALIGNANT NEOPLASM OF BREAST: ICD-10-CM

## 2022-05-26 PROCEDURE — 77067 SCR MAMMO BI INCL CAD: CPT

## 2022-05-28 ENCOUNTER — HEALTH MAINTENANCE LETTER (OUTPATIENT)
Age: 57
End: 2022-05-28

## 2022-06-01 ENCOUNTER — LAB (OUTPATIENT)
Dept: LAB | Facility: CLINIC | Age: 57
End: 2022-06-01
Payer: MEDICARE

## 2022-06-01 DIAGNOSIS — Z13.220 SCREENING CHOLESTEROL LEVEL: ICD-10-CM

## 2022-06-01 DIAGNOSIS — Z13.1 SCREENING FOR DIABETES MELLITUS: ICD-10-CM

## 2022-06-01 LAB
ANION GAP SERPL CALCULATED.3IONS-SCNC: 11 MMOL/L (ref 5–18)
BUN SERPL-MCNC: 19 MG/DL (ref 8–22)
CALCIUM SERPL-MCNC: 8.8 MG/DL (ref 8.5–10.5)
CHLORIDE BLD-SCNC: 104 MMOL/L (ref 98–107)
CHOLEST SERPL-MCNC: 161 MG/DL
CO2 SERPL-SCNC: 25 MMOL/L (ref 22–31)
CREAT SERPL-MCNC: 0.87 MG/DL (ref 0.6–1.1)
FASTING STATUS PATIENT QL REPORTED: ABNORMAL
GFR SERPL CREATININE-BSD FRML MDRD: 77 ML/MIN/1.73M2
GLUCOSE BLD-MCNC: 167 MG/DL (ref 70–125)
HDLC SERPL-MCNC: 46 MG/DL
LDLC SERPL CALC-MCNC: 100 MG/DL
POTASSIUM BLD-SCNC: 4.4 MMOL/L (ref 3.5–5)
SODIUM SERPL-SCNC: 140 MMOL/L (ref 136–145)
TRIGL SERPL-MCNC: 74 MG/DL

## 2022-06-01 PROCEDURE — 80061 LIPID PANEL: CPT

## 2022-06-01 PROCEDURE — 36415 COLL VENOUS BLD VENIPUNCTURE: CPT

## 2022-06-01 PROCEDURE — 80048 BASIC METABOLIC PNL TOTAL CA: CPT

## 2022-06-05 ASSESSMENT — PATIENT HEALTH QUESTIONNAIRE - PHQ9
SUM OF ALL RESPONSES TO PHQ QUESTIONS 1-9: 19
SUM OF ALL RESPONSES TO PHQ QUESTIONS 1-9: 19

## 2022-06-08 ENCOUNTER — OFFICE VISIT (OUTPATIENT)
Dept: FAMILY MEDICINE | Facility: CLINIC | Age: 57
End: 2022-06-08
Payer: MEDICARE

## 2022-06-08 VITALS
HEIGHT: 69 IN | SYSTOLIC BLOOD PRESSURE: 120 MMHG | OXYGEN SATURATION: 94 % | DIASTOLIC BLOOD PRESSURE: 82 MMHG | BODY MASS INDEX: 41.26 KG/M2 | WEIGHT: 278.56 LBS | HEART RATE: 80 BPM

## 2022-06-08 DIAGNOSIS — E66.01 MORBID OBESITY (H): ICD-10-CM

## 2022-06-08 DIAGNOSIS — R73.09 ELEVATED GLUCOSE: ICD-10-CM

## 2022-06-08 DIAGNOSIS — E55.9 VITAMIN D DEFICIENCY: ICD-10-CM

## 2022-06-08 DIAGNOSIS — C50.411 MALIGNANT NEOPLASM OF UPPER-OUTER QUADRANT OF RIGHT BREAST IN FEMALE, ESTROGEN RECEPTOR POSITIVE (H): ICD-10-CM

## 2022-06-08 DIAGNOSIS — Z00.00 ENCOUNTER FOR MEDICARE ANNUAL WELLNESS EXAM: Primary | ICD-10-CM

## 2022-06-08 DIAGNOSIS — Z11.4 SCREENING FOR HIV (HUMAN IMMUNODEFICIENCY VIRUS): ICD-10-CM

## 2022-06-08 DIAGNOSIS — R05.3 CHRONIC COUGH: ICD-10-CM

## 2022-06-08 DIAGNOSIS — N93.9 VAGINAL BLEEDING: ICD-10-CM

## 2022-06-08 DIAGNOSIS — E11.9 TYPE 2 DIABETES MELLITUS WITHOUT COMPLICATION, WITHOUT LONG-TERM CURRENT USE OF INSULIN (H): ICD-10-CM

## 2022-06-08 DIAGNOSIS — J32.9 CHRONIC SINUSITIS, UNSPECIFIED LOCATION: ICD-10-CM

## 2022-06-08 DIAGNOSIS — R53.83 FATIGUE, UNSPECIFIED TYPE: ICD-10-CM

## 2022-06-08 DIAGNOSIS — F31.9 BIPOLAR AFFECTIVE DISORDER, REMISSION STATUS UNSPECIFIED (H): ICD-10-CM

## 2022-06-08 DIAGNOSIS — Z17.0 MALIGNANT NEOPLASM OF UPPER-OUTER QUADRANT OF RIGHT BREAST IN FEMALE, ESTROGEN RECEPTOR POSITIVE (H): ICD-10-CM

## 2022-06-08 LAB
ALBUMIN SERPL-MCNC: 3.4 G/DL (ref 3.5–5)
ALP SERPL-CCNC: 108 U/L (ref 45–120)
ALT SERPL W P-5'-P-CCNC: 33 U/L (ref 0–45)
ANION GAP SERPL CALCULATED.3IONS-SCNC: 10 MMOL/L (ref 5–18)
AST SERPL W P-5'-P-CCNC: 27 U/L (ref 0–40)
BILIRUB SERPL-MCNC: 0.8 MG/DL (ref 0–1)
BUN SERPL-MCNC: 13 MG/DL (ref 8–22)
CALCIUM SERPL-MCNC: 8.7 MG/DL (ref 8.5–10.5)
CHLORIDE BLD-SCNC: 101 MMOL/L (ref 98–107)
CO2 SERPL-SCNC: 28 MMOL/L (ref 22–31)
CREAT SERPL-MCNC: 0.83 MG/DL (ref 0.6–1.1)
ERYTHROCYTE [DISTWIDTH] IN BLOOD BY AUTOMATED COUNT: 14 % (ref 10–15)
GFR SERPL CREATININE-BSD FRML MDRD: 81 ML/MIN/1.73M2
GLUCOSE BLD-MCNC: 138 MG/DL (ref 70–125)
HBA1C MFR BLD: 7.7 % (ref 0–5.6)
HCT VFR BLD AUTO: 43.2 % (ref 35–47)
HGB BLD-MCNC: 14 G/DL (ref 11.7–15.7)
HIV 1+2 AB+HIV1 P24 AG SERPL QL IA: NEGATIVE
MAGNESIUM SERPL-MCNC: 2.1 MG/DL (ref 1.8–2.6)
MCH RBC QN AUTO: 29.1 PG (ref 26.5–33)
MCHC RBC AUTO-ENTMCNC: 32.4 G/DL (ref 31.5–36.5)
MCV RBC AUTO: 90 FL (ref 78–100)
PLATELET # BLD AUTO: 208 10E3/UL (ref 150–450)
POTASSIUM BLD-SCNC: 4.6 MMOL/L (ref 3.5–5)
PROT SERPL-MCNC: 6.7 G/DL (ref 6–8)
RBC # BLD AUTO: 4.81 10E6/UL (ref 3.8–5.2)
SODIUM SERPL-SCNC: 139 MMOL/L (ref 136–145)
T3FREE SERPL-MCNC: 2.3 PG/ML (ref 1.6–3.9)
T4 FREE SERPL-MCNC: 0.88 NG/DL (ref 0.7–1.8)
TSH SERPL DL<=0.005 MIU/L-ACNC: 5.11 UIU/ML (ref 0.3–5)
VIT B12 SERPL-MCNC: 551 PG/ML (ref 213–816)
WBC # BLD AUTO: 11.4 10E3/UL (ref 4–11)

## 2022-06-08 PROCEDURE — 82306 VITAMIN D 25 HYDROXY: CPT | Performed by: FAMILY MEDICINE

## 2022-06-08 PROCEDURE — 84481 FREE ASSAY (FT-3): CPT | Performed by: FAMILY MEDICINE

## 2022-06-08 PROCEDURE — 87389 HIV-1 AG W/HIV-1&-2 AB AG IA: CPT | Performed by: FAMILY MEDICINE

## 2022-06-08 PROCEDURE — 80053 COMPREHEN METABOLIC PANEL: CPT | Performed by: FAMILY MEDICINE

## 2022-06-08 PROCEDURE — 84439 ASSAY OF FREE THYROXINE: CPT | Performed by: FAMILY MEDICINE

## 2022-06-08 PROCEDURE — 84443 ASSAY THYROID STIM HORMONE: CPT | Performed by: FAMILY MEDICINE

## 2022-06-08 PROCEDURE — 99214 OFFICE O/P EST MOD 30 MIN: CPT | Mod: 25 | Performed by: FAMILY MEDICINE

## 2022-06-08 PROCEDURE — G0439 PPPS, SUBSEQ VISIT: HCPCS | Performed by: FAMILY MEDICINE

## 2022-06-08 PROCEDURE — 83735 ASSAY OF MAGNESIUM: CPT | Performed by: FAMILY MEDICINE

## 2022-06-08 PROCEDURE — 82607 VITAMIN B-12: CPT | Performed by: FAMILY MEDICINE

## 2022-06-08 PROCEDURE — 36415 COLL VENOUS BLD VENIPUNCTURE: CPT | Performed by: FAMILY MEDICINE

## 2022-06-08 PROCEDURE — 83036 HEMOGLOBIN GLYCOSYLATED A1C: CPT | Performed by: FAMILY MEDICINE

## 2022-06-08 PROCEDURE — 85027 COMPLETE CBC AUTOMATED: CPT | Performed by: FAMILY MEDICINE

## 2022-06-08 RX ORDER — CETIRIZINE HYDROCHLORIDE 10 MG/1
10 TABLET, CHEWABLE ORAL DAILY
COMMUNITY

## 2022-06-08 RX ORDER — MONTELUKAST SODIUM 10 MG/1
10 TABLET ORAL AT BEDTIME
Qty: 30 TABLET | Refills: 0 | Status: SHIPPED | OUTPATIENT
Start: 2022-06-08 | End: 2022-06-08

## 2022-06-08 RX ORDER — SIMVASTATIN 20 MG
20 TABLET ORAL AT BEDTIME
Qty: 90 TABLET | Refills: 3 | Status: SHIPPED | OUTPATIENT
Start: 2022-06-08 | End: 2022-12-15

## 2022-06-08 RX ORDER — MONTELUKAST SODIUM 10 MG/1
10 TABLET ORAL AT BEDTIME
Qty: 90 TABLET | Refills: 3 | Status: SHIPPED | OUTPATIENT
Start: 2022-06-08 | End: 2022-07-05

## 2022-06-08 ASSESSMENT — PATIENT HEALTH QUESTIONNAIRE - PHQ9
SUM OF ALL RESPONSES TO PHQ QUESTIONS 1-9: 19
10. IF YOU CHECKED OFF ANY PROBLEMS, HOW DIFFICULT HAVE THESE PROBLEMS MADE IT FOR YOU TO DO YOUR WORK, TAKE CARE OF THINGS AT HOME, OR GET ALONG WITH OTHER PEOPLE: VERY DIFFICULT

## 2022-06-08 ASSESSMENT — ENCOUNTER SYMPTOMS
HEMATURIA: 0
COUGH: 1
PARESTHESIAS: 0
SORE THROAT: 1
DIZZINESS: 1
DIARRHEA: 1
HEADACHES: 1
HEARTBURN: 1
NERVOUS/ANXIOUS: 1
PALPITATIONS: 0
FREQUENCY: 1
JOINT SWELLING: 1
HEMATOCHEZIA: 0
NAUSEA: 1
FEVER: 0
WEAKNESS: 1
BREAST MASS: 0
ABDOMINAL PAIN: 1
ARTHRALGIAS: 1
CONSTIPATION: 0
SHORTNESS OF BREATH: 1
EYE PAIN: 0
DYSURIA: 0

## 2022-06-08 ASSESSMENT — ACTIVITIES OF DAILY LIVING (ADL): CURRENT_FUNCTION: NO ASSISTANCE NEEDED

## 2022-06-08 NOTE — PROGRESS NOTES
"    The patient was provided with suggestions to help her develop a healthy physical lifestyle.  She is at risk for lack of exercise and has been provided with information to increase physical activity for the benefit of her well-being.  The patient was counseled and encouraged to consider modifying their diet and eating habits. She was provided with information on recommended healthy diet options.  The patient was provided with written information regarding signs of hearing loss.  Information on urinary incontinence and treatment options given to patient.  The patient was provided with suggestions to help her develop a healthy emotional lifestyle.  Answers for HPI/ROS submitted by the patient on 6/8/2022  If you checked off any problems, how difficult have these problems made it for you to do your work, take care of things at home, or get along with other people?: Very difficult  PHQ9 TOTAL SCORE: 19  In general, how would you rate your overall physical health?: fair  Frequency of exercise:: None  Do you usually eat at least 4 servings of fruit and vegetables a day, include whole grains & fiber, and avoid regularly eating high fat or \"junk\" foods? : No  Activities of Daily Living: no assistance needed  Home safety: no safety concerns identified  Hearing Impairment:: difficulty following a conversation in a noisy restaurant or crowded room, feel that people are mumbling or not speaking clearly, difficulty following dialogue in the theater, difficult to understand a speaker at a public meeting or Nonden[employer]ational service, need to ask people to speak up or repeat themselves, find that men's voices are easier to understand than woman's, difficulty understanding soft or whispered speech, difficulty understanding speech on the telephone  In the past 6 months, have you been bothered by leaking of urine?: Yes  abdominal pain: Yes  Blood in stool: No  Blood in urine: No  chest pain: Yes  congestion: Yes  constipation: No  cough: " Yes  diarrhea: Yes  dizziness: Yes  eye pain: No  nervous/anxious: Yes  fever: No  frequency: Yes  genital sores: No  headaches: Yes  hearing loss: Yes  heartburn: Yes  arthralgias: Yes  joint swelling: Yes  mood changes: No  nausea: Yes  dysuria: No  palpitations: No  Skin sensation changes: No  sore throat: Yes  urgency: Yes  rash: No  shortness of breath: Yes  visual disturbance: No  weakness: Yes  pelvic pain: Yes  vaginal bleeding: Yes  vaginal discharge: No  tenderness: Yes  breast mass: No  breast discharge: No  In general, how would you rate your overall mental or emotional health?: poor  Additional concerns today:: Yes

## 2022-06-08 NOTE — PROGRESS NOTES
"SUBJECTIVE:   Emani Vargas is a 57 year old female who presents for Preventive Visit.      Patient has been advised of split billing requirements and indicates understanding: Yes  Are you in the first 12 months of your Medicare coverage?  No    Healthy Habits:     In general, how would you rate your overall health?  Fair    Frequency of exercise:  None    Do you usually eat at least 4 servings of fruit and vegetables a day, include whole grains    & fiber and avoid regularly eating high fat or \"junk\" foods?  No    Ability to successfully perform activities of daily living:  No assistance needed    Home Safety:  No safety concerns identified    Hearing Impairment:  Difficulty following a conversation in a noisy restaurant or crowded room, feel that people are mumbling or not speaking clearly, difficulty following dialogue in the theater, difficult to understand a speaker at a public meeting or [employer] service, need to ask people to speak up or repeat themselves, find that men's voices are easier to understand than woman's, difficulty understanding soft or whispered speech and difficulty understanding speech on the telephone    In the past 6 months, have you been bothered by leaking of urine? Yes    In general, how would you rate your overall mental or emotional health?  Poor      PHQ-2 Total Score: 6    Additional concerns today:  Yes    Do you feel safe in your environment? Yes    Have you ever done Advance Care Planning? (For example, a Health Directive, POLST, or a discussion with a medical provider or your loved ones about your wishes): Yes, advance care planning is on file.         Cognitive Screening   1) Repeat 3 items (Leader, Season, Table)    2) Clock draw: NORMAL  3) 3 item recall: Recalls 3 objects  Results: 3 items recalled: COGNITIVE IMPAIRMENT LESS LIKELY    Mini-CogTM Copyright CHRIS Benavidez. Licensed by the author for use in [facility]; reprinted with permission (jolie@.[facility]). All rights " reserved.      Do you have sleep apnea, excessive snoring or daytime drowsiness?: yes    Reviewed and updated as needed this visit by clinical staff   Tobacco  Allergies  Meds                Reviewed and updated as needed this visit by Provider     Meds               Social History     Tobacco Use     Smoking status: Former Smoker     Packs/day: 1.00     Years: 8.00     Pack years: 8.00     Quit date: 1985     Years since quittin.4     Smokeless tobacco: Never Used   Substance Use Topics     Alcohol use: Yes     Alcohol/week: 0.0 standard drinks     Comment: Alcoholic Drinks/day: 1 drink/month     If you drink alcohol do you typically have >3 drinks per day or >7 drinks per week? No    Alcohol Use 2022   Prescreen: >3 drinks/day or >7 drinks/week? Not Applicable   Prescreen: >3 drinks/day or >7 drinks/week? -   No flowsheet data found.    Current providers sharing in care for this patient include:   Patient Care Team:  Shira Miller MD as PCP - Nelida Bernal MD as MD (Hematology & Oncology)  Christina Leigh MD as MD (Hematology & Oncology)  Shira Miller MD as Assigned PCP  Christina Leigh MD as Assigned Cancer Care Provider  Kilo Ramon DO as Assigned Rheumatology Provider    The following health maintenance items are reviewed in Epic and correct as of today:  Health Maintenance Due   Topic Date Due     ASTHMA ACTION PLAN  Never done     HIV SCREENING  Never done     ZOSTER IMMUNIZATION (1 of 2) Never done     ASTHMA CONTROL TEST  2021     COVID-19 Vaccine (4 - Booster for Pfizer series) 2022     Lab work is in process    FHS-7:   Breast CA Risk Assessment (FHS-7) 2022   Did any of your first-degree relatives have breast or ovarian cancer? No Yes   Did any of your relatives have bilateral breast cancer? No Yes   Did any man in your family have breast cancer? No No   Did any woman in your family have breast and  "ovarian cancer? No No   Did any woman in your family have breast cancer before age 50 y? No Yes   Do you have 2 or more relatives with breast and/or ovarian cancer? No No   Do you have 2 or more relatives with breast and/or bowel cancer? No No     click delete button to remove this line now  Mammogram Screening: Recommended mammography every 1-2 years with patient discussion and risk factor consideration  Pertinent mammograms are reviewed under the imaging tab.    Review of Systems   Constitutional: Negative for fever.   HENT: Positive for congestion, hearing loss and sore throat.    Eyes: Negative for pain and visual disturbance.   Respiratory: Positive for cough and shortness of breath.    Cardiovascular: Positive for chest pain. Negative for palpitations.   Gastrointestinal: Positive for abdominal pain, diarrhea, heartburn and nausea. Negative for constipation and hematochezia.   Breasts:  Positive for tenderness. Negative for breast mass and discharge.   Genitourinary: Positive for frequency, pelvic pain, urgency and vaginal bleeding. Negative for dysuria, genital sores, hematuria and vaginal discharge.   Musculoskeletal: Positive for arthralgias and joint swelling.   Skin: Negative for rash.   Neurological: Positive for dizziness, weakness and headaches. Negative for paresthesias.   Psychiatric/Behavioral: Negative for mood changes. The patient is nervous/anxious.        OBJECTIVE:   /82 (BP Location: Left arm, Patient Position: Sitting, Cuff Size: Adult Large)   Pulse 80   Ht 1.753 m (5' 9\")   Wt 126.4 kg (278 lb 9 oz)   SpO2 94%   BMI 41.14 kg/m   Estimated body mass index is 41.14 kg/m  as calculated from the following:    Height as of this encounter: 1.753 m (5' 9\").    Weight as of this encounter: 126.4 kg (278 lb 9 oz).  Physical Exam  GENERAL APPEARANCE: healthy, alert and no distress  EYES: Eyes grossly normal to inspection, PERRL and conjunctivae and sclerae normal  HENT: ear canals and TM's " normal  NECK: no adenopathy, no asymmetry, masses, or scars and thyroid normal to palpation  RESP: lungs clear to auscultation - no rales, rhonchi or wheezes  BREAST: normal without masses, tenderness or nipple discharge and no palpable axillary masses or adenopathy  CV: regular rate and rhythm, normal S1 S2, no S3 or S4, no murmur, click or rub, no peripheral edema and peripheral pulses strong  ABDOMEN: soft, nontender, no hepatosplenomegaly, no masses and bowel sounds normal  MS: no musculoskeletal defects are noted and gait is age appropriate without ataxia  SKIN: no suspicious lesions or rashes  NEURO: Normal strength and tone, sensory exam grossly normal, mentation intact and speech normal  PSYCH: mentation appears normal and affect normal/bright    Diagnostic Test Results:  Labs reviewed in Epic    ASSESSMENT / PLAN:   Emani was seen today for medicare visit.    Diagnoses and all orders for this visit:    Encounter for Medicare annual wellness exam  -     REVIEW OF HEALTH MAINTENANCE PROTOCOL ORDERS  We discussed healthy lifestyle, nutrition, cardiovascular risk reduction, self care, safety, sunscreen, and timing of cancer screening.  Health maintenance screening and immunizations reviewed with the patient.  Follow up yearly for the annual physical.     Morbid obesity (H)  Counseled healthy lifestyle modifications    Type 2 diabetes mellitus without complication, without long-term current use of insulin (H)  New diagnosis  A1c=7.7  Start Ozempic  Start simvastatin  Lifestyle modification discussed  Follow-up in 3 months  -     simvastatin (ZOCOR) 20 MG tablet; Take 1 tablet (20 mg) by mouth At Bedtime  -     semaglutide (OZEMPIC) 2 MG/1.5ML SOPN pen; Inject 0.25 mg Subcutaneous once a week    Vaginal bleeding  Patient is postmenopausal  History of vaginal bleeding in 2022 for which a pelvic ultrasound revealed endometrial hyperplasia.  A follow-up endometrial biopsy did not revealed carcinoma.  She was in her  "normal state of health until today she noted vaginal bleeding.  -     US Pelvic Complete with Transvaginal; Future    Fatigue, unspecified type  Multifactorial:  Mental health due to recent death of her dad; new diagnosis of diabetes; vaginal bleeding  -     T3 Free; Future  -     T4 free; Future  -     TSH; Future  -     Vitamin B12; Future  -     Magnesium; Future  -     Comprehensive metabolic panel; Future  -     CBC with platelets; Future  -     Vitamin D deficiency screening; Future    Chronic cough, allergies, chronic sinusitis  refilled  -     montelukast (SINGULAIR) 10 MG tablet; Take 1 tablet (10 mg) by mouth At Bedtime    Screening for HIV (human immunodeficiency virus)  -     HIV Antigen Antibody Combo; Future    Bipolar affective disorder, remission status unspecified (H)  Sees psychiatry every 6 months  I recommend psychotherapy  Motivational interviewing was utilized today.  Modified cognitive behavioral therapy was performed with counseling on internal locus of control.  Discussed importance of self care, sleep, nutrition, hydration, exercise, and mindfulness     Malignant neoplasm of upper-outer quadrant of right breast in female, estrogen receptor positive (H)  Status postlumpectomy.  She is 5 years out from her treatment  Recommend yearly mammogram        Patient has been advised of split billing requirements and indicates understanding: Yes    COUNSELING:  Reviewed preventive health counseling, as reflected in patient instructions    Estimated body mass index is 41.14 kg/m  as calculated from the following:    Height as of this encounter: 1.753 m (5' 9\").    Weight as of this encounter: 126.4 kg (278 lb 9 oz).    Weight management plan: Discussed healthy diet and exercise guidelines    She reports that she quit smoking about 37 years ago. She has a 8.00 pack-year smoking history. She has never used smokeless tobacco.      Appropriate preventive services were discussed with this patient, including " applicable screening as appropriate for cardiovascular disease, diabetes, osteopenia/osteoporosis, and glaucoma.  As appropriate for age/gender, discussed screening for colorectal cancer, prostate cancer, breast cancer, and cervical cancer. Checklist reviewing preventive services available has been given to the patient.    Reviewed patients plan of care and provided an AVS. The Intermediate Care Plan ( asthma action plan, low back pain action plan, and migraine action plan) for Emani meets the Care Plan requirement. This Care Plan has been established and reviewed with the Patient.    Counseling Resources:  ATP IV Guidelines  Pooled Cohorts Equation Calculator  Breast Cancer Risk Calculator  Breast Cancer: Medication to Reduce Risk  FRAX Risk Assessment  ICSI Preventive Guidelines  Dietary Guidelines for Americans, 2010  [employer] MyPlate  ASA Prophylaxis  Lung CA Screening    Shira Yap MD  [facility]    Identified Health Risks:  Answers for HPI/ROS submitted by the patient on 6/8/2022  If you checked off any problems, how difficult have these problems made it for you to do your work, take care of things at home, or get along with other people?: Very difficult  PHQ9 TOTAL SCORE: 19

## 2022-06-08 NOTE — PATIENT INSTRUCTIONS
Patient Education   Personalized Prevention Plan  You are due for the preventive services outlined below.  Your care team is available to assist you in scheduling these services.  If you have already completed any of these items, please share that information with your care team to update in your medical record.  Health Maintenance Due   Topic Date Due     ANNUAL REVIEW OF HM ORDERS  Never done     Asthma Action Plan - yearly  Never done     HIV Screening  Never done     Zoster (Shingles) Vaccine (1 of 2) Never done     Asthma Control Test  09/03/2021     COVID-19 Vaccine (4 - Booster for Pfizer series) 04/24/2022     Your Health Risk Assessment indicates you feel you are not in good health    A healthy lifestyle helps keep the body fit and the mind alert. It helps protect you from disease, helps you fight disease, and helps prevent chronic disease (disease that doesn't go away) from getting worse. This is important as you get older and begin to notice twinges in muscles and joints and a decline in the strength and stamina you once took for granted. A healthy lifestyle includes good healthcare, good nutrition, weight control, recreation, and regular exercise. Avoid harmful substances and do what you can to keep safe. Another part of a healthy lifestyle is stay mentally active and socially involved.    Good healthcare     Have a wellness visit every year.     If you have new symptoms, let us know right away. Don't wait until the next checkup.     Take medicines exactly as prescribed and keep your medicines in a safe place. Tell us if your medicine causes problems.   Healthy diet and weight control     Eat 3 or 4 small, nutritious, low-fat, high-fiber meals a day. Include a variety of fruits, vegetables, and whole-grain foods.     Make sure you get enough calcium in your diet. Calcium, vitamin D, and exercise help prevent osteoporosis (bone thinning).     If you live alone, try eating with others when you can. That  way you get a good meal and have company while you eat it.     Try to keep a healthy weight. If you eat more calories than your body uses for energy, it will be stored as fat and you will gain weight.     Recreation   Recreation is not limited to sports and team events. It includes any activity that provides relaxation, interest, enjoyment, and exercise. Recreation provides an outlet for physical, mental, and social energy. It can give a sense of worth and achievement. It can help you stay healthy.    Mental Exercise and Social Involvement  Mental and emotional health is as important as physical health. Keep in touch with friends and family. Stay as active as possible. Continue to learn and challenge yourself.   Things you can do to stay mentally active are:    Learn something new, like a foreign language or musical instrument.     Play SCRABBLE or do crossword puzzles. If you cannot find people to play these games with you at home, you can play them with others on your computer through the Internet.     Join a games club--anything from card games to chess or checkers or lawn bowling.     Start a new hobby.     Go back to school.     Volunteer.     Read.   Keep up with world events.    Exercise for a Healthier Heart  You may wonder how you can improve the health of your heart. If you re thinking about exercise, you re on the right track. You don t need to become an athlete. But you do need a certain amount of brisk exercise to help strengthen your heart. If you have been diagnosed with a heart condition, your healthcare provider may advise exercise to help stabilize your condition. To help make exercise a habit, choose safe, fun activities.      Exercise with a friend. When activity is fun, you're more likely to stick with it.   Before you start  Check with your healthcare provider before starting an exercise program. This is especially important if you have not been active for a while. It's also important if you  have a long-term (chronic) health problem such as heart disease, diabetes, or obesity. Or if you are at high risk for having these problems.   Why exercise?  Exercising regularly offers many healthy rewards. It can help you do all of the following:     Improve your blood cholesterol level to help prevent further heart trouble    Lower your blood pressure to help prevent a stroke or heart attack    Control diabetes, or reduce your risk of getting this disease    Improve your heart and lung function    Reach and stay at a healthy weight    Make your muscles stronger so you can stay active    Prevent falls and fractures by slowing the loss of bone mass (osteoporosis)    Manage stress better    Reduce your blood pressure    Improve your sense of self and your body image  Exercise tips      Ease into your routine. Set small goals. Then build on them. If you are not sure what your activity level should be, talk with your healthcare provider first before starting an exercise routine.    Exercise on most days. Aim for a total of 150 minutes (2 hours and 30 minutes) or more of moderate-intensity aerobic activity each week. Or 75 minutes (1 hour and 15 minutes) or more of vigorous-intensity aerobic activity each week. Or try for a combination of both. Moderate activity means that you breathe heavier and your heart rate increases but you can still talk. Think about doing 40 minutes of moderate exercise, 3 to 4 times a week. For best results, activity should last for about 40 minutes to lower blood pressure and cholesterol. It's OK to work up to the 40-minute period over time. Examples of moderate-intensity activity are walking 1 mile in 15 minutes. Or doing 30 to 45 minutes of yard work.    Step up your daily activity level.  Along with your exercise program, try being more active the whole day. Walk instead of drive. Or park further away so that you take more steps each day. Do more household tasks or yard work. You may not  be able to meet the advised mount of physical activity. But doing some moderate- or vigorous-intensity aerobic activity can help reduce your risk for heart disease. Your healthcare provider can help you figure out what is best for you.    Choose 1 or more activities you enjoy.  Walking is one of the easiest things you can do. You can also try swimming, riding a bike, dancing, or taking an exercise class.    When to call your healthcare provider  Call your healthcare provider if you have any of these:     Chest pain or feel dizzy or lightheaded    Burning, tightness, pressure, or heaviness in your chest, neck, shoulders, back, or arms    Abnormal shortness of breath    More joint or muscle pain    A very fast or irregular heartbeat (palpitations)  [employer] last reviewed this educational content on 7/1/2019 2000-2021 [employer]. All rights reserved. This information is not intended as a substitute for professional medical care. Always follow your healthcare professional's instructions.          Understanding USDA MyPlate  The USDA has guidelines to help you make healthy food choices. These are called MyPlate. MyPlate shows the food groups that make up healthy meals using the image of a place setting. Before you eat, think about the healthiest choices for what to put on your plate or in your cup or bowl. To learn more about building a healthy plate, visit www.choosemyplate.gov.    The food groups    Fruits. Any fruit or 100% fruit juice counts as part of the Fruit Group. Fruits may be fresh, canned, frozen, or dried, and may be whole, cut-up, or pureed. Make 1/2 of your plate fruits and vegetables.    Vegetables. Any vegetable or 100% vegetable juice counts as a member of the Vegetable Group. Vegetables may be fresh, frozen, canned, or dried. They can be served raw or cooked and may be whole, cut-up, or mashed. Make 1/2 of your plate fruits and vegetables.    Grains. All foods made from grains are  part of the Grains Group. These include wheat, rice, oats, cornmeal, and barley. Grains are often used to make foods such as bread, pasta, oatmeal, cereal, tortillas, and grits. Grains should be no more than 1/4 of your plate. At least half of your grains should be whole grains.    Protein. This group includes meat, poultry, seafood, beans and peas, eggs, processed soy products (such as tofu), nuts (including nut butters), and seeds. Make protein choices no more than 1/4 of your plate. Meat and poultry choices should be lean or low fat.    Dairy. The Dairy Group includes all fluid milk products and foods made from milk that contain calcium, such as yogurt and cheese. (Foods that have little calcium, such as cream, butter, and cream cheese, are not part of this group.) Most dairy choices should be low-fat or fat-free.    Oils. Oils aren't a food group, but they do contain essential nutrients. However it's important to watch your intake of oils. These are fats that are liquid at room temperature. They include canola, corn, olive, soybean, vegetable, and sunflower oil. Foods that are mainly oil include mayonnaise, certain salad dressings, and soft margarines. You likely already get your daily oil allowance from the foods you eat.  Things to limit  Eating healthy also means limiting these things in your diet:       Salt (sodium). Many processed foods have a lot of sodium. To keep sodium intake down, eat fresh vegetables, meats, poultry, and seafood when possible. Purchase low-sodium, reduced-sodium, or no-salt-added food products at the store. And don't add salt to your meals at home. Instead, season them with herbs and spices such as dill, oregano, cumin, and paprika. Or try adding flavor with lemon or lime zest and juice.    Saturated fat. Saturated fats are most often found in animal products such as beef, pork, and chicken. They are often solid at room temperature, such as butter. To reduce your saturated fat  intake, choose leaner cuts of meat and poultry. And try healthier cooking methods such as grilling, broiling, roasting, or baking. For a simple lower-fat swap, use plain nonfat yogurt instead of mayonnaise when making potato salad or macaroni salad.    Added sugars. These are sugars added to foods. They are in foods such as ice cream, candy, soda, fruit drinks, sports drinks, energy drinks, cookies, pastries, jams, and syrups. Cut down on added sugars by sharing sweet treats with a family member or friend. You can also choose fruit for dessert, and drink water or other unsweetened beverages.     [employer]Well last reviewed this educational content on 6/1/2020 2000-2021 [employer]. All rights reserved. This information is not intended as a substitute for professional medical care. Always follow your healthcare professional's instructions.          Signs of Hearing Loss      Hearing much better with one ear can be a sign of hearing loss.   Hearing loss is a problem shared by many people. In fact, it is one of the most common health problems, particularly as people age. Most people age 65 and older have some hearing loss. By age 80, almost everyone does. Hearing loss often occurs slowly over the years. So you may not realize your hearing has gotten worse.  Have your hearing checked  Call your healthcare provider if you:    Have to strain to hear normal conversation    Have to watch other people s faces very carefully to follow what they re saying    Need to ask people to repeat what they ve said    Often misunderstand what people are saying    Turn the volume of the television or radio up so high that others complain    Feel that people are mumbling when they re talking to you    Find that the effort to hear leaves you feeling tired and irritated    Notice, when using the phone, that you hear better with one ear than the other  [employer] last reviewed this educational content on 1/1/2020 2000-2021 [employer]  [employer]. All rights reserved. This information is not intended as a substitute for professional medical care. Always follow your healthcare professional's instructions.          Urinary Incontinence, Female (Adult)   Urinary incontinence means loss of bladder control. This problem affects many women, especially as they get older. If you have incontinence, you may be embarrassed to ask for help. But know that this problem can be treated.   Types of Incontinence  There are different types of incontinence. Two of the main types are described here. You can have more than one type.     Stress incontinence. With this type, urine leaks when pressure (stress) is put on the bladder. This may happen when you cough, sneeze, or laugh. Stress incontinence most often occurs because the pelvic floor muscles that support the bladder and urethra are weak. This can happen after pregnancy and vaginal childbirth or a hysterectomy. It can also be due to excess body weight or hormone changes.    Urge incontinence (also called overactive bladder). With this type, a sudden urge to urinate is felt often. This may happen even though there may not be much urine in the bladder. The need to urinate often during the night is common. Urge incontinence most often occurs because of bladder spasms. This may be due to bladder irritation or infection. Damage to bladder nerves or pelvic muscles, constipation, and certain medicines can also lead to urge incontinence.  Treatment depends on the cause. Further evaluation is needed to find the type you have. This will likely include an exam and certain tests. Based on the results, you and your healthcare provider can then plan treatment. Until a diagnosis is made, the home care tips below can help ease symptoms.   Home care    Do pelvic floor muscle exercises, if they are prescribed. The pelvic floor muscles help support the bladder and urethra. Many women find that their symptoms improve when  doing special exercises that strengthen these muscles. To do the exercises, contract the muscles you would use to stop your stream of urine. But do this when you re not urinating. Hold for 10 seconds, then relax. Repeat 10 to 20 times in a row, at least 3 times a day. Your healthcare provider may give you other instructions for how to do the exercises and how often.    Keep a bladder diary. This helps track how often and how much you urinate over a set period of time. Bring this diary with you to your next visit with the provider. The information can help your provider learn more about your bladder problem.    Lose weight, if advised to by your provider. Extra weight puts pressure on the bladder. Your provider can help you create a weight-loss plan that s right for you. This may include exercising more and making certain diet changes.    Don't have foods and drinks that may irritate the bladder. These can include alcohol and caffeinated drinks.    Quit smoking. Smoking and other tobacco use can lead to a long-term (chronic) cough that strains the pelvic floor muscles. Smoking may also damage the bladder and urethra. Talk with your provider about treatments or methods you can use to quit smoking.    If drinking large amounts of fluid makes you have symptoms, you may be advised to limit your fluid intake. You may also be advised to drink most of your fluids during the day and to limit fluids at night.    If you re worried about urine leakage or accidents, you may wear absorbent pads to catch urine. Change the pads often. This helps reduce discomfort. It may also reduce the risk of skin or bladder infections.    Follow-up care  Follow up with your healthcare provider, or as directed. It may take some to find the right treatment for your problem. But healthy lifestyle changes can be made right away. These include such things as exercising on a regular basis, eating a healthy diet, losing weight (if needed), and quitting  smoking. Your treatment plan may include special therapies or medicines. Certain procedures or surgery may also be options. Talk about any questions you have with your provider.   When to seek medical advice  Call the healthcare provider right away if any of these occur:    Fever of 100.4 F (38 C) or higher, or as directed by your provider    Bladder pain or fullness    Belly swelling    Nausea or vomiting    Back pain    Weakness, dizziness, or fainting  [employer] last reviewed this educational content on 1/1/2020 2000-2021 [employer]. All rights reserved. This information is not intended as a substitute for professional medical care. Always follow your healthcare professional's instructions.        Your Health Risk Assessment indicates you feel you are not in good emotional health.    Recreation   Recreation is not limited to sports and team events. It includes any activity that provides relaxation, interest, enjoyment, and exercise. Recreation provides an outlet for physical, mental, and social energy. It can give a sense of worth and achievement. It can help you stay healthy.    Mental Exercise and Social Involvement  Mental and emotional health is as important as physical health. Keep in touch with friends and family. Stay as active as possible. Continue to learn and challenge yourself.   Things you can do to stay mentally active are:    Learn something new, like a foreign language or musical instrument.     Play SCRABBLE or do crossword puzzles. If you cannot find people to play these games with you at home, you can play them with others on your computer through the Internet.     Join a games club--anything from card games to chess or checkers or lawn bowling.     Start a new hobby.     Go back to school.     Volunteer.     Read.   Keep up with world events.    Depression and Suicide in Older Adults    Nearly 2 million older Americans have some type of depression. Some of them even take their  "own lives. Yet depression among older adults is often ignored. Learn the warning signs. You may help spare a loved one needless pain. You may also save a life.   What is depression?  Depression is a common and serious illness that affects the way you think and feel. It is not a normal part of aging, nor is it a sign of weakness, a character flaw, or something you can snap out of. Most people with depression need treatment to get better. The most common symptom is a feeling of deep sadness. People who are depressed also may seem tired and listless. And nothing seems to give them pleasure. It s normal to grieve or be sad sometimes. But sadness lessens or passes with time. Depression rarely goes away or improves on its own. A person with clinical depression can't \"snap out of it.\" Other symptoms of depression are:     Sleeping more or less than normal    Eating more or less than normal    Having headaches, stomachaches, or other pains that don t go away    Feeling nervous,  empty,  or worthless    Crying a great deal    Thinking or talking about suicide or death    Loss of interest in activities previously enjoyed    Social isolation    Feeling confused or forgetful  What causes it?  The causes of depression aren t fully known. But it is thought to result from a complex blend of these factors:     Biochemistry. Certain chemicals in the brain play a role.    Genes. Depression does run in families.    Life stress. Life stresses can also trigger depression in some people. Older adults often face many stressors, such as death of friends or a spouse, health problems, and financial concerns.    Chronic conditions. This includes conditions such as diabetes, heart disease, or cancer. These can cause symptoms of depression. Medicine side effects can cause changes in thoughts and behaviors.  How you can help  Often, depressed people may not want to ask for help. When they do, they may be ignored. Or, they may receive the wrong " treatment. You can help by showing parents and older friends love and support. If they seem depressed, don t lecture the person, ignore the symptoms, or discount the symptoms as a  normal  part of aging -which they are not. Get involved, listen, and show interest and support.   Help them understand that depression is a treatable illness. Tell them you can help them find the right treatment. Offer to go to their healthcare provider's appointment with them for support when the symptoms are discussed. With their approval, contact a local mental health center, social service agency, or hospital about services.   You can be an advocate for him or her at healthcare appointments. Many older adults have chronic illnesses that can cause symptoms of depression. Medicine side effects can change thoughts and behaviors. You can help make sure that the healthcare provider looks at all of these factors. He or she should refer your family member or friend to a mental healthcare provider when needed. in some cases, untreated depression can lead to a misdiagnosis. A person may be diagnosed with a brain disorder such as dementia. If the healthcare provider does not take the issue of depression seriously, help your family member or friend to find another provider.   Don't be afraid to ask  If you think an older person you care about could be suicidal, ask,  Have you thought about suicide?  Most people will tell you the truth. If they say  yes,  they may already have a plan for how and when they will attempt it. Find out as much as you can. The more detailed the plan, and the easier it is to carry out, the more danger the person is in right now. Tell the person you are there for them and do not want them to harm him or herself. Don't wait to get help for the person. Call the person's healthcare provider, local hospital, or emergency services.   To learn more    National Suicide Prevention Lifeline (crisis hotline) 477-035-RSFA  (594.644.4298)    National [place] of Mental Njenik202-599-6307vid.[facility].nih.gov    National [place] on Mental Tfuofgk349-110-7868gif.[place].org    Mental Health Nrtepxe987-518-1066awz.[facility].org    National Suicide Jjhmlwl611-KPWNGYY (761-691-6203)    Call 911  Never leave the person alone. A person who is actively suicidal needs psychiatric care right away. They will need constant supervision. Never leave the person out of sight. Call 911 or the [facility] 24-hour suicide crisis hotline at 561-698-VCAM (125-641-1799). You can also take the person to the closest emergency room.   [employer] last reviewed this educational content on 5/1/2020 2000-2021 [employer]. All rights reserved. This information is not intended as a substitute for professional medical care. Always follow your healthcare professional's instructions.

## 2022-06-09 ENCOUNTER — HOSPITAL ENCOUNTER (OUTPATIENT)
Dept: ULTRASOUND IMAGING | Facility: CLINIC | Age: 57
Discharge: HOME | End: 2022-06-09
Attending: FAMILY MEDICINE | Admitting: FAMILY MEDICINE
Payer: MEDICARE

## 2022-06-09 DIAGNOSIS — N93.9 VAGINAL BLEEDING: ICD-10-CM

## 2022-06-09 LAB — DEPRECATED CALCIDIOL+CALCIFEROL SERPL-MC: 28 UG/L (ref 20–75)

## 2022-06-09 PROCEDURE — 76830 TRANSVAGINAL US NON-OB: CPT

## 2022-06-13 DIAGNOSIS — E11.9 TYPE 2 DIABETES MELLITUS WITHOUT COMPLICATION, WITHOUT LONG-TERM CURRENT USE OF INSULIN (H): ICD-10-CM

## 2022-08-28 ENCOUNTER — NURSE TRIAGE (OUTPATIENT)
Dept: NURSING | Facility: CLINIC | Age: 57
End: 2022-08-28

## 2022-08-28 ENCOUNTER — APPOINTMENT (OUTPATIENT)
Dept: RADIOLOGY | Facility: CLINIC | Age: 57
End: 2022-08-28
Attending: EMERGENCY MEDICINE
Payer: MEDICARE

## 2022-08-28 ENCOUNTER — HOSPITAL ENCOUNTER (EMERGENCY)
Facility: CLINIC | Age: 57
Discharge: HOME | End: 2022-08-28
Attending: EMERGENCY MEDICINE | Admitting: EMERGENCY MEDICINE
Payer: MEDICARE

## 2022-08-28 VITALS
BODY MASS INDEX: 42.21 KG/M2 | HEIGHT: 69 IN | WEIGHT: 285 LBS | SYSTOLIC BLOOD PRESSURE: 116 MMHG | RESPIRATION RATE: 16 BRPM | HEART RATE: 79 BPM | OXYGEN SATURATION: 95 % | DIASTOLIC BLOOD PRESSURE: 84 MMHG | TEMPERATURE: 98.3 F
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
## 2022-08-28 DIAGNOSIS — S61.452A CAT BITE OF LEFT HAND, INITIAL ENCOUNTER: ICD-10-CM

## 2022-08-28 PROCEDURE — 99284 EMERGENCY DEPT VISIT MOD MDM: CPT

## 2022-08-28 PROCEDURE — 73110 X-RAY EXAM OF WRIST: CPT | Mod: LT

## 2022-08-28 PROCEDURE — 29125 APPL SHORT ARM SPLINT STATIC: CPT | Mod: LT

## 2022-08-28 PROCEDURE — 250N000013 HC RX MED GY IP 250 OP 250 PS 637: Performed by: EMERGENCY MEDICINE

## 2022-08-28 RX ADMIN — AMOXICILLIN AND CLAVULANATE POTASSIUM 1 TABLET: 875; 125 TABLET, FILM COATED ORAL at 18:03

## 2022-08-28 ASSESSMENT — ACTIVITIES OF DAILY LIVING (ADL)
ADLS_ACUITY_SCORE: 35
ADLS_ACUITY_SCORE: 35

## 2022-08-28 NOTE — ED PROVIDER NOTES
EMERGENCY DEPARTMENT ENCOUNTER      NAME: Emani Vargas  AGE: 57 year old female  YOB: 1965  MRN: [id number]  EVALUATION DATE & TIME: 2022  4:53 PM    PCP: Shira Miller    ED PROVIDER: Luis Fernando Wiggins M.D.      Chief Complaint   Patient presents with     Cat Bite         FINAL IMPRESSION:  1. Cat bite of left hand, initial encounter          ED COURSE & MEDICAL DECISION MAKIN:14 PM I met with the patient, obtained history, performed an initial exam, and discussed options and plan for diagnostics and treatment here in the ED.  6:50 PM I updated the patient on results. We discussed the plan for discharge and the patient is agreeable. Reviewed supportive cares, symptomatic treatment, outpatient follow up, and reasons to return to the Emergency Department. Patient to be discharged by ED RN.       Pertinent Labs & Imaging studies reviewed. (See chart for details)  ED Course as of 22   Sun Aug 28, 2022   1714 Patient is a 57-year-old woman who presents with pain to the left wrist and hand after cat bite.  Yesterday evening.  She has some tenderness to the wrist, some pain when she extends her middle finger, but no significant erythema or edema to the hand.  X-ray, Augmentin ordered.  I spoke to Dr. Zaldivar with hand surgery, we discussed her case and because of her relatively benign exam at this time, recommendation will be to follow-up in 3 days, keeping a close eye on symptoms to make sure she does not develop significant swelling, pain in the wrist.   1756 XR Wrist Left G/E 3 Views  Degenerative change at the STT joint and first CMC joint. No evidence for fracture. No foreign bodies are identified.     Patient given dose of Augmentin, sent home with a course of this.  Left wrist placed in a splint.    At the conclusion of the encounter I discussed the results of all of the tests and the disposition. The questions were answered. The patient or family acknowledged  understanding and was agreeable with the care plan.       MEDICATIONS GIVEN IN THE EMERGENCY:  Medications   amoxicillin-clavulanate (AUGMENTIN) 875-125 MG per tablet 1 tablet (1 tablet Oral Given 8/28/22 1803)         NEW PRESCRIPTIONS STARTED AT TODAY'S ER VISIT  Discharge Medication List as of 8/28/2022  6:58 PM      START taking these medications    Details   amoxicillin-clavulanate (AUGMENTIN) 875-125 MG tablet Take 1 tablet by mouth 2 times daily, Disp-28 tablet, R-0, E-Prescribe                =================================================================    HPI    Patient information was obtained from: Patient    Use of : N/A       Emani Vargas is a 57 year old female with a pertinent history of chronic pain, DM2, and osteoprosis who presents to this ED via walk-in for evaluation of cat bite.     Patient reports a cat bite on her left wrist and hand last night by her friend's cat. Patient clean the wound with alcoholic and wrapped it. She changed the dressing couple of time and also applied ointment. She states the wrist is sore and has pain when extending the middle finger which prompted her in the ED. The cats are vaccinated. Otherwise, patient denies no other injury. No other complaints at this time.            REVIEW OF SYSTEMS   Review of Systems   Musculoskeletal:        Positive for wounds on left wrist and hand   All other systems reviewed and are negative.       PAST MEDICAL HISTORY:  Past Medical History:   Diagnosis Date     Allergic rhinitis      Anemia      Anxiety      Asthma      Bipolar 1 disorder (H)      Breast cancer of upper-outer quadrant of right female breast (H) 4/11/2017     Chronic pain     Back pain due to arthritis.     COPD (chronic obstructive pulmonary disease) (H)      Depression      Endometriosis      Fibroid uterus      Hx of radiation therapy 2017     Insomnia      Osteoarthritis      Overweight      Refusal of blood transfusions as patient is [facility]  Witness 4/12/2017     Sensorineural hearing loss, bilateral      Shortness of breath      Vitamin D deficiency        PAST SURGICAL HISTORY:  Past Surgical History:   Procedure Laterality Date     ARTHROSCOPY KNEE Right 1996    Description: Arthroscopy Knee Right;  Recorded: 12/08/2011;     BIOPSY BREAST Right 2000's     BIOPSY BREAST Right 02/20/2017     KNEE SURGERY Right 1997    lateral release     LAPAROSCOPIC ENDOMETRIOSIS FULGURATION  2002     LUMPECTOMY BREAST Right 03/10/2017    with sentinel lymph node biopsy.     [place] HYSTEROSCOPY,W/ENDO BX N/A 12/15/2020    Procedure: HYSTEROSCOPY, WITH DILATION AND CURETTAGE;  Surgeon: Scottie Canas MD;  Location: [facility];  Service: Gynecology     TOTAL HIP ARTHROPLASTY N/A 9/24/2014    Procedure: LEFT HIP TOTAL ARTHROPLASTY;  Surgeon: Antony Elias MD;  Location: [facility];  Service:      TOTAL HIP ARTHROPLASTY Right 8/10/2015    Procedure: HIP TOTAL ARTHROPLASTY RIGHT;  Surgeon: Antony Elias MD;  Location: [facility];  Service:      [facility] TOTAL KNEE ARTHROPLASTY Right 8/28/2017    Procedure: RIGHT TOTAL KNEE ARTHROPLASTY;  Surgeon: Antony Elias MD;  Location: [facility];  Service: Orthopedics           CURRENT MEDICATIONS:    No current facility-administered medications for this encounter.     Current Outpatient Medications   Medication     amoxicillin-clavulanate (AUGMENTIN) 875-125 MG tablet     acetaminophen (TYLENOL) 500 MG tablet     albuterol (PROAIR HFA;PROVENTIL HFA;VENTOLIN HFA) 90 mcg/actuation inhaler     buPROPion (WELLBUTRIN XL) 150 MG 24 hr tablet     celecoxib (CELEBREX) 200 MG capsule     Cetirizine HCl (ZYRTEC PO)     diclofenac sodium (VOLTAREN) 1 % Gel     FLUoxetine (PROZAC) 20 MG capsule     fluticasone propionate (FLONASE ALLERGY RELIEF) 50 mcg/actuation nasal spray     gabapentin (NEURONTIN) 800 MG tablet     magnesium citrate solution     methocarbamol (ROBAXIN) 500 MG tablet     montelukast  "(SINGULAIR) 10 MG tablet     omeprazole (PRILOSEC) 40 MG DR capsule     OZEMPIC, 0.25 OR 0.5 MG/DOSE, 2 MG/1.5ML SOPN pen     simvastatin (ZOCOR) 20 MG tablet     traZODone (DESYREL) 100 MG tablet       ALLERGIES:  Allergies   Allergen Reactions     Cat/Feline Products [Cat Hair Extract] Itching     Trees Other (See Comments)     Grass, itching       FAMILY HISTORY:  Family History   Problem Relation Age of Onset     Depression Mother      Chronic Obstructive Pulmonary Disease Mother      Diabetes Father      Skin Cancer Father 45.00     Colon Cancer Maternal Grandmother      Colon Cancer Maternal Grandfather      Diabetes Paternal Grandmother      Colon Cancer Paternal Grandmother 80.00     Breast Cancer Cousin 48.00        Paternal side.     Testicular cancer Brother 44.00     Lung Cancer Paternal Aunt 60.00     Anesthesia Reaction No family hx of        SOCIAL HISTORY:   Social History     Socioeconomic History     Marital status:    Tobacco Use     Smoking status: Former Smoker     Packs/day: 1.00     Years: 8.00     Pack years: 8.00     Quit date: 1985     Years since quittin.6     Smokeless tobacco: Never Used   Substance and Sexual Activity     Alcohol use: Yes     Alcohol/week: 0.0 standard drinks     Comment: Alcoholic Drinks/day: 1 drink/month     Drug use: No     Comment: Drug use: past use of marijuana and hash.       VITALS:  /84   Pulse 79   Temp 98.3  F (36.8  C) (Oral)   Resp 16   Ht 1.753 m (5' 9\")   Wt 129.3 kg (285 lb)   SpO2 95%   BMI 42.09 kg/m      PHYSICAL EXAM    Constitutional: Well developed, well nourished. Comfortable appearing.   HENT: Normocephalic, atraumatic, mucous membranes moist, nose normal  Eyes: PERRL, EOMI, conjunctiva normal, no discharge.  Neck- Supple, gross ROM intact.   Respiratory: Normal work of breathing, normal rate, speaks in full sentences  Cardiovascular: Normal heart rate  Musculoskeletal: A few puncture florence and scratch on the dorsal " left wrist and hand. Some mild pain in her middle finger when she extends it. Mild pain in her wrsit with flexion and extension.  Wrist is not swollen or warm or red.  Neurologic: Alert & oriented x 3, cranial nerves grossly intact. Normal gross coordination  Psychiatric: Affect normal, cooperative.       LAB:  All pertinent labs reviewed and interpreted.  Labs Ordered and Resulted from Time of ED Arrival to Time of ED Departure - No data to display    RADIOLOGY:  Reviewed all pertinent imaging. Please see official radiology report.  XR Wrist Left G/E 3 Views   Final Result   IMPRESSION: Degenerative change at the STT joint and first CMC joint. No evidence for fracture. No foreign bodies are identified.          EKG:    All EKG interpretations will be found in ED course above.    PROCEDURES:     PROCEDURE: Splint Placement   INDICATIONS: left hand infection   PROCEDURE PROVIDER: Dr Barrie Wiggins   NOTE:  A Volar wrist splint made of premade aluminum and foam was applied to the Left upper extremity by the above provider. As noted in the physical exam, distal CMS was intact prior to placement. The splint was checked and the fit was adjusted to ensure proper positioning after placement. Sensation and circulation, as well as motor function, are unchanged after splint placement and the patient is more comfortable with the splint in place.       I, Joy Her am serving as a scribe to document services personally performed by Dr. Luis Fernando Wiggisn based on my observation and the provider's statements to me. I, Luis Fernando Wiggins MD attest that Joy Her is acting in a scribe capacity, has observed my performance of the services and has documented them in accordance with my direction.    Luis Fernando Wiggins M.D.  Emergency Medicine  [facility] EMERGENCY ROOM  [address] [id number] [phone]  Dept: [phone]     Luis Fernando Wiggins  MD  08/28/22 2810

## 2022-08-28 NOTE — DISCHARGE INSTRUCTIONS
I spoke to the hand surgeon with [facility].  He would like you to call tomorrow and set up an appointment to be seen by hand surgeon on either Tuesday or Wednesday.  Let them know that you were seen in the emergency department and that Dr. Zaldivar recommends this plan.  You do not necessarily need to see him.    Otherwise in the next 24 hours the antibiotic should fully kick in and you should not notice a significant increase in pain or swelling.  If the wrist and hand becomes much more swollen, or pain starts to track of the forearm, or develop a fever, please come back to the emergency department.

## 2022-08-28 NOTE — TELEPHONE ENCOUNTER
"S/B:  Bitten by a cat last night on her left hand,(like 10 bites) red, swollen bled quite a bit last night\". Denies fever  Tetanus up to date  Cat shots up to date      A/R: Disposition to ED.  Pt plans to follow- went over care advise.    Rosa Rice RN on 8/28/2022 at 3:24 PM      Reason for Disposition    [1] Cut (length > 1/8 inch or 3 mm) or skin tear AND [2] any animal  (Exception: superficial scratch that doesn't go through dermis)     3- 4 inches and more up her arm - has gauze sleeve on    Additional Information    Negative: [1] Major bleeding (e.g., actively dripping or spurting) AND [2] can't be stopped    Negative: Sounds like a life-threatening emergency to the triager    Negative: Snake bite    Negative: Bite, wound, or sting from fish    Negative: [1] Any break in skin from BITE (e.g., cut, puncture or scratch) AND[2] PET animal (e.g., dog, cat, or ferret) at risk for RABIES (e.g., sick, stray, unprovoked bite, developing country)    Negative: [1] Any break in skin from BITE (e.g., cut, puncture or scratch) AND[2] WILD animal at risk for RABIES (e.g., bat, raccoon, dhaliwal, skunk, coyote, other carnivores)    Negative: [1] Any break in skin from BITE (e.g., cut, puncture or scratch) AND[2] monkey    Negative: [1] EXPOSURE of non-intact skin (e.g., exposed person has dermatitis, abrasion, wound) AND[2] with animal BODY FLUID (e.g., saliva such as licking, blood, brain) AND[3] animal at high-risk for RABIES (e.g., bat, raccoon, dhaliwal, skunk, coyote, other carnivores)    Protocols used: ANIMAL BITE-A-AH      "

## 2022-08-28 NOTE — ED TRIAGE NOTES
Patient presents to ED with cat bites to L wrist and L forearm that occurred last night @2000, cat is UTD with immun, her last tdap was 6 years ago, she reports she does not want pertussis portion of the tdap shot, patient having swelling and redness to area that were bit by cat.  Corrine Reyna RN.......8/28/2022 4:25 PM     Triage Assessment     Row Name 08/28/22 9013       Triage Assessment (Adult)    Airway WDL WDL       Respiratory WDL    Respiratory WDL WDL       Skin Circulation/Temperature WDL    Skin Circulation/Temperature WDL WDL       Cardiac WDL    Cardiac WDL WDL       Peripheral/Neurovascular WDL    Peripheral Neurovascular WDL WDL       Cognitive/Neuro/Behavioral WDL    Cognitive/Neuro/Behavioral WDL WDL

## 2022-09-08 ASSESSMENT — ASTHMA QUESTIONNAIRES
QUESTION_2 LAST FOUR WEEKS HOW OFTEN HAVE YOU HAD SHORTNESS OF BREATH: THREE TO SIX TIMES A WEEK
QUESTION_5 LAST FOUR WEEKS HOW WOULD YOU RATE YOUR ASTHMA CONTROL: WELL CONTROLLED
QUESTION_1 LAST FOUR WEEKS HOW MUCH OF THE TIME DID YOUR ASTHMA KEEP YOU FROM GETTING AS MUCH DONE AT WORK, SCHOOL OR AT HOME: SOME OF THE TIME
QUESTION_3 LAST FOUR WEEKS HOW OFTEN DID YOUR ASTHMA SYMPTOMS (WHEEZING, COUGHING, SHORTNESS OF BREATH, CHEST TIGHTNESS OR PAIN) WAKE YOU UP AT NIGHT OR EARLIER THAN USUAL IN THE MORNING: TWO OR THREE NIGHTS A WEEK
ACT_TOTALSCORE: 16
QUESTION_4 LAST FOUR WEEKS HOW OFTEN HAVE YOU USED YOUR RESCUE INHALER OR NEBULIZER MEDICATION (SUCH AS ALBUTEROL): ONCE A WEEK OR LESS
ACT_TOTALSCORE: 16

## 2022-09-15 ENCOUNTER — OFFICE VISIT (OUTPATIENT)
Dept: FAMILY MEDICINE | Facility: CLINIC | Age: 57
End: 2022-09-15
Payer: MEDICARE

## 2022-09-15 VITALS
BODY MASS INDEX: 42.11 KG/M2 | HEART RATE: 89 BPM | DIASTOLIC BLOOD PRESSURE: 60 MMHG | SYSTOLIC BLOOD PRESSURE: 120 MMHG | WEIGHT: 285.13 LBS | OXYGEN SATURATION: 95 %

## 2022-09-15 DIAGNOSIS — E11.9 TYPE 2 DIABETES MELLITUS WITHOUT COMPLICATION, WITHOUT LONG-TERM CURRENT USE OF INSULIN (H): Primary | ICD-10-CM

## 2022-09-15 LAB
CREAT UR-MCNC: 145 MG/DL
HBA1C MFR BLD: 6.1 % (ref 0–5.6)
MICROALBUMIN UR-MCNC: <12 MG/L
MICROALBUMIN/CREAT UR: NORMAL MG/G{CREAT}

## 2022-09-15 PROCEDURE — 36415 COLL VENOUS BLD VENIPUNCTURE: CPT | Performed by: FAMILY MEDICINE

## 2022-09-15 PROCEDURE — 83036 HEMOGLOBIN GLYCOSYLATED A1C: CPT | Performed by: FAMILY MEDICINE

## 2022-09-15 PROCEDURE — 99213 OFFICE O/P EST LOW 20 MIN: CPT | Performed by: FAMILY MEDICINE

## 2022-09-15 PROCEDURE — 82043 UR ALBUMIN QUANTITATIVE: CPT | Performed by: FAMILY MEDICINE

## 2022-09-15 RX ORDER — BLOOD SUGAR DIAGNOSTIC
STRIP MISCELLANEOUS
Qty: 100 STRIP | Refills: 3 | Status: SHIPPED | OUTPATIENT
Start: 2022-09-15 | End: 2023-12-04

## 2022-09-15 RX ORDER — SEMAGLUTIDE 1.34 MG/ML
INJECTION, SOLUTION SUBCUTANEOUS
Qty: 4.5 ML | Refills: 3 | Status: SHIPPED | OUTPATIENT
Start: 2022-09-15 | End: 2022-12-15

## 2022-09-15 NOTE — PROGRESS NOTES
Assessment & Plan     Type 2 diabetes mellitus without complication, without long-term current use of insulin (H)  A1c=6.6 from 7.7  Continue with Ozempic 0.25 mg  Counseled healthy lifestyle modifications  Foot care advised. Monitor blisters on right heel  COC=258, on statin  Follow-up in 3 months  - Albumin Random Urine Quantitative with Creat Ratio  - Adult Eye  Referral  - blood glucose (ONETOUCH VERIO IQ) test strip  Dispense: 100 strip; Refill: 3  - blood glucose (NO BRAND SPECIFIED) lancets standard  Dispense: 100 each; Refill: 3  - Albumin Random Urine Quantitative with Creat Ratio    Vaginal bleeding  Resolved  Recent pelvic ultrasound was unremarkable      Shira Yap MD  [facility]    Caitie Joaquin is a 57 year old presenting for the following health issues:  Diabetes and Medication Request (Glucometer, lancet, strips  and needles)      History of Present Illness       Diabetes:   She presents for follow up of diabetes.  She is checking home blood glucose a few times a week. She checks blood glucose before and after meals and at bedtime.  Blood glucose is never over 200 and sometimes under 70. She is aware of hypoglycemia symptoms including shakiness, dizziness, weakness and confusion. She has no concerns regarding her diabetes at this time.  She is having numbness in feet, burning in feet and blurry vision. The patient has not had a diabetic eye exam in the last 12 months.     She consumes 1 sweetened beverage(s) daily.She exercises with enough effort to increase her heart rate 9 or less minutes per day.  She exercises with enough effort to increase her heart rate 3 or less days per week. She is missing 1 dose(s) of medications per week.  She is not taking prescribed medications regularly due to remembering to take.     Here for diabetes visit.  She was started on Ozempic and simvastatin 3 months ago.  Her blood sugars have been running in  the 100s to 110s.  She is tolerating Ozempic and simvastatin.  She has no questions or concerns today, except for blisters on the right heel.  She thinks it may be related to her shoe.  Last eye exam was over a year ago.        Objective    /60   Pulse 89   Wt 129.3 kg (285 lb 2 oz)   SpO2 95%   BMI 42.11 kg/m    Body mass index is 42.11 kg/m .  Physical Exam       Objective:    Physical Exam   /60   Pulse 89   Wt 129.3 kg (285 lb 2 oz)   SpO2 95%   BMI 42.11 kg/m     Constitutional: Patient is oriented to person, place, and time. Patient appears well-developed and well-nourished. No distress.   Head: Normocephalic and atraumatic.   Right Ear: External ear normal.   Left Ear: External ear normal.   Eyes: Conjunctivae and EOM are normal. Right eye exhibits no discharge. Left eye exhibits no discharge. No scleral icterus.   Neck: Neck supple. No JVD present. No tracheal deviation present. No thyromegaly present.   Cardiovascular: Normal rate, regular rhythm, normal heart sounds and intact distal pulses.    Pulmonary/Chest: Effort normal and breath sounds normal. No stridor. No respiratory distress. Patient has no wheezes, no rales, exhibits no tenderness.   Abdominal: Soft. Patient exhibits no distension and no mass.   Diabetic foot exam: normal DP and PT pulses. Blister on right heal with intact skin and without secondary infection      Answers for HPI/ROS submitted by the patient on 9/8/2022  Frequency of checking blood sugars:: a few times a week  What time of day are you checking your blood sugars : before and after meals, at bedtime  Have you had any blood sugars above 200?: No  Have you had any blood sugars below 70?: Yes  Hypoglycemia symptoms:: shakiness, dizziness, weakness, confusion  Diabetic concerns:: none  Paraesthesia present:: numbness in feet, burning in feet, blurry vision  Have you had a diabetic eye exam within the last year?: No  On average, how many sweetened beverages do you  drink each day (Examples: soda, juice, sweet tea, etc.  Do NOT count diet or artificially sweetened beverages)?: 1  How many minutes a day do you exercise enough to make your heart beat faster?: 9 or less  How many days a week do you exercise enough to make your heart beat faster?: 3 or less  How many days per week do you miss taking your medication?: 1  What makes it hard for you to take your medication every day?: remembering to take

## 2022-10-01 ENCOUNTER — HEALTH MAINTENANCE LETTER (OUTPATIENT)
Age: 57
End: 2022-10-01

## 2022-10-04 ENCOUNTER — TRANSFERRED RECORDS (OUTPATIENT)
Dept: HEALTH INFORMATION MANAGEMENT | Facility: CLINIC | Age: 57
End: 2022-10-04

## 2022-10-25 DIAGNOSIS — G89.29 OTHER CHRONIC PAIN: ICD-10-CM

## 2022-10-26 RX ORDER — METHOCARBAMOL 500 MG/1
TABLET, FILM COATED ORAL
Qty: 90 TABLET | Refills: 0 | Status: SHIPPED | OUTPATIENT
Start: 2022-10-26 | End: 2022-12-15

## 2022-12-12 ENCOUNTER — TRANSFERRED RECORDS (OUTPATIENT)
Dept: HEALTH INFORMATION MANAGEMENT | Facility: CLINIC | Age: 57
End: 2022-12-12

## 2022-12-14 ASSESSMENT — PATIENT HEALTH QUESTIONNAIRE - PHQ9
SUM OF ALL RESPONSES TO PHQ QUESTIONS 1-9: 19
10. IF YOU CHECKED OFF ANY PROBLEMS, HOW DIFFICULT HAVE THESE PROBLEMS MADE IT FOR YOU TO DO YOUR WORK, TAKE CARE OF THINGS AT HOME, OR GET ALONG WITH OTHER PEOPLE: VERY DIFFICULT
SUM OF ALL RESPONSES TO PHQ QUESTIONS 1-9: 19

## 2022-12-15 ENCOUNTER — OFFICE VISIT (OUTPATIENT)
Dept: FAMILY MEDICINE | Facility: CLINIC | Age: 57
End: 2022-12-15
Payer: MEDICARE

## 2022-12-15 VITALS
BODY MASS INDEX: 41.86 KG/M2 | WEIGHT: 283.44 LBS | HEART RATE: 82 BPM | SYSTOLIC BLOOD PRESSURE: 122 MMHG | DIASTOLIC BLOOD PRESSURE: 74 MMHG | OXYGEN SATURATION: 94 %

## 2022-12-15 DIAGNOSIS — E11.9 TYPE 2 DIABETES MELLITUS WITHOUT COMPLICATION, WITHOUT LONG-TERM CURRENT USE OF INSULIN (H): ICD-10-CM

## 2022-12-15 DIAGNOSIS — K21.9 GASTROESOPHAGEAL REFLUX DISEASE WITHOUT ESOPHAGITIS: ICD-10-CM

## 2022-12-15 DIAGNOSIS — R05.3 CHRONIC COUGH: ICD-10-CM

## 2022-12-15 DIAGNOSIS — J32.9 CHRONIC SINUSITIS, UNSPECIFIED LOCATION: ICD-10-CM

## 2022-12-15 DIAGNOSIS — G89.29 OTHER CHRONIC PAIN: ICD-10-CM

## 2022-12-15 LAB — HBA1C MFR BLD: 7.4 % (ref 0–5.6)

## 2022-12-15 PROCEDURE — 83036 HEMOGLOBIN GLYCOSYLATED A1C: CPT | Performed by: FAMILY MEDICINE

## 2022-12-15 PROCEDURE — 96127 BRIEF EMOTIONAL/BEHAV ASSMT: CPT | Performed by: FAMILY MEDICINE

## 2022-12-15 PROCEDURE — 99214 OFFICE O/P EST MOD 30 MIN: CPT | Performed by: FAMILY MEDICINE

## 2022-12-15 PROCEDURE — 36415 COLL VENOUS BLD VENIPUNCTURE: CPT | Performed by: FAMILY MEDICINE

## 2022-12-15 RX ORDER — METFORMIN HCL 500 MG
1000 TABLET, EXTENDED RELEASE 24 HR ORAL
Qty: 180 TABLET | Refills: 0 | Status: SHIPPED | OUTPATIENT
Start: 2022-12-15 | End: 2023-02-07

## 2022-12-15 RX ORDER — METHOCARBAMOL 500 MG/1
500 TABLET, FILM COATED ORAL 3 TIMES DAILY PRN
Qty: 90 TABLET | Refills: 3 | Status: SHIPPED | OUTPATIENT
Start: 2022-12-15 | End: 2024-02-22

## 2022-12-15 RX ORDER — OMEPRAZOLE 40 MG/1
CAPSULE, DELAYED RELEASE ORAL
Qty: 90 CAPSULE | Refills: 3 | Status: SHIPPED | OUTPATIENT
Start: 2022-12-15 | End: 2023-03-24

## 2022-12-15 RX ORDER — SIMVASTATIN 20 MG
20 TABLET ORAL AT BEDTIME
Qty: 90 TABLET | Refills: 3 | Status: SHIPPED | OUTPATIENT
Start: 2022-12-15 | End: 2024-02-22

## 2022-12-15 RX ORDER — MONTELUKAST SODIUM 10 MG/1
TABLET ORAL
Qty: 90 TABLET | Refills: 3 | Status: SHIPPED | OUTPATIENT
Start: 2022-12-15 | End: 2023-08-21

## 2022-12-15 RX ORDER — SEMAGLUTIDE 1.34 MG/ML
INJECTION, SOLUTION SUBCUTANEOUS
Qty: 4.5 ML | Refills: 3 | Status: SHIPPED | OUTPATIENT
Start: 2022-12-15 | End: 2023-02-07

## 2022-12-15 NOTE — PROGRESS NOTES
Assessment & Plan     Type 2 diabetes mellitus without complication, without long-term current use of insulin (H)  [id number]  Metformin as back up if she is still having difficulty with ozempic supply at her pharmacy  Counseled healthy lifestyle modifications  Advised yearly eye exam   Advised daily foot care  Follow-up in 3 months  - semaglutide (OZEMPIC, 0.25 OR 0.5 MG/DOSE,) 2 MG/1.5ML SOPN pen  Dispense: 4.5 mL; Refill: 3  - simvastatin (ZOCOR) 20 MG tablet  Dispense: 90 tablet; Refill: 3  - Hemoglobin A1c  - metFORMIN (GLUCOPHAGE XR) 500 MG 24 hr tablet  Dispense: 180 tablet; Refill: 0    Chronic cough  refilled  - montelukast (SINGULAIR) 10 MG tablet  Dispense: 90 tablet; Refill: 3  - omeprazole (PRILOSEC) 40 MG DR capsule  Dispense: 90 capsule; Refill: 3    Other chronic pain  refilled  - methocarbamol (ROBAXIN) 500 MG tablet  Dispense: 90 tablet; Refill: 3    Depression, anxiety, bipolar  Management per psychiatry  Motivational interviewing was utilized today.  Modified cognitive behavioral therapy was performed with counseling on internal locus of control.  Discussed importance of self care, sleep, nutrition, hydration, exercise, and mindfulness    Shira Yap MD  [facility]    Caitie Joaquin is a 57 year old presenting for the following health issues:  Diabetes      History of Present Illness       Diabetes:   She presents for follow up of diabetes.  She is checking home blood glucose one time daily. She checks blood glucose before meals.  Blood glucose is sometimes over 200 and never under 70. She is aware of hypoglycemia symptoms including other. She is concerned about other. She is having numbness in feet, burning in feet and blurry vision. The patient has not had a diabetic eye exam in the last 12 months.         She eats 0-1 servings of fruits and vegetables daily.She consumes 1 sweetened beverage(s) daily.She exercises with enough effort to  increase her heart rate 9 or less minutes per day.  She exercises with enough effort to increase her heart rate 3 or less days per week. She is missing 1 dose(s) of medications per week.  She is not taking prescribed medications regularly due to remembering to take.    Today's PHQ-9         PHQ-9 Total Score: 19    PHQ-9 Q9 Thoughts of better off dead/self-harm past 2 weeks :   Not at all    How difficult have these problems made it for you to do your work, take care of things at home, or get along with other people: Very difficult     Has had difficulty with getting refill of ozempic. For about a month, she was without any diabetic med. She has not been checking her blood sugar        Objective    /74 (BP Location: Left arm, Patient Position: Sitting, Cuff Size: Adult Large)   Pulse 82   Wt 128.6 kg (283 lb 7 oz)   SpO2 94%   BMI 41.86 kg/m    Body mass index is 41.86 kg/m .  Physical Exam     Constitutional: Patient is oriented to person, place, and time. Patient appears well-developed and well-nourished. No distress.   Head: Normocephalic and atraumatic.   Right Ear: External ear normal.   Left Ear: External ear normal.   Eyes: Conjunctivae and EOM are normal. Right eye exhibits no discharge. Left eye exhibits no discharge. No scleral icterus.   Neurological: Patient is alert and oriented to person, place, and time.  Skin: No rash noted. Patient is not diaphoretic. No erythema. No pallor.

## 2022-12-19 ENCOUNTER — OFFICE VISIT (OUTPATIENT)
Dept: OPTOMETRY | Facility: CLINIC | Age: 57
End: 2022-12-19
Attending: FAMILY MEDICINE
Payer: MEDICARE

## 2022-12-19 DIAGNOSIS — H26.9 BILATERAL INCIPIENT CATARACTS: ICD-10-CM

## 2022-12-19 DIAGNOSIS — H52.203 ASTIGMATISM OF BOTH EYES, UNSPECIFIED TYPE: ICD-10-CM

## 2022-12-19 DIAGNOSIS — H52.4 PRESBYOPIA: ICD-10-CM

## 2022-12-19 DIAGNOSIS — H10.13 ALLERGIC CONJUNCTIVITIS OF BOTH EYES: ICD-10-CM

## 2022-12-19 DIAGNOSIS — E11.9 TYPE 2 DIABETES MELLITUS WITHOUT COMPLICATION, WITHOUT LONG-TERM CURRENT USE OF INSULIN (H): ICD-10-CM

## 2022-12-19 DIAGNOSIS — E11.9 TYPE 2 DIABETES MELLITUS WITHOUT RETINOPATHY (H): Primary | ICD-10-CM

## 2022-12-19 PROCEDURE — 92015 DETERMINE REFRACTIVE STATE: CPT | Mod: GY | Performed by: OPTOMETRIST

## 2022-12-19 PROCEDURE — 92004 COMPRE OPH EXAM NEW PT 1/>: CPT | Performed by: OPTOMETRIST

## 2022-12-19 RX ORDER — OLOPATADINE HYDROCHLORIDE 2 MG/ML
1 SOLUTION/ DROPS OPHTHALMIC DAILY
Qty: 5 ML | Refills: 6 | Status: SHIPPED | OUTPATIENT
Start: 2022-12-19 | End: 2023-09-26

## 2022-12-19 ASSESSMENT — CONF VISUAL FIELD
OS_NORMAL: 1
OD_INFERIOR_TEMPORAL_RESTRICTION: 0
OD_NORMAL: 1
OS_SUPERIOR_NASAL_RESTRICTION: 0
OD_SUPERIOR_TEMPORAL_RESTRICTION: 0
OS_INFERIOR_NASAL_RESTRICTION: 0
OS_SUPERIOR_TEMPORAL_RESTRICTION: 0
OS_INFERIOR_TEMPORAL_RESTRICTION: 0
OD_SUPERIOR_NASAL_RESTRICTION: 0
OD_INFERIOR_NASAL_RESTRICTION: 0

## 2022-12-19 ASSESSMENT — REFRACTION_WEARINGRX
OS_CYLINDER: +0.75
OS_AXIS: 168
OD_SPHERE: -1.25
OS_SPHERE: -1.00
OS_ADD: +2.50
SPECS_TYPE: BIFOCAL
OD_AXIS: 009
OD_CYLINDER: +0.75
OD_ADD: +2.50

## 2022-12-19 ASSESSMENT — VISUAL ACUITY
OD_CC: 20/20
METHOD: SNELLEN - LINEAR
OD_SC: 20/30
OD_CC+: -1
OS_CC: 20/20
OS_CC: 20/60
CORRECTION_TYPE: GLASSES
OS_SC+: -1
OD_CC: 20/20
OS_SC: 20/50

## 2022-12-19 ASSESSMENT — REFRACTION_MANIFEST
OD_AXIS: 015
OD_AXIS: 008
OD_ADD: +2.50
OS_CYLINDER: +0.50
OS_AXIS: 163
OD_SPHERE: -0.75
OS_SPHERE: -1.00
OS_ADD: +2.50
OD_CYLINDER: +0.75
OS_AXIS: 154
OS_CYLINDER: +0.75
OS_SPHERE: -0.50
OD_CYLINDER: +1.00
OD_SPHERE: -1.00
METHOD_AUTOREFRACTION: 1

## 2022-12-19 ASSESSMENT — EXTERNAL EXAM - RIGHT EYE: OD_EXAM: NORMAL

## 2022-12-19 ASSESSMENT — KERATOMETRY
OD_AXISANGLE2_DEGREES: 148
OD_AXISANGLE_DEGREES: 58
OD_K2POWER_DIOPTERS: 45.75
OS_K2POWER_DIOPTERS: 46.50
OS_AXISANGLE_DEGREES: 94
OD_K1POWER_DIOPTERS: 45.37
OS_K1POWER_DIOPTERS: 45.12
OS_AXISANGLE2_DEGREES: 4

## 2022-12-19 ASSESSMENT — TONOMETRY
OD_IOP_MMHG: 16
IOP_METHOD: APPLANATION
OS_IOP_MMHG: 16

## 2022-12-19 ASSESSMENT — CUP TO DISC RATIO
OS_RATIO: 0.5
OD_RATIO: 0.4

## 2022-12-19 ASSESSMENT — SLIT LAMP EXAM - LIDS
COMMENTS: NORMAL
COMMENTS: NORMAL

## 2022-12-19 ASSESSMENT — EXTERNAL EXAM - LEFT EYE: OS_EXAM: NORMAL

## 2022-12-19 NOTE — LETTER
12/19/2022         RE: Emani Vargas  [address] [place] [address]        Dear Colleague,    Thank you for referring your patient, Emani Vargas, to the [facility]. Please see a copy of my visit note below.    Chief Complaint   Patient presents with     Diabetic Eye Exam         Lab Results   Component Value Date    A1C 7.4 12/15/2022    A1C 6.1 09/15/2022    A1C 7.7 06/08/2022    A1C 6.1 09/09/2016    A1C 5.3 09/24/2015            Last Eye Exam: 1 year ago  Dilated Previously: Yes, side effects of dilation explained today    What are you currently using to see?  glasses    Distance Vision Acuity: Satisfied with vision    Near Vision Acuity: Satisfied with vision while reading and using computer with glasses    Eye Comfort: mattery and itchy  Do you use eye drops? : Yes: Pataday when needed      Ekaterina Cota - Optometric Assistant      Medical, surgical and family histories reviewed and updated 12/19/2022.       OBJECTIVE: See Ophthalmology exam    ASSESSMENT:    ICD-10-CM    1. Type 2 diabetes mellitus without retinopathy (H)  E11.9       2. Type 2 diabetes mellitus without complication, without long-term current use of insulin (H)  E11.9 Adult Eye  Referral      3. Astigmatism of both eyes, unspecified type  H52.203       4. Presbyopia  H52.4       5. Allergic conjunctivitis of both eyes  H10.13       6. Bilateral incipient cataracts  H26.9           PLAN:  No prescription change needed , could consider single vision computer or trifocal  Glucose control  Allergy drop once daily OU  Emani Vargas aware  eye exam results will be sent to Shira Miller.    Viola Singh OD           Again, thank you for allowing me to participate in the care of your patient.        Sincerely,        Viola Singh, OD

## 2022-12-19 NOTE — PROGRESS NOTES
Chief Complaint   Patient presents with     Diabetic Eye Exam         Lab Results   Component Value Date    A1C 7.4 12/15/2022    A1C 6.1 09/15/2022    A1C 7.7 06/08/2022    A1C 6.1 09/09/2016    A1C 5.3 09/24/2015            Last Eye Exam: 1 year ago  Dilated Previously: Yes, side effects of dilation explained today    What are you currently using to see?  glasses    Distance Vision Acuity: Satisfied with vision    Near Vision Acuity: Satisfied with vision while reading and using computer with glasses    Eye Comfort: mattery and itchy  Do you use eye drops? : Yes: Pataday when needed      Ekaterina Cota - Optometric Assistant      Medical, surgical and family histories reviewed and updated 12/19/2022.       OBJECTIVE: See Ophthalmology exam    ASSESSMENT:    ICD-10-CM    1. Type 2 diabetes mellitus without retinopathy (H)  E11.9       2. Type 2 diabetes mellitus without complication, without long-term current use of insulin (H)  E11.9 Adult Eye  Referral      3. Astigmatism of both eyes, unspecified type  H52.203       4. Presbyopia  H52.4       5. Allergic conjunctivitis of both eyes  H10.13       6. Bilateral incipient cataracts  H26.9           PLAN:  No prescription change needed , could consider single vision computer or trifocal  Glucose control  Allergy drop once daily OU  Emani silvestre  eye exam results will be sent to Shira Miller.    Viola Singh OD

## 2022-12-19 NOTE — PATIENT INSTRUCTIONS
Patient Education   Diabetes weakens the blood vessels all over the body, including the eyes. Damage to the blood vessels in the eyes can cause swelling or bleeding into part of the eye (called the retina). This is called diabetic retinopathy (NENA-tin-AH-pu-thee). If not treated, this disease can cause vision loss or blindness.   Symptoms may include blurred or distorted vision, but many people have no symptoms. It's important to see your eye doctor regularly to check for problems.   Early treatment and good control can help protect your vision. Here are the things you can do to help prevent vision loss:      1. Keep your blood sugar levels under tight control.      2. Bring high blood pressure under control.      3. No smoking.      4. Have yearly dilated eye exams.    No retinopathy , minimal prescription change

## 2023-01-06 ENCOUNTER — APPOINTMENT (OUTPATIENT)
Dept: OPTOMETRY | Facility: CLINIC | Age: 58
End: 2023-01-06
Payer: MEDICARE

## 2023-01-06 PROCEDURE — 92341 FIT SPECTACLES BIFOCAL: CPT | Performed by: OPTOMETRIST

## 2023-01-26 ENCOUNTER — HOSPITAL ENCOUNTER (OUTPATIENT)
Facility: CLINIC | Age: 58
Setting detail: OBSERVATION
Discharge: HOME | End: 2023-01-27
Attending: EMERGENCY MEDICINE | Admitting: FAMILY MEDICINE
Payer: MEDICARE

## 2023-01-26 ENCOUNTER — APPOINTMENT (OUTPATIENT)
Dept: CT IMAGING | Facility: CLINIC | Age: 58
End: 2023-01-26
Attending: EMERGENCY MEDICINE
Payer: MEDICARE

## 2023-01-26 DIAGNOSIS — R05.1 ACUTE COUGH: ICD-10-CM

## 2023-01-26 DIAGNOSIS — E11.9 TYPE 2 DIABETES MELLITUS WITHOUT COMPLICATION, WITHOUT LONG-TERM CURRENT USE OF INSULIN (H): ICD-10-CM

## 2023-01-26 DIAGNOSIS — A41.9 SEPSIS, DUE TO UNSPECIFIED ORGANISM, UNSPECIFIED WHETHER ACUTE ORGAN DYSFUNCTION PRESENT (H): ICD-10-CM

## 2023-01-26 DIAGNOSIS — R09.02 HYPOXIA: ICD-10-CM

## 2023-01-26 DIAGNOSIS — J45.41 MODERATE PERSISTENT ASTHMA WITH ACUTE EXACERBATION: Primary | ICD-10-CM

## 2023-01-26 DIAGNOSIS — J44.1 COPD EXACERBATION (H): ICD-10-CM

## 2023-01-26 LAB
ANION GAP SERPL CALCULATED.3IONS-SCNC: 11 MMOL/L (ref 5–18)
ATRIAL RATE - MUSE: 77 BPM
BACTERIA SPT CULT: NORMAL
BASE EXCESS BLDV CALC-SCNC: 1.7 MMOL/L
BASOPHILS # BLD AUTO: 0.1 10E3/UL (ref 0–0.2)
BASOPHILS NFR BLD AUTO: 0 %
BNP SERPL-MCNC: 18 PG/ML (ref 0–89)
BUN SERPL-MCNC: 8 MG/DL (ref 8–22)
CALCIUM SERPL-MCNC: 9.3 MG/DL (ref 8.5–10.5)
CHLORIDE BLD-SCNC: 99 MMOL/L (ref 98–107)
CO2 SERPL-SCNC: 26 MMOL/L (ref 22–31)
CREAT SERPL-MCNC: 0.89 MG/DL (ref 0.6–1.1)
DIASTOLIC BLOOD PRESSURE - MUSE: 60 MMHG
EOSINOPHIL # BLD AUTO: 0.2 10E3/UL (ref 0–0.7)
EOSINOPHIL NFR BLD AUTO: 1 %
ERYTHROCYTE [DISTWIDTH] IN BLOOD BY AUTOMATED COUNT: 13.2 % (ref 10–15)
FLUAV RNA SPEC QL NAA+PROBE: NEGATIVE
FLUBV RNA RESP QL NAA+PROBE: NEGATIVE
GFR SERPL CREATININE-BSD FRML MDRD: 75 ML/MIN/1.73M2
GLUCOSE BLD-MCNC: 170 MG/DL (ref 70–125)
GLUCOSE BLDC GLUCOMTR-MCNC: 227 MG/DL (ref 70–99)
GLUCOSE BLDC GLUCOMTR-MCNC: 234 MG/DL (ref 70–99)
GLUCOSE BLDC GLUCOMTR-MCNC: 315 MG/DL (ref 70–99)
GLUCOSE BLDC GLUCOMTR-MCNC: 382 MG/DL (ref 70–99)
GLUCOSE BLDC GLUCOMTR-MCNC: 396 MG/DL (ref 70–99)
GRAM STAIN RESULT: NORMAL
HCO3 BLDV-SCNC: 25 MMOL/L (ref 24–30)
HCT VFR BLD AUTO: 41.5 % (ref 35–47)
HGB BLD-MCNC: 13.5 G/DL (ref 11.7–15.7)
IMM GRANULOCYTES # BLD: 0.1 10E3/UL
IMM GRANULOCYTES NFR BLD: 0 %
INTERPRETATION ECG - MUSE: NORMAL
LACTATE SERPL-SCNC: 1.7 MMOL/L (ref 0.7–2)
LYMPHOCYTES # BLD AUTO: 2.6 10E3/UL (ref 0.8–5.3)
LYMPHOCYTES NFR BLD AUTO: 16 %
MCH RBC QN AUTO: 29 PG (ref 26.5–33)
MCHC RBC AUTO-ENTMCNC: 32.5 G/DL (ref 31.5–36.5)
MCV RBC AUTO: 89 FL (ref 78–100)
MONOCYTES # BLD AUTO: 1.3 10E3/UL (ref 0–1.3)
MONOCYTES NFR BLD AUTO: 8 %
NEUTROPHILS # BLD AUTO: 12.3 10E3/UL (ref 1.6–8.3)
NEUTROPHILS NFR BLD AUTO: 75 %
NRBC # BLD AUTO: 0 10E3/UL
NRBC BLD AUTO-RTO: 0 /100
OXYHGB MFR BLDV: 92 % (ref 70–75)
P AXIS - MUSE: 41 DEGREES
PCO2 BLDV: 30 MM HG (ref 35–50)
PH BLDV: 7.52 [PH] (ref 7.35–7.45)
PLATELET # BLD AUTO: 222 10E3/UL (ref 150–450)
PO2 BLDV: 57 MM HG (ref 25–47)
POTASSIUM BLD-SCNC: 4.4 MMOL/L (ref 3.5–5)
PR INTERVAL - MUSE: 156 MS
PROCALCITONIN SERPL-MCNC: 0.1 NG/ML (ref 0–0.49)
QRS DURATION - MUSE: 80 MS
QT - MUSE: 410 MS
QTC - MUSE: 463 MS
R AXIS - MUSE: 56 DEGREES
RBC # BLD AUTO: 4.65 10E6/UL (ref 3.8–5.2)
RSV RNA SPEC NAA+PROBE: NEGATIVE
SAO2 % BLDV: 93 % (ref 70–75)
SARS-COV-2 RNA RESP QL NAA+PROBE: NEGATIVE
SODIUM SERPL-SCNC: 136 MMOL/L (ref 136–145)
SYSTOLIC BLOOD PRESSURE - MUSE: 103 MMHG
T AXIS - MUSE: 72 DEGREES
TROPONIN I SERPL-MCNC: <0.01 NG/ML (ref 0–0.29)
VENTRICULAR RATE- MUSE: 77 BPM
WBC # BLD AUTO: 16.5 10E3/UL (ref 4–11)

## 2023-01-26 PROCEDURE — 250N000009 HC RX 250: Performed by: EMERGENCY MEDICINE

## 2023-01-26 PROCEDURE — 96361 HYDRATE IV INFUSION ADD-ON: CPT

## 2023-01-26 PROCEDURE — 94640 AIRWAY INHALATION TREATMENT: CPT

## 2023-01-26 PROCEDURE — 250N000011 HC RX IP 250 OP 636: Performed by: EMERGENCY MEDICINE

## 2023-01-26 PROCEDURE — 96375 TX/PRO/DX INJ NEW DRUG ADDON: CPT

## 2023-01-26 PROCEDURE — 83605 ASSAY OF LACTIC ACID: CPT | Performed by: EMERGENCY MEDICINE

## 2023-01-26 PROCEDURE — G0378 HOSPITAL OBSERVATION PER HR: HCPCS

## 2023-01-26 PROCEDURE — 87070 CULTURE OTHR SPECIMN AEROBIC: CPT | Performed by: STUDENT IN AN ORGANIZED HEALTH CARE EDUCATION/TRAINING PROGRAM

## 2023-01-26 PROCEDURE — 84145 PROCALCITONIN (PCT): CPT | Performed by: EMERGENCY MEDICINE

## 2023-01-26 PROCEDURE — 36415 COLL VENOUS BLD VENIPUNCTURE: CPT | Performed by: STUDENT IN AN ORGANIZED HEALTH CARE EDUCATION/TRAINING PROGRAM

## 2023-01-26 PROCEDURE — 82805 BLOOD GASES W/O2 SATURATION: CPT | Performed by: STUDENT IN AN ORGANIZED HEALTH CARE EDUCATION/TRAINING PROGRAM

## 2023-01-26 PROCEDURE — 99285 EMERGENCY DEPT VISIT HI MDM: CPT | Mod: 25,CS

## 2023-01-26 PROCEDURE — 94640 AIRWAY INHALATION TREATMENT: CPT | Mod: 76

## 2023-01-26 PROCEDURE — 93005 ELECTROCARDIOGRAM TRACING: CPT | Performed by: STUDENT IN AN ORGANIZED HEALTH CARE EDUCATION/TRAINING PROGRAM

## 2023-01-26 PROCEDURE — 99223 1ST HOSP IP/OBS HIGH 75: CPT | Mod: GC | Performed by: STUDENT IN AN ORGANIZED HEALTH CARE EDUCATION/TRAINING PROGRAM

## 2023-01-26 PROCEDURE — 36415 COLL VENOUS BLD VENIPUNCTURE: CPT | Performed by: EMERGENCY MEDICINE

## 2023-01-26 PROCEDURE — 87637 SARSCOV2&INF A&B&RSV AMP PRB: CPT | Performed by: EMERGENCY MEDICINE

## 2023-01-26 PROCEDURE — 96372 THER/PROPH/DIAG INJ SC/IM: CPT

## 2023-01-26 PROCEDURE — 80048 BASIC METABOLIC PNL TOTAL CA: CPT | Performed by: EMERGENCY MEDICINE

## 2023-01-26 PROCEDURE — 96365 THER/PROPH/DIAG IV INF INIT: CPT | Mod: XU

## 2023-01-26 PROCEDURE — 96372 THER/PROPH/DIAG INJ SC/IM: CPT | Mod: XU | Performed by: STUDENT IN AN ORGANIZED HEALTH CARE EDUCATION/TRAINING PROGRAM

## 2023-01-26 PROCEDURE — 250N000012 HC RX MED GY IP 250 OP 636 PS 637: Performed by: STUDENT IN AN ORGANIZED HEALTH CARE EDUCATION/TRAINING PROGRAM

## 2023-01-26 PROCEDURE — 258N000003 HC RX IP 258 OP 636: Performed by: EMERGENCY MEDICINE

## 2023-01-26 PROCEDURE — 87040 BLOOD CULTURE FOR BACTERIA: CPT | Performed by: EMERGENCY MEDICINE

## 2023-01-26 PROCEDURE — C9803 HOPD COVID-19 SPEC COLLECT: HCPCS

## 2023-01-26 PROCEDURE — 250N000013 HC RX MED GY IP 250 OP 250 PS 637

## 2023-01-26 PROCEDURE — 82962 GLUCOSE BLOOD TEST: CPT

## 2023-01-26 PROCEDURE — 250N000009 HC RX 250: Performed by: STUDENT IN AN ORGANIZED HEALTH CARE EDUCATION/TRAINING PROGRAM

## 2023-01-26 PROCEDURE — 96372 THER/PROPH/DIAG INJ SC/IM: CPT | Performed by: STUDENT IN AN ORGANIZED HEALTH CARE EDUCATION/TRAINING PROGRAM

## 2023-01-26 PROCEDURE — 85004 AUTOMATED DIFF WBC COUNT: CPT | Performed by: EMERGENCY MEDICINE

## 2023-01-26 PROCEDURE — 96374 THER/PROPH/DIAG INJ IV PUSH: CPT

## 2023-01-26 PROCEDURE — 250N000012 HC RX MED GY IP 250 OP 636 PS 637

## 2023-01-26 PROCEDURE — 71275 CT ANGIOGRAPHY CHEST: CPT | Mod: MF

## 2023-01-26 PROCEDURE — 96367 TX/PROPH/DG ADDL SEQ IV INF: CPT

## 2023-01-26 PROCEDURE — 258N000003 HC RX IP 258 OP 636: Performed by: STUDENT IN AN ORGANIZED HEALTH CARE EDUCATION/TRAINING PROGRAM

## 2023-01-26 PROCEDURE — 84484 ASSAY OF TROPONIN QUANT: CPT | Performed by: EMERGENCY MEDICINE

## 2023-01-26 PROCEDURE — 999N000157 HC STATISTIC RCP TIME EA 10 MIN

## 2023-01-26 PROCEDURE — 83880 ASSAY OF NATRIURETIC PEPTIDE: CPT | Performed by: EMERGENCY MEDICINE

## 2023-01-26 PROCEDURE — 250N000013 HC RX MED GY IP 250 OP 250 PS 637: Performed by: EMERGENCY MEDICINE

## 2023-01-26 PROCEDURE — 250N000011 HC RX IP 250 OP 636: Performed by: STUDENT IN AN ORGANIZED HEALTH CARE EDUCATION/TRAINING PROGRAM

## 2023-01-26 PROCEDURE — 250N000013 HC RX MED GY IP 250 OP 250 PS 637: Performed by: STUDENT IN AN ORGANIZED HEALTH CARE EDUCATION/TRAINING PROGRAM

## 2023-01-26 RX ORDER — HYDROXYZINE HYDROCHLORIDE 25 MG/1
50 TABLET, FILM COATED ORAL ONCE
Status: COMPLETED | OUTPATIENT
Start: 2023-01-26 | End: 2023-01-26

## 2023-01-26 RX ORDER — METFORMIN HCL 500 MG
1000 TABLET, EXTENDED RELEASE 24 HR ORAL
Status: DISCONTINUED | OUTPATIENT
Start: 2023-01-26 | End: 2023-01-27 | Stop reason: HOSPADM

## 2023-01-26 RX ORDER — AZITHROMYCIN 250 MG/1
TABLET, FILM COATED ORAL
Qty: 6 TABLET | Refills: 0 | Status: CANCELLED | OUTPATIENT
Start: 2023-01-26 | End: 2023-01-31

## 2023-01-26 RX ORDER — DEXTROSE MONOHYDRATE 25 G/50ML
25-50 INJECTION, SOLUTION INTRAVENOUS
Status: DISCONTINUED | OUTPATIENT
Start: 2023-01-26 | End: 2023-01-27 | Stop reason: HOSPADM

## 2023-01-26 RX ORDER — CEFTRIAXONE 2 G/1
2 INJECTION, POWDER, FOR SOLUTION INTRAMUSCULAR; INTRAVENOUS EVERY 24 HOURS
Status: DISCONTINUED | OUTPATIENT
Start: 2023-01-26 | End: 2023-01-26

## 2023-01-26 RX ORDER — METHYLPREDNISOLONE SODIUM SUCCINATE 125 MG/2ML
125 INJECTION, POWDER, LYOPHILIZED, FOR SOLUTION INTRAMUSCULAR; INTRAVENOUS ONCE
Status: COMPLETED | OUTPATIENT
Start: 2023-01-26 | End: 2023-01-26

## 2023-01-26 RX ORDER — AZITHROMYCIN 500 MG/5ML
500 INJECTION, POWDER, LYOPHILIZED, FOR SOLUTION INTRAVENOUS EVERY 24 HOURS
Status: COMPLETED | OUTPATIENT
Start: 2023-01-26 | End: 2023-01-26

## 2023-01-26 RX ORDER — BENZONATATE 100 MG/1
100 CAPSULE ORAL 3 TIMES DAILY PRN
Status: DISCONTINUED | OUTPATIENT
Start: 2023-01-26 | End: 2023-01-27 | Stop reason: HOSPADM

## 2023-01-26 RX ORDER — ONDANSETRON 2 MG/ML
4 INJECTION INTRAMUSCULAR; INTRAVENOUS EVERY 6 HOURS PRN
Status: DISCONTINUED | OUTPATIENT
Start: 2023-01-26 | End: 2023-01-27 | Stop reason: HOSPADM

## 2023-01-26 RX ORDER — KETOROLAC TROMETHAMINE 15 MG/ML
15 INJECTION, SOLUTION INTRAMUSCULAR; INTRAVENOUS ONCE
Status: COMPLETED | OUTPATIENT
Start: 2023-01-26 | End: 2023-01-26

## 2023-01-26 RX ORDER — OXYCODONE HYDROCHLORIDE 5 MG/1
5 TABLET ORAL ONCE
Status: COMPLETED | OUTPATIENT
Start: 2023-01-26 | End: 2023-01-26

## 2023-01-26 RX ORDER — IBUPROFEN 600 MG/1
600 TABLET, FILM COATED ORAL EVERY 6 HOURS PRN
Status: DISCONTINUED | OUTPATIENT
Start: 2023-01-26 | End: 2023-01-27 | Stop reason: HOSPADM

## 2023-01-26 RX ORDER — PREDNISONE 20 MG/1
40 TABLET ORAL DAILY
Status: DISCONTINUED | OUTPATIENT
Start: 2023-01-27 | End: 2023-01-27 | Stop reason: HOSPADM

## 2023-01-26 RX ORDER — MULTIVITAMIN,THERAPEUTIC
1 TABLET ORAL DAILY
COMMUNITY
End: 2023-12-01

## 2023-01-26 RX ORDER — AZITHROMYCIN 250 MG/1
250 TABLET, FILM COATED ORAL DAILY
Status: DISCONTINUED | OUTPATIENT
Start: 2023-01-27 | End: 2023-01-27 | Stop reason: HOSPADM

## 2023-01-26 RX ORDER — ACETAMINOPHEN 325 MG/1
650 TABLET ORAL EVERY 6 HOURS PRN
Status: DISCONTINUED | OUTPATIENT
Start: 2023-01-26 | End: 2023-01-27 | Stop reason: HOSPADM

## 2023-01-26 RX ORDER — ALBUTEROL SULFATE 90 UG/1
2 AEROSOL, METERED RESPIRATORY (INHALATION) EVERY 6 HOURS PRN
Qty: 18 G | Refills: 1 | Status: CANCELLED | OUTPATIENT
Start: 2023-01-26

## 2023-01-26 RX ORDER — MONTELUKAST SODIUM 10 MG/1
10 TABLET ORAL AT BEDTIME
Status: DISCONTINUED | OUTPATIENT
Start: 2023-01-26 | End: 2023-01-27 | Stop reason: HOSPADM

## 2023-01-26 RX ORDER — TRAZODONE HYDROCHLORIDE 100 MG/1
200 TABLET ORAL AT BEDTIME
Status: DISCONTINUED | OUTPATIENT
Start: 2023-01-26 | End: 2023-01-27 | Stop reason: HOSPADM

## 2023-01-26 RX ORDER — PANTOPRAZOLE SODIUM 20 MG/1
40 TABLET, DELAYED RELEASE ORAL 2 TIMES DAILY
Status: DISCONTINUED | OUTPATIENT
Start: 2023-01-26 | End: 2023-01-27 | Stop reason: HOSPADM

## 2023-01-26 RX ORDER — DOXYCYCLINE 100 MG/1
100 CAPSULE ORAL ONCE
Status: COMPLETED | OUTPATIENT
Start: 2023-01-26 | End: 2023-01-26

## 2023-01-26 RX ORDER — IPRATROPIUM BROMIDE AND ALBUTEROL SULFATE 2.5; .5 MG/3ML; MG/3ML
3 SOLUTION RESPIRATORY (INHALATION) 4 TIMES DAILY
Status: DISCONTINUED | OUTPATIENT
Start: 2023-01-26 | End: 2023-01-26

## 2023-01-26 RX ORDER — FLUTICASONE PROPIONATE AND SALMETEROL XINAFOATE 115; 21 UG/1; UG/1
2 AEROSOL, METERED RESPIRATORY (INHALATION) 2 TIMES DAILY
Qty: 12 G | Refills: 1 | Status: CANCELLED | OUTPATIENT
Start: 2023-01-26

## 2023-01-26 RX ORDER — IPRATROPIUM BROMIDE AND ALBUTEROL SULFATE 2.5; .5 MG/3ML; MG/3ML
3 SOLUTION RESPIRATORY (INHALATION)
Status: DISCONTINUED | OUTPATIENT
Start: 2023-01-26 | End: 2023-01-27 | Stop reason: HOSPADM

## 2023-01-26 RX ORDER — FLUTICASONE PROPIONATE 50 MCG
1 SPRAY, SUSPENSION (ML) NASAL DAILY
Status: DISCONTINUED | OUTPATIENT
Start: 2023-01-26 | End: 2023-01-27 | Stop reason: HOSPADM

## 2023-01-26 RX ORDER — SIMVASTATIN 20 MG
20 TABLET ORAL AT BEDTIME
Status: DISCONTINUED | OUTPATIENT
Start: 2023-01-26 | End: 2023-01-27 | Stop reason: HOSPADM

## 2023-01-26 RX ORDER — ENOXAPARIN SODIUM 100 MG/ML
40 INJECTION SUBCUTANEOUS EVERY 12 HOURS
Status: DISCONTINUED | OUTPATIENT
Start: 2023-01-26 | End: 2023-01-27 | Stop reason: HOSPADM

## 2023-01-26 RX ORDER — FLUTICASONE FUROATE AND VILANTEROL 200; 25 UG/1; UG/1
1 POWDER RESPIRATORY (INHALATION) DAILY
Status: DISCONTINUED | OUTPATIENT
Start: 2023-01-26 | End: 2023-01-27 | Stop reason: HOSPADM

## 2023-01-26 RX ORDER — ONDANSETRON 4 MG/1
4 TABLET, ORALLY DISINTEGRATING ORAL EVERY 6 HOURS PRN
Status: DISCONTINUED | OUTPATIENT
Start: 2023-01-26 | End: 2023-01-27 | Stop reason: HOSPADM

## 2023-01-26 RX ORDER — CETIRIZINE HYDROCHLORIDE 5 MG/1
10 TABLET ORAL EVERY EVENING
Status: DISCONTINUED | OUTPATIENT
Start: 2023-01-26 | End: 2023-01-27 | Stop reason: HOSPADM

## 2023-01-26 RX ORDER — LIDOCAINE 40 MG/G
CREAM TOPICAL
Status: DISCONTINUED | OUTPATIENT
Start: 2023-01-26 | End: 2023-01-27 | Stop reason: HOSPADM

## 2023-01-26 RX ORDER — ACETAMINOPHEN 650 MG/1
650 SUPPOSITORY RECTAL EVERY 6 HOURS PRN
Status: DISCONTINUED | OUTPATIENT
Start: 2023-01-26 | End: 2023-01-27 | Stop reason: HOSPADM

## 2023-01-26 RX ORDER — GABAPENTIN 400 MG/1
1600 CAPSULE ORAL AT BEDTIME
Status: DISCONTINUED | OUTPATIENT
Start: 2023-01-26 | End: 2023-01-27 | Stop reason: HOSPADM

## 2023-01-26 RX ORDER — IOPAMIDOL 755 MG/ML
100 INJECTION, SOLUTION INTRAVASCULAR ONCE
Status: COMPLETED | OUTPATIENT
Start: 2023-01-26 | End: 2023-01-26

## 2023-01-26 RX ORDER — PREDNISONE 20 MG/1
40 TABLET ORAL DAILY
Qty: 10 TABLET | Refills: 0 | Status: CANCELLED | OUTPATIENT
Start: 2023-01-26 | End: 2023-01-31

## 2023-01-26 RX ORDER — PREDNISONE 20 MG/1
40 TABLET ORAL DAILY
Status: DISCONTINUED | OUTPATIENT
Start: 2023-01-26 | End: 2023-01-26

## 2023-01-26 RX ORDER — METHOCARBAMOL 500 MG/1
500 TABLET, FILM COATED ORAL 3 TIMES DAILY PRN
Status: DISCONTINUED | OUTPATIENT
Start: 2023-01-26 | End: 2023-01-27 | Stop reason: HOSPADM

## 2023-01-26 RX ORDER — OLOPATADINE HYDROCHLORIDE 2 MG/ML
1 SOLUTION/ DROPS OPHTHALMIC DAILY
Status: DISCONTINUED | OUTPATIENT
Start: 2023-01-26 | End: 2023-01-27

## 2023-01-26 RX ORDER — FLUTICASONE FUROATE AND VILANTEROL 200; 25 UG/1; UG/1
1 POWDER RESPIRATORY (INHALATION) DAILY
Status: ON HOLD | COMMUNITY
End: 2023-01-27

## 2023-01-26 RX ORDER — IPRATROPIUM BROMIDE AND ALBUTEROL SULFATE 2.5; .5 MG/3ML; MG/3ML
3 SOLUTION RESPIRATORY (INHALATION) ONCE
Status: COMPLETED | OUTPATIENT
Start: 2023-01-26 | End: 2023-01-26

## 2023-01-26 RX ORDER — NICOTINE POLACRILEX 4 MG
15-30 LOZENGE BUCCAL
Status: DISCONTINUED | OUTPATIENT
Start: 2023-01-26 | End: 2023-01-27 | Stop reason: HOSPADM

## 2023-01-26 RX ADMIN — OLOPATADINE HYDROCHLORIDE 1 DROP: 2 SOLUTION OPHTHALMIC at 12:26

## 2023-01-26 RX ADMIN — METHYLPREDNISOLONE SODIUM SUCCINATE 125 MG: 125 INJECTION, POWDER, FOR SOLUTION INTRAMUSCULAR; INTRAVENOUS at 04:05

## 2023-01-26 RX ADMIN — PREDNISONE 40 MG: 20 TABLET ORAL at 12:27

## 2023-01-26 RX ADMIN — ACETAMINOPHEN 650 MG: 325 TABLET ORAL at 21:01

## 2023-01-26 RX ADMIN — TRAZODONE HYDROCHLORIDE 200 MG: 100 TABLET ORAL at 21:02

## 2023-01-26 RX ADMIN — CEFTRIAXONE SODIUM 2 G: 2 INJECTION, POWDER, FOR SOLUTION INTRAMUSCULAR; INTRAVENOUS at 05:44

## 2023-01-26 RX ADMIN — IPRATROPIUM BROMIDE AND ALBUTEROL SULFATE 3 ML: 2.5; .5 SOLUTION RESPIRATORY (INHALATION) at 20:30

## 2023-01-26 RX ADMIN — BENZONATATE 100 MG: 100 CAPSULE ORAL at 04:15

## 2023-01-26 RX ADMIN — IPRATROPIUM BROMIDE AND ALBUTEROL SULFATE 3 ML: 2.5; .5 SOLUTION RESPIRATORY (INHALATION) at 14:10

## 2023-01-26 RX ADMIN — CETIRIZINE HYDROCHLORIDE 10 MG: 5 TABLET ORAL at 21:10

## 2023-01-26 RX ADMIN — PANTOPRAZOLE SODIUM 40 MG: 20 TABLET, DELAYED RELEASE ORAL at 13:18

## 2023-01-26 RX ADMIN — SODIUM CHLORIDE 500 MG: 9 INJECTION, SOLUTION INTRAVENOUS at 09:59

## 2023-01-26 RX ADMIN — GABAPENTIN 1600 MG: 400 CAPSULE ORAL at 21:02

## 2023-01-26 RX ADMIN — INSULIN ASPART 4 UNITS: 100 INJECTION, SOLUTION INTRAVENOUS; SUBCUTANEOUS at 19:04

## 2023-01-26 RX ADMIN — SODIUM CHLORIDE 1000 ML: 9 INJECTION, SOLUTION INTRAVENOUS at 04:15

## 2023-01-26 RX ADMIN — ENOXAPARIN SODIUM 40 MG: 100 INJECTION SUBCUTANEOUS at 18:20

## 2023-01-26 RX ADMIN — BENZONATATE 100 MG: 100 CAPSULE ORAL at 21:01

## 2023-01-26 RX ADMIN — ENOXAPARIN SODIUM 40 MG: 100 INJECTION SUBCUTANEOUS at 05:45

## 2023-01-26 RX ADMIN — KETOROLAC TROMETHAMINE 15 MG: 15 INJECTION, SOLUTION INTRAMUSCULAR; INTRAVENOUS at 04:04

## 2023-01-26 RX ADMIN — PANTOPRAZOLE SODIUM 40 MG: 20 TABLET, DELAYED RELEASE ORAL at 21:03

## 2023-01-26 RX ADMIN — METHOCARBAMOL TABLETS 500 MG: 500 TABLET, COATED ORAL at 21:02

## 2023-01-26 RX ADMIN — INSULIN ASPART 5 UNITS: 100 INJECTION, SOLUTION INTRAVENOUS; SUBCUTANEOUS at 12:09

## 2023-01-26 RX ADMIN — FLUOXETINE 40 MG: 20 CAPSULE ORAL at 12:26

## 2023-01-26 RX ADMIN — METFORMIN HYDROCHLORIDE 1000 MG: 500 TABLET, EXTENDED RELEASE ORAL at 18:19

## 2023-01-26 RX ADMIN — MONTELUKAST 10 MG: 10 TABLET, FILM COATED ORAL at 21:03

## 2023-01-26 RX ADMIN — FLUTICASONE PROPIONATE 1 SPRAY: 50 SPRAY, METERED NASAL at 12:28

## 2023-01-26 RX ADMIN — DOXYCYCLINE 100 MG: 100 CAPSULE ORAL at 04:05

## 2023-01-26 RX ADMIN — SIMVASTATIN 20 MG: 20 TABLET, FILM COATED ORAL at 21:02

## 2023-01-26 RX ADMIN — IOPAMIDOL 100 ML: 755 INJECTION, SOLUTION INTRAVENOUS at 04:50

## 2023-01-26 RX ADMIN — OXYCODONE HYDROCHLORIDE 5 MG: 5 TABLET ORAL at 22:46

## 2023-01-26 RX ADMIN — BENZONATATE 100 MG: 100 CAPSULE ORAL at 12:41

## 2023-01-26 RX ADMIN — INSULIN ASPART 2 UNITS: 100 INJECTION, SOLUTION INTRAVENOUS; SUBCUTANEOUS at 07:33

## 2023-01-26 RX ADMIN — IPRATROPIUM BROMIDE AND ALBUTEROL SULFATE 3 ML: 2.5; .5 SOLUTION RESPIRATORY (INHALATION) at 03:38

## 2023-01-26 RX ADMIN — HYDROXYZINE HYDROCHLORIDE 50 MG: 25 TABLET, FILM COATED ORAL at 22:47

## 2023-01-26 RX ADMIN — INSULIN GLARGINE 5 UNITS: 100 INJECTION, SOLUTION SUBCUTANEOUS at 22:46

## 2023-01-26 RX ADMIN — IPRATROPIUM BROMIDE AND ALBUTEROL SULFATE 3 ML: 2.5; .5 SOLUTION RESPIRATORY (INHALATION) at 08:32

## 2023-01-26 RX ADMIN — FLUTICASONE FUROATE AND VILANTEROL TRIFENATATE 1 PUFF: 200; 25 POWDER RESPIRATORY (INHALATION) at 12:24

## 2023-01-26 ASSESSMENT — ACTIVITIES OF DAILY LIVING (ADL)
ADLS_ACUITY_SCORE: 35
DEPENDENT_IADLS:: INDEPENDENT
ADLS_ACUITY_SCORE: 24
ADLS_ACUITY_SCORE: 35
ADLS_ACUITY_SCORE: 22
ADLS_ACUITY_SCORE: 24
ADLS_ACUITY_SCORE: 35
ADLS_ACUITY_SCORE: 24
ADLS_ACUITY_SCORE: 35
ADLS_ACUITY_SCORE: 35
ADLS_ACUITY_SCORE: 24

## 2023-01-26 ASSESSMENT — ENCOUNTER SYMPTOMS
SORE THROAT: 1
COUGH: 1
RHINORRHEA: 1
FEVER: 1
ABDOMINAL PAIN: 1
NAUSEA: 1

## 2023-01-26 NOTE — H&P
[facility]    History and Physical - Resident Service       Date of Admission:  1/26/2023    Assessment & Plan      Emani Vargas is a 57 year old female with a history of T2DM, allergic rhinitis, chronic cough possible asthma, obesity, breast cancer, bipolar disorder who was admitted on 1/26/2023 with shortness of breath and productive cough.  Respiratory viral panel was negative.  She received duo nebs, Solu-Medrol, doxycycline with improvement in breathing and was admitted for IV antibiotics, steroids, and monitoring of respiratory status.    Acute hypoxic respiratory failure  Fever  Cough productive of sputum  Mild leukocytosis  Felt better after steroids and DuoNebs.  Continue  Remote history of smoking.  No COPD at baseline.  Has mild underlying asthma, which was likely exacerbated by acute viral respiratory illness.  Also suspect pneumonia given fever and purulent sputum.  -Supplemental O2, maintain SPO2 > 90%  -RT consultation for assistance with therapies  -DuoNebs  -Prednisone 40 mg  -Azithromycin and ceftriaxone order, narrow as able  -sputum culture   -follow BCx  -procal every other day to monitor response to abx  -tessalon PRN for cough  -clear liquid diet, ADAT      T2DM  On Ozempic PTA.  Recently started metformin.  -Glucose checks as ordered  -Medium ISS      Allergies  -Cont PTA cetirizine, nasal spray, eye drops      CHRONIC MEDICAL PROBLEMS:  Breast cancer:  S/p radiation and chemotherapy.  No active concerns  Endometriosis:  No active concerns.   Fibromyalgia:  Stable  Hearing difficulty:  Noted  Depression with anxiety:  Cont home fluoxetine  Sleeping difficulty:  Cont home trazodone  Chronic pain:  Cont PTA gabapentin, robaxin         Diet: Combination Diet Clear Liquid  DVT Prophylaxis: Enoxaparin (Lovenox) SQ  Chaudhry Catheter: Not present  Fluids: PO  Lines: None     Cardiac Monitoring: None  Code Status:  No intubation; attempt full resuscitation    Clinically  "Significant Risk Factors Present on Admission                      # DMII: A1C = 7.4 % (Ref range: 0.0 - 5.6 %) within past 3 months    # Severe Obesity: Estimated body mass index is 40.91 kg/m  as calculated from the following:    Height as of this encounter: 1.753 m (5' 9\").    Weight as of this encounter: 125.6 kg (277 lb).           Disposition Plan      Expected Discharge Date: 01/27/2023                The patient's care was discussed with the Attending Physician, Dr. Bustamante, patient, and patient's sister in law.      Selena Simeon MD  [id number]  [facility]  Securely message with foc[employer] (more info)  Text page via [facility] Paging/Directory   ______________________________________________________________________    Chief Complaint   Chief Complaint   Patient presents with     Cough     Shortness of Breath         History is obtained from the patient    History of Present Illness   57-year-old female with a history of obesity, T2DM, breast cancer s/p lumpectomy and radiation, chronic cough, remote tobacco use, vitamin D deficiency, osteoarthritis status post bilateral LALA and right TKA, chronic low back pain, possible asthma, allergic rhinitis, endometriosis, and bipolar disorder who presents to [facility] with 1 week of fever, cough, body aches that started with a sore throat 1 week ago and have been getting progressively worse.    She is vaccine against COVID and had a negative COVID test 3 days prior to admission.  Did not get influenza vaccination this year.  Fevers not getting better.      In the ED, she received neb and improved.  WBC 16.  No pleuritic chest pain..  Also got a dose of doxycycline, solumedrol for possible COPD exacerbation.  CT was ordered and is pending at time of admission.     She was admitted for worsening hypoxia, possible COPD exacerbation vs pneumonia.       0317 Oral temperature here is 100.1, this is after 2 extra strength Tylenol taken approximately 3 hours ago, " tachycardic, tachypneic.  Oxygen saturations 90 to 92% on room air.  Patient is coming in with 1 week of fevers, cough, body aches.  She said last Thursday she started with cough, sore throat.  Since then has been getting progressively worse.  She does have a history of COPD.  Was having some abdominal pain and slight nausea but has been taking hyoscyamine and that is making that better.  Otherwise she does states she feels rundown with sore throat, runny nose, cough.  Cough seems to be getting worse.  No recent steroids.  She had took a home COVID test on Monday that was negative.  She has been vaccinated against COVID-19 x3 with last dose given in 2021.  No influenza vaccine this year.     Exam for patient here appears mildly uncomfortable, frequent coarse cough noted.  Tachycardia.  Skin is warm and flushed.  Lungs with crackles at bilateral bases with end expiratory apical wheezing bilaterally.  Abdomen soft nontender.  Legs without swelling or pitting edema.  Equal in size.  Posterior oropharynx is erythematous without unilateral swelling or discharge.  Neck without signs of meningismus.     Patient here febrile, tachycardic.  Could be likely viral illness however given history of COPD and the fact that her oxygen saturations have been hovering in the low 90s.  She has never dropped below 90, at times she will cough and oxygen saturations will rebound to 94 slight concern for some mucous plugging going on there.  She not having any chest pain or discomfort of any pleuritic pain with any of this.  Plan for sepsis work-up, fluids, medications for symptom control.  Given history of COPD, wheezing, cough that is getting worse as well as productivity of cough will treat for COPD exacerbation as well with some doxycycline, saw Solu-Medrol.  Chest x-ray as well.   0321 Labs by care with fairly significant elevation of white blood cell count.  Normal lactic acid, negative troponin, negative influenza, COVID.  Still  awaiting chest x-ray.   0420 Reevaluated patient after her nebulizer treatment, heart rate is starting to come down, she does feel slightly better with medications however oxygen saturation still hovering at 90%.  This is with her sitting and resting.  Any exertion she does drop slightly lower.  Not requiring oxygen as of yet given her history of COPD would not place her on oxygen unless it was consistently below 90.  This was discussed with nursing staff in room.  Given the fact she is borderline hypoxic here will add on CT of the chest.  Will need to be admitted.         Past Medical History    Past Medical History:   Diagnosis Date     Allergic rhinitis      Anemia      Anxiety      Asthma      Bipolar 1 disorder (H)      Breast cancer of upper-outer quadrant of right female breast (H) 04/11/2017     Chronic pain     Back pain due to arthritis.     COPD (chronic obstructive pulmonary disease) (H)      Depression      Diabetes (H)      Endometriosis      Fibroid uterus      Hx of radiation therapy 01/01/2017     Insomnia      Osteoarthritis      Overweight      Refusal of blood transfusions as patient is [employer] 04/12/2017     Sensorineural hearing loss, bilateral      Shortness of breath      Vitamin D deficiency        Past Surgical History   Past Surgical History:   Procedure Laterality Date     ARTHROSCOPY KNEE Right 1996    Description: Arthroscopy Knee Right;  Recorded: 12/08/2011;     BIOPSY BREAST Right 2000's     BIOPSY BREAST Right 02/20/2017     KNEE SURGERY Right 1997    lateral release     LAPAROSCOPIC ENDOMETRIOSIS FULGURATION  2002     LUMPECTOMY BREAST Right 03/10/2017    with sentinel lymph node biopsy.     MO HYSTEROSCOPY,W/ENDO BX N/A 12/15/2020    Procedure: HYSTEROSCOPY, WITH DILATION AND CURETTAGE;  Surgeon: Scottie Canas MD;  Location: [facility];  Service: Gynecology     TOTAL HIP ARTHROPLASTY N/A 9/24/2014    Procedure: LEFT HIP TOTAL ARTHROPLASTY;  Surgeon: Antony  OLE Elias MD;  Location: [facility];  Service:      TOTAL HIP ARTHROPLASTY Right 8/10/2015    Procedure: HIP TOTAL ARTHROPLASTY RIGHT;  Surgeon: Antony Elias MD;  Location: [facility];  Service:      [facility] TOTAL KNEE ARTHROPLASTY Right 8/28/2017    Procedure: RIGHT TOTAL KNEE ARTHROPLASTY;  Surgeon: Antony Elias MD;  Location: [facility];  Service: Orthopedics       Prior to Admission Medications   Prior to Admission Medications   Prescriptions Last Dose Informant Patient Reported? Taking?   Cetirizine HCl (ZYRTEC PO)   Yes No   FLUoxetine (PROZAC) 20 MG capsule   Yes No   Sig: Take 40 mg by mouth daily   acetaminophen (TYLENOL) 500 MG tablet   Yes No   Sig: [ACETAMINOPHEN (TYLENOL) 500 MG TABLET] Take 1,000 mg by mouth every 6 (six) hours as needed for pain.   albuterol (PROAIR HFA;PROVENTIL HFA;VENTOLIN HFA) 90 mcg/actuation inhaler   No No   Sig: [ALBUTEROL (PROAIR HFA;PROVENTIL HFA;VENTOLIN HFA) 90 MCG/ACTUATION INHALER] Inhale 1-2 puffs every 4 (four) hours as needed for wheezing or shortness of breath.   blood glucose (NO BRAND SPECIFIED) lancets standard   No No   Sig: Use to test blood sugar once daily   blood glucose (ONETOUCH VERIO IQ) test strip   No No   Sig: Use to test blood sugar once daily   buPROPion (WELLBUTRIN XL) 150 MG 24 hr tablet   No No   Sig: [BUPROPION (WELLBUTRIN XL) 150 MG 24 HR TABLET] TAKE 1 TABLET BY MOUTH EVERY DAY IN THE MORNING   celecoxib (CELEBREX) 200 MG capsule   No No   Sig: Take one tab po qd, prn. Take with food.   diclofenac sodium (VOLTAREN) 1 % Gel   No No   Sig: [DICLOFENAC SODIUM (VOLTAREN) 1 % GEL] Apply approximately 1/2-1 gm over affected hand and/or left elbow joints bid, as needed.   fluticasone propionate (FLONASE ALLERGY RELIEF) 50 mcg/actuation nasal spray   No No   Sig: [FLUTICASONE PROPIONATE (FLONASE ALLERGY RELIEF) 50 MCG/ACTUATION NASAL SPRAY] One spray in each nostril twice daily. OFFICE VISIT NEEDED FOR ANY FURTHER  REFILLS   gabapentin (NEURONTIN) 800 MG tablet   Yes No   Sig: [GABAPENTIN (NEURONTIN) 800 MG TABLET] 2 tablets at bedtime.   magnesium citrate solution   Yes No   Sig: [MAGNESIUM CITRATE SOLUTION] Take 296 mL by mouth daily as needed.   metFORMIN (GLUCOPHAGE XR) 500 MG 24 hr tablet   No No   Sig: Take 2 tablets (1,000 mg) by mouth daily (with dinner)   methocarbamol (ROBAXIN) 500 MG tablet   No No   Sig: Take 1 tablet (500 mg) by mouth 3 times daily as needed   montelukast (SINGULAIR) 10 MG tablet   No No   Sig: TAKE 1 TABLET (10 MG) BY MOUTH AT BEDTIME OFFICE VISIT NEEDED FOR ANY FURTHER REFILLS   olopatadine (PATADAY) 0.2 % ophthalmic solution   No No   Sig: Place 0.05 mLs (1 drop) into both eyes daily   omeprazole (PRILOSEC) 40 MG DR capsule   No No   Sig: TAKE 1 CAPSULE BY MOUTH EVERY DAY BEFORE BREAKFAST   semaglutide (OZEMPIC, 0.25 OR 0.5 MG/DOSE,) 2 MG/1.5ML SOPN pen   No No   Sig: INJECT 0.25 MG SUBCUTANEOUS ONCE A WEEK   simvastatin (ZOCOR) 20 MG tablet   No No   Sig: Take 1 tablet (20 mg) by mouth At Bedtime   traZODone (DESYREL) 100 MG tablet   Yes No   Sig: [TRAZODONE (DESYREL) 100 MG TABLET] 2 tablets at bedtime.      Facility-Administered Medications: None      }     Physical Exam   Vital Signs: Temp: 100.1  F (37.8  C) Temp src: Oral BP: 112/75 Pulse: 111   Resp: 20 SpO2: 92 % O2 Device: None (Room air)    Weight: 277 lbs 0 oz  General:. Alert and interactive, comfortable appearing. Febrile   HENT: Oropharynx without erythema or exudates. MMM.    Eyes: Pupils mid-sized and equally reactive.   Neck:No midline tenderness to palpation.  Cardiovascular: +tachycardia.  RRR. extr warm  Chest/Pulmonary: No chest wall tenderness or deformities. +wheezing + cough  Abdomen: Soft, nondistended. Nontender without guarding or rebound.  Back/Spine: No CVA or midline tenderness.  Extremities: Normal ROM of all major joints. No lower extremity edema.   Skin: Warm and dry. Normal skin color.   Neuro: Speech clear.  CNs grossly intact. Moves all extremities appropriately. Strength and sensation grossly intact to all extremities.   Psych: Normal affect/mood, cooperative, memory appropriate.        Data   ------------------------- PAST 24 HR DATA REVIEWED -----------------------------------------------    I have personally reviewed the following data over the past 24 hrs:    16.5 (H)  \   13.5   / 222     136 99 8 /  170 (H)   4.4 26 0.89 \       Trop: N/A BNP: 18       Procal: N/A CRP: N/A Lactic Acid: 1.7         Imaging results reviewed over the past 24 hrs:   Recent Results (from the past 24 hour(s))   CT Chest Pulmonary Embolism w Contrast    Narrative    EXAM: CT CHEST PULMONARY EMBOLISM W CONTRAST  LOCATION: [facility]  DATE/TIME: 1/26/2023 4:52 AM    INDICATION: hypoxia, cough  COMPARISON: None.  TECHNIQUE: CT chest pulmonary angiogram during arterial phase injection of IV contrast. Multiplanar reformats and MIP reconstructions were performed. Dose reduction techniques were used.   CONTRAST: 100ml Isovue 370     FINDINGS:  ANGIOGRAM CHEST: Pulmonary arteries are normal caliber and negative for pulmonary emboli. Thoracic aorta is not well opacified and is  indeterminate for dissection. No CT evidence of right heart strain.    LUNGS AND PLEURA: No infiltrate or pleural effusion. Scattered mucous plugging greater in the left lower lobe..    MEDIASTINUM/AXILLAE: Calcified left hilar nodes. No adenopathy. No significant pericardial effusion. Tracheobronchial wall thickening and narrowing.     CORONARY ARTERY CALCIFICATION: Mild.    UPPER ABDOMEN: Hepatic steatosis. Prominent spleen.    MUSCULOSKELETAL: Mild degenerative change osseous structures.      Impression    IMPRESSION:  1.  Tracheobronchial narrowing and wall thickening. Infectious or inflammatory etiology not excluded.  2.  Scattered mucous plugging.

## 2023-01-26 NOTE — DISCHARGE SUMMARY
[facility]  Discharge Summary - Medicine & Pediatrics       Date of Admission:  1/26/2023  Date of Discharge:  1/27/2023  Discharging Provider: Dr. Marcio Bustamante  Discharge Service: Hospitalist Service    Discharge Diagnoses   Asthma exacerbation  Acute hypoxic respiratory failure, resolved  Steroid-induced hyperglycemia  T2DM    Follow-ups Needed After Discharge    Follow up with PCP for close glycemic monitoring after steroid course and for asthma management    Unresulted Labs Ordered in the Past 30 Days of this Admission     Date and Time Order Name Status Description    1/26/2023  3:17 AM Blood Culture Peripheral Blood Preliminary     1/26/2023  3:17 AM Blood Culture Peripheral Blood Preliminary       These results will be followed up by PCP    Discharge Disposition   Discharged to home  Condition at discharge: Stable    Hospital Course   Emani Vargas is a 57 year old female with a history of T2DM, allergic rhinitis, chronic cough possible asthma, obesity, breast cancer, bipolar disorder who was admitted on 1/26/2023 with shortness of breath and productive cough.  Respiratory viral panel was negative.  She received duo nebs, Solu-Medrol, doxycycline with improvement in breathing and was admitted for IV antibiotics, steroids, and monitoring of respiratory status.  Patient improved with DuoNeb treatments and was satting well on room air.  Suspect asthma exacerbation in the setting of acute viral respiratory illness.  Patient developed steroid-induced hyperglycemia in the setting of type 2 diabetes with improvement in glucoses before discharge.      Asthma exacerbation  Acute hypoxic respiratory failure, resolved  Fever, resolved  Presented with productive cough, mild leukocytosis, and a remote history of smoking.  PFTs without evidence of COPD the patient has mild underlying asthma which is likely exacerbated with recent acute viral respiratory illness.  CT with tracheobronchial  narrowing wall thickening but no acute infiltrate.  Clinically improved with DuoNeb treatments, steroids and antibiotics.  -Prednisone 40 mg daily for 5 days total  -Azithromycin 250 mg PO for 4 days  -Advair daily  -Albuterol PRN     Steroid-induced hyperglycemia  T2DM  On Ozempic PTA with last dose 1/23 states she has no refills at home. Recently prescribed metformin but has not been taking it.  Glucoses were noted to be elevated to 380s to 390s after steroid treatment with improvement with lantus and aspart. Received diabetes education before discharge.   -Glipizide for 3 doses (while taking prednisone)  -Continue PTA metformin  -Monitor glucoses QID with home supplies  -Consider ACE/ARB if hypertensive     Allergies  -Cont PTA cetirizine, nasal spray, Singulair, eye drops     CHRONIC MEDICAL PROBLEMS:  Breast cancer:  S/p radiation and chemotherapy.  No active concerns  Endometriosis:  No active concerns.   Fibromyalgia:  Stable  Hearing difficulty:  Noted  Depression with anxiety:  Cont home fluoxetine  Sleeping difficulty:  Cont home trazodone  Chronic pain:  Cont PTA gabapentin, robaxin    Consultations This Hospital Stay   RESPIRATORY CARE IP CONSULT  CARE MANAGEMENT / SOCIAL WORK IP CONSULT  DIABETES EDUCATION IP CONSULT    Code Status   Full Code     The patient was discussed with Dr. Dianna Ahumada MD  [address] [place] [id number]  Phone: [phone]  Fax: [phone]  ______________________________________________________________________    Physical Exam   Vital Signs: Temp: 97.6  F (36.4  C) Temp src: Oral BP: 117/85 Pulse: 84   Resp: 18 SpO2: 96 % O2 Device: None (Room air)    Weight: 280 lbs 0 oz  Constitutional: awake, alert, cooperative, no apparent distress  ENT: Normocephalic, without obvious abnormality, atraumatic  Respiratory: No increased work of breathing, no wheezing, faint crackles left base, harsh cough  Cardiovascular: Normal apical impulse, regular rate and  rhythm  GI: No scars, normal bowel sounds, soft, non-distended, non-tender  Neurologic: Awake, alert, oriented to name, place and time.  Cranial nerves II-XII are grossly intact.      Primary Care Physician   Shira Yap    Discharge Orders      Primary Care - Care Coordination Referral      Reason for your hospital stay    Asthma exacerbation     Follow-up and recommended labs and tests     Follow up with primary care provider, Shira Yap, within 7 days for hospital follow- up.  The following labs/tests are recommended: BMP, monitoring glucose given steroids.  Consider ACE/ARB if continued hypertension.     Activity    Your activity upon discharge: as tolerated     Diet    Follow this diet upon discharge: Diabetic diet       Significant Results and Procedures   Results for orders placed or performed during the hospital encounter of 01/26/23   CT Chest Pulmonary Embolism w Contrast    Narrative    EXAM: CT CHEST PULMONARY EMBOLISM W CONTRAST  LOCATION: [facility]  DATE/TIME: 1/26/2023 4:52 AM    INDICATION: hypoxia, cough  COMPARISON: None.  TECHNIQUE: CT chest pulmonary angiogram during arterial phase injection of IV contrast. Multiplanar reformats and MIP reconstructions were performed. Dose reduction techniques were used.   CONTRAST: 100ml Isovue 370     FINDINGS:  ANGIOGRAM CHEST: Pulmonary arteries are normal caliber and negative for pulmonary emboli. Thoracic aorta is not well opacified and is  indeterminate for dissection. No CT evidence of right heart strain.    LUNGS AND PLEURA: No infiltrate or pleural effusion. Scattered mucous plugging greater in the left lower lobe..    MEDIASTINUM/AXILLAE: Calcified left hilar nodes. No adenopathy. No significant pericardial effusion. Tracheobronchial wall thickening and narrowing.     CORONARY ARTERY CALCIFICATION: Mild.    UPPER ABDOMEN: Hepatic steatosis. Prominent spleen.    MUSCULOSKELETAL: Mild  degenerative change osseous structures.      Impression    IMPRESSION:  1.  Tracheobronchial narrowing and wall thickening. Infectious or inflammatory etiology not excluded.  2.  Scattered mucous plugging.       Discharge Medications   Current Discharge Medication List      START taking these medications    Details   azithromycin (ZITHROMAX) 250 MG tablet Take 1 tablet (250 mg) by mouth daily for 3 days  Qty: 3 tablet, Refills: 0    Associated Diagnoses: Moderate persistent asthma with acute exacerbation      fluticasone-salmeterol (ADVAIR HFA) 115-21 MCG/ACT inhaler Inhale 2 puffs into the lungs 2 times daily  Qty: 12 g, Refills: 1    Associated Diagnoses: Moderate persistent asthma with acute exacerbation      glipiZIDE (GLUCOTROL) 5 MG tablet Take 1 tablet (5 mg) by mouth daily for 3 days  Qty: 3 tablet, Refills: 0    Comments: Take in the morning with oral prednisone  Associated Diagnoses: Type 2 diabetes mellitus without complication, without long-term current use of insulin (H)      predniSONE (DELTASONE) 20 MG tablet Take 2 tablets (40 mg) by mouth daily for 3 days  Qty: 6 tablet, Refills: 0    Associated Diagnoses: Moderate persistent asthma with acute exacerbation         CONTINUE these medications which have CHANGED    Details   albuterol (PROAIR HFA/PROVENTIL HFA/VENTOLIN HFA) 108 (90 Base) MCG/ACT inhaler Inhale 2 puffs into the lungs every 6 hours as needed for shortness of breath, wheezing or cough  Qty: 18 g, Refills: 1    Comments: Pharmacy may dispense brand covered by insurance (Proair, or proventil or ventolin or generic albuterol inhaler)  Associated Diagnoses: Moderate persistent asthma with acute exacerbation         CONTINUE these medications which have NOT CHANGED    Details   acetaminophen (TYLENOL) 500 MG tablet [ACETAMINOPHEN (TYLENOL) 500 MG TABLET] Take 1,000 mg by mouth every 6 (six) hours as needed for pain.      cetirizine (ZYRTEC) 10 MG CHEW Take 10 mg by mouth daily      diclofenac  sodium (VOLTAREN) 1 % Gel [DICLOFENAC SODIUM (VOLTAREN) 1 % GEL] Apply approximately 1/2-1 gm over affected hand and/or left elbow joints bid, as needed.  Qty: 1 Tube, Refills: 2    Associated Diagnoses: Osteoarthritis of multiple joints, unspecified osteoarthritis type; Chronic elbow pain, left      FLUoxetine (PROZAC) 20 MG capsule Take 40 mg by mouth daily      fluticasone propionate (FLONASE ALLERGY RELIEF) 50 mcg/actuation nasal spray [FLUTICASONE PROPIONATE (FLONASE ALLERGY RELIEF) 50 MCG/ACTUATION NASAL SPRAY] One spray in each nostril twice daily. OFFICE VISIT NEEDED FOR ANY FURTHER REFILLS  Qty: 16 g, Refills: 0    Associated Diagnoses: Chronic cough; Chronic sinusitis, unspecified location      gabapentin (NEURONTIN) 800 MG tablet Take 2 tablets by mouth At Bedtime      magnesium citrate solution [MAGNESIUM CITRATE SOLUTION] Take 296 mL by mouth daily as needed.      metFORMIN (GLUCOPHAGE XR) 500 MG 24 hr tablet Take 2 tablets (1,000 mg) by mouth daily (with dinner)  Qty: 180 tablet, Refills: 0    Associated Diagnoses: Type 2 diabetes mellitus without complication, without long-term current use of insulin (H)      methocarbamol (ROBAXIN) 500 MG tablet Take 1 tablet (500 mg) by mouth 3 times daily as needed  Qty: 90 tablet, Refills: 3    Associated Diagnoses: Other chronic pain      montelukast (SINGULAIR) 10 MG tablet TAKE 1 TABLET (10 MG) BY MOUTH AT BEDTIME OFFICE VISIT NEEDED FOR ANY FURTHER REFILLS  Qty: 90 tablet, Refills: 3    Associated Diagnoses: Chronic cough; Chronic sinusitis, unspecified location      multivitamin, therapeutic (THERA-VIT) TABS tablet Take 1 tablet by mouth daily      olopatadine (PATADAY) 0.2 % ophthalmic solution Place 0.05 mLs (1 drop) into both eyes daily  Qty: 5 mL, Refills: 6    Comments: Okay to substitute patanol two times daily both eyes x12 refills  Associated Diagnoses: Allergic conjunctivitis of both eyes      omeprazole (PRILOSEC) 40 MG DR capsule TAKE 1 CAPSULE BY  MOUTH EVERY DAY BEFORE BREAKFAST  Qty: 90 capsule, Refills: 3    Associated Diagnoses: Chronic cough; Gastroesophageal reflux disease without esophagitis      semaglutide (OZEMPIC, 0.25 OR 0.5 MG/DOSE,) 2 MG/1.5ML SOPN pen INJECT 0.25 MG SUBCUTANEOUS ONCE A WEEK  Qty: 4.5 mL, Refills: 3    Associated Diagnoses: Type 2 diabetes mellitus without complication, without long-term current use of insulin (H)      simvastatin (ZOCOR) 20 MG tablet Take 1 tablet (20 mg) by mouth At Bedtime  Qty: 90 tablet, Refills: 3    Associated Diagnoses: Type 2 diabetes mellitus without complication, without long-term current use of insulin (H)      traZODone (DESYREL) 100 MG tablet [TRAZODONE (DESYREL) 100 MG TABLET] 2 tablets at bedtime.      blood glucose (NO BRAND SPECIFIED) lancets standard Use to test blood sugar once daily  Qty: 100 each, Refills: 3    Associated Diagnoses: Type 2 diabetes mellitus without complication, without long-term current use of insulin (H)      blood glucose (ONETOUCH VERIO IQ) test strip Use to test blood sugar once daily  Qty: 100 strip, Refills: 3    Associated Diagnoses: Type 2 diabetes mellitus without complication, without long-term current use of insulin (H)         STOP taking these medications       fluticasone-vilanterol (BREO ELLIPTA) 200-25 MCG/ACT inhaler Comments:   Reason for Stopping:             Allergies   Allergies   Allergen Reactions     Cat/Feline Products [Cat Hair Extract] Itching     Trees Other (See Comments)     Grass, itching

## 2023-01-26 NOTE — ED NOTES
Infectious Disease lab was unable to process sputum culture as sent.  Will attempt to re collect..

## 2023-01-26 NOTE — ED PROVIDER NOTES
EMERGENCY DEPARTMENT ENCOUNTER      NAME: Emani Vargas  AGE: 57 year old female  YOB: 1965  MRN: [id number]  EVALUATION DATE & TIME: 1/26/2023  3:07 AM    PCP: Shira Miller    ED PROVIDER: Christina Green M.D.      Chief Complaint   Patient presents with     Cough     Shortness of Breath         FINAL IMPRESSION:  1. Hypoxia    2. Acute cough    3. COPD exacerbation (H)    4. Sepsis, due to unspecified organism, unspecified whether acute organ dysfunction present (H)        MEDICAL DECISION MAKING:    Pertinent Labs & Imaging studies reviewed. (See chart for details)  ED Course as of 01/26/23 0448   Thu Jan 26, 2023   0317 Oral temperature here is 100.1, this is after 2 extra strength Tylenol taken approximately 3 hours ago, tachycardic, tachypneic.  Oxygen saturations 90 to 92% on room air.  Patient is coming in with 1 week of fevers, cough, body aches.  She said last Thursday she started with cough, sore throat.  Since then has been getting progressively worse.  She does have a history of COPD.  Was having some abdominal pain and slight nausea but has been taking hyoscyamine and that is making that better.  Otherwise she does states she feels rundown with sore throat, runny nose, cough.  Cough seems to be getting worse.  No recent steroids.  She had took a home COVID test on Monday that was negative.  She has been vaccinated against COVID-19 x3 with last dose given in 2021.  No influenza vaccine this year.    Exam for patient here appears mildly uncomfortable, frequent coarse cough noted.  Tachycardia.  Skin is warm and flushed.  Lungs with crackles at bilateral bases with end expiratory apical wheezing bilaterally.  Abdomen soft nontender.  Legs without swelling or pitting edema.  Equal in size.  Posterior oropharynx is erythematous without unilateral swelling or discharge.  Neck without signs of meningismus.    Patient here febrile, tachycardic.  Could be likely viral illness  however given history of COPD and the fact that her oxygen saturations have been hovering in the low 90s.  She has never dropped below 90, at times she will cough and oxygen saturations will rebound to 94 slight concern for some mucous plugging going on there.  She not having any chest pain or discomfort of any pleuritic pain with any of this.  Plan for sepsis work-up, fluids, medications for symptom control.  Given history of COPD, wheezing, cough that is getting worse as well as productivity of cough will treat for COPD exacerbation as well with some doxycycline, saw Solu-Medrol.  Chest x-ray as well.   0321 Labs by care with fairly significant elevation of white blood cell count.  Normal lactic acid, negative troponin, negative influenza, COVID.  Still awaiting chest x-ray.   0420 Reevaluated patient after her nebulizer treatment, heart rate is starting to come down, she does feel slightly better with medications however oxygen saturation still hovering at 90%.  This is with her sitting and resting.  Any exertion she does drop slightly lower.  Not requiring oxygen as of yet given her history of COPD would not place her on oxygen unless it was consistently below 90.  This was discussed with nursing staff in room.  Given the fact she is borderline hypoxic here will add on CT of the chest.  Will need to be admitted.     Spoke with residency service for admission.    Medical Decision Making    History:    Supplemental history from: Documented in chart, if applicable    External Record(s) reviewed: Documented in chart, if applicable.    Work Up:    Chart documentation includes differential considered and any EKGs or imaging independently interpreted by provider, where specified.    In additional to work up documented, I considered the following work up: Documented in chart, if applicable.    External consultation:    Discussion of management with another provider: Documented in chart, if applicable    Complicating  factors:    Care impacted by chronic illness: Diabetes    Care affected by social determinants of health: N/A    Disposition considerations: Admit.          Critical care: 0 minutes excluding separately billable procedures.  Includes bedside management, time reviewing test results, review of records, discussing the case with staff, documenting the medical record and time spent with family members (or surrogate decision makers) discussing specific treatment issues.          ED COURSE:  3:12 AM I met with the patient and performed my initial interview and exam   4:45 AM I discussed the patient with the resident from the hospitalist service who agrees to admit the patient.      The importance of close follow up was discussed. We reviewed warning signs and symptoms, and I instructed Ms. Vargas to return to the emergency department immediately if she develops any new or worsening symptoms. I provided additional verbal discharge instructions. Ms. Vargas expressed understanding and agreement with this plan of care, her questions were answered, and she was discharged in stable condition.     MEDICATIONS GIVEN IN THE EMERGENCY:  Medications   benzonatate (TESSALON) capsule 100 mg (100 mg Oral Given 1/26/23 0415)   iopamidol (ISOVUE-370) solution 100 mL (has no administration in time range)   ipratropium - albuterol 0.5 mg/2.5 mg/3 mL (DUONEB) neb solution 3 mL (3 mLs Nebulization Given 1/26/23 0338)   methylPREDNISolone sodium succinate (solu-MEDROL) injection 125 mg (125 mg Intravenous Given 1/26/23 0405)   doxycycline hyclate (VIBRAMYCIN) capsule 100 mg (100 mg Oral Given 1/26/23 0405)   ketorolac (TORADOL) injection 15 mg (15 mg Intravenous Given 1/26/23 0404)   0.9% sodium chloride BOLUS (1,000 mLs Intravenous New Bag 1/26/23 0415)       NEW PRESCRIPTIONS STARTED AT TODAY'S ER VISIT:  New Prescriptions    No medications on file          =================================================================    HPI    Patient  information was obtained from: Patient     Use of : N/A       Emani Vargas is a 57 year old female who presents with cough      The patient reports a week of fevers, cough, and body aches. They note that these symptoms started with a cough and sore throat a week ago. Since then, their symptoms have been getting progressively worse. The patient was having some abdominal pain and slight nausea but has been taking hyoscyamine and that is making it better. Right now, the patient feels rundown with sore throat, runny nose, and cough. The patient feels as though their cough is getting worse. The patient took a home COVID test three days ago that was negative. The patient has been vaccinated against COVID-19 x3 with last dose given in 2021.  No influenza vaccine this year.  The patient reports a history of COPD.  The patient denies recent steroid use or chest pain.     REVIEW OF SYSTEMS   Review of Systems   Constitutional: Positive for fever.        Body aches   HENT: Positive for rhinorrhea and sore throat.    Respiratory: Positive for cough.    Cardiovascular: Negative for chest pain.   Gastrointestinal: Positive for abdominal pain and nausea (improved).   All other systems reviewed and are negative.        PAST MEDICAL HISTORY:  Past Medical History:   Diagnosis Date     Allergic rhinitis      Anemia      Anxiety      Asthma      Bipolar 1 disorder (H)      Breast cancer of upper-outer quadrant of right female breast (H) 04/11/2017     Chronic pain     Back pain due to arthritis.     COPD (chronic obstructive pulmonary disease) (H)      Depression      Diabetes (H)      Endometriosis      Fibroid uterus      Hx of radiation therapy 01/01/2017     Insomnia      Osteoarthritis      Overweight      Refusal of blood transfusions as patient is [place] 04/12/2017     Sensorineural hearing loss, bilateral      Shortness of breath      Vitamin D deficiency        PAST SURGICAL HISTORY:  Past Surgical  History:   Procedure Laterality Date     ARTHROSCOPY KNEE Right 1996    Description: Arthroscopy Knee Right;  Recorded: 12/08/2011;     BIOPSY BREAST Right 2000's     BIOPSY BREAST Right 02/20/2017     KNEE SURGERY Right 1997    lateral release     LAPAROSCOPIC ENDOMETRIOSIS FULGURATION  2002     LUMPECTOMY BREAST Right 03/10/2017    with sentinel lymph node biopsy.     KS HYSTEROSCOPY,W/ENDO BX N/A 12/15/2020    Procedure: HYSTEROSCOPY, WITH DILATION AND CURETTAGE;  Surgeon: Scottie Canas MD;  Location: [facility];  Service: Gynecology     TOTAL HIP ARTHROPLASTY N/A 9/24/2014    Procedure: LEFT HIP TOTAL ARTHROPLASTY;  Surgeon: Antony Elias MD;  Location: [facility] OR;  Service:      TOTAL HIP ARTHROPLASTY Right 8/10/2015    Procedure: HIP TOTAL ARTHROPLASTY RIGHT;  Surgeon: Antony Elias MD;  Location: [facility];  Service:      [facility] TOTAL KNEE ARTHROPLASTY Right 8/28/2017    Procedure: RIGHT TOTAL KNEE ARTHROPLASTY;  Surgeon: Antony Elias MD;  Location: [facility];  Service: Orthopedics       CURRENT MEDICATIONS:      Current Facility-Administered Medications:      benzonatate (TESSALON) capsule 100 mg, 100 mg, Oral, TID PRN, Christina Green MD, 100 mg at 01/26/23 0415     iopamidol (ISOVUE-370) solution 100 mL, 100 mL, Intravenous, Once, Christina Green MD    Current Outpatient Medications:      acetaminophen (TYLENOL) 500 MG tablet, [ACETAMINOPHEN (TYLENOL) 500 MG TABLET] Take 1,000 mg by mouth every 6 (six) hours as needed for pain., Disp: , Rfl:      albuterol (PROAIR HFA;PROVENTIL HFA;VENTOLIN HFA) 90 mcg/actuation inhaler, [ALBUTEROL (PROAIR HFA;PROVENTIL HFA;VENTOLIN HFA) 90 MCG/ACTUATION INHALER] Inhale 1-2 puffs every 4 (four) hours as needed for wheezing or shortness of breath., Disp: 1 Inhaler, Rfl: 11     blood glucose (NO BRAND SPECIFIED) lancets standard, Use to test blood sugar once daily, Disp: 100 each, Rfl: 3     blood glucose (ONETOUCH VERIO  IQ) test strip, Use to test blood sugar once daily, Disp: 100 strip, Rfl: 3     buPROPion (WELLBUTRIN XL) 150 MG 24 hr tablet, [BUPROPION (WELLBUTRIN XL) 150 MG 24 HR TABLET] TAKE 1 TABLET BY MOUTH EVERY DAY IN THE MORNING, Disp: 90 tablet, Rfl: 3     celecoxib (CELEBREX) 200 MG capsule, Take one tab po qd, prn. Take with food., Disp: 60 capsule, Rfl: 0     Cetirizine HCl (ZYRTEC PO), , Disp: , Rfl:      diclofenac sodium (VOLTAREN) 1 % Gel, [DICLOFENAC SODIUM (VOLTAREN) 1 % GEL] Apply approximately 1/2-1 gm over affected hand and/or left elbow joints bid, as needed., Disp: 1 Tube, Rfl: 2     FLUoxetine (PROZAC) 20 MG capsule, Take 40 mg by mouth daily, Disp: , Rfl:      fluticasone propionate (FLONASE ALLERGY RELIEF) 50 mcg/actuation nasal spray, [FLUTICASONE PROPIONATE (FLONASE ALLERGY RELIEF) 50 MCG/ACTUATION NASAL SPRAY] One spray in each nostril twice daily. OFFICE VISIT NEEDED FOR ANY FURTHER REFILLS, Disp: 16 g, Rfl: 0     gabapentin (NEURONTIN) 800 MG tablet, [GABAPENTIN (NEURONTIN) 800 MG TABLET] 2 tablets at bedtime., Disp: , Rfl:      magnesium citrate solution, [MAGNESIUM CITRATE SOLUTION] Take 296 mL by mouth daily as needed., Disp: , Rfl:      metFORMIN (GLUCOPHAGE XR) 500 MG 24 hr tablet, Take 2 tablets (1,000 mg) by mouth daily (with dinner), Disp: 180 tablet, Rfl: 0     methocarbamol (ROBAXIN) 500 MG tablet, Take 1 tablet (500 mg) by mouth 3 times daily as needed, Disp: 90 tablet, Rfl: 3     montelukast (SINGULAIR) 10 MG tablet, TAKE 1 TABLET (10 MG) BY MOUTH AT BEDTIME OFFICE VISIT NEEDED FOR ANY FURTHER REFILLS, Disp: 90 tablet, Rfl: 3     olopatadine (PATADAY) 0.2 % ophthalmic solution, Place 0.05 mLs (1 drop) into both eyes daily, Disp: 5 mL, Rfl: 6     omeprazole (PRILOSEC) 40 MG DR capsule, TAKE 1 CAPSULE BY MOUTH EVERY DAY BEFORE BREAKFAST, Disp: 90 capsule, Rfl: 3     semaglutide (OZEMPIC, 0.25 OR 0.5 MG/DOSE,) 2 MG/1.5ML SOPN pen, INJECT 0.25 MG SUBCUTANEOUS ONCE A WEEK, Disp: 4.5 mL, Rfl:  "3     simvastatin (ZOCOR) 20 MG tablet, Take 1 tablet (20 mg) by mouth At Bedtime, Disp: 90 tablet, Rfl: 3     traZODone (DESYREL) 100 MG tablet, [TRAZODONE (DESYREL) 100 MG TABLET] 2 tablets at bedtime., Disp: , Rfl:     ALLERGIES:  Allergies   Allergen Reactions     Cat/Feline Products [Cat Hair Extract] Itching     Trees Other (See Comments)     Grass, itching       FAMILY HISTORY:  Family History   Problem Relation Age of Onset     Depression Mother      Chronic Obstructive Pulmonary Disease Mother      Diabetes Father      Skin Cancer Father 45     Colon Cancer Maternal Grandmother      Colon Cancer Maternal Grandfather      Diabetes Paternal Grandmother      Colon Cancer Paternal Grandmother 80     Testicular cancer Brother 44     Lung Cancer Paternal Aunt 60     Breast Cancer Cousin 48        Paternal side.     Anesthesia Reaction No family hx of      Glaucoma No family hx of      Macular Degeneration No family hx of        SOCIAL HISTORY:   Social History     Socioeconomic History     Marital status:    Tobacco Use     Smoking status: Former     Packs/day: 1.00     Years: 8.00     Pack years: 8.00     Types: Cigarettes     Quit date: 1985     Years since quittin.0     Smokeless tobacco: Never   Substance and Sexual Activity     Alcohol use: Yes     Alcohol/week: 0.0 standard drinks     Comment: Alcoholic Drinks/day: 1 drink/month     Drug use: No     Comment: Drug use: past use of marijuana and hash.       PHYSICAL EXAM:    Vitals: /75   Pulse 111   Temp 100.1  F (37.8  C) (Oral)   Resp 20   Ht 1.753 m (5' 9\")   Wt 125.6 kg (277 lb)   SpO2 92%   BMI 40.91 kg/m     General:. Alert and interactive, comfortable appearing. Febrile   HENT: Oropharynx without erythema or exudates. MMM.  TMs clear bilaterally.  Eyes: Pupils mid-sized and equally reactive.   Neck: Full AROM.  No midline tenderness to palpation.  Cardiovascular: Regular rhythm. Peripheral pulses 2+ bilaterally. Patient " is tachycardic   Chest/Pulmonary: No chest wall tenderness or deformities. Wheezy cough   Abdomen: Soft, nondistended. Nontender without guarding or rebound.  Back/Spine: No CVA or midline tenderness.  Extremities: Normal ROM of all major joints. No lower extremity edema.   Skin: Warm and dry. Normal skin color.   Neuro: Speech clear. CNs grossly intact. Moves all extremities appropriately. Strength and sensation grossly intact to all extremities.   Psych: Normal affect/mood, cooperative, memory appropriate.     LAB:  All pertinent labs reviewed and interpreted.  Labs Ordered and Resulted from Time of ED Arrival to Time of ED Departure   BASIC METABOLIC PANEL - Abnormal       Result Value    Sodium 136      Potassium 4.4      Chloride 99      Carbon Dioxide (CO2) 26      Anion Gap 11      Urea Nitrogen 8      Creatinine 0.89      Calcium 9.3      Glucose 170 (*)     GFR Estimate 75     CBC WITH PLATELETS AND DIFFERENTIAL - Abnormal    WBC Count 16.5 (*)     RBC Count 4.65      Hemoglobin 13.5      Hematocrit 41.5      MCV 89      MCH 29.0      MCHC 32.5      RDW 13.2      Platelet Count 222      % Neutrophils 75      % Lymphocytes 16      % Monocytes 8      % Eosinophils 1      % Basophils 0      % Immature Granulocytes 0      NRBCs per 100 WBC 0      Absolute Neutrophils 12.3 (*)     Absolute Lymphocytes 2.6      Absolute Monocytes 1.3      Absolute Eosinophils 0.2      Absolute Basophils 0.1      Absolute Immature Granulocytes 0.1      Absolute NRBCs 0.0     TROPONIN I - Normal    Troponin I <0.01     B-TYPE NATRIURETIC PEPTIDE ([facility] ONLY) - Normal    BNP 18     LACTIC ACID WHOLE BLOOD - Normal    Lactic Acid 1.7     INFLUENZA A/B & SARS-COV2 PCR MULTIPLEX - Normal    Influenza A PCR Negative      Influenza B PCR Negative      RSV PCR Negative      SARS CoV2 PCR Negative     PROCALCITONIN   BLOOD CULTURE   BLOOD CULTURE       RADIOLOGY:  CT Chest Pulmonary Embolism w Contrast    (Results Pending)           I,  Latosha Andrews, am serving as a scribe to document services personally performed by Dr. Christina Green  based on my observation and the provider's statements to me. I, Christina Green MD attest that Latosha Andrews is acting in a scribe capacity, has observed my performance of the services and has documented them in accordance with my direction.      Christina Green M.D.  Emergency Medicine  [facility] EMERGENCY ROOM  [address] [address]  [phone]  Dept: [phone]     Christina Green MD  01/26/23 0448

## 2023-01-26 NOTE — CONSULTS
Care Management Initial Consult    General Information  Assessment completed with: Patient, Family, Patient and sister-in-law Marycarmen  Type of CM/SW Visit: Initial Assessment    Primary Care Provider verified and updated as needed: Yes   Readmission within the last 30 days: no previous admission in last 30 days      Reason for Consult: discharge planning  Advance Care Planning: Advance Care Planning Reviewed: other (see comments), no concerns identified (Declined Honoring Choices)     General Information Comments: Lives alone    Communication Assessment  Patient's communication style: spoken language (English or Bilingual)    Hearing Difficulty or Deaf: yes   Wear Glasses or Blind: yes    Cognitive  Cognitive/Neuro/Behavioral: WDL  Level of Consciousness: alert  Arousal Level: opens eyes spontaneously  Orientation: oriented x 4        Speech: hoarse, clear    Living Environment:   People in home: alone     Current living Arrangements: house      Able to return to prior arrangements: yes  Living Arrangement Comments: Lives alone    Family/Social Support:  Care provided by: self  Provides care for: no one  Marital Status:   Sibling(s)          Description of Support System: Supportive, Involved    Support Assessment: Adequate family and caregiver support    Current Resources:   Patient receiving home care services: No     Community Resources: Financial/Insurance  Equipment currently used at home: walker, rolling (Has one and sometimes uses it)  Supplies currently used at home: Diabetic Supplies, Hearing Aid Batteries    Employment/Financial:  Employment Status: disabled        Financial Concerns: No concerns identified       Lifestyle & Psychosocial Needs:  Social Determinants of Health     Tobacco Use: Medium Risk     Smoking Tobacco Use: Former     Smokeless Tobacco Use: Never     Passive Exposure: Not on file   Alcohol Use: Not on file   Financial Resource Strain: Not on file   Food Insecurity: Not on file    Transportation Needs: Not on file   Physical Activity: Not on file   Stress: Not on file   Social Connections: Not on file   Intimate Partner Violence: Not on file   Depression: At risk     PHQ-2 Score: 5   Housing Stability: Not on file       Functional Status:  Prior to admission patient needed assistance:   Dependent ADLs:: Independent  Dependent IADLs:: Independent  Assesssment of Functional Status: Not at  functional baseline    Mental Health Status:          Chemical Dependency Status:              Values/Beliefs:  Spiritual, Cultural Beliefs, [employer] Practices, Values that affect care:                 Additional Information:   Patient was admitted on 1/26/2023 with shortness of breath and productive cough.  Respiratory viral panel was negative.  She received duo nebs, Solu-Medrol, doxycycline with improvement in breathing and was admitted for IV antibiotics, steroids, and monitoring of respiratory status .    Reports she lives alone in a twin home. Has a roller-walker that she sometime uses; no home care services; does drive. States is independent with I/ADLs. Explained MOON/Observation Status and patient verbalized understanding. Plans to return home with sister-in-law Marycarmen transporting patient on discharge. Other needs pending clinical progress.    WOLFGANG LOMBARDO, RN/CM

## 2023-01-26 NOTE — PHARMACY-ADMISSION MEDICATION HISTORY
Pharmacy Note - Admission Medication History    Pertinent Provider Information:     Patient reported that over the past three days she has taken medication that was originally prescribed to her  father. The medication that she has taken is morphine 2.5 mg tablet and hyoscyamine 0.125 mg tablet. She reports taking 2 tablets of hyoscyamine daily and 3 tablets of morphine TID for the past three days.     Patient has also used an older (not out of date) Breo 200 mcg/25 mcg for the past three days to help with breathing problems.    Patient brought all of her medication bottles with her and her  fathers prescriptions as well. Patients albuterol and Breo ellipta inhalers are past their expiration date.      ______________________________________________________________________    Prior To Admission (PTA) med list completed and updated in EMR.       PTA Med List   Medication Sig Last Dose     acetaminophen (TYLENOL) 500 MG tablet [ACETAMINOPHEN (TYLENOL) 500 MG TABLET] Take 1,000 mg by mouth every 6 (six) hours as needed for pain. 2023 at prn     albuterol (PROAIR HFA;PROVENTIL HFA;VENTOLIN HFA) 90 mcg/actuation inhaler [ALBUTEROL (PROAIR HFA;PROVENTIL HFA;VENTOLIN HFA) 90 MCG/ACTUATION INHALER] Inhale 1-2 puffs every 4 (four) hours as needed for wheezing or shortness of breath. 2023 at prn     cetirizine (ZYRTEC) 10 MG CHEW Take 10 mg by mouth daily 2023 at pm     diclofenac sodium (VOLTAREN) 1 % Gel [DICLOFENAC SODIUM (VOLTAREN) 1 % GEL] Apply approximately 1/2-1 gm over affected hand and/or left elbow joints bid, as needed. Past Week at prn     FLUoxetine (PROZAC) 20 MG capsule Take 40 mg by mouth daily 2023 at am     fluticasone propionate (FLONASE ALLERGY RELIEF) 50 mcg/actuation nasal spray [FLUTICASONE PROPIONATE (FLONASE ALLERGY RELIEF) 50 MCG/ACTUATION NASAL SPRAY] One spray in each nostril twice daily. OFFICE VISIT NEEDED FOR ANY FURTHER REFILLS 2023 at am      fluticasone-vilanterol (BREO ELLIPTA) 200-25 MCG/ACT inhaler Inhale 1 puff into the lungs daily 1/25/2023 at pm     gabapentin (NEURONTIN) 800 MG tablet Take 2 tablets by mouth At Bedtime 1/25/2023 at pm     magnesium citrate solution [MAGNESIUM CITRATE SOLUTION] Take 296 mL by mouth daily as needed. More than a month at prn     metFORMIN (GLUCOPHAGE XR) 500 MG 24 hr tablet Take 2 tablets (1,000 mg) by mouth daily (with dinner) Unknown at has not started     methocarbamol (ROBAXIN) 500 MG tablet Take 1 tablet (500 mg) by mouth 3 times daily as needed 1/25/2023 at prn     montelukast (SINGULAIR) 10 MG tablet TAKE 1 TABLET (10 MG) BY MOUTH AT BEDTIME OFFICE VISIT NEEDED FOR ANY FURTHER REFILLS 1/25/2023 at pm     multivitamin, therapeutic (THERA-VIT) TABS tablet Take 1 tablet by mouth daily 1/25/2023 at am     olopatadine (PATADAY) 0.2 % ophthalmic solution Place 0.05 mLs (1 drop) into both eyes daily 1/25/2023 at pm     omeprazole (PRILOSEC) 40 MG DR capsule TAKE 1 CAPSULE BY MOUTH EVERY DAY BEFORE BREAKFAST 1/25/2023 at pm     semaglutide (OZEMPIC, 0.25 OR 0.5 MG/DOSE,) 2 MG/1.5ML SOPN pen INJECT 0.25 MG SUBCUTANEOUS ONCE A WEEK 1/23/2023 at am (last dose)     simvastatin (ZOCOR) 20 MG tablet Take 1 tablet (20 mg) by mouth At Bedtime 1/25/2023 at pm     traZODone (DESYREL) 100 MG tablet [TRAZODONE (DESYREL) 100 MG TABLET] 2 tablets at bedtime. 1/25/2023 at pm       Information source(s): Patient  Method of interview communication: in-person    Patient was asked about OTC/herbal products specifically.  PTA med list reflects this.    In the past week, patient estimated taking medication this percent of the time:  greater than 90%.    Medication Affordability:  Not including over the counter (OTC) medications, was there a time in the past 12 months when you did not take your medications as prescribed because of cost?: No    Allergies were reviewed, assessed, and updated with the patient.      Patient does not use any  multi-dose medications prior to admission.    The information provided in this note is only as accurate as the sources available at the time of the update(s).    Thank you for the opportunity to participate in the care of this patient.    ALYCIA AHMADI  1/26/2023 9:38 AM

## 2023-01-26 NOTE — PROGRESS NOTES
[facility]    Progress Note - Hospitalist Service       Date of Admission:  1/26/2023    Assessment & Plan   Emani Vargas is a 57 year old female with a history of T2DM, allergic rhinitis, chronic cough possible asthma, obesity, breast cancer, bipolar disorder who was admitted on 1/26/2023 with shortness of breath and productive cough.  Respiratory viral panel was negative.  She received duo nebs, Solu-Medrol, doxycycline with improvement in breathing and was admitted for IV antibiotics, steroids, and monitoring of respiratory status.  Patient improved with DuoNeb treatments and is satting well on room air.  Suspect asthma exacerbation in the setting of acute viral respiratory illness.  Patient developed steroid-induced hyperglycemia in the setting of type 2 diabetes.      Asthma exacerbation  Acute hypoxic respiratory failure, resolved  Fever, resolved  Presented with productive cough, mild leukocytosis, and a remote history of smoking.  PFTs without evidence of COPD the patient has mild underlying asthma which is likely exacerbated with recent acute viral respiratory illness.  CT with tracheobronchial narrowing wall thickening but no acute infiltrate.  Clinically improved with DuoNeb treatments, steroids and antibiotics.  -Prednisone 40 mg daily for 5 days total  -DuoNebs with RT  -Azithromycin 250 mg PO for 4 days  -tessalon PRN for cough  -Supplemental O2, maintain SPO2 > 90%    Steroid-induced hyperglycemia  T2DM  On Ozempic PTA with last dose 1/23 states she has no refills at home. Recently prescribed metformin but has not been taking it.  Glucoses were noted to be elevated to 380s to 390s after steroid treatment.  Patient has not used insulin before and does not have supplies at home to check her sugars.  -Monitor insulin needs overnight  -5 units Lantus at bedtime  -Start PTA metformin  -Medium ISS for now  -Diabetes education     Allergies  -Cont PTA cetirizine, nasal spray,  Singulair, eye drops     CHRONIC MEDICAL PROBLEMS:  Breast cancer:  S/p radiation and chemotherapy.  No active concerns  Endometriosis:  No active concerns.   Fibromyalgia:  Stable  Hearing difficulty:  Noted  Depression with anxiety:  Cont home fluoxetine  Sleeping difficulty:  Cont home trazodone  Chronic pain:  Cont PTA gabapentin, robaxin       Diet: Combination Diet Clear Liquid    DVT Prophylaxis: Enoxaparin (Lovenox) SQ  Chaudhry Catheter: Not present  Fluids: PO  Lines: None     Cardiac Monitoring: None  Code Status: Full Code         Disposition Plan      Expected Discharge Date: 2023                The patient's care was discussed with the Attending Physician, Dr. Bustamante.    April Ahumada MD  Hospitalist Service  [facility]  Securely message with Couchsur[employer] (more info)  Text page via [facility] Paging/Directory   ______________________________________________________________________    Interval History   No acute events overnight.  Patient states she has had a cough for the past 5 days.  She states she has a history of chronic bronchitis but is been told that she does not have COPD.  Patient has been taking her  father's morphine and inhalers.    Physical Exam   Vital Signs: Temp: 97.9  F (36.6  C) Temp src: Oral BP: 110/61 Pulse: 83   Resp: 20 SpO2: 94 % O2 Device: None (Room air)    Weight: 280 lbs 0 oz  Constitutional: awake, alert, cooperative, no apparent distress  ENT: Normocephalic, without obvious abnormality, atraumatic  Respiratory: No increased work of breathing, no wheezing, faint crackles left base, harsh cough  Cardiovascular: Normal apical impulse, regular rate and rhythm  GI: No scars, normal bowel sounds, soft, non-distended, non-tender  Neurologic: Awake, alert, oriented to name, place and time.  Cranial nerves II-XII are grossly intact.      Data     I have personally reviewed the following data over the past 24 hrs:    16.5 (H)  \   13.5   / 222     136 99  8 /  382 (H)   4.4 26 0.89 \       Trop: N/A BNP: 18       Procal: 0.10 CRP: N/A Lactic Acid: 1.7

## 2023-01-26 NOTE — ED TRIAGE NOTES
Pt presents to ED with c/o cough and congestion with shortness of breath starting on 1/20.  Pt has hx of COPD.  Reports using her inhaler at home, but states it is old.  Took singulair twice tonight, tried taking 5 mg of morphine, last dose about 2 hours PTA.  Also took clonazepam a couple hours ago.  States she is just sick of not feeling well.       Triage Assessment     Row Name 01/26/23 0309       Triage Assessment (Adult)    Airway WDL WDL       Respiratory WDL    Respiratory WDL X;rhythm/pattern;cough    Rhythm/Pattern, Respiratory shortness of breath    Cough Frequency frequent    Cough Type congested       Skin Circulation/Temperature WDL    Skin Circulation/Temperature WDL X;temperature    Skin Temperature warm       Cardiac WDL    Cardiac WDL X;rhythm    Pulse Rate & Regularity tachycardic       Peripheral/Neurovascular WDL    Peripheral Neurovascular WDL WDL       Cognitive/Neuro/Behavioral WDL    Cognitive/Neuro/Behavioral WDL WDL

## 2023-01-27 VITALS
TEMPERATURE: 97.6 F | RESPIRATION RATE: 18 BRPM | BODY MASS INDEX: 41.47 KG/M2 | HEART RATE: 84 BPM | OXYGEN SATURATION: 96 % | DIASTOLIC BLOOD PRESSURE: 85 MMHG | WEIGHT: 280 LBS | HEIGHT: 69 IN | SYSTOLIC BLOOD PRESSURE: 117 MMHG

## 2023-01-27 LAB
ANION GAP SERPL CALCULATED.3IONS-SCNC: 9 MMOL/L (ref 5–18)
BUN SERPL-MCNC: 16 MG/DL (ref 8–22)
CALCIUM SERPL-MCNC: 9.1 MG/DL (ref 8.5–10.5)
CHLORIDE BLD-SCNC: 101 MMOL/L (ref 98–107)
CO2 SERPL-SCNC: 24 MMOL/L (ref 22–31)
CREAT SERPL-MCNC: 0.87 MG/DL (ref 0.6–1.1)
ERYTHROCYTE [DISTWIDTH] IN BLOOD BY AUTOMATED COUNT: 12.9 % (ref 10–15)
GFR SERPL CREATININE-BSD FRML MDRD: 77 ML/MIN/1.73M2
GLUCOSE BLD-MCNC: 223 MG/DL (ref 70–125)
GLUCOSE BLDC GLUCOMTR-MCNC: 219 MG/DL (ref 70–99)
GLUCOSE BLDC GLUCOMTR-MCNC: 224 MG/DL (ref 70–99)
HCT VFR BLD AUTO: 40.1 % (ref 35–47)
HGB BLD-MCNC: 12.8 G/DL (ref 11.7–15.7)
MCH RBC QN AUTO: 29.2 PG (ref 26.5–33)
MCHC RBC AUTO-ENTMCNC: 31.9 G/DL (ref 31.5–36.5)
MCV RBC AUTO: 91 FL (ref 78–100)
PLATELET # BLD AUTO: 213 10E3/UL (ref 150–450)
POTASSIUM BLD-SCNC: 3.8 MMOL/L (ref 3.5–5)
RBC # BLD AUTO: 4.39 10E6/UL (ref 3.8–5.2)
SODIUM SERPL-SCNC: 134 MMOL/L (ref 136–145)
WBC # BLD AUTO: 18.2 10E3/UL (ref 4–11)

## 2023-01-27 PROCEDURE — 999N000157 HC STATISTIC RCP TIME EA 10 MIN: Mod: 76

## 2023-01-27 PROCEDURE — 250N000013 HC RX MED GY IP 250 OP 250 PS 637: Performed by: EMERGENCY MEDICINE

## 2023-01-27 PROCEDURE — G0378 HOSPITAL OBSERVATION PER HR: HCPCS

## 2023-01-27 PROCEDURE — 36415 COLL VENOUS BLD VENIPUNCTURE: CPT

## 2023-01-27 PROCEDURE — 250N000012 HC RX MED GY IP 250 OP 636 PS 637: Performed by: STUDENT IN AN ORGANIZED HEALTH CARE EDUCATION/TRAINING PROGRAM

## 2023-01-27 PROCEDURE — 250N000013 HC RX MED GY IP 250 OP 250 PS 637

## 2023-01-27 PROCEDURE — 96372 THER/PROPH/DIAG INJ SC/IM: CPT | Performed by: STUDENT IN AN ORGANIZED HEALTH CARE EDUCATION/TRAINING PROGRAM

## 2023-01-27 PROCEDURE — 94640 AIRWAY INHALATION TREATMENT: CPT

## 2023-01-27 PROCEDURE — 85027 COMPLETE CBC AUTOMATED: CPT

## 2023-01-27 PROCEDURE — 99238 HOSP IP/OBS DSCHRG MGMT 30/<: CPT | Mod: GC

## 2023-01-27 PROCEDURE — 82962 GLUCOSE BLOOD TEST: CPT

## 2023-01-27 PROCEDURE — 80048 BASIC METABOLIC PNL TOTAL CA: CPT

## 2023-01-27 PROCEDURE — 250N000011 HC RX IP 250 OP 636: Performed by: STUDENT IN AN ORGANIZED HEALTH CARE EDUCATION/TRAINING PROGRAM

## 2023-01-27 PROCEDURE — 250N000009 HC RX 250: Performed by: STUDENT IN AN ORGANIZED HEALTH CARE EDUCATION/TRAINING PROGRAM

## 2023-01-27 PROCEDURE — 94640 AIRWAY INHALATION TREATMENT: CPT | Mod: 76

## 2023-01-27 RX ORDER — FLUTICASONE PROPIONATE AND SALMETEROL XINAFOATE 115; 21 UG/1; UG/1
2 AEROSOL, METERED RESPIRATORY (INHALATION) 2 TIMES DAILY
Qty: 12 G | Refills: 1 | Status: SHIPPED | OUTPATIENT
Start: 2023-01-27 | End: 2024-07-02

## 2023-01-27 RX ORDER — PREDNISONE 20 MG/1
40 TABLET ORAL DAILY
Qty: 6 TABLET | Refills: 0 | Status: SHIPPED | OUTPATIENT
Start: 2023-01-27 | End: 2023-01-30

## 2023-01-27 RX ORDER — AZITHROMYCIN 250 MG/1
250 TABLET, FILM COATED ORAL DAILY
Qty: 3 TABLET | Refills: 0 | Status: SHIPPED | OUTPATIENT
Start: 2023-01-28 | End: 2023-01-31

## 2023-01-27 RX ORDER — ALBUTEROL SULFATE 90 UG/1
2 AEROSOL, METERED RESPIRATORY (INHALATION) EVERY 6 HOURS PRN
Qty: 18 G | Refills: 1 | Status: SHIPPED | OUTPATIENT
Start: 2023-01-27 | End: 2023-10-26

## 2023-01-27 RX ORDER — GLIPIZIDE 5 MG/1
5 TABLET ORAL DAILY
Qty: 3 TABLET | Refills: 0 | Status: SHIPPED | OUTPATIENT
Start: 2023-01-27 | End: 2023-02-07 | Stop reason: DRUGHIGH

## 2023-01-27 RX ORDER — OLOPATADINE HYDROCHLORIDE 1 MG/ML
1 SOLUTION/ DROPS OPHTHALMIC DAILY
Status: DISCONTINUED | OUTPATIENT
Start: 2023-01-27 | End: 2023-01-27

## 2023-01-27 RX ORDER — OLOPATADINE HYDROCHLORIDE 2 MG/ML
1 SOLUTION/ DROPS OPHTHALMIC DAILY
Status: DISCONTINUED | OUTPATIENT
Start: 2023-01-28 | End: 2023-01-27 | Stop reason: HOSPADM

## 2023-01-27 RX ADMIN — IPRATROPIUM BROMIDE AND ALBUTEROL SULFATE 3 ML: 2.5; .5 SOLUTION RESPIRATORY (INHALATION) at 13:35

## 2023-01-27 RX ADMIN — IPRATROPIUM BROMIDE AND ALBUTEROL SULFATE 3 ML: 2.5; .5 SOLUTION RESPIRATORY (INHALATION) at 08:11

## 2023-01-27 RX ADMIN — FLUTICASONE PROPIONATE 1 SPRAY: 50 SPRAY, METERED NASAL at 09:34

## 2023-01-27 RX ADMIN — OLOPATADINE HYDROCHLORIDE 1 DROP: 2 SOLUTION OPHTHALMIC at 09:36

## 2023-01-27 RX ADMIN — PREDNISONE 40 MG: 20 TABLET ORAL at 13:28

## 2023-01-27 RX ADMIN — FLUTICASONE FUROATE AND VILANTEROL TRIFENATATE 1 PUFF: 200; 25 POWDER RESPIRATORY (INHALATION) at 09:35

## 2023-01-27 RX ADMIN — IPRATROPIUM BROMIDE AND ALBUTEROL SULFATE 3 ML: 2.5; .5 SOLUTION RESPIRATORY (INHALATION) at 04:49

## 2023-01-27 RX ADMIN — AZITHROMYCIN MONOHYDRATE 250 MG: 250 TABLET ORAL at 09:32

## 2023-01-27 RX ADMIN — FLUOXETINE 40 MG: 20 CAPSULE ORAL at 09:32

## 2023-01-27 RX ADMIN — PANTOPRAZOLE SODIUM 40 MG: 20 TABLET, DELAYED RELEASE ORAL at 09:32

## 2023-01-27 RX ADMIN — INSULIN ASPART 2 UNITS: 100 INJECTION, SOLUTION INTRAVENOUS; SUBCUTANEOUS at 13:28

## 2023-01-27 RX ADMIN — BENZONATATE 100 MG: 100 CAPSULE ORAL at 05:06

## 2023-01-27 RX ADMIN — INSULIN ASPART 2 UNITS: 100 INJECTION, SOLUTION INTRAVENOUS; SUBCUTANEOUS at 09:32

## 2023-01-27 RX ADMIN — ENOXAPARIN SODIUM 40 MG: 100 INJECTION SUBCUTANEOUS at 05:06

## 2023-01-27 RX ADMIN — METHOCARBAMOL TABLETS 500 MG: 500 TABLET, COATED ORAL at 05:05

## 2023-01-27 ASSESSMENT — ACTIVITIES OF DAILY LIVING (ADL)
ADLS_ACUITY_SCORE: 24

## 2023-01-27 NOTE — PROGRESS NOTES
Care Management Discharge Note    Discharge Date: 01/28/2023     Discharge Disposition: Home    Discharge Services: None    Discharge DME: Other (see comment) (Pending clinical progress)    Discharge Transportation: family or friend will provide    Patient/Family in Agreement with the Plan: yes    Additional Information:  Reviewed. No CM needs for discharge.     BPA triggered at discharge and sent as needed.     SAMUEL Ruiz

## 2023-01-27 NOTE — PROGRESS NOTES
Pt received scheduled nebulizer tx via mouthpiece. Tolerated well. Pt has clear/diminished breath sounds. She also has a strong productive cough. Will continue to follow as needed.

## 2023-01-27 NOTE — PLAN OF CARE
"  Problem: Diabetes Comorbidity  Goal: Blood Glucose Level Within Targeted Range  Outcome: Progressing   Goal Outcome Evaluation:      Plan of Care Reviewed With: patientPRIMARY DIAGNOSIS: \"GENERIC\" NURSING  OUTPATIENT/OBSERVATION GOALS TO BE MET BEFORE DISCHARGE:  ADLs back to baseline: Yes    Activity and level of assistance: Ambulating independently.    Pain status: Pain free.    Return to near baseline physical activity: Yes     Discharge Planner Nurse   Safe discharge environment identified: Yes  Barriers to discharge: Yes, not medically fit for discharge yet       Entered by: Elisa Glynn RN 01/26/2023 7:52 PM     Please review provider order for any additional goals.   Nurse to notify provider when observation goals have been met and patient is ready for discharge.    Overall Patient Progress: improvingOverall Patient Progress: improving, pt alert and oriented x4. VSS on room air. Able to voice needs. Pt ambulating independently. Pt iniatially had refused to stay in the gym because it was too hot for her, temperature adjusted and she agreed to stay. Blood glucose monitored and insulin given, pt denies pain since she came to the gym. She has a productive cough  and she continues on abx, and steroids.            "

## 2023-01-27 NOTE — DISCHARGE INSTRUCTIONS
"Check your blood sugars with meals and at bedtime. Take glipizide daily with prednisone. Use the advair inhaler daily and the albuterol inhaler as needed. Follow up with your primary care provider early next week if possible.    Watch for symptoms of low blood sugar   Symptoms include:  Headaches  Shakiness or dizziness  Hunger  Cold, clammy skin  Sweating  A hard, fast heartbeat  Confusion or irritability    If you think your blood sugar is low, check a blood sample with a meter. If the level is low, eat one of the \"quick fix\" foods below. They can help raise your blood sugar:   3 to 4 glucose tablets  1 serving of glucose gel  1/2 cup (4 ounces) of any fruit juice  1/2 cup (4 ounces) of regular (not diet) soda  1 tbsp of honey or sugar  5 to 6 pieces of hard candy?  Recheck your blood sugar in 15 minutes. If it is still low, eat another serving. If it stays low after the second snack, seek medical care.   "

## 2023-01-27 NOTE — PROGRESS NOTES
"BP (!) 142/89 (BP Location: Left arm)   Pulse 85   Temp 98  F (36.7  C) (Oral)   Resp 20   Ht 1.753 m (5' 9\")   Wt 127 kg (280 lb)   SpO2 92%   BMI 41.35 kg/m        The PT was provided nebs per MD order. BS were clear and diminished both pre and post. The most noteworthy aspect of the presentation was her cough. It was frequent, strong, loose and only occasionally productive. RT will continue to follow as directed.  "

## 2023-01-27 NOTE — CONSULTS
DIABETES CARE    Situation:  Consulted by Provider for Diabetes Education: steroid induced acute hyperglycemia in T2DM not previously on insulin.  57 year old female with type 2 Diabetes. Patient was admitted for shortness of breath and productive cough.     Background:  Related Co-morbidities include: obesity, breast cancer, bipolar disorder  PCP: Angelia Krishna with [facility]  Social: lives alone at home    Diabetes History:   On Ozempic PTA with last dose 1/23 states she has no refills at home. Recently prescribed metformin but has not been taking it. Patient has not used insulin before.    Meds for BG Management PTA:  1000 mg Metformin XR q PM - not started yet  0.25 mg Ozempic q week - just finished    Current Inpatient Meds for BG Management:  5 units Lantus q PM  1/50 medium resistance correction scale >140 mg/dL with meals and >200 mg/dL at bedtime  1000 mg Metformin XR q PM    Other Meds:   40 mg Prednisone daily for 5 days    Labs:  Hemoglobin A1C: 7.4% 12/15/22  Hgb: 12.8 g/dL  SCr: 0.87 mg/L   GFR: 77 mL/min/1.73m^2    Blood Glucose POC:    01/26/23 11:34 01/26/23 13:37 01/26/23 18:11 01/26/23 22:08 01/27/23 06:41 01/27/23 07:54   Glucose     223 (H)    GLUCOSE BY METER POCT 396 (H) 382 (H) 315 (H) 227 (H)  224 (H)     Diet Order: 60 grams CHO   Intake: 100%   Weight: 127 kg    BMI: 41.3 kg/m^2    DM EDUCATION/COUNSELING:  Barriers to Learning and/or DM Self-Management: None known  Previous DM Education: No  Current education and/or visit with patient:  Educated/reviewed diabetes basics: pathophysiology, hyperglycemia, treatment.  Educated/reviewed hypoglycemia: sx, causes, treatment.  Explained normal/goals of blood sugar control, A1C, when to call provider.  Educated/reviewed use of home glucose meter. Patient has and knows how to use.  Educated on normal pancreas function and possible need for basal insulin.  Specific medications were explained, including how they each acted on blood sugar, their timing  "and differences in dosing.   The insulin pen technique was reviewed. Very similar to Ozempic pen.     Written handouts given on all education provided.     Created goals with patient:  Establish appointment with PCP and bring BG logs to fine tune medication regimen.     Assessment:  Patient familiar with pen technique. Patient has glucose meter at home and knows how to use. Reviewed insulin and possible need while on steroid. Copay with insurance is $35 which is tight for patient to afford. Under new insurance Ozempic is no longer covered. Patient reports eating one meal per day at 8:00pm.    Recommendations:  1. Continue Metformin XR upon discharge. Work up on dose as tolerated.  2. Consider use of Glipizide taken at same time as steroid as more affordable option while on short course of steroids. Can stop Glipizide once steroid is complete.     Refer to \"Guidelines for Insulin Initiation and Care in Hospitalized Adults\" link in Diabetes Management Order set for dosing guidelines.  Hospital goals for blood glucose levels are < 180 mg/dL for improved health outcomes.    F/U Plans:  To see PCP for ongoing med management.     DISCHARGE NEEDS:  Glipizide tablets.     Thank you,   Mary Millan RDN, CSPCC, LD, Diabetes Educator    [facility]  [address] [place] [address]  [email or web address]   Office: [phone]  Pager: [phone]  "

## 2023-01-27 NOTE — PROGRESS NOTES
Patient received her scheduled Q6H Duo-neb with VSS Sat's 96% in RA. Tolerated therapy well and has GIBBS when walking. RT will continue to follow. Kiesha Gamez, RT, RRT-NPS     01/27/23 0811 01/27/23 0819   Oxygen Therapy   Daily Review of Necessity (O2 Therapy) completed  --    Oxygen Therapy   SpO2 96 % 97 %   O2 Device None (Room air) None (Room air)   Assessment   Respiratory WDL breath sounds;cough  --    Rhythm/Pattern, Respiratory dyspnea on exertion  --    Cough Frequency infrequent  --    Cough Type productive  --    Nebulizer Assessment & Treatment   $RT Use ONLY Delivery Method Nebulizer - Additional  --    Daily Review of Necessity (SVN) completed  --    Nebulizer Device Mouthpiece  --    Pretreatment Heart Rate (beats/min) 75  --    Pretreatment Resp Rate (breaths/min) 16  --    Pretreatment O2 sats - (TCU only) 96  --    Pretreat Breath Sounds - Bilat - All Lobes clear  --    Respiratory Treatment Status (SVN) given  --    Tech time (minutes) (TCU only) 2  --    Breath Sounds Post-Respiratory Treatment   Posttreatment Heart Rate (beats/min)  --  84   Posttreatment Resp Rate (breaths/min)  --  16   Post treatment O2 Sats - (TCU only)  --  97   Posttreatment Assessment (SVN)  --  breath sounds unchanged   Signs of Intolerance (SVN) none none   Breath Sounds Posttreatment All Fields  --  All Fields   Breath Sounds Posttreatment All Fields  --  no change;clear

## 2023-01-27 NOTE — PLAN OF CARE
Problem: Diabetes Comorbidity  Goal: Blood Glucose Level Within Targeted Range  Outcome: Not Progressing; pt's glucose remains elevated     Problem: Plan of Care - These are the overarching goals to be used throughout the patient stay.    Goal: Optimal Comfort and Wellbeing; patient continues to have cough and shortness of breath but medications and respiratory interventions are helpful.   Outcome: Progressing     Problem: Asthma Comorbidity  Goal: Maintenance of Asthma Control  Outcome: Progressing   Goal Outcome Evaluation:    Patient is alert and oriented x4; CMS intact; able to sleep between cares; tolerates medications.  Diet: tolerating normal diet; adaquate fluid intake   Activity:ambulates independently but has generalized weakness.   Vital signs:vital signs within normal limits on room air.   IV: IV saline locked.  Respiratory: patient has shortness of breath, pt has nebulizer and reports they are effective.  Pain: managed with pain mediations and requires changing positions often moving from her bed to chair.   Blood glucose: glucose remain elevated but appears to be trending down.   Discharge plan: Today home with sister in law to transport when pt is cleared by medical.       PRN Medications: Pt reports she cannot take Ibuprofen due to GI upset.  2101 Tylenol 650 mg for pain            Tessalon 100 mg for cough- effective            Robaxin 500 mg for chest tightness- effective  2246 One time only PRN:  Hydroxyzine 25 mg and Oxycodone 5 mg for pain, coughing and chest tightness; pt reports medications were effective and pt was able to sleep.   0505: Tessalon 100 mg for cough- effective            Robaxin 500 mg for chest tightness- effective

## 2023-01-27 NOTE — PLAN OF CARE
Problem: Plan of Care - These are the overarching goals to be used throughout the patient stay.    Goal: Absence of Hospital-Acquired Illness or Injury  Intervention: Prevent Skin Injury  Recent Flowsheet Documentation  Taken 1/27/2023 1400 by Elisa Glynn RN  Body Position: position changed independently  Taken 1/27/2023 1000 by Elisa Glynn RN  Body Position: position changed independently   Goal Outcome Evaluation:      Plan of Care Reviewed With: patient    Overall Patient Progress: improvingOverall Patient Progress: improving  Pt alert and oriented,VSS on room air, pt has got a persistent non productive cough, was on nebulisers, stereoids and antibiotics.  Denies any nausea, sob  or pain. Pt full independent. Fully continent.  Pt had education for her diabetes management vs steroids at home.  Pt will be going home with  glipizide instead of insulin as planned earlier.  I have given pt her discharge summary and I explained all the information there. All questions answered. Pt has been discharged with all her belongings. Iv has been removed

## 2023-01-31 LAB
BACTERIA BLD CULT: NO GROWTH
BACTERIA BLD CULT: NO GROWTH

## 2023-02-06 ASSESSMENT — ASTHMA QUESTIONNAIRES
QUESTION_2 LAST FOUR WEEKS HOW OFTEN HAVE YOU HAD SHORTNESS OF BREATH: MORE THAN ONCE A DAY
QUESTION_5 LAST FOUR WEEKS HOW WOULD YOU RATE YOUR ASTHMA CONTROL: SOMEWHAT CONTROLLED
QUESTION_3 LAST FOUR WEEKS HOW OFTEN DID YOUR ASTHMA SYMPTOMS (WHEEZING, COUGHING, SHORTNESS OF BREATH, CHEST TIGHTNESS OR PAIN) WAKE YOU UP AT NIGHT OR EARLIER THAN USUAL IN THE MORNING: FOUR OR MORE NIGHTS A WEEK
HOSPITALIZATION_OVERNIGHT_LAST_YEAR_TOTAL: ONE
QUESTION_1 LAST FOUR WEEKS HOW MUCH OF THE TIME DID YOUR ASTHMA KEEP YOU FROM GETTING AS MUCH DONE AT WORK, SCHOOL OR AT HOME: MOST OF THE TIME
ACT_TOTALSCORE: 9
QUESTION_4 LAST FOUR WEEKS HOW OFTEN HAVE YOU USED YOUR RESCUE INHALER OR NEBULIZER MEDICATION (SUCH AS ALBUTEROL): ONE OR TWO TIMES PER DAY
ACT_TOTALSCORE: 9

## 2023-02-06 ASSESSMENT — PATIENT HEALTH QUESTIONNAIRE - PHQ9
SUM OF ALL RESPONSES TO PHQ QUESTIONS 1-9: 18
SUM OF ALL RESPONSES TO PHQ QUESTIONS 1-9: 18
10. IF YOU CHECKED OFF ANY PROBLEMS, HOW DIFFICULT HAVE THESE PROBLEMS MADE IT FOR YOU TO DO YOUR WORK, TAKE CARE OF THINGS AT HOME, OR GET ALONG WITH OTHER PEOPLE: VERY DIFFICULT

## 2023-02-07 ENCOUNTER — OFFICE VISIT (OUTPATIENT)
Dept: FAMILY MEDICINE | Facility: CLINIC | Age: 58
End: 2023-02-07
Payer: MEDICARE

## 2023-02-07 VITALS
BODY MASS INDEX: 40.88 KG/M2 | DIASTOLIC BLOOD PRESSURE: 80 MMHG | RESPIRATION RATE: 14 BRPM | WEIGHT: 276 LBS | TEMPERATURE: 98.4 F | HEART RATE: 83 BPM | SYSTOLIC BLOOD PRESSURE: 120 MMHG | OXYGEN SATURATION: 95 % | HEIGHT: 69 IN

## 2023-02-07 DIAGNOSIS — C50.411 MALIGNANT NEOPLASM OF UPPER-OUTER QUADRANT OF RIGHT BREAST IN FEMALE, ESTROGEN RECEPTOR POSITIVE (H): ICD-10-CM

## 2023-02-07 DIAGNOSIS — M79.7 FIBROMYALGIA: ICD-10-CM

## 2023-02-07 DIAGNOSIS — K21.9 GASTROESOPHAGEAL REFLUX DISEASE WITHOUT ESOPHAGITIS: ICD-10-CM

## 2023-02-07 DIAGNOSIS — E03.8 SUBCLINICAL HYPOTHYROIDISM: ICD-10-CM

## 2023-02-07 DIAGNOSIS — Z12.31 ENCOUNTER FOR SCREENING MAMMOGRAM FOR BREAST CANCER: ICD-10-CM

## 2023-02-07 DIAGNOSIS — E66.01 MORBID OBESITY (H): ICD-10-CM

## 2023-02-07 DIAGNOSIS — R79.89 ELEVATED TSH: ICD-10-CM

## 2023-02-07 DIAGNOSIS — E11.9 TYPE 2 DIABETES MELLITUS WITHOUT COMPLICATION, WITHOUT LONG-TERM CURRENT USE OF INSULIN (H): ICD-10-CM

## 2023-02-07 DIAGNOSIS — F31.30 BIPOLAR DISORDER, CURRENT EPISODE DEPRESSED, MILD OR MODERATE SEVERITY, UNSPECIFIED (H): Primary | ICD-10-CM

## 2023-02-07 DIAGNOSIS — Z17.0 MALIGNANT NEOPLASM OF UPPER-OUTER QUADRANT OF RIGHT BREAST IN FEMALE, ESTROGEN RECEPTOR POSITIVE (H): ICD-10-CM

## 2023-02-07 DIAGNOSIS — D72.829 LEUKOCYTOSIS, UNSPECIFIED TYPE: ICD-10-CM

## 2023-02-07 LAB
BASOPHILS # BLD AUTO: 0.1 10E3/UL (ref 0–0.2)
BASOPHILS NFR BLD AUTO: 0 %
EOSINOPHIL # BLD AUTO: 0.1 10E3/UL (ref 0–0.7)
EOSINOPHIL NFR BLD AUTO: 1 %
ERYTHROCYTE [DISTWIDTH] IN BLOOD BY AUTOMATED COUNT: 12.8 % (ref 10–15)
HCT VFR BLD AUTO: 43.1 % (ref 35–47)
HGB BLD-MCNC: 13.9 G/DL (ref 11.7–15.7)
IMM GRANULOCYTES # BLD: 0.1 10E3/UL
IMM GRANULOCYTES NFR BLD: 0 %
LYMPHOCYTES # BLD AUTO: 3.4 10E3/UL (ref 0.8–5.3)
LYMPHOCYTES NFR BLD AUTO: 30 %
MCH RBC QN AUTO: 29.3 PG (ref 26.5–33)
MCHC RBC AUTO-ENTMCNC: 32.3 G/DL (ref 31.5–36.5)
MCV RBC AUTO: 91 FL (ref 78–100)
MONOCYTES # BLD AUTO: 0.6 10E3/UL (ref 0–1.3)
MONOCYTES NFR BLD AUTO: 5 %
NEUTROPHILS # BLD AUTO: 7.1 10E3/UL (ref 1.6–8.3)
NEUTROPHILS NFR BLD AUTO: 63 %
PLATELET # BLD AUTO: 198 10E3/UL (ref 150–450)
RBC # BLD AUTO: 4.75 10E6/UL (ref 3.8–5.2)
WBC # BLD AUTO: 11.4 10E3/UL (ref 4–11)

## 2023-02-07 PROCEDURE — 84443 ASSAY THYROID STIM HORMONE: CPT | Performed by: FAMILY MEDICINE

## 2023-02-07 PROCEDURE — 84439 ASSAY OF FREE THYROXINE: CPT | Performed by: FAMILY MEDICINE

## 2023-02-07 PROCEDURE — 85025 COMPLETE CBC W/AUTO DIFF WBC: CPT | Performed by: FAMILY MEDICINE

## 2023-02-07 PROCEDURE — 80048 BASIC METABOLIC PNL TOTAL CA: CPT | Performed by: FAMILY MEDICINE

## 2023-02-07 PROCEDURE — 99214 OFFICE O/P EST MOD 30 MIN: CPT | Performed by: FAMILY MEDICINE

## 2023-02-07 PROCEDURE — 36415 COLL VENOUS BLD VENIPUNCTURE: CPT | Performed by: FAMILY MEDICINE

## 2023-02-07 RX ORDER — FLUOXETINE 40 MG/1
40 CAPSULE ORAL DAILY
Qty: 90 CAPSULE | Refills: 1
Start: 2023-02-07 | End: 2023-08-30

## 2023-02-07 RX ORDER — FAMOTIDINE 40 MG/1
40 TABLET, FILM COATED ORAL 2 TIMES DAILY PRN
Qty: 180 TABLET | Refills: 3 | Status: SHIPPED | OUTPATIENT
Start: 2023-02-07 | End: 2024-04-10

## 2023-02-07 RX ORDER — FLUOXETINE 40 MG/1
40 CAPSULE ORAL DAILY
COMMUNITY
Start: 2022-10-18 | End: 2023-02-07

## 2023-02-07 RX ORDER — METFORMIN HCL 500 MG
1000 TABLET, EXTENDED RELEASE 24 HR ORAL
Qty: 180 TABLET | Refills: 1 | Status: SHIPPED | OUTPATIENT
Start: 2023-02-07 | End: 2023-03-24 | Stop reason: SINTOL

## 2023-02-07 RX ORDER — TRAZODONE HYDROCHLORIDE 100 MG/1
200 TABLET ORAL AT BEDTIME
Qty: 180 TABLET | Refills: 3
Start: 2023-02-07 | End: 2024-04-10

## 2023-02-07 RX ORDER — GABAPENTIN 800 MG/1
1600 TABLET ORAL AT BEDTIME
Qty: 180 TABLET | Refills: 1 | Status: SHIPPED | OUTPATIENT
Start: 2023-02-07 | End: 2023-08-21

## 2023-02-07 ASSESSMENT — PATIENT HEALTH QUESTIONNAIRE - PHQ9
SUM OF ALL RESPONSES TO PHQ QUESTIONS 1-9: 18
10. IF YOU CHECKED OFF ANY PROBLEMS, HOW DIFFICULT HAVE THESE PROBLEMS MADE IT FOR YOU TO DO YOUR WORK, TAKE CARE OF THINGS AT HOME, OR GET ALONG WITH OTHER PEOPLE: VERY DIFFICULT

## 2023-02-07 NOTE — PATIENT INSTRUCTIONS
Try doing pepcid (famotidine) two times daily for a few weeks. IF you are doing well after a week or so,try stopping the omeprazole and then after a few weeks, you can try going to once a day for a few weeks and then use this as needed.

## 2023-02-07 NOTE — PROGRESS NOTES
Assessment & Plan     Bipolar disorder, current episode depressed, mild or moderate severity, unspecified (H)  -Discussed her mental health today.  She has chronic longstanding issues related to her bipolar and finds that when her PHQ-9 is a lower number she tends to become manic and so this has been of reasonable place for her to be for her mental health.  She feels that her medicines are working well for her at this time.  She does not find that her psychiatrist has been responsive to her concerns, and so would like to have me take over her prescriptions with which I think is reasonable at this time.  - gabapentin (NEURONTIN) 800 MG tablet  Dispense: 180 tablet; Refill: 1  - traZODone (DESYREL) 100 MG tablet  Dispense: 180 tablet; Refill: 3  - FLUoxetine (PROZAC) 40 MG capsule  Dispense: 90 capsule; Refill: 1    Leukocytosis, unspecified type  -Patient did have significantly elevated white blood cell count while she was in the hospital recently, likely secondary to her prednisone therapy.  We will update her CBC again today to ensure that this is resolving.  - CBC with platelets and differential  - CBC with platelets and differential    Elevated TSH  -Patient endorses significant fatigue especially over the last several months.  Her TSH was elevated about 6 months ago, will update her level again today.    Type 2 diabetes mellitus without complication, without long-term current use of insulin (H)  -Last A1c was under reasonable control.  Continue on her current medications.  She will trial taking the metformin twice daily rather than all of her prescription at nighttime to see if this helps with the diarrhea.  If her blood sugars are continuing to be elevated we discussed starting a low-dose of glipizide back with her upcoming surgery and her need to have her hemoglobin A1c under 7.  - metFORMIN (GLUCOPHAGE XR) 500 MG 24 hr tablet  Dispense: 180 tablet; Refill: 1  - Basic metabolic panel  - TSH  - T4, free  -  "Basic metabolic panel  - TSH  - T4, free    Gastroesophageal reflux disease without esophagitis  -We will start oral Pepcid as listed below in addition to her PPI.  If things are going well with this, will trial discontinuing her omeprazole.  If we discontinue her omeprazole and her symptoms become worse then she can reach out and we can trial her on a 20 mg dose of the omeprazole with the Pepcid as listed.  She will continue working on dietary adjustments to help limit her reflux as well.  - famotidine (PEPCID) 40 MG tablet  Dispense: 180 tablet; Refill: 3    Fibromyalgia  -Generally doing well.  Filled out a disability parking permit application for her today as well as she is really limited with mobility secondary to her chronic lower extremity pain especially her ankle pain, and knee pain.  - gabapentin (NEURONTIN) 800 MG tablet  Dispense: 180 tablet; Refill: 1    Morbid obesity (H)  -Difficult to exercise secondary to her musculoskeletal issues, working on healthful diet    Malignant neoplasm of upper-outer quadrant of right breast in female, estrogen receptor positive (H)  -Generally doing well at this time, due for mammogram in the spring, ordered today    Encounter for screening mammogram for breast cancer  - *MA Screening Digital Bilateral             BMI:   Estimated body mass index is 40.76 kg/m  as calculated from the following:    Height as of this encounter: 1.753 m (5' 9\").    Weight as of this encounter: 125.2 kg (276 lb).   Weight management plan: Discussed healthy diet and exercise guidelines    Depression Screening Follow Up    PHQ 2/6/2023   PHQ-9 Total Score 18   Q9: Thoughts of better off dead/self-harm past 2 weeks Not at all       Follow Up Actions Taken  patient has been well established with mental health, issues are chronic in nature         No follow-ups on file.    Angelia Krishna MD  [facility]    Caitie Joaquin is a 57 year old, presenting for the " "following health issues:  Establish Care (Mammogram, Refills, aspirating weekly )      History of Present Illness       Reason for visit:  Establish doc patient relationship    She eats 2-3 servings of fruits and vegetables daily.She consumes 1 sweetened beverage(s) daily.She exercises with enough effort to increase her heart rate 9 or less minutes per day.  She exercises with enough effort to increase her heart rate 3 or less days per week. She is missing 1 dose(s) of medications per week.  She is not taking prescribed medications regularly due to cost of medication and remembering to take.    Today's PHQ-9         PHQ-9 Total Score: 18    PHQ-9 Q9 Thoughts of better off dead/self-harm past 2 weeks :   Not at all    How difficult have these problems made it for you to do your work, take care of things at home, or get along with other people: Very difficult     She comes in today to establish care. Her previous PCP did leave the system.     She does hope to get a handicap sticker. She is having usrgery in April for her left knee. She does have one knee and two hips repalced. She does have a bad back as well. She does note that she does have issues with walking any distance as well due to her ankle fractures as well.     She does need tohave her A1c under 7 for her upcoming knee surgery. It was last 7.4 about one month ago. She does note that they were awful in the hospital. Typically it is 120-150 in the morning. She does note that she is just checking once in the morning.     She does note that she is aspirating quite a bit. She does note that it is happening 1-3 times a week. She does note that it happens if she has butter or soda, hot salsa. She did try to get off of the Prilosec and that did make things worse as well.       Review of Systems   Per above      Objective    /80   Pulse 83   Temp 98.4  F (36.9  C)   Resp 14   Ht 1.753 m (5' 9\")   Wt 125.2 kg (276 lb)   SpO2 95%   BMI 40.76 kg/m    Body " mass index is 40.76 kg/m .  Physical Exam   GENERAL: healthy, alert and no distress  HENT: ear canals and TM's normal, nose and mouth without ulcers or lesions  NECK: no adenopathy, no asymmetry, masses, or scars and thyroid normal to palpation  RESP: lungs clear to auscultation - no rales, rhonchi or wheezes  RESP: barky cough present  CV: regular rate and rhythm, normal S1 S2, no S3 or S4, no murmur, click or rub, no peripheral edema and peripheral pulses strong  MS: no gross musculoskeletal defects noted, no edema  PSYCH: mentation appears normal, affect normal/bright

## 2023-02-08 LAB
ANION GAP SERPL CALCULATED.3IONS-SCNC: 12 MMOL/L (ref 7–15)
BUN SERPL-MCNC: 12.9 MG/DL (ref 6–20)
CALCIUM SERPL-MCNC: 9.1 MG/DL (ref 8.6–10)
CHLORIDE SERPL-SCNC: 101 MMOL/L (ref 98–107)
CREAT SERPL-MCNC: 0.86 MG/DL (ref 0.51–0.95)
DEPRECATED HCO3 PLAS-SCNC: 25 MMOL/L (ref 22–29)
GFR SERPL CREATININE-BSD FRML MDRD: 78 ML/MIN/1.73M2
GLUCOSE SERPL-MCNC: 128 MG/DL (ref 70–99)
POTASSIUM SERPL-SCNC: 4.7 MMOL/L (ref 3.4–5.3)
SODIUM SERPL-SCNC: 138 MMOL/L (ref 136–145)
T4 FREE SERPL-MCNC: 1.06 NG/DL (ref 0.9–1.7)
TSH SERPL DL<=0.005 MIU/L-ACNC: 6 UIU/ML (ref 0.3–4.2)

## 2023-02-09 RX ORDER — LEVOTHYROXINE SODIUM 25 UG/1
25 TABLET ORAL DAILY
Qty: 30 TABLET | Refills: 2 | Status: SHIPPED | OUTPATIENT
Start: 2023-02-09 | End: 2023-09-28

## 2023-02-23 ENCOUNTER — TELEPHONE (OUTPATIENT)
Dept: OPTOMETRY | Facility: CLINIC | Age: 58
End: 2023-02-23
Payer: MEDICARE

## 2023-02-23 NOTE — TELEPHONE ENCOUNTER
[facility] Call Center    Phone Message    May a detailed message be left on voicemail: yes     Reason for Call: Other: Patient called requesting a call back from clinical staff. She states her new script for her glasses seem to be way off. Please call to advise.      Action Taken: Other: eye    Travel Screening: Not Applicable

## 2023-02-23 NOTE — TELEPHONE ENCOUNTER
Attempted to return patients call. Phone rang continuously. No option to leave voicemail. Will try again later.

## 2023-02-24 NOTE — TELEPHONE ENCOUNTER
Pt had gls made January, can not see out of them - wearing older glasses and is concerned that Dr. Singh's axis is very different than her axis for old prescription from a different clinic. Will see Dr. Singh on 3/13 for a glasses recheck.

## 2023-03-24 ENCOUNTER — OFFICE VISIT (OUTPATIENT)
Dept: FAMILY MEDICINE | Facility: CLINIC | Age: 58
End: 2023-03-24
Payer: MEDICARE

## 2023-03-24 VITALS
TEMPERATURE: 98.2 F | OXYGEN SATURATION: 96 % | DIASTOLIC BLOOD PRESSURE: 80 MMHG | SYSTOLIC BLOOD PRESSURE: 122 MMHG | RESPIRATION RATE: 14 BRPM | WEIGHT: 280 LBS | HEIGHT: 69 IN | BODY MASS INDEX: 41.47 KG/M2 | HEART RATE: 74 BPM

## 2023-03-24 DIAGNOSIS — E11.9 TYPE 2 DIABETES MELLITUS WITHOUT COMPLICATION, WITHOUT LONG-TERM CURRENT USE OF INSULIN (H): ICD-10-CM

## 2023-03-24 DIAGNOSIS — M17.12 PRIMARY OSTEOARTHRITIS OF LEFT KNEE: ICD-10-CM

## 2023-03-24 DIAGNOSIS — F31.30 BIPOLAR DISORDER, CURRENT EPISODE DEPRESSED, MILD OR MODERATE SEVERITY, UNSPECIFIED (H): ICD-10-CM

## 2023-03-24 DIAGNOSIS — E03.8 SUBCLINICAL HYPOTHYROIDISM: ICD-10-CM

## 2023-03-24 DIAGNOSIS — Z01.818 PREOP GENERAL PHYSICAL EXAM: Primary | ICD-10-CM

## 2023-03-24 DIAGNOSIS — Z23 IMMUNIZATION DUE: ICD-10-CM

## 2023-03-24 LAB
ERYTHROCYTE [DISTWIDTH] IN BLOOD BY AUTOMATED COUNT: 13.3 % (ref 10–15)
HBA1C MFR BLD: 7 % (ref 0–5.6)
HCT VFR BLD AUTO: 42.3 % (ref 35–47)
HGB BLD-MCNC: 13.7 G/DL (ref 11.7–15.7)
MCH RBC QN AUTO: 29.6 PG (ref 26.5–33)
MCHC RBC AUTO-ENTMCNC: 32.4 G/DL (ref 31.5–36.5)
MCV RBC AUTO: 91 FL (ref 78–100)
PLATELET # BLD AUTO: 187 10E3/UL (ref 150–450)
RBC # BLD AUTO: 4.63 10E6/UL (ref 3.8–5.2)
WBC # BLD AUTO: 9.3 10E3/UL (ref 4–11)

## 2023-03-24 PROCEDURE — 83036 HEMOGLOBIN GLYCOSYLATED A1C: CPT | Performed by: FAMILY MEDICINE

## 2023-03-24 PROCEDURE — 80048 BASIC METABOLIC PNL TOTAL CA: CPT | Performed by: FAMILY MEDICINE

## 2023-03-24 PROCEDURE — 85027 COMPLETE CBC AUTOMATED: CPT | Performed by: FAMILY MEDICINE

## 2023-03-24 PROCEDURE — 99214 OFFICE O/P EST MOD 30 MIN: CPT | Performed by: FAMILY MEDICINE

## 2023-03-24 PROCEDURE — 36415 COLL VENOUS BLD VENIPUNCTURE: CPT | Performed by: FAMILY MEDICINE

## 2023-03-24 RX ORDER — SEMAGLUTIDE 1.34 MG/ML
INJECTION, SOLUTION SUBCUTANEOUS
COMMUNITY
Start: 2023-03-13 | End: 2024-01-19

## 2023-03-24 NOTE — PROGRESS NOTES
[facility]  [address],   [place] [facility] [address]  Phone: [phone]  Fax: [phone]  Primary Provider: Shira Miller  Pre-op Performing Provider: INDY ABBOTT      PREOPERATIVE EVALUATION:  Today's date: 3/24/2023    Emani Vargas is a 58 year old female who presents for a preoperative evaluation.  Additional Questions 6/8/2022   Roomed by Malathi PEDROZA MA     Surgical Information:  Surgery/Procedure: Left knee replacement  Surgery Location: [facility]  Surgeon: Dr. Lane  Surgery Date: 4/12/23  Time of Surgery: TBD  Where patient plans to recover: At home with family  Fax number for surgical facility: [phone]    Assessment & Plan     The proposed surgical procedure is considered INTERMEDIATE risk.    Preop general physical exam  -Medically optimized for surgery at this time with diabetes under good control.   -Surgery center note states BMI should be less than 35, patient's is 41.   -She will hold cetirizine the morning of surgery but will take her normal morning medications otherwise with a small sip of water.   -patient is a [employer], defers any blood products.   -updating labs as listed.   - Basic metabolic panel  - CBC with platelets  - Basic metabolic panel  - CBC with platelets    Primary osteoarthritis of left knee  -proceed with surgery as deemed medically necessary and appropriate by anesthesia and orthopedics. Has done well with her prior joint replacements.     Type 2 diabetes mellitus without complication, without long-term current use of insulin (H)  -A1c is under good control. She will continue with current medications  - HEMOGLOBIN A1C  - semaglutide (OZEMPIC) 2 MG/1.5ML SOPN pen  Dispense: 4.5 mL; Refill: 1  - HEMOGLOBIN A1C    Bipolar disorder, current episode depressed, mild or moderate severity, unspecified (H)  -Stable on her current medications, will take them the morning of  surgery with a small sip of water.     Subclinical hypothyroidism  -Doing well    Immunization due  -will return as able for vaccine.   - Pneumococcal 20 Valent Conjugate (Prevnar 20)      Risks and Recommendations:  The patient has the following additional risks and recommendations for perioperative complications:   - Morbid obesity (BMI >40)    Medication Instructions:  Patient is to take all scheduled medications on the day of surgery EXCEPT for modifications listed below:  will hold cetirizine the morning of her procedure    RECOMMENDATION:  APPROVAL GIVEN to proceed with proposed procedure, without further diagnostic evaluation.            Subjective     HPI related to upcoming procedure:     She is back on the Ozempic rather than the metformin. She gets terrible diarrhea with metformin and is happy to be back on this.     She does not use advair on a scheduled basis, does use this only on an as needed basis with URIs.     She is looking forward to having this done. She has done well with her bilateral hip replacement and her other knee replacement. She is looking forward to doing this.     Preop Questions 3/17/2023   1. Have you ever had a heart attack or stroke? No   2. Have you ever had surgery on your heart or blood vessels, such as a stent placement, a coronary artery bypass, or surgery on an artery in your head, neck, heart, or legs? No   3. Do you have chest pain with activity? No   4. Do you have a history of  heart failure? No   5. Do you currently have a cold, bronchitis or symptoms of other infection? No   6. Do you have a cough, shortness of breath, or wheezing? YES - a little congestion today, likely secondary to recent pizza intake   7. Do you or anyone in your family have previous history of blood clots? No   8. Do you or does anyone in your family have a serious bleeding problem such as prolonged bleeding following surgeries or cuts? No   9. Have you ever had problems with anemia or been told to  take iron pills? No   10. Have you had any abnormal blood loss such as black, tarry or bloody stools, or abnormal vaginal bleeding? No   11. Have you ever had a blood transfusion? No   12. Are you willing to have a blood transfusion if it is medically needed before, during, or after your surgery? NO - Confucianist   13. Have you or any of your relatives ever had problems with anesthesia? UNKNOWN - maybe some difficulties with IV sedation in 2017, since then has done well with anesthesia   14. Do you have sleep apnea, excessive snoring or daytime drowsiness? YES - daytime sleepiness   14a. Do you have a CPAP machine? No   15. Do you have any artifical heart valves or other implanted medical devices like a pacemaker, defibrillator, or continuous glucose monitor? No   16. Do you have artificial joints? YES - bilateral hips and right knee   17. Are you allergic to latex? No   18. Is there any chance that you may be pregnant? No       Health Care Directive:  Patient does not have a Health Care Directive or Living Will: Advance Directive received and scanned. Click on Code in the patient header to view.    Preoperative Review of :   reviewed - no record of controlled substances prescribed.        Review of Systems  Constitutional, neuro, ENT, endocrine, pulmonary, cardiac, gastrointestinal, genitourinary, musculoskeletal, integument and psychiatric systems are negative, except as otherwise noted.    Patient Active Problem List    Diagnosis Date Noted     Moderate persistent asthma with acute exacerbation 01/26/2023     Priority: Medium     Diabetes mellitus, type 2 (H) 06/08/2022     Priority: Medium     Morbid obesity (H) 08/15/2018     Priority: Medium     Knee osteoarthritis 08/28/2017     Priority: Medium     Bipolar affective disorder, remission status unspecified (H)      Priority: Medium     Chronic pain syndrome      Priority: Medium     Osteoporosis 05/05/2017     Priority: Medium     Refusal of blood  transfusions as patient is Amish 04/12/2017     Priority: Medium     Malignant neoplasm of upper-outer quadrant of right breast in female, estrogen receptor positive (H) 04/11/2017     Priority: Medium     Compression fracture of vertebral column with routine healing 09/09/2016     Priority: Medium     Hearing Loss      Priority: Medium     Created by Conversion  Replacement Utility updated for latest IMO load         Insomnia      Priority: Medium     Created by Conversion         Allergic rhinitis      Priority: Medium     Osteoarthrosis, unspecified whether generalized or localized, pelvic region and thigh 09/24/2014     Priority: Medium      Past Medical History:   Diagnosis Date     Allergic rhinitis      Anemia      Anxiety      Asthma      Bipolar 1 disorder (H)      Breast cancer of upper-outer quadrant of right female breast (H) 04/11/2017     Chronic pain     Back pain due to arthritis.     COPD (chronic obstructive pulmonary disease) (H)      Depression      Diabetes (H)      Endometriosis      Fibroid uterus      Hx of radiation therapy 01/01/2017     Insomnia      Osteoarthritis      Overweight      Refusal of blood transfusions as patient is Amish 04/12/2017     Sensorineural hearing loss, bilateral      Shortness of breath      Vitamin D deficiency      Past Surgical History:   Procedure Laterality Date     ARTHROSCOPY KNEE Right 1996    Description: Arthroscopy Knee Right;  Recorded: 12/08/2011;     BIOPSY BREAST Right 2000's     BIOPSY BREAST Right 02/20/2017     KNEE SURGERY Right 1997    lateral release     LAPAROSCOPIC ENDOMETRIOSIS FULGURATION  2002     LUMPECTOMY BREAST Right 03/10/2017    with sentinel lymph node biopsy.     OK HYSTEROSCOPY,W/ENDO BX N/A 12/15/2020    Procedure: HYSTEROSCOPY, WITH DILATION AND CURETTAGE;  Surgeon: Scottie Canas MD;  Location: [facility];  Service: Gynecology     TOTAL HIP ARTHROPLASTY N/A 9/24/2014    Procedure: LEFT HIP  TOTAL ARTHROPLASTY;  Surgeon: Antony Elias MD;  Location: [facility] OR;  Service:      TOTAL HIP ARTHROPLASTY Right 8/10/2015    Procedure: HIP TOTAL ARTHROPLASTY RIGHT;  Surgeon: Antony Elias MD;  Location: [facility] OR;  Service:      [facility] TOTAL KNEE ARTHROPLASTY Right 8/28/2017    Procedure: RIGHT TOTAL KNEE ARTHROPLASTY;  Surgeon: Antony Elias MD;  Location: [facility] OR;  Service: Orthopedics     Current Outpatient Medications   Medication Sig Dispense Refill     blood glucose (NO BRAND SPECIFIED) lancets standard Use to test blood sugar once daily 100 each 3     blood glucose (ONETOUCH VERIO IQ) test strip Use to test blood sugar once daily 100 strip 3     cetirizine (ZYRTEC) 10 MG CHEW Take 10 mg by mouth daily       diclofenac sodium (VOLTAREN) 1 % Gel [DICLOFENAC SODIUM (VOLTAREN) 1 % GEL] Apply approximately 1/2-1 gm over affected hand and/or left elbow joints bid, as needed. 1 Tube 2     famotidine (PEPCID) 40 MG tablet Take 1 tablet (40 mg) by mouth 2 times daily as needed for heartburn 180 tablet 3     FLUoxetine (PROZAC) 40 MG capsule Take 1 capsule (40 mg) by mouth daily 90 capsule 1     fluticasone propionate (FLONASE ALLERGY RELIEF) 50 mcg/actuation nasal spray [FLUTICASONE PROPIONATE (FLONASE ALLERGY RELIEF) 50 MCG/ACTUATION NASAL SPRAY] One spray in each nostril twice daily. OFFICE VISIT NEEDED FOR ANY FURTHER REFILLS 16 g 0     gabapentin (NEURONTIN) 800 MG tablet Take 2 tablets (1,600 mg) by mouth At Bedtime 180 tablet 1     levothyroxine (SYNTHROID/LEVOTHROID) 25 MCG tablet Take 1 tablet (25 mcg) by mouth daily 30 tablet 2     magnesium citrate solution [MAGNESIUM CITRATE SOLUTION] Take 296 mL by mouth daily as needed.       methocarbamol (ROBAXIN) 500 MG tablet Take 1 tablet (500 mg) by mouth 3 times daily as needed 90 tablet 3     montelukast (SINGULAIR) 10 MG tablet TAKE 1 TABLET (10 MG) BY MOUTH AT BEDTIME OFFICE VISIT NEEDED FOR ANY FURTHER REFILLS 90  tablet 3     multivitamin, therapeutic (THERA-VIT) TABS tablet Take 1 tablet by mouth daily       olopatadine (PATADAY) 0.2 % ophthalmic solution Place 0.05 mLs (1 drop) into both eyes daily 5 mL 6     OZEMPIC, 0.25 OR 0.5 MG/DOSE, 2 MG/1.5ML SOPN pen INJECT 0.25MG UNDER THE SKIN ONCE WEEKLY       semaglutide (OZEMPIC) 2 MG/1.5ML SOPN pen Inject 0.25 mg Subcutaneous once a week 4.5 mL 1     simvastatin (ZOCOR) 20 MG tablet Take 1 tablet (20 mg) by mouth At Bedtime 90 tablet 3     traZODone (DESYREL) 100 MG tablet Take 2 tablets (200 mg) by mouth At Bedtime 180 tablet 3     acetaminophen (TYLENOL) 500 MG tablet [ACETAMINOPHEN (TYLENOL) 500 MG TABLET] Take 1,000 mg by mouth every 6 (six) hours as needed for pain. (Patient not taking: Reported on 3/24/2023)       albuterol (PROAIR HFA/PROVENTIL HFA/VENTOLIN HFA) 108 (90 Base) MCG/ACT inhaler Inhale 2 puffs into the lungs every 6 hours as needed for shortness of breath, wheezing or cough (Patient not taking: Reported on 3/24/2023) 18 g 1     fluticasone-salmeterol (ADVAIR HFA) 115-21 MCG/ACT inhaler Inhale 2 puffs into the lungs 2 times daily (Patient not taking: Reported on 3/24/2023) 12 g 1       Allergies   Allergen Reactions     Cat/Feline Products [Cat Hair Extract] Itching     Trees Other (See Comments)     Grass, itching        Social History     Tobacco Use     Smoking status: Former     Packs/day: 1.00     Years: 8.00     Pack years: 8.00     Types: Cigarettes     Quit date: 1985     Years since quittin.2     Smokeless tobacco: Never   Substance Use Topics     Alcohol use: Yes     Alcohol/week: 0.0 standard drinks     Comment: Alcoholic Drinks/day: 1 drink/month     Family History   Problem Relation Age of Onset     Depression Mother      Chronic Obstructive Pulmonary Disease Mother      Diabetes Father      Skin Cancer Father 45     Colon Cancer Maternal Grandmother      Colon Cancer Maternal Grandfather      Diabetes Paternal Grandmother      Colon  "Cancer Paternal Grandmother 80     Testicular cancer Brother 44     Lung Cancer Paternal Aunt 60     Breast Cancer Cousin 48        Paternal side.     Anesthesia Reaction No family hx of      Glaucoma No family hx of      Macular Degeneration No family hx of      History   Drug Use No     Comment: Drug use: past use of marijuana and hash.         Objective     /80   Pulse 74   Temp 98.2  F (36.8  C)   Resp 14   Ht 1.753 m (5' 9\")   Wt 127 kg (280 lb)   SpO2 96%   BMI 41.35 kg/m      Physical Exam    GENERAL APPEARANCE: healthy, alert and no distress     EYES: EOMI, PERRL     HENT: ear canals and TM's normal and nose and mouth without ulcers or lesions     NECK: no adenopathy, no asymmetry, masses, or scars and thyroid normal to palpation     RESP: lungs clear to auscultation - no rales, rhonchi or wheezes     CV: regular rates and rhythm, normal S1 S2, no S3 or S4 and no murmur, click or rub     ABDOMEN:  soft, nontender, no HSM or masses and bowel sounds normal     MS: extremities normal- no gross deformities noted, no evidence of inflammation in joints, FROM in all extremities.     SKIN: no suspicious lesions or rashes     NEURO: Normal strength and tone, sensory exam grossly normal, mentation intact and speech normal     PSYCH: mentation appears normal. and affect normal/bright     LYMPHATICS: No cervical adenopathy    Recent Labs   Lab Test 02/07/23  1600 01/27/23  0641 01/26/23  0345 12/15/22  1458 09/15/22  1118   HGB 13.9 12.8   < >  --   --     213   < >  --   --     134*   < >  --   --    POTASSIUM 4.7 3.8   < >  --   --    CR 0.86 0.87   < >  --   --    A1C  --   --   --  7.4* 6.1*    < > = values in this interval not displayed.        Diagnostics:  Recent Results (from the past 168 hour(s))   HEMOGLOBIN A1C    Collection Time: 03/24/23  2:41 PM   Result Value Ref Range    Hemoglobin A1C 7.0 (H) 0.0 - 5.6 %   Basic metabolic panel    Collection Time: 03/24/23  2:41 PM   Result " Value Ref Range    Sodium 139 136 - 145 mmol/L    Potassium 4.5 3.4 - 5.3 mmol/L    Chloride 102 98 - 107 mmol/L    Carbon Dioxide (CO2) 23 22 - 29 mmol/L    Anion Gap 14 7 - 15 mmol/L    Urea Nitrogen 12.4 8.0 - 23.0 mg/dL    Creatinine 0.87 0.51 - 0.95 mg/dL    Calcium 9.1 8.6 - 10.0 mg/dL    Glucose 131 (H) 70 - 99 mg/dL    GFR Estimate 76 >60 mL/min/1.73m2   CBC with platelets    Collection Time: 03/24/23  2:41 PM   Result Value Ref Range    WBC Count 9.3 4.0 - 11.0 10e3/uL    RBC Count 4.63 3.80 - 5.20 10e6/uL    Hemoglobin 13.7 11.7 - 15.7 g/dL    Hematocrit 42.3 35.0 - 47.0 %    MCV 91 78 - 100 fL    MCH 29.6 26.5 - 33.0 pg    MCHC 32.4 31.5 - 36.5 g/dL    RDW 13.3 10.0 - 15.0 %    Platelet Count 187 150 - 450 10e3/uL      No EKG this visit, completed in the last 90 days.    Revised Cardiac Risk Index (RCRI):  The patient has the following serious cardiovascular risks for perioperative complications:   - High risk surgery (>5% cardiac complication risk) = 1 point     RCRI Interpretation: 1 point: Class II (low risk - 0.9% complication rate)           Signed Electronically by: Angelia Krishna MD  Copy of this evaluation report is provided to requesting physician.

## 2023-03-24 NOTE — PATIENT INSTRUCTIONS
For informational purposes only. Not to replace the advice of your health care provider. Copyright   2003,  [employer]. All rights reserved. Clinically reviewed by Nati Rousseau MD. [employer] [id number] - REV .  Preparing for Your Surgery  Getting started  A nurse will call you to review your health history and instructions. They will give you an arrival time based on your scheduled surgery time. Please be ready to share:    Your doctor's clinic name and phone number    Your medical, surgical, and anesthesia history    A list of allergies and sensitivities    A list of medicines, including herbal treatments and over-the-counter drugs    Whether the patient has a legal guardian (ask how to send us the papers in advance)  Please tell us if you're pregnant--or if there's any chance you might be pregnant. Some surgeries may injure a fetus (unborn baby), so they require a pregnancy test. Surgeries that are safe for a fetus don't always need a test, and you can choose whether to have one.   If you have a child who's having surgery, please ask for a copy of Preparing for Your Child's Surgery.    Preparing for surgery    Within 10 to 30 days of surgery: Have a pre-op exam (sometimes called an H&P, or History and Physical). This can be done at a clinic or pre-operative center.  ? If you're having a , you may not need this exam. Talk to your care team.    At your pre-op exam, talk to your care team about all medicines you take. If you need to stop any medicines before surgery, ask when to start taking them again.  ? We do this for your safety. Many medicines can make you bleed too much during surgery. Some change how well surgery (anesthesia) drugs work.    Call your insurance company to let them know you're having surgery. (If you don't have insurance, call [phone].)    Call your clinic if there's any change in your health. This includes signs of a cold or flu (sore throat, runny nose,  cough, rash, fever). It also includes a scrape or scratch near the surgery site.    If you have questions on the day of surgery, call your hospital or surgery center.  Eating and drinking guidelines  For your safety: Unless your surgeon tells you otherwise, follow the guidelines below.    Eat and drink as usual until 8 hours before you arrive for surgery. After that, no food or milk.    Drink clear liquids until 2 hours before you arrive. These are liquids you can see through, like water, Gatorade, and Propel Water. They also include plain black coffee and tea (no cream or milk), candy, and breath mints. You can spit out gum when you arrive.    If you drink alcohol: Stop drinking it the night before surgery.    If your care team tells you to take medicine on the morning of surgery, it's okay to take it with a sip of water.  Preventing infection    Shower or bathe the night before and morning of your surgery. Follow the instructions your clinic gave you. (If no instructions, use regular soap.)    Don't shave or clip hair near your surgery site. We'll remove the hair if needed.    Don't smoke or vape the morning of surgery. You may chew nicotine gum up to 2 hours before surgery. A nicotine patch is okay.  ? Note: Some surgeries require you to completely quit smoking and nicotine. Check with your surgeon.    Your care team will make every effort to keep you safe from infection. We will:  ? Clean our hands often with soap and water (or an alcohol-based hand rub).  ? Clean the skin at your surgery site with a special soap that kills germs.  ? Give you a special gown to keep you warm. (Cold raises the risk of infection.)  ? Wear special hair covers, masks, gowns and gloves during surgery.  ? Give antibiotic medicine, if prescribed. Not all surgeries need antibiotics.  What to bring on the day of surgery    Photo ID and insurance card    Copy of your health care directive, if you have one    Glasses and hearing aids (bring  cases)  ? You can't wear contacts during surgery    Inhaler and eye drops, if you use them (tell us about these when you arrive)    CPAP machine or breathing device, if you use them    A few personal items, if spending the night    If you have . . .  ? A pacemaker, ICD (cardiac defibrillator) or other implant: Bring the ID card.  ? An implanted stimulator: Bring the remote control.  ? A legal guardian: Bring a copy of the certified (court-stamped) guardianship papers.  Please remove any jewelry, including body piercings. Leave jewelry and other valuables at home.  If you're going home the day of surgery    You must have a responsible adult drive you home. They should stay with you overnight as well.    If you don't have someone to stay with you, and you aren't safe to go home alone, we may keep you overnight. Insurance often won't pay for this.  After surgery  If it's hard to control your pain or you need more pain medicine, please call your surgeon's office.  Questions?   If you have any questions for your care team, list them here: _________________________________________________________________________________________________________________________________________________________________________ ____________________________________ ____________________________________ ____________________________________    How to Take Your Medication Before Surgery  - Take all of your medications before surgery except as noted below  - HOLD (do not take) cetirizine the morning of surgery  - STOP taking all vitamins and herbal supplements 14 days before surgery.   Everything else is fine with a small sip of water.

## 2023-03-25 LAB
ANION GAP SERPL CALCULATED.3IONS-SCNC: 14 MMOL/L (ref 7–15)
BUN SERPL-MCNC: 12.4 MG/DL (ref 8–23)
CALCIUM SERPL-MCNC: 9.1 MG/DL (ref 8.6–10)
CHLORIDE SERPL-SCNC: 102 MMOL/L (ref 98–107)
CREAT SERPL-MCNC: 0.87 MG/DL (ref 0.51–0.95)
DEPRECATED HCO3 PLAS-SCNC: 23 MMOL/L (ref 22–29)
GFR SERPL CREATININE-BSD FRML MDRD: 76 ML/MIN/1.73M2
GLUCOSE SERPL-MCNC: 131 MG/DL (ref 70–99)
POTASSIUM SERPL-SCNC: 4.5 MMOL/L (ref 3.4–5.3)
SODIUM SERPL-SCNC: 139 MMOL/L (ref 136–145)

## 2023-03-30 ENCOUNTER — TELEPHONE (OUTPATIENT)
Dept: FAMILY MEDICINE | Facility: CLINIC | Age: 58
End: 2023-03-30
Payer: MEDICARE

## 2023-03-30 NOTE — TELEPHONE ENCOUNTER
Forms Request  Name of form/paperwork: [facility] pre [facility]  Have you been seen for this request: N/A  Do we have the form: yes, in Dr. Browne inbox  When is form needed by: when done  How would you like the form returned: fax  Patient Notified form requests are processed in 3-5 business days: unable to, pt left before speaking to them    Okay to leave a detailed message? Unsure, pt left before being able to ask

## 2023-04-26 ENCOUNTER — TRANSFERRED RECORDS (OUTPATIENT)
Dept: HEALTH INFORMATION MANAGEMENT | Facility: CLINIC | Age: 58
End: 2023-04-26
Payer: MEDICARE

## 2023-05-23 ENCOUNTER — TRANSFERRED RECORDS (OUTPATIENT)
Dept: HEALTH INFORMATION MANAGEMENT | Facility: CLINIC | Age: 58
End: 2023-05-23
Payer: MEDICARE

## 2023-05-26 NOTE — PROGRESS NOTES
Assessment:   Emani Vargas is a 58 y.o. y.o. female with past medical history significant for osteoporosis,chronic pain, bipolar disorder,  asthma, breast cancer status post lumpectomy and radiation, , obesity, type 2 diabetes mellitus who presents today for follow-up regarding chronic centralized low back pain, worse over the past 2 months since she had knee replacement surgery.  My review of been MRI lumbar spine from 2019 shows a posterior annular bulge and facet hypertrophy at L4-5 resulting in mild spinal canal stenosis.  There is facet hypertrophy L3-L5.  X-rays of the sacroiliac joint shows mild to moderate degenerative change.  Suspect she is primarily symptomatic from the lumbar facet arthropathy.  She did have reproduction pain with lumbar facet maneuvers bilaterally.          Plan:     A shared decision making plan was used.  The patient's values and choices were respected.  The following represents what was discussed and decided upon by the physician assistant and the patient.      1.  DIAGNOSTIC TESTS: I reviewed the MRI scans of the cervical, thoracic, and lumbar spine from 2019.  - I ordered an updated MRI lumbar spine for further evaluation, in preparation for interventional pain management.  I would also like to rule out worsening spinal stenosis.    2.  PHYSICAL THERAPY: Patient did physical therapy at [facility] ending June 7, 2019.  She felt this was very helpful.  She admits she is not doing home exercises.  - Entered a new referral for the patient to go back to [facility] physical therapy to do pool therapy.    3.  MEDICATIONS:  - I recommended that the patient try over-the-counter pain relieving patches with lidocaine.  - Patient can continue Tylenol as needed.  - Patient can continue Voltaren gel as needed  - Patient takes gabapentin 1600 milligrams at bedtime for bipolar) for pain  - Patient can continue methocarbamol as needed.  - Patient takes naproxen as needed  - Offered Celebrex  but patient reports that causes even more stomach irritation than naproxen.    4.  INTERVENTIONS:    -No interventions were ordere today.  We will await the results of her MRI lumbar spine.  Depending on those results I would likely offer lower lumbar medial branch blocks as a work-up toward radiofrequency ablation.     5.  PATIENT EDUCATION: Patient is in agreement the above plan.  All questions were answered.    6.  FOLLOW-UP:  I will send the patient a Agari message with herMRI results.  If she has questions or concerns in the meantime, she should not hesitate to call    Subjective:     Emani Vargas is a 58 y.o. female who presents today for follow-up regarding chronic and worsening centralized low back pain.  Pain has been worse, especially since she had left knee replacement surgery in April 2023.    Patient complains of centralized low back pain.  Pain extends from the L4 region to the tailbone.  Lately she has had more pain on the right than the left.  She states she pulled a muscle on the right side of her lower back when she lifted juwan litter about 2 weeks after her knee surgery.  She describes the pain as shooting and aching.  Denies any pain down the legs.  She has chronic numbness and tingling in her feet related to diabetes.  She is chronic urinary incontinence.  Denies loss of bowel control.  Denies recent fevers.  She rates her pain today as a 7 out of 10.  Pain is aggravated with standing, walking too long, sitting, transitioning from seated to standing.  Pain is alleviated with applying ice and lying on her left side.    Treatment to date:  -Patient last had physical therapy at [facility] ending June 7, 2019.  This was very helpful.  - Bilateral L4-5 facet joint injections August 16, 2016 with only short-term relief.      Review of Systems:  Positive for numbness/tingling, loss of bladder control, wetting pants, headache, pain much worse in neck, difficulty with hand skills.  Negative for  weakness, loss of bowel control, inability to urinate, trip/stumble/falls, difficulty swallowing, fevers, unintentional weight loss.     Objective:   CONSTITUTIONAL:  Vital signs as above.  No acute distress.  The patient is well nourished and well groomed.    PSYCHIATRIC:  The patient is awake, alert, oriented to person, place and time.  The patient is answering questions appropriately with clear speech.  Normal affect.  HEENT: Normocephalic, atraumatic.  Sclera clear.  Neck is supple.  SKIN:  Skin over the face, neck bilateral upper extremities is clean, dry, intact without rashes.  MUSCULOSKELETAL: Tender to palpation bilateral lower lumbar paraspinous muscles.  Tender to palpation bilateral sacroiliac joints.  Lumbar extension severely restricted.  Lumbar facet maneuvers reproduced low back pain bilaterally.  5/5 strength bilateral hip flexors, knee flexors/extensors, ankle dorsi/plantar flexors.  Able to rise onto toes and heels bilaterally with support.  NEUROLOGICAL: Sensation light touch intact bilateral lower extremities.    RESULTS:      I reviewed the MRI lumbar spine from [facility] dated April 25, 2019.  This shows a posterior annular bulge and facet hypertrophy at L4-5 resulting in mild spinal canal stenosis.  There is no neuroforaminal stenosis.  There are Schmorl's nodes about the upper lumbar interspaces and facet hypertrophy L3-L5.

## 2023-06-01 ENCOUNTER — OFFICE VISIT (OUTPATIENT)
Dept: PHYSICAL MEDICINE AND REHAB | Facility: CLINIC | Age: 58
End: 2023-06-01
Payer: MEDICARE

## 2023-06-01 ENCOUNTER — HOSPITAL ENCOUNTER (OUTPATIENT)
Dept: MAMMOGRAPHY | Facility: CLINIC | Age: 58
Discharge: HOME | End: 2023-06-01
Attending: FAMILY MEDICINE | Admitting: FAMILY MEDICINE
Payer: MEDICARE

## 2023-06-01 VITALS
WEIGHT: 270.2 LBS | HEART RATE: 85 BPM | SYSTOLIC BLOOD PRESSURE: 123 MMHG | HEIGHT: 69 IN | DIASTOLIC BLOOD PRESSURE: 81 MMHG | BODY MASS INDEX: 40.02 KG/M2

## 2023-06-01 DIAGNOSIS — Z12.31 ENCOUNTER FOR SCREENING MAMMOGRAM FOR BREAST CANCER: ICD-10-CM

## 2023-06-01 DIAGNOSIS — M47.816 LUMBAR FACET ARTHROPATHY: Primary | ICD-10-CM

## 2023-06-01 DIAGNOSIS — M54.50 LUMBAR SPINE PAIN: ICD-10-CM

## 2023-06-01 PROCEDURE — 99214 OFFICE O/P EST MOD 30 MIN: CPT | Performed by: PHYSICIAN ASSISTANT

## 2023-06-01 PROCEDURE — 77067 SCR MAMMO BI INCL CAD: CPT

## 2023-06-01 ASSESSMENT — PAIN SCALES - GENERAL: PAINLEVEL: SEVERE PAIN (7)

## 2023-06-01 NOTE — LETTER
6/1/2023         RE: Emani Vargas  [address] [place] [address]        Dear Colleague,    Thank you for referring your patient, Emani Vargas, to the [facility]. Please see a copy of my visit note below.    Assessment:   Emani Vargas is a 58 y.o. y.o. female with past medical history significant for osteoporosis,chronic pain, bipolar disorder,  asthma, breast cancer status post lumpectomy and radiation, , obesity, type 2 diabetes mellitus who presents today for follow-up regarding chronic centralized low back pain, worse over the past 2 months since she had knee replacement surgery.  My review of been MRI lumbar spine from 2019 shows a posterior annular bulge and facet hypertrophy at L4-5 resulting in mild spinal canal stenosis.  There is facet hypertrophy L3-L5.  X-rays of the sacroiliac joint shows mild to moderate degenerative change.  Suspect she is primarily symptomatic from the lumbar facet arthropathy.  She did have reproduction pain with lumbar facet maneuvers bilaterally.          Plan:     A shared decision making plan was used.  The patient's values and choices were respected.  The following represents what was discussed and decided upon by the physician assistant and the patient.      1.  DIAGNOSTIC TESTS: I reviewed the MRI scans of the cervical, thoracic, and lumbar spine from 2019.  - I ordered an updated MRI lumbar spine for further evaluation, in preparation for interventional pain management.  I would also like to rule out worsening spinal stenosis.    2.  PHYSICAL THERAPY: Patient did physical therapy at [facility] ending June 7, 2019.  She felt this was very helpful.  She admits she is not doing home exercises.  - Entered a new referral for the patient to go back to [facility] physical therapy to do pool therapy.    3.  MEDICATIONS:  - I recommended that the patient try over-the-counter pain relieving patches with lidocaine.  - Patient can  continue Tylenol as needed.  - Patient can continue Voltaren gel as needed  - Patient takes gabapentin 1600 milligrams at bedtime for bipolar) for pain  - Patient can continue methocarbamol as needed.  - Patient takes naproxen as needed  - Offered Celebrex but patient reports that causes even more stomach irritation than naproxen.    4.  INTERVENTIONS:    -No interventions were ordere today.  We will await the results of her MRI lumbar spine.  Depending on those results I would likely offer lower lumbar medial branch blocks as a work-up toward radiofrequency ablation.     5.  PATIENT EDUCATION: Patient is in agreement the above plan.  All questions were answered.    6.  FOLLOW-UP:  I will send the patient a [place] message with herMRI results.  If she has questions or concerns in the meantime, she should not hesitate to call    Subjective:     Emani Vargas is a 58 y.o. female who presents today for follow-up regarding chronic and worsening centralized low back pain.  Pain has been worse, especially since she had left knee replacement surgery in April 2023.    Patient complains of centralized low back pain.  Pain extends from the L4 region to the tailbone.  Lately she has had more pain on the right than the left.  She states she pulled a muscle on the right side of her lower back when she lifted juwan litter about 2 weeks after her knee surgery.  She describes the pain as shooting and aching.  Denies any pain down the legs.  She has chronic numbness and tingling in her feet related to diabetes.  She is chronic urinary incontinence.  Denies loss of bowel control.  Denies recent fevers.  She rates her pain today as a 7 out of 10.  Pain is aggravated with standing, walking too long, sitting, transitioning from seated to standing.  Pain is alleviated with applying ice and lying on her left side.    Treatment to date:  -Patient last had physical therapy at [facility] ending June 7, 2019.  This was very helpful.  -  Bilateral L4-5 facet joint injections August 16, 2016 with only short-term relief.      Review of Systems:  Positive for numbness/tingling, loss of bladder control, wetting pants, headache, pain much worse in neck, difficulty with hand skills.  Negative for weakness, loss of bowel control, inability to urinate, trip/stumble/falls, difficulty swallowing, fevers, unintentional weight loss.     Objective:   CONSTITUTIONAL:  Vital signs as above.  No acute distress.  The patient is well nourished and well groomed.    PSYCHIATRIC:  The patient is awake, alert, oriented to person, place and time.  The patient is answering questions appropriately with clear speech.  Normal affect.  HEENT: Normocephalic, atraumatic.  Sclera clear.  Neck is supple.  SKIN:  Skin over the face, neck bilateral upper extremities is clean, dry, intact without rashes.  MUSCULOSKELETAL: Tender to palpation bilateral lower lumbar paraspinous muscles.  Tender to palpation bilateral sacroiliac joints.  Lumbar extension severely restricted.  Lumbar facet maneuvers reproduced low back pain bilaterally.  5/5 strength bilateral hip flexors, knee flexors/extensors, ankle dorsi/plantar flexors.  Able to rise onto toes and heels bilaterally with support.  NEUROLOGICAL: Sensation light touch intact bilateral lower extremities.    RESULTS:      I reviewed the MRI lumbar spine from [facility] dated April 25, 2019.  This shows a posterior annular bulge and facet hypertrophy at L4-5 resulting in mild spinal canal stenosis.  There is no neuroforaminal stenosis.  There are Schmorl's nodes about the upper lumbar interspaces and facet hypertrophy L3-L5.             Again, thank you for allowing me to participate in the care of your patient.        Sincerely,        Flavia Camarena PA-C

## 2023-06-01 NOTE — PATIENT INSTRUCTIONS
[facility] Scheduling    Please call [phone] to schedule your image(s) (select option#1). There are 2 different locations, see below. You can do walk-in visits for xray only images if you want.     [facility]  [address] [address]    [address] [address]     We talked about moving forward with medial branch blocks as a work-up for radiofrequency ablation depending on the results of your MRI.

## 2023-06-08 ENCOUNTER — HOSPITAL ENCOUNTER (OUTPATIENT)
Dept: MAMMOGRAPHY | Facility: CLINIC | Age: 58
Discharge: HOME | End: 2023-06-08
Attending: FAMILY MEDICINE | Admitting: FAMILY MEDICINE
Payer: MEDICARE

## 2023-06-08 DIAGNOSIS — N64.89 BREAST ASYMMETRY: ICD-10-CM

## 2023-06-08 PROCEDURE — 77061 BREAST TOMOSYNTHESIS UNI: CPT | Mod: RT

## 2023-06-10 ENCOUNTER — HOSPITAL ENCOUNTER (OUTPATIENT)
Dept: MRI IMAGING | Facility: CLINIC | Age: 58
Discharge: HOME | End: 2023-06-10
Attending: PHYSICIAN ASSISTANT | Admitting: PHYSICIAN ASSISTANT
Payer: MEDICARE

## 2023-06-10 DIAGNOSIS — M47.816 LUMBAR FACET ARTHROPATHY: ICD-10-CM

## 2023-06-10 PROCEDURE — 72148 MRI LUMBAR SPINE W/O DYE: CPT | Mod: MF

## 2023-06-13 NOTE — PROGRESS NOTES
Assessment:   Emani Vargas is a 58 y.o. y.o. female with past medical history significant for osteoporosis,chronic pain, bipolar disorder,  asthma, breast cancer status post lumpectomy and radiation, , obesity, type 2 diabetes mellitus who presents today for follow-up regarding chronic bilateral low back pain, worse over the past 2-3 months since she had knee replacement surgery.  My review of been MRI lumbar spine shows lower lumbar facet arthropathy.  At L4-5 there is a disc protrusion causing right lateral recess stenosis.  Pain is most consistent with lumbar facet arthropathy.  She had reproduction of pain with lumbar facet loading maneuvers bilaterally.  She does not have any right lower extremity radicular pain.  She does describe numbness down the left lower extremity.  Unclear etiology.  She does not have any left-sided neural compromise on MRI.  Suspect she could have diabetic neuropathy.  On exam she reported a sensory deficit bilateral lower extremities.  Strength was intact.         Plan:     A shared decision making plan was used.  The patient's values and choices were respected.  The following represents what was discussed and decided upon by the physician assistant and the patient.      1.  DIAGNOSTIC TESTS: I reviewed the MRI lumbar spine.  I offered to get an EMG of the bilateral lower extremities for further evaluation of her paresthesias.  She declined.    2.  PHYSICAL THERAPY: Patient did physical therapy at [facility] ending June 7, 2019.  She felt this was very helpful.  She admits she is not doing home exercises.  - Patient is scheduled to return to physical therapy at [facility] June 29, 2023.    3.  MEDICATIONS:  - I recommended that the patient try over-the-counter pain relieving patches with lidocaine.  - Patient can continue Tylenol as needed.  - Patient can continue Voltaren gel as needed  - Patient takes gabapentin 1600 milligrams at bedtime   - Patient can continue methocarbamol  as needed.  - Patient takes naproxen as needed  - Offered Celebrex but patient reports that causes even more stomach irritation than naproxen.  - Patient requested oxycodone for hydrocodone.  She takes this at bedtime as needed.  I told the patient I do not recommend use of opiates for chronic pain.  She voiced understanding.    4.  INTERVENTIONS:    -I offer the patient bilateral L3, L4, L5 medial branch blocks as a work-up for radiofrequency ablation.  Patient indicated she would like to proceed and an order was placed.      5.  PATIENT EDUCATION: Patient is in agreement the above plan.  All questions were answered.    6.  FOLLOW-UP:  Patient will return to the clinic for bilateral L3, L4, L5 medial branch blocks.  If she has questions or concerns in the meantime, she should not hesitate to call.    Subjective:     Emani Vargas is a 58 y.o. female who presents today for follow-up regarding chronic and worsening centralized low back pain.  Pain has been worse, especially since she had left knee replacement surgery in April 2023.  I last saw the patient June 1, 2023.  At that time I ordered an MRI lumbar spine.  She returns today to review the results and discuss treatment options.  She denies any change in her pain since she was last seen.    Patient complains of bilateral lower back pain.  Pain is looking the lower lumbar region.  She denies pain down the legs.  She does have numbness and tingling that radiates down the left lateral leg into the lateral calf, and involving the whole foot.  She denies any numbness or tingling in the right leg.  She rates her pain today as a 6 or 7 out of 10.  At its worst it is an 8 of 10.  At its best it is a 4 out of 10.  Pain is aggravated with laying down, too much rest, walking, standing.  Pain is alleviated with walking, ice, rest.  Denies any new symptoms since she was last seen.    Treatment to date:  -Patient last had physical therapy at [facility] ending June 7, 2019.   This was very helpful.  She is scheduled to return to physical therapy June 29, 2023.  - Bilateral L4-5 facet joint injections August 16, 2016 with only short-term relief.  - Tylenol  - Voltaren gel  - Gabapentin 1600 mg at bedtime  - Methocarbamol  - Celebrex causes stomach irritation  - Naproxen as needed  - Oxycodone at bedtime as needed is helpful      Review of Systems:  Positive for numbness/tingling, intermittent weakness, loss of bladder control (chronic), headache, pain much worse in neck, difficulty with hand skills, unintentional weight loss (on Ozempic).  Negative for loss of bowel control, inability to urinate, trip/stumble/falls, difficulty swallowing, fevers.     Objective:   CONSTITUTIONAL:  Vital signs as above.  No acute distress.  The patient is well nourished and well groomed.    PSYCHIATRIC:  The patient is awake, alert, oriented to person, place and time.  The patient is answering questions appropriately with clear speech.  Normal affect.  HEENT: Normocephalic, atraumatic.  Sclera clear.  Neck is supple.  SKIN:  Skin over the face, neck bilateral upper extremities is clean, dry, intact without rashes.  MUSCULOSKELETAL: Tender to palpation bilateral lower lumbar paraspinous muscles.  Tender to palpation bilateral sacroiliac joints.  Lumbar extension severely restricted.  Lumbar facet maneuvers reproduced low back pain bilaterally.  5/5 strength bilateral hip flexors, knee flexors/extensors, ankle dorsi/plantar flexors.  Able to rise onto toes and heels bilaterally with support.  NEUROLOGICAL: Diminished sensation bilateral lower extremities distal to the knee.    RESULTS:      I reviewed the MRI lumbar spine from [facility] dated Sandy 10, 2023.  This shows a chronic L1 superior endplate compression fracture.  No additional fracture.  At L4-5 there is a disc protrusion contributing to right lateral recess stenosis and impingement of the right L5 nerve root.  There is moderate bilateral foraminal  stenosis at this level.  Patient also has moderate bilateral facet arthropathy L5-S1.

## 2023-06-15 ENCOUNTER — OFFICE VISIT (OUTPATIENT)
Dept: PHYSICAL MEDICINE AND REHAB | Facility: CLINIC | Age: 58
End: 2023-06-15
Payer: MEDICARE

## 2023-06-15 VITALS — DIASTOLIC BLOOD PRESSURE: 80 MMHG | SYSTOLIC BLOOD PRESSURE: 131 MMHG | HEART RATE: 82 BPM

## 2023-06-15 DIAGNOSIS — M47.816 LUMBAR FACET ARTHROPATHY: Primary | ICD-10-CM

## 2023-06-15 PROCEDURE — 99214 OFFICE O/P EST MOD 30 MIN: CPT | Performed by: PHYSICIAN ASSISTANT

## 2023-06-15 RX ORDER — OXYCODONE HYDROCHLORIDE 5 MG/1
TABLET ORAL
COMMUNITY
Start: 2023-04-18 | End: 2023-07-19

## 2023-06-15 ASSESSMENT — PAIN SCALES - GENERAL: PAINLEVEL: SEVERE PAIN (7)

## 2023-06-15 NOTE — LETTER
6/15/2023         RE: Emani Vargas  [address] [place] [address]        Dear Colleague,    Thank you for referring your patient, Emani Vargas, to the [facility]. Please see a copy of my visit note below.    Assessment:   Emani Vargas is a 58 y.o. y.o. female with past medical history significant for osteoporosis,chronic pain, bipolar disorder,  asthma, breast cancer status post lumpectomy and radiation, , obesity, type 2 diabetes mellitus who presents today for follow-up regarding chronic bilateral low back pain, worse over the past 2-3 months since she had knee replacement surgery.  My review of been MRI lumbar spine shows lower lumbar facet arthropathy.  At L4-5 there is a disc protrusion causing right lateral recess stenosis.  Pain is most consistent with lumbar facet arthropathy.  She had reproduction of pain with lumbar facet loading maneuvers bilaterally.  She does not have any right lower extremity radicular pain.  She does describe numbness down the left lower extremity.  Unclear etiology.  She does not have any left-sided neural compromise on MRI.  Suspect she could have diabetic neuropathy.  On exam she reported a sensory deficit bilateral lower extremities.  Strength was intact.         Plan:     A shared decision making plan was used.  The patient's values and choices were respected.  The following represents what was discussed and decided upon by the physician assistant and the patient.      1.  DIAGNOSTIC TESTS: I reviewed the MRI lumbar spine.  I offered to get an EMG of the bilateral lower extremities for further evaluation of her paresthesias.  She declined.    2.  PHYSICAL THERAPY: Patient did physical therapy at [facility] ending June 7, 2019.  She felt this was very helpful.  She admits she is not doing home exercises.  - Patient is scheduled to return to physical therapy at [facility] June 29, 2023.    3.  MEDICATIONS:  - I recommended that the  patient try over-the-counter pain relieving patches with lidocaine.  - Patient can continue Tylenol as needed.  - Patient can continue Voltaren gel as needed  - Patient takes gabapentin 1600 milligrams at bedtime   - Patient can continue methocarbamol as needed.  - Patient takes naproxen as needed  - Offered Celebrex but patient reports that causes even more stomach irritation than naproxen.  - Patient requested oxycodone for hydrocodone.  She takes this at bedtime as needed.  I told the patient I do not recommend use of opiates for chronic pain.  She voiced understanding.    4.  INTERVENTIONS:    -I offer the patient bilateral L3, L4, L5 medial branch blocks as a work-up for radiofrequency ablation.  Patient indicated she would like to proceed and an order was placed.      5.  PATIENT EDUCATION: Patient is in agreement the above plan.  All questions were answered.    6.  FOLLOW-UP:  Patient will return to the clinic for bilateral L3, L4, L5 medial branch blocks.  If she has questions or concerns in the meantime, she should not hesitate to call.    Subjective:     Emani Vargas is a 58 y.o. female who presents today for follow-up regarding chronic and worsening centralized low back pain.  Pain has been worse, especially since she had left knee replacement surgery in April 2023.  I last saw the patient June 1, 2023.  At that time I ordered an MRI lumbar spine.  She returns today to review the results and discuss treatment options.  She denies any change in her pain since she was last seen.    Patient complains of bilateral lower back pain.  Pain is looking the lower lumbar region.  She denies pain down the legs.  She does have numbness and tingling that radiates down the left lateral leg into the lateral calf, and involving the whole foot.  She denies any numbness or tingling in the right leg.  She rates her pain today as a 6 or 7 out of 10.  At its worst it is an 8 of 10.  At its best it is a 4 out of 10.  Pain is  aggravated with laying down, too much rest, walking, standing.  Pain is alleviated with walking, ice, rest.  Denies any new symptoms since she was last seen.    Treatment to date:  -Patient last had physical therapy at [facility] ending June 7, 2019.  This was very helpful.  She is scheduled to return to physical therapy June 29, 2023.  - Bilateral L4-5 facet joint injections August 16, 2016 with only short-term relief.  - Tylenol  - Voltaren gel  - Gabapentin 1600 mg at bedtime  - Methocarbamol  - Celebrex causes stomach irritation  - Naproxen as needed  - Oxycodone at bedtime as needed is helpful      Review of Systems:  Positive for numbness/tingling, intermittent weakness, loss of bladder control (chronic), headache, pain much worse in neck, difficulty with hand skills, unintentional weight loss (on Ozempic).  Negative for loss of bowel control, inability to urinate, trip/stumble/falls, difficulty swallowing, fevers.     Objective:   CONSTITUTIONAL:  Vital signs as above.  No acute distress.  The patient is well nourished and well groomed.    PSYCHIATRIC:  The patient is awake, alert, oriented to person, place and time.  The patient is answering questions appropriately with clear speech.  Normal affect.  HEENT: Normocephalic, atraumatic.  Sclera clear.  Neck is supple.  SKIN:  Skin over the face, neck bilateral upper extremities is clean, dry, intact without rashes.  MUSCULOSKELETAL: Tender to palpation bilateral lower lumbar paraspinous muscles.  Tender to palpation bilateral sacroiliac joints.  Lumbar extension severely restricted.  Lumbar facet maneuvers reproduced low back pain bilaterally.  5/5 strength bilateral hip flexors, knee flexors/extensors, ankle dorsi/plantar flexors.  Able to rise onto toes and heels bilaterally with support.  NEUROLOGICAL: Diminished sensation bilateral lower extremities distal to the knee.    RESULTS:      I reviewed the MRI lumbar spine from [facility] dated Sandy 10, 2023.   This shows a chronic L1 superior endplate compression fracture.  No additional fracture.  At L4-5 there is a disc protrusion contributing to right lateral recess stenosis and impingement of the right L5 nerve root.  There is moderate bilateral foraminal stenosis at this level.  Patient also has moderate bilateral facet arthropathy L5-S1.             Again, thank you for allowing me to participate in the care of your patient.        Sincerely,        Flavia Camarena PA-C

## 2023-06-15 NOTE — PATIENT INSTRUCTIONS
Medial Branch Block (MBB) TEST    This is a TEST injection to find out what is causing your pain.  Specifically, this test is trying to identify whichjoint in your back or neck is causing pain.   This injection will NOT provide any long term pain relief because it is just a TEST.  Steroid is NOT used for this injection.   Steroid is what gives longterm pain relief.  Since there is no steroid used, there is no long term pain relief.    You need a  for the procedure.    Because we want to determine what is causing your pain, we need you to do a few things on the day of your Medial Branch Block :     Do not take any pain medications on the day of your Medial Branch Block/Test.  Try to do activities that make our pain increase.  We want you tohave a high level of pain on the day of the test.  This makes it easier to know the results of the test.  Other information:    You may eatand drink on the day of the test.  You should take your other medications (just not pain medications) at the times you usually take them  You will be asked to fill out a pain diary for 6 hours after thetest.    AFTER THE TEST:    Please do not take any pain medications for 6 hours after the TEST.  After the pain diary is filled out, youmay resume taking your pain medications.  Please return the pain diary to the Spine Center the next day or as soon as you are able.  You will be contacted with what will happen next after the physicianreviews your pain diary.  You may be asked to come in for a follow-up appointment to discuss other treatment options  It may be recommended that you have an injection to help treatyour pain.  You may need to come in for a second set of Medial Branch Blocks (TESTS).        Please call the spine center at [phone] if you have any questions.

## 2023-06-27 ENCOUNTER — E-VISIT (OUTPATIENT)
Dept: FAMILY MEDICINE | Facility: CLINIC | Age: 58
End: 2023-06-27
Payer: MEDICARE

## 2023-06-27 DIAGNOSIS — M54.41 ACUTE LOW BACK PAIN WITH RIGHT-SIDED SCIATICA, UNSPECIFIED BACK PAIN LATERALITY: Primary | ICD-10-CM

## 2023-06-27 PROCEDURE — 99207 PR NON-BILLABLE SERV PER CHARTING: CPT | Performed by: FAMILY MEDICINE

## 2023-06-27 RX ORDER — METHYLPREDNISOLONE 4 MG
TABLET, DOSE PACK ORAL
Qty: 21 TABLET | Refills: 0 | Status: SHIPPED | OUTPATIENT
Start: 2023-06-27 | End: 2023-07-19

## 2023-06-28 NOTE — TELEPHONE ENCOUNTER
Provider E-Visit time total (minutes): 3      Assessment:   The primary encounter diagnosis was (M54.41) Acute low back pain with right-sided sciatica, unspecified back pain laterality  (primary encounter diagnosis)  Comment:   Plan: methylPREDNISolone (MEDROL DOSEPAK) 4 MG tablet        therapy pack              Plan:   No orders of the defined types were placed in this encounter.      Patient Instructions     Thank you for choosing us for your care. I have placed an order for a prescription so that you can start treatment. I know that you've had prednisone in the past, the Medrol often works better, so I sent this in instead for now. View your full visit summary for details by clicking on the link below. Your pharmacist will able to address any questions you may have about the medication.      If you're not feeling better within 2-3 weeks please schedule an appointment.  You can schedule an appointment right here in videoNEXT, or call [phone]  If the visit is for the same symptoms as your eVisit, we'll refund the cost of your eVisit if seen within seven days.        Subjective:   Emani Vargas is a 58 year old female who submitted an eVisit request for evaluation of   Chief Complaint   Patient presents with     Back Pain     Entered automatically based on patient selection in videoNEXT.     Back Pain       See the questionnaire and message section of encounter report for information related to history of present illness and review of systems.    Portions of the patient's history were reviewed and updated as appropriate.     Objective:   No exam performed today, patient submitted as eVisit.

## 2023-06-28 NOTE — PATIENT INSTRUCTIONS
Thank you for choosing us for your care. I have placed an order for a prescription so that you can start treatment. I know that you've had prednisone in the past, the Medrol often works better, so I sent this in instead for now. View your full visit summary for details by clicking on the link below. Your pharmacist will able to address any questions you may have about the medication.      If you're not feeling better within 2-3 weeks please schedule an appointment.  You can schedule an appointment right here in [facility], or call [phone]  If the visit is for the same symptoms as your eVisit, we'll refund the cost of your eVisit if seen within seven days.

## 2023-06-29 NOTE — PROGRESS NOTES
"Pulmonary Clinic Follow-up Visit    Assessment and Plan:   54 year old female with a history of remote tobacco use, obesity, vitamin D deficiency, osteoarthritis s/p bilateral LALA and right TKA, insomnia, possible asthma, GERD, right breast cancer s/p lumpectomy and radiation, chronic low back pain, bipolar disorder, allergic rhinitis, endometriosis, and chronic cough, presenting for follow-up.     Chronic cough: Present for years. Initially tried empiric therapy for upper airway cough syndrome (\"postnasal drip\" due to chronic rhinosinusitis) with nasal fluticasone and montelukast in addition to cetirizine, and increase in omeprazole from 20 mg daily to 40 mg daily (was having persistent nocturnal chest burning despite the lower dose), led to some improvement (from 6/10 to 4/10 with 10 being worse). At the last visit about six weeks ago, due to persistent postnasal drainage sensation and cough, changed from cetirizine to loratadine-pseudoephedrine, increased nasal fluticasone to twice-daily use, and started high-dose fluticasone-vilanterol, and this has led to significant improvement. She is sleeping better due to less drainage and cough, and she stopped the fluticasone-vilanterol for two days and felt worse, improved with rechallenge. Still has some phlegm in the throat, but less severe.    Plan:  - continue loratadine-pseudoephedrine 24-tab one daily  - continue nasal fluticasone one spray in each nostril two times a day  - continue fluticasone-vilanterol 200-25 mcg one inhalation daily; rinse/gargle/spit water after use  - continue montelukast 10 mg at bedtime  - continue omeprazole 40 mg daily  - advised trial of guaifenesin 600-1200 mg two times a day as needed for excessive throat congestion  - can deescalate above therapies at some point if clinically indicated  - follow up as needed  - encouraged her to call any time with questions or concerning symptoms     I appreciate the opportunity to participate in the " Monitor your blood pressure until her next appointment and bring in values to your next appointment "care of Ms. Vargas.  Please feel free to contact me at any time.     CCx: chronic cough    HPI: 54 year old female with a history of remote tobacco use, obesity, vitamin D deficiency, osteoarthritis s/p bilateral LALA and right TKA, insomnia, possible asthma, GERD, right breast cancer s/p lumpectomy and radiation, chronic low back pain, bipolar disorder, allergic rhinitis, endometriosis, and chronic cough, presenting for follow-up. Present for years. Initially tried empiric therapy for upper airway cough syndrome (\"postnasal drip\" due to chronic rhinosinusitis) with nasal fluticasone and montelukast in addition to cetirizine, and increase in omeprazole from 20 mg daily to 40 mg daily (was having persistent nocturnal chest burning despite the lower dose), led to some improvement (from 6/10 to 4/10 with 10 being worse). At the last visit about six weeks ago, due to persistent postnasal drainage sensation and cough, changed from cetirizine to loratadine-pseudoephedrine, increased nasal fluticasone to twice-daily use, and started high-dose fluticasone-vilanterol, and this has led to significant improvement. She is sleeping better due to less drainage and cough, and she stopped the fluticasone-vilanterol for two days and felt worse, improved with rechallenge. Still has some phlegm in the throat, but less severe.    ROS:  A 12-system review was obtained and was negative with the exception of the symptoms endorsed in the history of present illness.    PMH:  BMI 38.5  Hearing loss  OA s/p bilateral LALA, right TKA  Insomnia  Possible asthma  Back pain  Right breast cancer s/p lumpectomy and radiation  Bipolar  allergic rhinitis  Multiple allergies  Endometriosis  Likely chronic intermittent sinusitis  Chronic cough  GERD    PSH:  Past Surgical History:   Procedure Laterality Date     BREAST BIOPSY Right 2000's     BREAST BIOPSY Right 02/20/2017     BREAST LUMPECTOMY Right 03/10/2017    with sentinel lymph node biopsy.     " KNEE ARTHROSCOPY Right 1996    Description: Arthroscopy Knee Right;  Recorded: 12/08/2011;     KNEE SURGERY Right 1997    lateral release     LAPAROSCOPIC ENDOMETRIOSIS FULGURATION  2002     LA TOTAL KNEE ARTHROPLASTY Right 8/28/2017    Procedure: RIGHT TOTAL KNEE ARTHROPLASTY;  Surgeon: Antony Elias MD;  Location: [facility] Main OR;  Service: Orthopedics     TOTAL HIP ARTHROPLASTY N/A 9/24/2014    Procedure: LEFT HIP TOTAL ARTHROPLASTY;  Surgeon: Antony Elias MD;  Location: [facility] OR;  Service:      TOTAL HIP ARTHROPLASTY Right 8/10/2015    Procedure: HIP TOTAL ARTHROPLASTY RIGHT;  Surgeon: Antony Elias MD;  Location: [facility] Main OR;  Service:        Allergies:  Allergies   Allergen Reactions     Cat/Feline Products Itching     Egg Derived      Flu like symptoms--pain     Trees Other (See Comments)     Grass, itching       Family HX:  Family History   Problem Relation Age of Onset     Depression Mother      COPD Mother      Diabetes Father      Skin cancer Father 45     Colon cancer Maternal Grandmother      Colon cancer Maternal Grandfather      Diabetes Paternal Grandmother      Colon cancer Paternal Grandmother 80     Breast cancer Cousin 48        Paternal side.     Testicular cancer Brother 44     Lung cancer Paternal Aunt 60     Anesthesia problems Neg Hx        Social Hx:  Social History     Socioeconomic History     Marital status:      Spouse name: Not on file     Number of children: Not on file     Years of education: Not on file     Highest education level: Not on file   Occupational History     Occupation: Disabled.     Comment: Was an .   Social Needs     Financial resource strain: Not on file     Food insecurity:     Worry: Not on file     Inability: Not on file     Transportation needs:     Medical: Not on file     Non-medical: Not on file   Tobacco Use     Smoking status: Former Smoker     Packs/day: 1.00     Years: 8.00     Pack years: 8.00      Last attempt to quit: 1985     Years since quittin.4     Smokeless tobacco: Never Used   Substance and Sexual Activity     Alcohol use: Yes     Alcohol/week: 0.0 oz     Comment: 1 drink/month     Drug use: No     Comment: past use of marijuana and hash.     Sexual activity: Not on file   Lifestyle     Physical activity:     Days per week: Not on file     Minutes per session: Not on file     Stress: Not on file   Relationships     Social connections:     Talks on phone: Not on file     Gets together: Not on file     Attends Rastafari service: Not on file     Active member of club or organization: Not on file     Attends meetings of clubs or organizations: Not on file     Relationship status: Not on file     Intimate partner violence:     Fear of current or ex partner: Not on file     Emotionally abused: Not on file     Physically abused: Not on file     Forced sexual activity: Not on file   Other Topics Concern     Not on file   Social History Narrative     Not on file       Current Meds:  Current Outpatient Medications   Medication Sig Dispense Refill     acetaminophen (TYLENOL) 500 MG tablet Take 1,000 mg by mouth every 6 (six) hours as needed for pain.       albuterol (PROAIR HFA;PROVENTIL HFA;VENTOLIN HFA) 90 mcg/actuation inhaler Inhale 1-2 puffs every 4 (four) hours as needed for wheezing or shortness of breath. 1 Inhaler 11     buPROPion (WELLBUTRIN XL) 150 MG 24 hr tablet TAKE 1 TABLET (150 MG TOTAL) BY MOUTH EVERY MORNING. 90 tablet 2     cholecalciferol, vitamin D3, 1,000 unit tablet Take 2,000 Units by mouth daily.       dextroamphetamine-amphetamine 20 mg Tab Take 1 tablet by mouth 2 (two) times a day.  0     diclofenac sodium (VOLTAREN) 1 % Gel Apply approximately 1/2-1 gm over affected hand and/or left elbow joints tid-qid, as needed. 1 Tube 2     FLUoxetine (PROZAC) 20 MG capsule Take 20 mg by mouth daily.       fluticasone furoate-vilanterol (BREO ELLIPTA) 200-25 mcg/dose DsDv inhaler  "Inhale 1 puff daily. 30 each 11     fluticasone propionate (FLONASE ALLERGY RELIEF) 50 mcg/actuation nasal spray One spray in each nostril twice daily. 16 g 12     gabapentin (NEURONTIN) 400 MG capsule 2400 mg daily  11     letrozole (FEMARA) 2.5 mg tablet Take 1 tablet (2.5 mg total) by mouth daily. 90 tablet 3     loratadine-pseudoephedrine (CLARITIN-D 24 HOUR)  mg per 24 hr tablet Take 1 tablet by mouth daily. 30 tablet 11     magnesium citrate solution Take 296 mL by mouth daily as needed.       methocarbamol (ROBAXIN) 500 MG tablet Take 1 tablet (500 mg total) by mouth 3 (three) times a day. As needed 90 tablet 6     montelukast (SINGULAIR) 10 mg tablet Take 1 tablet (10 mg total) by mouth at bedtime. 30 tablet 11     omeprazole (PRILOSEC) 40 MG capsule Take 1 capsule (40 mg total) by mouth daily before breakfast. 30 capsule 11     traZODone (DESYREL) 100 MG tablet TAKE 1/2 TO 1 TABLET BY MOUTH NIGHTLY AT BEDTIME. 90 tablet 3     guaiFENesin ER (MUCINEX) 600 mg 12 hr tablet Take 1-2 tablets (600-1,200 mg total) by mouth 2 (two) times a day as needed for congestion. 120 tablet 11     No current facility-administered medications for this visit.        Physical Exam:  /64   Pulse 89   Resp 16   Ht 5' 10\" (1.778 m)   Wt (!) 268 lb 1.6 oz (121.6 kg)   LMP 08/10/2012   SpO2 94%   BMI 38.47 kg/m    Gen: alert, oriented, no distress  HEENT: nasal turbinates are unremarkable, no oropharyngeal lesions, no cervical or supraclavicular lymphadenopathy  CV: RRR, no M/G/R  Resp: CTAB, no focal crackles or wheezes  Abd: soft, nontender, no palpable organomegaly  Skin: no apparent rashes  Ext: no cyanosis, clubbing or edema  Neuro: alert, nonfocal    Labs:  reviewed    Imaging studies:  CXR (3/12/19):  - directly reviewed  - clear lungs  - no effusions    CT chest/abdomen/pelvis with oral/IV contrast (April 2017):  - images directly reviewed, formal interpretation follows:  FINDINGS:   CHEST: The lungs are " clear. The pleural spaces appear normal. No mediastinal or hilar lymphadenopathy. Postoperative changes are seen in the right breast and right axilla.     ABDOMEN: The liver is diffusely low in attenuation consistent with fatty infiltration. The gallbladder, spleen, adrenal glands, kidneys, and pancreas appear normal. The abdominal aorta is normal in caliber. The appendix is visualized and appears normal.     PELVIS: There are sigmoid diverticula without evidence of diverticulitis. Beam hardening artifact from bilateral total hip arthroplasties compromises evaluation. No definite mass.     MUSCULOSKELETAL: Bilateral total hip arthroplasties.     IMPRESSION:   CONCLUSION:  1.  No evidence of metastatic disease.     2.  Colonic diverticulosis without evidence of diverticulitis.     3.  Fatty infiltration of the liver.     4.  Postoperative changes in the right axilla and right breast.    CT head (January 2019):  - sinuses appear fairly clear, mild maxillary mucosal thickening     PFTs (4/26/19):  FEV1/FVC  is normal  FEV1 is normal  FVC is normal  There was no improvement in spirometry after a single inhaled dose of bronchodilator  TLC is normal  DLCO is normal when it is corrected for hemoglobin.    Marques Mary MD  Pulmonary and Critical Care Medicine  [facility]  Cell [phone]  Office [phone]  Pager [phone]

## 2023-07-10 ENCOUNTER — TELEPHONE (OUTPATIENT)
Dept: FAMILY MEDICINE | Facility: CLINIC | Age: 58
End: 2023-07-10
Payer: MEDICARE

## 2023-07-10 DIAGNOSIS — N64.89 BREAST ASYMMETRY: Primary | ICD-10-CM

## 2023-07-10 NOTE — TELEPHONE ENCOUNTER
Order/Referral Request    Who is requesting: pt    Orders being requested: follow up mammogram    Reason service is needed/diagnosis: abnormal mammogram 6/1/23    When are orders needed by: would like asap so she can schedule    Has this been discussed with Provider: No    Does patient have a preference on a Group/Provider/Facility? [facility]    Does patient have an appointment scheduled?: No    Where to send orders: Place orders within Epic    Could we send this information to you in [facility] or would you prefer to receive a phone call?:   Patient would like to be contacted via [facility]       Please let her know via [facility] once orders are in so she can schedule-needs to be 6 months from 6/1/23

## 2023-07-10 NOTE — TELEPHONE ENCOUNTER
6/8/23 mammogram result:  IMPRESSION:   6 month follow-up diagnostic right mammogram with magnification views.   I personally reviewed these findings and recommendations with the patient at the conclusion of the examination.      ACR BI-RADS Category 3: Probably Benign.        Pt requesting order now so she can schedule for January 2024.

## 2023-07-19 ENCOUNTER — RADIOLOGY INJECTION OFFICE VISIT (OUTPATIENT)
Dept: PHYSICAL MEDICINE AND REHAB | Facility: CLINIC | Age: 58
End: 2023-07-19
Attending: PHYSICIAN ASSISTANT
Payer: MEDICARE

## 2023-07-19 VITALS
SYSTOLIC BLOOD PRESSURE: 110 MMHG | RESPIRATION RATE: 18 BRPM | DIASTOLIC BLOOD PRESSURE: 70 MMHG | HEART RATE: 76 BPM | OXYGEN SATURATION: 94 % | TEMPERATURE: 98.5 F

## 2023-07-19 DIAGNOSIS — M47.816 LUMBAR FACET ARTHROPATHY: ICD-10-CM

## 2023-07-19 PROCEDURE — 64493 INJ PARAVERT F JNT L/S 1 LEV: CPT | Mod: 50 | Performed by: PAIN MEDICINE

## 2023-07-19 PROCEDURE — 64494 INJ PARAVERT F JNT L/S 2 LEV: CPT | Mod: 50 | Performed by: PAIN MEDICINE

## 2023-07-19 RX ORDER — LIDOCAINE HYDROCHLORIDE 10 MG/ML
INJECTION, SOLUTION EPIDURAL; INFILTRATION; INTRACAUDAL; PERINEURAL
Status: COMPLETED | OUTPATIENT
Start: 2023-07-19 | End: 2023-07-19

## 2023-07-19 RX ORDER — BUPIVACAINE HYDROCHLORIDE 5 MG/ML
INJECTION, SOLUTION EPIDURAL; INTRACAUDAL
Status: COMPLETED | OUTPATIENT
Start: 2023-07-19 | End: 2023-07-19

## 2023-07-19 RX ADMIN — BUPIVACAINE HYDROCHLORIDE 3 ML: 5 INJECTION, SOLUTION EPIDURAL; INTRACAUDAL at 14:30

## 2023-07-19 RX ADMIN — LIDOCAINE HYDROCHLORIDE 5 ML: 10 INJECTION, SOLUTION EPIDURAL; INFILTRATION; INTRACAUDAL; PERINEURAL at 14:29

## 2023-07-19 ASSESSMENT — PAIN SCALES - GENERAL
PAINLEVEL: MILD PAIN (2)
PAINLEVEL: SEVERE PAIN (7)

## 2023-07-25 ENCOUNTER — TELEPHONE (OUTPATIENT)
Dept: PHYSICAL MEDICINE AND REHAB | Facility: CLINIC | Age: 58
End: 2023-07-25
Payer: MEDICARE

## 2023-07-25 DIAGNOSIS — M54.16 LUMBAR RADICULOPATHY: Primary | ICD-10-CM

## 2023-07-25 NOTE — TELEPHONE ENCOUNTER
Spoke with patient regarding recommendations per DIANA Lam. Order for bilateral L4-5 TFESI was placed per note from PSP. Pt was agreeable with this plan and stated that she will schedule injection and call or schedule follow up if she has any other concerns. Phone number for nurse navigation line provided.     ----- Message from Flavia Camarena PA-C sent at 7/25/2023  7:30 AM CDT -----  Regarding: FW: MBB/RFA PROCESS  Failed MBB's.  We could try bilateral L4-5 transforaminal epidural steroid injections.  I am also happy to see the patient back in the clinic to discuss in person if she prefers.    ----- Message -----  From: Breyer, Nathan J, RN  Sent: 7/24/2023   9:58 AM CDT  To: Flavia Camarena PA-C; #  Subject: FW: MBB/RFA PROCESS                              Pain diary received and scanned into chart.  Please review and forward recommendation to Procedure Support Pool in-basket.  Thank you!    ----- Message -----  From: Ekaterina Zamudio RN  Sent: 7/19/2023   2:31 PM CDT  To: [facility] Procedure Support Pool  Subject: MBB/RFA PROCESS                                  Patient here today, 7/19/2023 for bilateral L3, L4, L5 MBBs. Awaiting pain diary at this time.

## 2023-08-06 ENCOUNTER — HEALTH MAINTENANCE LETTER (OUTPATIENT)
Age: 58
End: 2023-08-06

## 2023-08-21 ENCOUNTER — RADIOLOGY INJECTION OFFICE VISIT (OUTPATIENT)
Dept: PHYSICAL MEDICINE AND REHAB | Facility: CLINIC | Age: 58
End: 2023-08-21
Attending: PHYSICIAN ASSISTANT
Payer: MEDICARE

## 2023-08-21 VITALS
SYSTOLIC BLOOD PRESSURE: 112 MMHG | OXYGEN SATURATION: 94 % | TEMPERATURE: 97.3 F | RESPIRATION RATE: 16 BRPM | DIASTOLIC BLOOD PRESSURE: 62 MMHG | HEART RATE: 77 BPM

## 2023-08-21 DIAGNOSIS — E11.9 DIABETES MELLITUS, TYPE 2 (H): Primary | ICD-10-CM

## 2023-08-21 DIAGNOSIS — M79.7 FIBROMYALGIA: ICD-10-CM

## 2023-08-21 DIAGNOSIS — F31.30 BIPOLAR DISORDER, CURRENT EPISODE DEPRESSED, MILD OR MODERATE SEVERITY, UNSPECIFIED (H): ICD-10-CM

## 2023-08-21 DIAGNOSIS — M54.16 LUMBAR RADICULOPATHY: ICD-10-CM

## 2023-08-21 LAB — GLUCOSE SERPL-MCNC: 152 MG/DL (ref 70–99)

## 2023-08-21 PROCEDURE — 82962 GLUCOSE BLOOD TEST: CPT | Performed by: PAIN MEDICINE

## 2023-08-21 PROCEDURE — 64483 NJX AA&/STRD TFRM EPI L/S 1: CPT | Mod: 50 | Performed by: PAIN MEDICINE

## 2023-08-21 RX ORDER — LIDOCAINE HYDROCHLORIDE 10 MG/ML
INJECTION, SOLUTION EPIDURAL; INFILTRATION; INTRACAUDAL; PERINEURAL
Status: COMPLETED | OUTPATIENT
Start: 2023-08-21 | End: 2023-08-21

## 2023-08-21 RX ORDER — DEXAMETHASONE SODIUM PHOSPHATE 10 MG/ML
INJECTION, SOLUTION INTRAMUSCULAR; INTRAVENOUS
Status: COMPLETED | OUTPATIENT
Start: 2023-08-21 | End: 2023-08-21

## 2023-08-21 RX ADMIN — DEXAMETHASONE SODIUM PHOSPHATE 20 MG: 10 INJECTION, SOLUTION INTRAMUSCULAR; INTRAVENOUS at 14:11

## 2023-08-21 RX ADMIN — LIDOCAINE HYDROCHLORIDE 4 ML: 10 INJECTION, SOLUTION EPIDURAL; INFILTRATION; INTRACAUDAL; PERINEURAL at 14:10

## 2023-08-21 ASSESSMENT — PAIN SCALES - GENERAL
PAINLEVEL: SEVERE PAIN (6)
PAINLEVEL: MILD PAIN (2)

## 2023-08-21 NOTE — TELEPHONE ENCOUNTER
Refill Request  Medication name: Pending Prescriptions:                       Disp   Refills    gabapentin (NEURONTIN) 800 MG tablet      180 ta*1            Sig: Take 2 tablets (1,600 mg) by mouth At Bedtime    [facility]  [facility] [place] [address]

## 2023-08-21 NOTE — PATIENT INSTRUCTIONS
DISCHARGE INSTRUCTIONS    During office hours (8:00 a.m.- 4:00 p.m.) questions or concerns may be answered  by calling Spine Center Navigation Nurses at  [phone].  Messages received after hours will be returned the following business day.      In the case of an emergency, please dial 911 or seek assistance at the nearest Emergency Room/Urgent Care facility.     All Patients:    You may experience an increase in your symptoms for the first 2 days (It may take anywhere between 2 days- 2 weeks for the steroid to have maximum effect).    You may use ice on the injection site, as frequently as 20 minutes each hour if needed.    You may take your pain medicine.    You may continue taking your regular medication after your injection. If you have had a Medial Branch Block you may resume pain medication once your pain diary is completed.    You may shower. No swimming, tub bath or hot tub for 48 hours.  You may remove your bandaid/bandage as soon as you are home.    You may resume light activities, as tolerated.    Resume your usual diet as tolerated.    It is strongly advised that you do not drive for 1-3 hours post injection.    If you have had oral sedation:  Do not drive for 8 hours post injection.      If you have had IV sedation:  Do not drive for 24 hours post injection.  Do not operate hazardous machinery or make important personal/business decisions for 24 hours.      POSSIBLE STEROID SIDE EFFECTS (If steroid/cortisone was used for your procedure)    -If you experience these symptoms, it should only last for a short period    Swelling of the legs              Skin redness (flushing)     Mouth (oral) irritation   Blood sugar (glucose) levels            Sweats                    Mood changes  Headache  Sleeplessness  Weakened immune system for up to 14 days, which could increase the risk of garo the COVID-19 virus and/or experiencing more severe symptoms of the disease, if exposed.  Decreased  effectiveness of the flu vaccine if given within 2 weeks of the steroid.         POSSIBLE PROCEDURE SIDE EFFECTS  -Call the Spine Center if you are concerned  Increased Pain           Increased numbness/tingling      Nausea/Vomiting          Bruising/bleeding at site      Redness or swelling                                              Difficulty walking      Weakness           Fever greater than 100.5    *In the event of a severe headache after an epidural steroid injection that is relieved by lying down, please call the [facility] to speak with a clinical staff member*     Please be advised that your blood glucose levels may be increased for the next 5-7 days as a result of the steroid you received with your injection today.  It is recommended that you monitor your blood glucose levels closely over the next week and direct any additional questions regarding your blood glucose management to your primary doctor.

## 2023-08-22 RX ORDER — GABAPENTIN 800 MG/1
1600 TABLET ORAL AT BEDTIME
Qty: 180 TABLET | Refills: 1 | Status: SHIPPED | OUTPATIENT
Start: 2023-08-22 | End: 2024-02-22

## 2023-08-22 NOTE — TELEPHONE ENCOUNTER
Routing refill request to provider for review/approval because:  Drug not on the [facility] refill protocol     Last Written Prescription Date:  2/7/2023  Last Fill Quantity: 180,  # refills: 1   Last office visit provider:  3/24/2023     Requested Prescriptions   Pending Prescriptions Disp Refills    gabapentin (NEURONTIN) 800 MG tablet 180 tablet 1     Sig: Take 2 tablets (1,600 mg) by mouth At Bedtime       There is no refill protocol information for this order          Daniela Bocanegra RN 08/22/23 10:32 AM

## 2023-08-30 DIAGNOSIS — F31.30 BIPOLAR DISORDER, CURRENT EPISODE DEPRESSED, MILD OR MODERATE SEVERITY, UNSPECIFIED (H): ICD-10-CM

## 2023-08-30 NOTE — TELEPHONE ENCOUNTER
Refill Request  Medication name: Pending Prescriptions:                       Disp   Refills    FLUoxetine (PROZAC) 40 MG capsule         90 cap*1            Sig: Take 1 capsule (40 mg) by mouth daily    Requested Pharmacy:  [facility] [place] - [address]

## 2023-08-31 RX ORDER — FLUOXETINE 40 MG/1
40 CAPSULE ORAL DAILY
Qty: 90 CAPSULE | Refills: 1 | Status: SHIPPED | OUTPATIENT
Start: 2023-08-31 | End: 2024-03-13

## 2023-08-31 NOTE — TELEPHONE ENCOUNTER
"Routing refill request to provider for review/approval because:  PHQ-9 score    Last Written Prescription Date:  2/7/2023  Last Fill Quantity: 90,  # refills: 1   Last office visit provider:  3/24/2023     Requested Prescriptions   Pending Prescriptions Disp Refills    FLUoxetine (PROZAC) 40 MG capsule 90 capsule 1     Sig: Take 1 capsule (40 mg) by mouth daily       SSRIs Protocol Failed - 8/30/2023  4:53 PM        Failed - PHQ-9 score less than 5 in past 6 months     Please review last PHQ-9 score.           Passed - Medication is active on med list        Passed - Patient is age 18 or older        Passed - No active pregnancy on record        Passed - No positive pregnancy test in last 12 months        Passed - Recent (6 mo) or future (30 days) visit within the authorizing provider's specialty     Patient had office visit in the last 6 months or has a visit in the next 30 days with authorizing provider or within the authorizing provider's specialty.  See \"Patient Info\" tab in inbasket, or \"Choose Columns\" in Meds & Orders section of the refill encounter.                 Angelia Montano RN 08/30/23 11:24 PM  "

## 2023-09-25 ASSESSMENT — ENCOUNTER SYMPTOMS
HEARTBURN: 0
CONSTIPATION: 1
ARTHRALGIAS: 0
EYE PAIN: 0
COUGH: 0
MYALGIAS: 0
PALPITATIONS: 0
PARESTHESIAS: 0
DYSURIA: 0
FREQUENCY: 0
HEADACHES: 1
CHILLS: 1
SHORTNESS OF BREATH: 0
HEMATOCHEZIA: 0
BREAST MASS: 0
DIARRHEA: 0
NAUSEA: 1
WEAKNESS: 0
DIZZINESS: 0
ABDOMINAL PAIN: 0
NERVOUS/ANXIOUS: 0
JOINT SWELLING: 0
FEVER: 0
HEMATURIA: 0
SORE THROAT: 0

## 2023-09-25 ASSESSMENT — ACTIVITIES OF DAILY LIVING (ADL): CURRENT_FUNCTION: NO ASSISTANCE NEEDED

## 2023-09-25 ASSESSMENT — PATIENT HEALTH QUESTIONNAIRE - PHQ9
10. IF YOU CHECKED OFF ANY PROBLEMS, HOW DIFFICULT HAVE THESE PROBLEMS MADE IT FOR YOU TO DO YOUR WORK, TAKE CARE OF THINGS AT HOME, OR GET ALONG WITH OTHER PEOPLE: VERY DIFFICULT
SUM OF ALL RESPONSES TO PHQ QUESTIONS 1-9: 21
SUM OF ALL RESPONSES TO PHQ QUESTIONS 1-9: 21

## 2023-09-25 NOTE — PROGRESS NOTES
Assessment:   Emani Vargas is a 58 y.o. y.o. female with past medical history significant for osteoporosis,chronic pain, bipolar disorder,  asthma, breast cancer status post lumpectomy and radiation, , obesity, type 2 diabetes mellitus who presents today for follow-up regarding chronic bilateral low back pain.  My review of been MRI lumbar spine shows lower lumbar facet arthropathy.  At L4-5 there is a disc protrusion causing right lateral recess stenosis.  Patient is following up after bilateral L4-5 transforaminal epidural steroid injections August 21, 2023 which provided 80% relief of pain.  - Patient also complains of coccydynia.  She was hopeful this would resolve with the epidural steroid injections but it has not changed.         Plan:     A shared decision making plan was used.  The patient's values and choices were respected.  The following represents what was discussed and decided upon by the physician assistant and the patient.      1.  DIAGNOSTIC TESTS: I reviewed the MRI lumbar spine.  No additional diagnostic test were ordered.    2.  PHYSICAL THERAPY: Patient is currently in physical therapy at [facility] she will continue with physical therapy and the home exercises.    3.  MEDICATIONS:  - No changes are made to the patient's medications.  - Patient can continue Tylenol as needed.  - Patient can continue Voltaren gel as needed  - Patient takes gabapentin 1600 milligrams at bedtime   - Patient can continue methocarbamol as needed.  - Patient takes naproxen as needed      4.  INTERVENTIONS:    -No interventions were ordered today.  As long as patient has at least 50% relief of pain for at least 3 months we could repeat the bilateral L4-5 transforaminal epidural steroid injections.  - If that does not help we could consider an L5-S1 interlaminar epidural steroid injection.  - If tailbone pain persist we could also consider a sacrococcygeal joint injection.    5.  PATIENT EDUCATION: Patient is in  agreement the above plan.  All questions were answered.    6.  FOLLOW-UP: Patient is going to follow-up as needed.  If she has questions or concerns, she should not hesitate to call.    Subjective:     Emani Vargas is a 58 y.o. female who presents today for follow-up regarding chronic low back pain.  Patient is following up after bilateral L4-5 transforaminal epidural steroid injections August 21, 2023.  Patient reports 80% relief of pain.  She has also had significant improvement in function.  She went on a 7.5-hour road trip since after the injection which she states she would not have been able to do before because of her pain.  She is also able to walk better and shopping is easier.  She is happy with her outcome.    Patient complains of mild residual bilateral low back pain.  She also has pain at the tailbone.  Denies pain down the legs.  She has chronic numbness and tingling on the left lateral thigh and lateral calf which is slightly better.  She has unchanged numbness and tingling both feet.  She reports improvement and bilateral lower extremity weakness after the injections.  She has chronic loss of bladder control.  She rates her pain today as a 3 out of 10.  At its worst it is an 8-10.  At its best it is a 2 out of 10.  She denies aggravating or alleviating factors to the pain.  Denies any new symptoms since she was last seen.    Treatment to date:  - Current physical therapy at [facility]  - Bilateral L4-5 transforaminal epidural steroid injections August 21, 2023 with 80% relief of pain  - Failed to have adequate relief with bilateral L3, 4, 5 new branch blocks July 19, 2023  - Bilateral L4-5 facet joint injections August 16, 2016 with only short-term relief.  - Tylenol  - Voltaren gel  - Gabapentin 1600 mg at bedtime  - Methocarbamol  - Celebrex causes stomach irritation  - Naproxen as needed  - Oxycodone at bedtime as needed is helpful      Review of Systems:  Positive for numbness/tingling,  intermittent weakness, loss of bladder control (chronic), headache, pain much worse at night, trip/stumble/falls, difficulty swallowing, difficulty with hand skills.  Negative for loss of bowel control, inability to urinate,  fevers, unintentional weight loss.     Objective:   CONSTITUTIONAL:  Vital signs as above.  No acute distress.  The patient is well nourished and well groomed.    PSYCHIATRIC:  The patient is awake, alert, oriented to person, place and time.  The patient is answering questions appropriately with clear speech.  Normal affect.  HEENT: Normocephalic, atraumatic.  Sclera clear.  Neck is supple.  SKIN:  Skin over the face, neck bilateral upper extremities is clean, dry, intact without rashes.  MUSCULOSKELETAL: Gait is antalgic, favoring the left.  Able to rise onto toes and heels bilaterally with support..  5/5 strength bilateral hip flexors, knee flexors/extensors, ankle dorsi/plantar flexors.  NEUROLOGICAL: Diminished sensation bilateral lower extremities distal to the knee.    RESULTS:      I reviewed the MRI lumbar spine from [facility] dated Sandy 10, 2023.  This shows a chronic L1 superior endplate compression fracture.  No additional fracture.  At L4-5 there is a disc protrusion contributing to right lateral recess stenosis and impingement of the right L5 nerve root.  There is moderate bilateral foraminal stenosis at this level.  Patient also has moderate bilateral facet arthropathy L5-S1.

## 2023-09-25 NOTE — COMMUNITY RESOURCES LIST (ENGLISH)
09/25/2023   [facility]  N/A  For questions about this resource list or additional care needs, please contact your primary care clinic or care manager.  Phone: [phone]   Email: N/A   Address: [address] [place] [id number]   Hours: N/A        Food and Nutrition       Food pantry  1  [employer] Distance: 3.82 miles      In-Person, Delivery, Pickup   [address] [place] [address]  Language: English, Azerbaijani  Hours: Mon - Fri 8:45 AM - 11:30 AM , Mon - Fri 1:00 PM - 3:30 PM  Fees: Free   Phone: (344) 216-4380 Email: info@[email or web address] Website: [email or web address]     2  [facility] Distance: 7.07 miles      [facility]   [address] [place] [address]  Language: Icelandic, English, Hmong, Lise, Azerbaijani  Hours: Mon 1:00 PM - 4:00 PM , Mon 5:00 PM - 6:30 PM , Tue - Fri 9:00 AM - 11:30 AM , Tue - Fri 1:00 PM - 3:30 PM  Fees: Free   Phone: (214) 207-3506 Website: [email or web address]     SNAP application assistance  3  [facility] Distance: 5.02 miles      In-Person, Phone/Virtual   [address] [place] [address]  Language: English  Hours: Mon - Fri 8:00 AM - 4:30 PM  Fees: Free   Phone: (633) 462-8206 Email: [email or web address] Website: [email or web address]     4  [facility] Distance: 5.83 miles      In-Person, Phone/Virtual   [address] [place] [address]  Language: English  Hours: Mon - Fri 8:00 AM - 4:30 PM  Fees: Free   Phone: (393) 912-9489 Email: [email or web address] Website: [email or web address]://[email or web address]/787/Economic-Support     Soup kitchen or free meals  5  [facility] Distance: 2.5 miles      In-Person, [place]    [address] [place] [address]  Language: English  Hours: Mon - Thu 5:00 PM - 6:30 PM  Fees: Free   Phone: (326) 758-1570 Email: office@[employer] Website: http://[employer]/     6  [facility] Distance: 3.31 miles      In-Person   [address] [place] [address]  Language: English  Hours: Fri 6:00 PM - 8:00 PM  Fees: Free   Phone: (352) 358-2264 Email: marisol@[employer].org Website: http://www.[employer].org/          Important Numbers & Websites       Emergency Services   911  City Services   311  Poison Control   (991) 641-2657  Suicide Prevention Lifeline   (597) 501-6239 (TALK)  Child Abuse Hotline   (147) 889-9934 (4-A-Child)  Sexual Assault Hotline   (820) 367-4403 (HOPE)  National Runaway Safeline   (983) 212-8958 (RUNAWAY)  All-Options Talkline   (539) 485-9103  Substance Abuse Referral   (617) 926-8166 (HELP)

## 2023-09-26 ENCOUNTER — OFFICE VISIT (OUTPATIENT)
Dept: FAMILY MEDICINE | Facility: CLINIC | Age: 58
End: 2023-09-26
Payer: MEDICARE

## 2023-09-26 VITALS
BODY MASS INDEX: 40.43 KG/M2 | SYSTOLIC BLOOD PRESSURE: 108 MMHG | HEIGHT: 69 IN | RESPIRATION RATE: 15 BRPM | OXYGEN SATURATION: 94 % | DIASTOLIC BLOOD PRESSURE: 68 MMHG | HEART RATE: 82 BPM | TEMPERATURE: 98 F | WEIGHT: 273 LBS

## 2023-09-26 DIAGNOSIS — R92.2 INCONCLUSIVE MAMMOGRAM: ICD-10-CM

## 2023-09-26 DIAGNOSIS — H10.13 ALLERGIC CONJUNCTIVITIS OF BOTH EYES: ICD-10-CM

## 2023-09-26 DIAGNOSIS — J32.9 CHRONIC SINUSITIS, UNSPECIFIED LOCATION: ICD-10-CM

## 2023-09-26 DIAGNOSIS — E11.9 TYPE 2 DIABETES MELLITUS WITHOUT COMPLICATION, WITHOUT LONG-TERM CURRENT USE OF INSULIN (H): ICD-10-CM

## 2023-09-26 DIAGNOSIS — Z00.00 ENCOUNTER FOR MEDICARE ANNUAL WELLNESS EXAM: Primary | ICD-10-CM

## 2023-09-26 DIAGNOSIS — E03.8 SUBCLINICAL HYPOTHYROIDISM: ICD-10-CM

## 2023-09-26 DIAGNOSIS — F31.9 BIPOLAR AFFECTIVE DISORDER, REMISSION STATUS UNSPECIFIED (H): ICD-10-CM

## 2023-09-26 DIAGNOSIS — R05.3 CHRONIC COUGH: ICD-10-CM

## 2023-09-26 DIAGNOSIS — Z12.31 ENCOUNTER FOR SCREENING MAMMOGRAM FOR BREAST CANCER: ICD-10-CM

## 2023-09-26 LAB
ALBUMIN SERPL BCG-MCNC: 4.1 G/DL (ref 3.5–5.2)
ALP SERPL-CCNC: 75 U/L (ref 35–104)
ALT SERPL W P-5'-P-CCNC: 24 U/L (ref 0–50)
ANION GAP SERPL CALCULATED.3IONS-SCNC: 11 MMOL/L (ref 7–15)
AST SERPL W P-5'-P-CCNC: 33 U/L (ref 0–45)
BILIRUB SERPL-MCNC: 0.3 MG/DL
BUN SERPL-MCNC: 7.8 MG/DL (ref 8–23)
CALCIUM SERPL-MCNC: 9.3 MG/DL (ref 8.6–10)
CHLORIDE SERPL-SCNC: 101 MMOL/L (ref 98–107)
CHOLEST SERPL-MCNC: 133 MG/DL
CREAT SERPL-MCNC: 0.94 MG/DL (ref 0.51–0.95)
CREAT UR-MCNC: 126 MG/DL
DEPRECATED HCO3 PLAS-SCNC: 27 MMOL/L (ref 22–29)
EGFRCR SERPLBLD CKD-EPI 2021: 70 ML/MIN/1.73M2
GLUCOSE SERPL-MCNC: 166 MG/DL (ref 70–99)
HBA1C MFR BLD: 6 % (ref 0–5.6)
HDLC SERPL-MCNC: 46 MG/DL
LDLC SERPL CALC-MCNC: 63 MG/DL
MICROALBUMIN UR-MCNC: <12 MG/L
MICROALBUMIN/CREAT UR: NORMAL MG/G{CREAT}
NONHDLC SERPL-MCNC: 87 MG/DL
POTASSIUM SERPL-SCNC: 4.2 MMOL/L (ref 3.4–5.3)
PROT SERPL-MCNC: 7.1 G/DL (ref 6.4–8.3)
SODIUM SERPL-SCNC: 139 MMOL/L (ref 135–145)
T4 FREE SERPL-MCNC: 1.01 NG/DL (ref 0.9–1.7)
TRIGL SERPL-MCNC: 121 MG/DL
TSH SERPL DL<=0.005 MIU/L-ACNC: 5.03 UIU/ML (ref 0.3–4.2)

## 2023-09-26 PROCEDURE — 99214 OFFICE O/P EST MOD 30 MIN: CPT | Mod: 25 | Performed by: FAMILY MEDICINE

## 2023-09-26 PROCEDURE — 36415 COLL VENOUS BLD VENIPUNCTURE: CPT | Performed by: FAMILY MEDICINE

## 2023-09-26 PROCEDURE — 84439 ASSAY OF FREE THYROXINE: CPT | Performed by: FAMILY MEDICINE

## 2023-09-26 PROCEDURE — 80061 LIPID PANEL: CPT | Performed by: FAMILY MEDICINE

## 2023-09-26 PROCEDURE — 84443 ASSAY THYROID STIM HORMONE: CPT | Performed by: FAMILY MEDICINE

## 2023-09-26 PROCEDURE — 80053 COMPREHEN METABOLIC PANEL: CPT | Performed by: FAMILY MEDICINE

## 2023-09-26 PROCEDURE — G0439 PPPS, SUBSEQ VISIT: HCPCS | Performed by: FAMILY MEDICINE

## 2023-09-26 PROCEDURE — 83036 HEMOGLOBIN GLYCOSYLATED A1C: CPT | Performed by: FAMILY MEDICINE

## 2023-09-26 PROCEDURE — 82570 ASSAY OF URINE CREATININE: CPT | Performed by: FAMILY MEDICINE

## 2023-09-26 PROCEDURE — 82043 UR ALBUMIN QUANTITATIVE: CPT | Performed by: FAMILY MEDICINE

## 2023-09-26 RX ORDER — BUPROPION HYDROCHLORIDE 150 MG/1
150 TABLET ORAL EVERY MORNING
Qty: 90 TABLET | Refills: 1 | Status: SHIPPED | OUTPATIENT
Start: 2023-09-26 | End: 2024-04-10

## 2023-09-26 RX ORDER — MONTELUKAST SODIUM 10 MG/1
TABLET ORAL
Qty: 90 TABLET | Refills: 3 | Status: SHIPPED | OUTPATIENT
Start: 2023-09-26 | End: 2024-10-02

## 2023-09-26 RX ORDER — OLOPATADINE HYDROCHLORIDE 2 MG/ML
1 SOLUTION/ DROPS OPHTHALMIC DAILY
Qty: 5 ML | Refills: 6 | Status: SHIPPED | OUTPATIENT
Start: 2023-09-26

## 2023-09-26 ASSESSMENT — ENCOUNTER SYMPTOMS
MYALGIAS: 0
DIZZINESS: 0
ABDOMINAL PAIN: 0
EYE PAIN: 0
NERVOUS/ANXIOUS: 0
FREQUENCY: 0
WEAKNESS: 0
CHILLS: 1
PALPITATIONS: 0
FEVER: 0
ARTHRALGIAS: 0
HEADACHES: 1
BREAST MASS: 0
COUGH: 0
HEMATURIA: 0
NAUSEA: 1
HEARTBURN: 0
SHORTNESS OF BREATH: 0
DYSURIA: 0
SORE THROAT: 0
JOINT SWELLING: 0
DIARRHEA: 0
HEMATOCHEZIA: 0
CONSTIPATION: 1
PARESTHESIAS: 0

## 2023-09-26 ASSESSMENT — ACTIVITIES OF DAILY LIVING (ADL): CURRENT_FUNCTION: NO ASSISTANCE NEEDED

## 2023-09-26 NOTE — PATIENT INSTRUCTIONS
Patient Education   Personalized Prevention Plan  You are due for the preventive services outlined below.  Your care team is available to assist you in scheduling these services.  If you have already completed any of these items, please share that information with your care team to update in your medical record.  Health Maintenance Due   Topic Date Due     COPD Action Plan  Never done     Hepatitis B Vaccine (1 of 3 - 3-dose series) Never done     Pneumococcal Vaccine (1 - PCV) Never done     Zoster (Shingles) Vaccine (1 of 2) Never done     COVID-19 Vaccine (4 - Pfizer series) 02/18/2022     Cholesterol Lab  06/01/2023     Annual Wellness Visit  06/08/2023     ANNUAL REVIEW OF HM ORDERS  06/08/2023     A1C Lab  06/24/2023     Flu Vaccine (1) 09/01/2023     Kidney Microalbumin Urine Test  09/15/2023     Diabetic Foot Exam  09/15/2023     Your Health Risk Assessment indicates you feel you are not in good health    A healthy lifestyle helps keep the body fit and the mind alert. It helps protect you from disease, helps you fight disease, and helps prevent chronic disease (disease that doesn't go away) from getting worse. This is important as you get older and begin to notice twinges in muscles and joints and a decline in the strength and stamina you once took for granted. A healthy lifestyle includes good healthcare, good nutrition, weight control, recreation, and regular exercise. Avoid harmful substances and do what you can to keep safe. Another part of a healthy lifestyle is stay mentally active and socially involved.    Good healthcare   Have a wellness visit every year.   If you have new symptoms, let us know right away. Don't wait until the next checkup.   Take medicines exactly as prescribed and keep your medicines in a safe place. Tell us if your medicine causes problems.   Healthy diet and weight control   Eat 3 or 4 small, nutritious, low-fat, high-fiber meals a day. Include a variety of fruits, vegetables,  "and whole-grain foods.   Make sure you get enough calcium in your diet. Calcium, vitamin D, and exercise help prevent osteoporosis (bone thinning).   If you live alone, try eating with others when you can. That way you get a good meal and have company while you eat it.   Try to keep a healthy weight. If you eat more calories than your body uses for energy, it will be stored as fat and you will gain weight.     Recreation   Recreation is not limited to sports and team events. It includes any activity that provides relaxation, interest, enjoyment, and exercise. Recreation provides an outlet for physical, mental, and social energy. It can give a sense of worth and achievement. It can help you stay healthy.    Mental Exercise and Social Involvement  Mental and emotional health is as important as physical health. Keep in touch with friends and family. Stay as active as possible. Continue to learn and challenge yourself.   Things you can do to stay mentally active are:  Learn something new, like a foreign language or musical instrument.   Play SCRABBLE or do crossword puzzles. If you cannot find people to play these games with you at home, you can play them with others on your computer through the Internet.   Join a games club--anything from card games to chess or checkers or lawn bowling.   Start a new hobby.   Go back to school.   Volunteer.   Read.   Keep up with world events.  Learning About Being Physically Active  What is physical activity?     Being physically active means doing any kind of activity that gets your body moving.  The types of physical activity that can help you get fit and stay healthy include:  Aerobic or \"cardio\" activities. These make your heart beat faster and make you breathe harder, such as brisk walking, riding a bike, or running. They strengthen your heart and lungs and build up your endurance.  Strength training activities. These make your muscles work against, or \"resist,\" something. " Examples include lifting weights or doing push-ups. These activities help tone and strengthen your muscles and bones.  Stretches. These let you move your joints and muscles through their full range of motion. Stretching helps you be more flexible.  Reaching a balance between these three types of physical activity is important because each one contributes to your overall fitness.  What are the benefits of being active?  Being active is one of the best things you can do for your health. It helps you to:  Feel stronger and have more energy to do all the things you like to do.  Focus better at school or work.  Feel, think, and sleep better.  Reach and stay at a healthy weight.  Lose fat and build lean muscle.  Lower your risk for serious health problems, including diabetes, heart attack, high blood pressure, and some cancers.  Keep your heart, lungs, bones, muscles, and joints strong and healthy.  How can you make being active part of your life?  Start slowly. Make it your long-term goal to get at least 30 minutes of exercise on most days of the week. Walking is a good choice. You also may want to do other activities, such as running, swimming, cycling, or playing tennis or team sports.  Pick activities that you like--ones that make your heart beat faster, your muscles stronger, and your muscles and joints more flexible. If you find more than one thing you like doing, do them all. You don't have to do the same thing every day.  Get your heart pumping every day. Any activity that makes your heart beat faster and keeps it at that rate for a while counts.  Here are some great ways to get your heart beating faster:  Go for a brisk walk, run, or bike ride.  Go for a hike or swim.  Go in-line skating.  Play a game of touch football, basketball, or soccer.  Ride a bike.  Play tennis or racquetball.  Climb stairs.  Even some household chores can be aerobic--just do them at a faster pace. Vacuuming, raking or mowing the lawn,  "sweeping the garage, and washing and waxing the car all can help get your heart rate up.  Strengthen your muscles during the week. You don't have to lift heavy weights or grow big, bulky muscles to get stronger. Doing a few simple activities that make your muscles work against, or \"resist,\" something can help you get stronger.  For example, you can:  Do push-ups or sit-ups, which use your own body weight as resistance.  Lift weights or dumbbells or use stretch bands at home or in a gym or community center.  Stretch your muscles often. Stretching will help you as you become more active. It can help you stay flexible, loosen tight muscles, and avoid injury. It can also help improve your balance and posture and can be a great way to relax.  Be sure to stretch the muscles you'll be using when you work out. It's best to warm your muscles slightly before you stretch them. Walk or do some other light aerobic activity for a few minutes, and then start stretching.  When you stretch your muscles:  Do it slowly. Stretching is not about going fast or making sudden movements.  Don't push or bounce during a stretch.  Hold each stretch for at least 15 to 30 seconds, if you can. You should feel a stretch in the muscle, but not pain.  Breathe out as you do the stretch. Then breathe in as you hold the stretch. Don't hold your breath.  If you're worried about how more activity might affect your health, have a checkup before you start. Follow any special advice your doctor gives you for getting a smart start.  Where can you learn more?  Go to https://www.[employer].net/patiented  Enter W332 in the search box to learn more about \"Learning About Being Physically Active.\"  Current as of: October 10, 2022               Content Version: 13.7    4698-4524 [employer].   Care instructions adapted under license by your healthcare professional. If you have questions about a medical condition or this instruction, always ask your " healthcare professional. [employer], [facility] disclaims any warranty or liability for your use of this information.      Learning About Dietary Guidelines  What are the Dietary Guidelines for Americans?     Dietary Guidelines for Americans provide tips for eating well and staying healthy. This helps reduce the risk for long-term (chronic) diseases.  These guidelines recommend that you:  Eat and drink the right amount for you. The U.[place] government's food guide is called MyPlate. It can help you make your own well-balanced eating plan.  Try to balance your eating with your activity. This helps you stay at a healthy weight.  Drink alcohol in moderation, if at all.  Limit foods high in salt, saturated fat, trans fat, and added sugar.  These guidelines are from the U.[place]. Department of Agriculture and the U.S. Department of Health and Human Services. They are updated every 5 years.  What is MyPlate?  MyPlate is the U.[place] government's food guide. It can help you make your own well-balanced eating plan. A balanced eating plan means that you eat enough, but not too much, and that your food gives you the nutrients you need to stay healthy.  MyPlate focuses on eating plenty of whole grains, fruits, and vegetables, and on limiting fat and sugar. It is available online at www.ChooseMyPlate.gov.  How can you get started?  If you're trying to eat healthier, you can slowly change your eating habits over time. You don't have to make big changes all at once. Start by adding one or two healthy foods to your meals each day.  Grains  Choose whole-grain breads and cereals and whole-wheat pasta and whole-grain crackers.  Vegetables  Eat a variety of vegetables every day. They have lots of nutrients and are part of a heart-healthy diet.  Fruits  Eat a variety of fruits every day. Fruits contain lots of nutrients. Choose fresh fruit instead of fruit juice.  Protein foods  Choose fish and lean poultry more often. Eat red meat and fried  "meats less often. Dried beans, tofu, and nuts are also good sources of protein.  Dairy  Choose low-fat or fat-free products from this food group. If you have problems digesting milk, try eating cheese or yogurt instead.  Fats and oils  Limit fats and oils if you're trying to cut calories. Choose healthy fats when you cook. These include canola oil and olive oil.  Where can you learn more?  Go to https://www.[employer].net/patiented  Enter D676 in the search box to learn more about \"Learning About Dietary Guidelines.\"  Current as of: March 1, 2023               Content Version: 13.7    1901-6787 [employer].   Care instructions adapted under license by your healthcare professional. If you have questions about a medical condition or this instruction, always ask your healthcare professional. [employer] disclaims any warranty or liability for your use of this information.      Hearing Loss: Care Instructions  Overview     Hearing loss is a sudden or slow decrease in how well you hear. It can range from slight to profound. Permanent hearing loss can occur with aging. It also can happen when you are exposed long-term to loud noise. Examples include listening to loud music, riding motorcycles, or being around other loud machines.  Hearing loss can affect your work and home life. It can make you feel lonely or depressed. You may feel that you have lost your independence. But hearing aids and other devices can help you hear better and feel connected to others.  Follow-up care is a key part of your treatment and safety. Be sure to make and go to all appointments, and call your doctor if you are having problems. It's also a good idea to know your test results and keep a list of the medicines you take.  How can you care for yourself at home?  Avoid loud noises whenever possible. This helps keep your hearing from getting worse.  Always wear hearing protection around loud noises.  Wear a hearing aid as " "directed.  A professional can help you pick a hearing aid that will work best for you.  You can also get hearing aids over the counter for mild to moderate hearing loss.  Have hearing tests as your doctor suggests. They can show whether your hearing has changed. Your hearing aid may need to be adjusted.  Use other devices as needed. These may include:  Telephone amplifiers and hearing aids that can connect to a television, stereo, radio, or microphone.  Devices that use lights or vibrations. These alert you to the doorbell, a ringing telephone, or a baby monitor.  Television closed-captioning. This shows the words at the bottom of the screen. Most new TVs can do this.  TTY (text telephone). This lets you type messages back and forth on the telephone instead of talking or listening. These devices are also called TDD. When messages are typed on the keyboard, they are sent over the phone line to a receiving TTY. The message is shown on a monitor.  Use text messaging, social media, and email if it is hard for you to communicate by telephone.  Try to learn a listening technique called speechreading. It is not lipreading. You pay attention to people's gestures, expressions, posture, and tone of voice. These clues can help you understand what a person is saying. Face the person you are talking to, and have them face you. Make sure the lighting is good. You need to see the other person's face clearly.  Think about counseling if you need help to adjust to your hearing loss.  When should you call for help?  Watch closely for changes in your health, and be sure to contact your doctor if:    You think your hearing is getting worse.     You have new symptoms, such as dizziness or nausea.   Where can you learn more?  Go to https://www.healthwise.net/patiented  Enter R798 in the search box to learn more about \"Hearing Loss: Care Instructions.\"  Current as of: March 1, 2023               Content Version: 13.7    7905-8120 " [employer].   Care instructions adapted under license by your healthcare professional. If you have questions about a medical condition or this instruction, always ask your healthcare professional. [employer] disclaims any warranty or liability for your use of this information.      Bladder Training: Care Instructions  Your Care Instructions     Bladder training is used to treat urge incontinence and stress incontinence. Urge incontinence means that the need to urinate comes on so fast that you can't get to a toilet in time. Stress incontinence means that you leak urine because of pressure on your bladder. For example, it may happen when you laugh, cough, or lift something heavy.  Bladder training can increase how long you can wait before you have to urinate. It can also help your bladder hold more urine. And it can give you better control over the urge to urinate.  It is important to remember that bladder training takes a few weeks to a few months to make a difference. You may not see results right away, but don't give up.  Follow-up care is a key part of your treatment and safety. Be sure to make and go to all appointments, and call your doctor if you are having problems. It's also a good idea to know your test results and keep a list of the medicines you take.  How can you care for yourself at home?  Work with your doctor to come up with a bladder training program that is right for you. You may use one or more of the following methods.  Delayed urination  In the beginning, try to keep from urinating for 5 minutes after you first feel the need to go.  While you wait, take deep, slow breaths to relax. Kegel exercises can also help you delay the need to go to the bathroom.  After some practice, when you can easily wait 5 minutes to urinate, try to wait 10 minutes before you urinate.  Slowly increase the waiting period until you are able to control when you have to urinate.  Scheduled  "urination  Empty your bladder when you first wake up in the morning.  Schedule times throughout the day when you will urinate.  Start by going to the bathroom every hour, even if you don't need to go.  Slowly increase the time between trips to the bathroom.  When you have found a schedule that works well for you, keep doing it.  If you wake up during the night and have to urinate, do it. Apply your schedule to waking hours only.  Kegel exercises  These tighten and strengthen pelvic muscles, which can help you control the flow of urine. (If doing these exercises causes pain, stop doing them and talk with your doctor.) To do Kegel exercises:  Squeeze your muscles as if you were trying not to pass gas. Or squeeze your muscles as if you were stopping the flow of urine. Your belly, legs, and buttocks shouldn't move.  Hold the squeeze for 3 seconds, then relax for 5 to 10 seconds.  Start with 3 seconds, then add 1 second each week until you are able to squeeze for 10 seconds.  Repeat the exercise 10 times a session. Do 3 to 8 sessions a day.  When should you call for help?  Watch closely for changes in your health, and be sure to contact your doctor if:    Your incontinence is getting worse.     You do not get better as expected.   Where can you learn more?  Go to https://www.[employer].net/patiented  Enter V684 in the search box to learn more about \"Bladder Training: Care Instructions.\"  Current as of: March 1, 2023               Content Version: 13.7    4313-4814 [employer].   Care instructions adapted under license by your healthcare professional. If you have questions about a medical condition or this instruction, always ask your healthcare professional. [employer] disclaims any warranty or liability for your use of this information.      Your Health Risk Assessment indicates you feel you are not in good emotional health.    Recreation   Recreation is not limited to sports and team events. " It includes any activity that provides relaxation, interest, enjoyment, and exercise. Recreation provides an outlet for physical, mental, and social energy. It can give a sense of worth and achievement. It can help you stay healthy.    Mental Exercise and Social Involvement  Mental and emotional health is as important as physical health. Keep in touch with friends and family. Stay as active as possible. Continue to learn and challenge yourself.   Things you can do to stay mentally active are:  Learn something new, like a foreign language or musical instrument.   Play SCRABBLE or do crossword puzzles. If you cannot find people to play these games with you at home, you can play them with others on your computer through the Internet.   Join a games club--anything from card games to chess or checkers or lawn bowling.   Start a new hobby.   Go back to school.   Volunteer.   Read.   Keep up with world events.  Learning About Depression Screening  What is depression screening?  Depression screening is a way to see if you have depression symptoms. It may be done by a doctor or counselor. It's often part of a routine checkup. That's because your mental health is just as important as your physical health.  Depression is a mental health condition that affects how you feel, think, and act. You may:  Have less energy.  Lose interest in your daily activities.  Feel sad and grouchy for a long time.  Depression is very common. It affects people of all ages.  Many things can lead to depression. Some people become depressed after they have a stroke or find out they have a major illness like cancer or heart disease. The death of a loved one or a breakup may lead to depression. It can run in families. Most experts believe that a combination of inherited genes and stressful life events can cause it.  What happens during screening?  You may be asked to fill out a form about your depression symptoms. You and the doctor will discuss your  "answers. The doctor may ask you more questions to learn more about how you think, act, and feel.  What happens after screening?  If you have symptoms of depression, your doctor will talk to you about your options.  Doctors usually treat depression with medicines or counseling. Often, combining the two works best. Many people don't get help because they think that they'll get over the depression on their own. But people with depression may not get better unless they get treatment.  The cause of depression is not well understood. There may be many factors involved. But if you have depression, it's not your fault.  A serious symptom of depression is thinking about death or suicide. If you or someone you care about talks about this or about feeling hopeless, get help right away.  It's important to know that depression can be treated. Medicine, counseling, and self-care may help.  Where can you learn more?  Go to https://www.[employer].net/patiented  Enter T185 in the search box to learn more about \"Learning About Depression Screening.\"  Current as of: October 20, 2022               Content Version: 13.7    9911-9931 [employer].   Care instructions adapted under license by your healthcare professional. If you have questions about a medical condition or this instruction, always ask your healthcare professional. [employer] disclaims any warranty or liability for your use of this information.         "

## 2023-09-26 NOTE — COMMUNITY RESOURCES LIST (ENGLISH)
09/26/2023   [facility]  N/A  For additional resource needs, please contact your health insurance member services or your primary care team.  Phone: [phone]   Email: N/A   Address: [address] [place] [id number]   Hours: N/A        Food and Nutrition       Food pantry  1  [employer] Distance: 3.82 miles      In-Person, Delivery, Pickup   [address] [place] [address]  Language: English, Macanese  Hours: Mon - Fri 8:45 AM - 11:30 AM , Mon - Fri 1:00 PM - 3:30 PM  Fees: Free   Phone: (515) 351-8154 Email: info@[email or web address].org Website: http://www.[email or web address]     2  [facility] Distance: 7.07 miles      Pickup   [address] [place] [address]  Language: Syriac, English, Hmong, Lise, Macanese  Hours: Mon 1:00 PM - 4:00 PM , Mon 5:00 PM - 6:30 PM , Tue - Fri 9:00 AM - 11:30 AM , Tue - Fri 1:00 PM - 3:30 PM  Fees: Free   Phone: (441) 485-8902 Website: [email or web address]/     SNAP application assistance  3  [employer] [place] Distance: 9.38 miles      Phone/Virtual   [address] [address]  Language: English, Hmong, Gibraltarian, Chilean, Macanese  Hours: Mon - Fri 8:30 AM - 4:30 PM  Fees: Free   Phone: (917) 275-6652 Email: helpline@[employer].org Website: https://www.[employer].org/programs/[place]-food-helpline/     4  [facility] Distance: 5.02 miles      In-Person, Phone/Virtual   [address] [place] [address]  Language: English  Hours: Mon - Fri 8:00 AM - 4:30 PM  Fees: Free   Phone: (105) 343-6563 Email: esvin@[facility].mn. Website: https://www.[facility].us/Facilities/Facility/Details/[facility]-22     Soup kitchen or free meals  5  [facility] Distance: 2.5 miles      In-Person, Pickup   [address] [place] [address]  Language: English  Hours: Mon - Thu 5:00 PM - 6:30 PM  Fees: Free   Phone: (152) 830-6997 Email: office@[employer] Website: http://[employer]/     6  [facility] Distance: 3.31 miles      In-Person   [address] [place] [address]  Language: English  Hours: Fri 6:00 PM - 8:00 PM  Fees: Free   Phone: (835) 705-2667 Email: marisol@[employer].org Website: http://www.[facility].org/          Important Numbers & Websites       [address]org  Poison Control   (981) 353-9093 [place]  Suicide and Crisis Lifeline   [address] [place], [place] [address]line.org  Childhelp [place] Child Abuse Hotline   608.497.3327 Childhelphotline.org  [place] Sexual Assault Hotline   (921) 717-1744 (HOPE) [facility] Runaway Safeline   (322) 662-1700 (RUNAWAY) [facility].org  Pregnancy & Postpartum Support [place]   Call/text 312-120-8776 Ppsupport[place].org  Substance Abuse National Helpline ([facility]   176-261-HELP (4906) Findtreatment.gov  Emergency Services   911

## 2023-09-26 NOTE — PROGRESS NOTES
"   SUBJECTIVE:   CC: Emani is an 58 year old who presents for preventive health visit.       9/26/2023     1:56 PM   Additional Questions   Roomed by April       Healthy Habits:     In general, how would you rate your overall health?  Fair    Frequency of exercise:  1 day/week    Duration of exercise:  Less than 15 minutes    Do you usually eat at least 4 servings of fruit and vegetables a day, include whole grains    & fiber and avoid regularly eating high fat or \"junk\" foods?  No    Taking medications regularly:  No    Barriers to taking medications:  Problems remembering to take them    Medication side effects:  Lightheadedness    Ability to successfully perform activities of daily living:  No assistance needed    Home Safety:  No safety concerns identified    Hearing Impairment:  Difficulty following a conversation in a noisy restaurant or crowded room, difficulty following dialogue in the theater, difficult to understand a speaker at a public meeting or Manda[employer] service, need to ask people to speak up or repeat themselves, find that men's voices are easier to understand than woman's, difficulty understanding soft or whispered speech and difficulty understanding speech on the telephone    In the past 6 months, have you been bothered by leaking of urine? Yes    In general, how would you rate your overall mental or emotional health?  Fair    Additional concerns today:  Yes    She does need to have a repeat mammogram done.     She does hope to have a refill of her eye drops and her Singulair.     She does not feel that her depression is under good control. She does think that the prozac makes her sleepy.     She does hope to go back on the bupropion.     She is due for her lab work. She is usually getting numbers between , very occasionally higher than this.     Today's PHQ-9 Score:       9/25/2023    11:05 AM   PHQ-9 SCORE   PHQ-9 Total Score Brittneyhart 21 (Severe depression)   PHQ-9 Total Score 21       Do " you feel safe in your environment? Yes    Have you ever done Advance Care Planning? (For example, a Health Directive, POLST, or a discussion with a medical provider or your loved ones about your wishes)? Yes, patient states has an Advance Care Planning document and will bring a copy to the clinic.      Cognitive Screenin) Repeat 3 items (Leader, Season, Table)    2) Clock draw: NORMAL  3) 3 item recall: Recalls 3 objects  Results: 3 items recalled: COGNITIVE IMPAIRMENT LESS LIKELY    Mini-CogTM Copyright S Pramod. Licensed by the author for use in [facility]; reprinted with permission (jolie@[facility]). All rights reserved.          Social History     Tobacco Use    Smoking status: Former     Packs/day: 1.00     Years: 8.00     Pack years: 8.00     Types: Cigarettes     Quit date: 1985     Years since quittin.7    Smokeless tobacco: Never   Substance Use Topics    Alcohol use: Yes     Alcohol/week: 0.0 standard drinks of alcohol     Comment: Alcoholic Drinks/day: 1 drink/month             2023    10:59 AM   Alcohol Use   Prescreen: >3 drinks/day or >7 drinks/week? Not Applicable     Do you have a current opioid prescription? No  Do you use any other controlled substances or medications that are not prescribed by a provider? None    Current providers sharing in care for this patient include:   Patient Care Team:  Angelia Krishna MD as PCP - General (Family Medicine)  Nelida Recio MD as MD (Hematology & Oncology)  Christina Leigh MD as MD (Hematology & Oncology)  Kilo Ramon DO as Assigned Rheumatology Provider  Viola Singh OD as MD (Ophthalmology)  Shira Miller MD as Referring Physician (Family Medicine)  Viola Singh OD as Assigned Surgical Provider  Angelia Krishna MD as Assigned PCP  Angelia Krishna MD as MD (Family Medicine)    The following health maintenance items are reviewed in Epic and correct as of  today:  Health Maintenance   Topic Date Due    COPD ACTION PLAN  Never done    HEPATITIS B IMMUNIZATION (1 of 3 - 3-dose series) Never done    Pneumococcal Vaccine: Pediatrics (0 to 5 Years) and At-Risk Patients (6 to 64 Years) (1 - PCV) Never done    ZOSTER IMMUNIZATION (1 of 2) Never done    COVID-19 Vaccine (4 - Pfizer series) 2022    LIPID  2023    MEDICARE ANNUAL WELLNESS VISIT  2023    ANNUAL REVIEW OF HM ORDERS  2023    A1C  2023    INFLUENZA VACCINE (1) 2023    MICROALBUMIN  09/15/2023    DIABETIC FOOT EXAM  09/15/2023    EYE EXAM  2023    TSH W/FREE T4 REFLEX  2024    BMP  2024    COLORECTAL CANCER SCREENING  2025    HPV TEST  02/10/2025    PAP  02/10/2025    MAMMO SCREENING  2025    DTAP/TDAP/TD IMMUNIZATION (2 - Td or Tdap) 2026    ADVANCE CARE PLANNING  2027    SPIROMETRY  Completed    HEPATITIS C SCREENING  Completed    HIV SCREENING  Completed    PHQ-2 (once per calendar year)  Completed    IPV IMMUNIZATION  Aged Out    HPV IMMUNIZATION  Aged Out    MENINGITIS IMMUNIZATION  Aged Out       Patient has been advised of split billing requirements and indicates understanding: Yes    Appropriate preventive services were discussed with this patient, including applicable screening as appropriate for fall prevention, nutrition, physical activity, Tobacco-use cessation, weight loss and cognition.  Checklist reviewing preventive services available has been given to the patient.      Social History     Tobacco Use    Smoking status: Former     Packs/day: 1.00     Years: 8.00     Pack years: 8.00     Types: Cigarettes     Quit date: 1985     Years since quittin.7    Smokeless tobacco: Never   Substance Use Topics    Alcohol use: Yes     Alcohol/week: 0.0 standard drinks of alcohol     Comment: Alcoholic Drinks/day: 1 drink/month             2023    10:59 AM   Alcohol Use   Prescreen: >3 drinks/day or >7 drinks/week? Not  Applicable     Reviewed orders with patient.  Reviewed health maintenance and updated orders accordingly - Yes      Breast Cancer Screening:    FHS-7:       5/26/2022    12:03 PM 6/8/2022     2:11 PM 3/17/2023    10:27 AM 6/1/2023     1:51 PM 9/25/2023    11:12 AM   Breast CA Risk Assessment (FHS-7)   Did any of your first-degree relatives have breast or ovarian cancer? No Yes Yes No    Did any of your relatives have bilateral breast cancer? No Yes Yes Unknown Yes   Did any man in your family have breast cancer? No No No No No   Did any woman in your family have breast and ovarian cancer? No No No No No   Did any woman in your family have breast cancer before age 50 y? No Yes No No Yes   Do you have 2 or more relatives with breast and/or ovarian cancer? No No No No No   Do you have 2 or more relatives with breast and/or bowel cancer? No No No Yes No       Mammogram Screening: Recommended mammography every 1-2 years with patient discussion and risk factor consideration  Pertinent mammograms are reviewed under the imaging tab.    History of abnormal Pap smear: NO - age 30-65 PAP every 5 years with negative HPV co-testing recommended      Latest Ref Rng & Units 2/10/2020     2:00 PM   PAP / HPV   PAP Negative for squamous intraepithelial lesion or malignancy. Negative for squamous intraepithelial lesion or malignancy  Electronically signed by Larisa Gonzalez CT (ASCP) on 2/19/2020 at 10:40 AM      HPV 16 DNA NEG Negative    HPV 18 DNA NEG Negative    Other HR HPV NEG Negative      Reviewed and updated as needed this visit by clinical staff   Tobacco  Allergies  Meds  Problems  Med Hx  Surg Hx  Fam Hx          Reviewed and updated as needed this visit by Provider   Tobacco  Allergies  Meds  Problems  Med Hx  Surg Hx  Fam Hx             Review of Systems   Constitutional:  Positive for chills. Negative for fever.   HENT:  Negative for congestion, ear pain, hearing loss and sore throat.    Eyes:  Negative  "for pain and visual disturbance.   Respiratory:  Negative for cough and shortness of breath.    Cardiovascular:  Negative for chest pain, palpitations and peripheral edema.   Gastrointestinal:  Positive for constipation and nausea. Negative for abdominal pain, diarrhea, heartburn and hematochezia.   Breasts:  Negative for tenderness, breast mass and discharge.   Genitourinary:  Positive for pelvic pain and urgency. Negative for dysuria, frequency, genital sores, hematuria, vaginal bleeding and vaginal discharge.   Musculoskeletal:  Negative for arthralgias, joint swelling and myalgias.   Skin:  Negative for rash.   Neurological:  Positive for headaches. Negative for dizziness, weakness and paresthesias.   Psychiatric/Behavioral:  Positive for mood changes. The patient is not nervous/anxious.         OBJECTIVE:   /68   Pulse 82   Temp 98  F (36.7  C)   Resp 15   Ht 1.753 m (5' 9\")   Wt 123.8 kg (273 lb)   SpO2 94%   BMI 40.32 kg/m    Physical Exam  GENERAL: healthy, alert and no distress  EYES: Eyes grossly normal to inspection, PERRL and conjunctivae and sclerae normal  HENT: ear canals and TM's normal, nose and mouth without ulcers or lesions  NECK: no adenopathy, no asymmetry, masses, or scars and thyroid normal to palpation  RESP: lungs clear to auscultation - no rales, rhonchi or wheezes  BREAST: normal without masses, tenderness or nipple discharge and no palpable axillary masses or adenopathy  CV: regular rate and rhythm, normal S1 S2, no S3 or S4, no murmur, click or rub, no peripheral edema and peripheral pulses strong  ABDOMEN: soft, nontender, no hepatosplenomegaly, no masses and bowel sounds normal   (female): deferred  MS: no gross musculoskeletal defects noted, no edema  SKIN: no suspicious lesions or rashes  NEURO: Normal strength and tone, mentation intact and speech normal  PSYCH: mentation appears normal, affect normal/bright    Diabetic foot exam: normal DP and PT pulses, no trophic " changes or ulcerative lesions, normal sensory exam, and decreased sensation to midfoot bilaterally      Diagnostic Test Results:  Labs reviewed in Epic    ASSESSMENT/PLAN:   Emani was seen today for wellness visit.    Diagnoses and all orders for this visit:    Encounter for Medicare annual wellness exam   Routine health maintenance discussion:  No smoking, limited alcohol (7 or less servings per week), 5 fruits/veg servings per day, 200 minutes of exercise per week.  Daily calcium/vitamin D guidelines, bone health, colon cancer screening beginning at age 50.  Accident avoidance, sun screen.    Type 2 diabetes mellitus without complication, without long-term current use of insulin (H)  -     Lipid panel reflex to direct LDL Non-fasting; Future  -     HEMOGLOBIN A1C; Future  -     Albumin Random Urine Quantitative with Creat Ratio; Future  -     Comprehensive metabolic panel (BMP + Alb, Alk Phos, ALT, AST, Total. Bili, TP); Future  -     Lipid panel reflex to direct LDL Non-fasting  -     HEMOGLOBIN A1C  -     Albumin Random Urine Quantitative with Creat Ratio  -     Comprehensive metabolic panel (BMP + Alb, Alk Phos, ALT, AST, Total. Bili, TP)   Doing well, updating labs and will adjust treatment as needed.     Bipolar affective disorder, remission status unspecified (H)  -     buPROPion (WELLBUTRIN XL) 150 MG 24 hr tablet; Take 1 tablet (150 mg) by mouth every morning   Will change prozac to evening and start wellbutrin in a few weeks if not improving as she has done well with this previously, she will follow up in six weeks for a recheck.     Chronic cough  -     montelukast (SINGULAIR) 10 MG tablet; TAKE 1 TABLET (10 MG) BY MOUTH AT BEDTIME   Improved with the Singulair    Allergic conjunctivitis of both eyes  -     olopatadine (PATADAY) 0.2 % ophthalmic solution; Place 0.05 mLs (1 drop) into both eyes daily    Subclinical hypothyroidism  -     TSH  -     T4, free   Check labs and adjust as needed    Chronic  "sinusitis, unspecified location  -     montelukast (SINGULAIR) 10 MG tablet; TAKE 1 TABLET (10 MG) BY MOUTH AT BEDTIME    Encounter for screening mammogram for breast cancer  -     MA Diagnostic Digital Right; Future    Inconclusive mammogram  -     MA Diagnostic Digital Right; Future    Other orders  -     REVIEW OF HEALTH MAINTENANCE PROTOCOL ORDERS  -     PRIMARY CARE FOLLOW-UP SCHEDULING; Future        Patient has been advised of split billing requirements and indicates understanding: Yes    Depression Screening Follow Up        9/25/2023    11:05 AM   PHQ   PHQ-9 Total Score 21   Q9: Thoughts of better off dead/self-harm past 2 weeks Not at all         Follow Up Actions Taken  Crisis resource information provided in After Visit Summary       COUNSELING:  Reviewed preventive health counseling, as reflected in patient instructions      BMI:   Estimated body mass index is 40.32 kg/m  as calculated from the following:    Height as of this encounter: 1.753 m (5' 9\").    Weight as of this encounter: 123.8 kg (273 lb).   Weight management plan: Discussed healthy diet and exercise guidelines      She reports that she quit smoking about 38 years ago. Her smoking use included cigarettes. She has a 8.00 pack-year smoking history. She has never used smokeless tobacco.          Angelia Krishna MD  [facility] submitted by the patient for this visit:  Patient Health Questionnaire (Submitted on 9/25/2023)  If you checked off any problems, how difficult have these problems made it for you to do your work, take care of things at home, or get along with other people?: Very difficult  PHQ9 TOTAL SCORE: 21  The patient was provided with suggestions to help her develop a healthy physical lifestyle.  She is at risk for lack of exercise and has been provided with information to increase physical activity for the benefit of her well-being.  The patient was counseled and encouraged to consider " modifying their diet and eating habits. She was provided with information on recommended healthy diet options.  The patient was provided with written information regarding signs of hearing loss.  Information on urinary incontinence and treatment options given to patient.  The patient was provided with suggestions to help her develop a healthy emotional lifestyle.  The patient's PHQ-9 score is consistent with severe depression. She was provided with information regarding depression and suicide prevention and was advised to schedule a follow up appointment as needed to further address this issue. Adjusting medication

## 2023-09-28 ENCOUNTER — OFFICE VISIT (OUTPATIENT)
Dept: PHYSICAL MEDICINE AND REHAB | Facility: CLINIC | Age: 58
End: 2023-09-28
Payer: MEDICARE

## 2023-09-28 VITALS
BODY MASS INDEX: 40.43 KG/M2 | WEIGHT: 273 LBS | HEART RATE: 68 BPM | DIASTOLIC BLOOD PRESSURE: 82 MMHG | HEIGHT: 69 IN | SYSTOLIC BLOOD PRESSURE: 139 MMHG

## 2023-09-28 DIAGNOSIS — M47.816 LUMBAR FACET ARTHROPATHY: ICD-10-CM

## 2023-09-28 DIAGNOSIS — M53.3 COCCYDYNIA: ICD-10-CM

## 2023-09-28 DIAGNOSIS — M54.16 LUMBAR RADICULAR PAIN: Primary | ICD-10-CM

## 2023-09-28 DIAGNOSIS — E03.8 SUBCLINICAL HYPOTHYROIDISM: ICD-10-CM

## 2023-09-28 PROBLEM — M79.7 FIBROMYALGIA: Status: ACTIVE | Noted: 2019-12-12

## 2023-09-28 PROBLEM — F31.9 BIPOLAR DISORDER (H): Status: ACTIVE | Noted: 2020-12-02

## 2023-09-28 PROBLEM — N95.0 POSTMENOPAUSAL BLEEDING: Status: ACTIVE | Noted: 2020-12-02

## 2023-09-28 PROBLEM — M17.9 KNEE OSTEOARTHRITIS: Status: RESOLVED | Noted: 2017-08-28 | Resolved: 2023-09-28

## 2023-09-28 PROCEDURE — 99213 OFFICE O/P EST LOW 20 MIN: CPT | Performed by: PHYSICIAN ASSISTANT

## 2023-09-28 RX ORDER — LEVOTHYROXINE SODIUM 75 UG/1
37.5 TABLET ORAL DAILY
Qty: 45 TABLET | Refills: 1 | Status: SHIPPED | OUTPATIENT
Start: 2023-09-28 | End: 2024-04-10

## 2023-09-28 ASSESSMENT — PAIN SCALES - GENERAL: PAINLEVEL: MILD PAIN (3)

## 2023-09-28 NOTE — TELEPHONE ENCOUNTER
Talked to patient and relayed message below per Dr. Krishna. Pt agrees to plan. No further questions.     Taty Renee CMA.

## 2023-09-28 NOTE — LETTER
9/28/2023         RE: mEani Vargas  [address] [place] [address]        Dear Colleague,    Thank you for referring your patient, Emani Vargas, to the [facility]. Please see a copy of my visit note below.    Assessment:   Emani Vargas is a 58 y.o. y.o. female with past medical history significant for osteoporosis,chronic pain, bipolar disorder,  asthma, breast cancer status post lumpectomy and radiation, , obesity, type 2 diabetes mellitus who presents today for follow-up regarding chronic bilateral low back pain.  My review of been MRI lumbar spine shows lower lumbar facet arthropathy.  At L4-5 there is a disc protrusion causing right lateral recess stenosis.  Patient is following up after bilateral L4-5 transforaminal epidural steroid injections August 21, 2023 which provided 80% relief of pain.  - Patient also complains of coccydynia.  She was hopeful this would resolve with the epidural steroid injections but it has not changed.         Plan:     A shared decision making plan was used.  The patient's values and choices were respected.  The following represents what was discussed and decided upon by the physician assistant and the patient.      1.  DIAGNOSTIC TESTS: I reviewed the MRI lumbar spine.  No additional diagnostic test were ordered.    2.  PHYSICAL THERAPY: Patient is currently in physical therapy at [facility] she will continue with physical therapy and the home exercises.    3.  MEDICATIONS:  - No changes are made to the patient's medications.  - Patient can continue Tylenol as needed.  - Patient can continue Voltaren gel as needed  - Patient takes gabapentin 1600 milligrams at bedtime   - Patient can continue methocarbamol as needed.  - Patient takes naproxen as needed      4.  INTERVENTIONS:    -No interventions were ordered today.  As long as patient has at least 50% relief of pain for at least 3 months we could repeat the bilateral L4-5 transforaminal  epidural steroid injections.  - If that does not help we could consider an L5-S1 interlaminar epidural steroid injection.  - If tailbone pain persist we could also consider a sacrococcygeal joint injection.    5.  PATIENT EDUCATION: Patient is in agreement the above plan.  All questions were answered.    6.  FOLLOW-UP: Patient is going to follow-up as needed.  If she has questions or concerns, she should not hesitate to call.    Subjective:     Emani Vargas is a 58 y.o. female who presents today for follow-up regarding chronic low back pain.  Patient is following up after bilateral L4-5 transforaminal epidural steroid injections August 21, 2023.  Patient reports 80% relief of pain.  She has also had significant improvement in function.  She went on a 7.5-hour road trip since after the injection which she states she would not have been able to do before because of her pain.  She is also able to walk better and shopping is easier.  She is happy with her outcome.    Patient complains of mild residual bilateral low back pain.  She also has pain at the tailbone.  Denies pain down the legs.  She has chronic numbness and tingling on the left lateral thigh and lateral calf which is slightly better.  She has unchanged numbness and tingling both feet.  She reports improvement and bilateral lower extremity weakness after the injections.  She has chronic loss of bladder control.  She rates her pain today as a 3 out of 10.  At its worst it is an 8-10.  At its best it is a 2 out of 10.  She denies aggravating or alleviating factors to the pain.  Denies any new symptoms since she was last seen.    Treatment to date:  - Current physical therapy at [facility]  - Bilateral L4-5 transforaminal epidural steroid injections August 21, 2023 with 80% relief of pain  - Failed to have adequate relief with bilateral L3, 4, 5 new branch blocks July 19, 2023  - Bilateral L4-5 facet joint injections August 16, 2016 with only short-term  relief.  - Tylenol  - Voltaren gel  - Gabapentin 1600 mg at bedtime  - Methocarbamol  - Celebrex causes stomach irritation  - Naproxen as needed  - Oxycodone at bedtime as needed is helpful      Review of Systems:  Positive for numbness/tingling, intermittent weakness, loss of bladder control (chronic), headache, pain much worse at night, trip/stumble/falls, difficulty swallowing, difficulty with hand skills.  Negative for loss of bowel control, inability to urinate,  fevers, unintentional weight loss.     Objective:   CONSTITUTIONAL:  Vital signs as above.  No acute distress.  The patient is well nourished and well groomed.    PSYCHIATRIC:  The patient is awake, alert, oriented to person, place and time.  The patient is answering questions appropriately with clear speech.  Normal affect.  HEENT: Normocephalic, atraumatic.  Sclera clear.  Neck is supple.  SKIN:  Skin over the face, neck bilateral upper extremities is clean, dry, intact without rashes.  MUSCULOSKELETAL: Gait is antalgic, favoring the left.  Able to rise onto toes and heels bilaterally with support..  5/5 strength bilateral hip flexors, knee flexors/extensors, ankle dorsi/plantar flexors.  NEUROLOGICAL: Diminished sensation bilateral lower extremities distal to the knee.    RESULTS:      I reviewed the MRI lumbar spine from [facility] dated Sandy 10, 2023.  This shows a chronic L1 superior endplate compression fracture.  No additional fracture.  At L4-5 there is a disc protrusion contributing to right lateral recess stenosis and impingement of the right L5 nerve root.  There is moderate bilateral foraminal stenosis at this level.  Patient also has moderate bilateral facet arthropathy L5-S1.           Again, thank you for allowing me to participate in the care of your patient.        Sincerely,        Flavia Camarena PA-C

## 2023-09-28 NOTE — PATIENT INSTRUCTIONS
I am so happy that you are feeling better!  If your pain returns we can repeat the injection(s).  You can have injections up to 4 times per year.    If your pain returns or if you have any other questions or concerns please send me a [employer] message or call our nurse line at [phone].

## 2023-09-28 NOTE — TELEPHONE ENCOUNTER
I sent in a new prescription for 37.5 mcg daily of levothyroxine.     She can take 1.5 tablets of what she has and the new one will be 1/2 tablet.     I'd recommend we check labs again in about 6 weeks, we should be able to do that with her upcoming appointment.

## 2023-10-26 ENCOUNTER — E-VISIT (OUTPATIENT)
Dept: URGENT CARE | Facility: CLINIC | Age: 58
End: 2023-10-26
Payer: MEDICARE

## 2023-10-26 DIAGNOSIS — J45.41 MODERATE PERSISTENT ASTHMA WITH ACUTE EXACERBATION: ICD-10-CM

## 2023-10-26 DIAGNOSIS — J20.9 ACUTE BRONCHITIS WITH SYMPTOMS > 10 DAYS: ICD-10-CM

## 2023-10-26 DIAGNOSIS — J44.1 COPD EXACERBATION (H): Primary | ICD-10-CM

## 2023-10-26 PROCEDURE — 99207 PR NON-BILLABLE SERV PER CHARTING: CPT | Performed by: PHYSICIAN ASSISTANT

## 2023-10-26 RX ORDER — ALBUTEROL SULFATE 0.83 MG/ML
2.5 SOLUTION RESPIRATORY (INHALATION) EVERY 6 HOURS PRN
Qty: 90 ML | Refills: 0 | Status: SHIPPED | OUTPATIENT
Start: 2023-10-26 | End: 2024-03-18

## 2023-10-26 RX ORDER — ALBUTEROL SULFATE 90 UG/1
2 AEROSOL, METERED RESPIRATORY (INHALATION) EVERY 6 HOURS PRN
Qty: 18 G | Refills: 1 | Status: SHIPPED | OUTPATIENT
Start: 2023-10-26

## 2023-10-26 NOTE — PATIENT INSTRUCTIONS
Bronchitis: Care Instructions  Your Care Instructions     Bronchitis is inflammation of the bronchial tubes, which carry air to the lungs. The tubes swell and produce mucus, or phlegm. The mucus and inflamed bronchial tubes make you cough. You may have trouble breathing.  Most cases of bronchitis are caused by viruses like those that cause colds. Antibiotics usually do not help and they may be harmful.  Bronchitis usually develops rapidly and lasts about 2 to 3 weeks in otherwise healthy people.  Follow-up care is a key part of your treatment and safety. Be sure to make and go to all appointments, and call your doctor if you are having problems. It's also a good idea to know your test results and keep a list of the medicines you take.  How can you care for yourself at home?  Take all medicines exactly as prescribed. Call your doctor if you think you are having a problem with your medicine.  Get some extra rest.  Take an over-the-counter pain medicine, such as acetaminophen (Tylenol), ibuprofen (Advil, Motrin), or naproxen (Aleve) to reduce fever and relieve body aches. Read and follow all instructions on the label.  Do not take two or more pain medicines at the same time unless the doctor told you to. Many pain medicines have acetaminophen, which is Tylenol. Too much acetaminophen (Tylenol) can be harmful.  Take an over-the-counter cough medicine to help quiet a dry, hacking cough so that you can sleep. Avoid cough medicines that have more than one active ingredient. Read and follow all instructions on the label.  Do not smoke. Smoking can make bronchitis worse. If you need help quitting, talk to your doctor about stop-smoking programs and medicines. These can increase your chances of quitting for good.  When should you call for help?   Call 911 anytime you think you may need emergency care. For example, call if:    You have severe trouble breathing.   Call your doctor now or seek immediate medical care if:    You  "have new or worse trouble breathing.     You cough up dark brown or bloody mucus (sputum).     You have a new or higher fever.     You have a new rash.   Watch closely for changes in your health, and be sure to contact your doctor if:    You cough more deeply or more often, especially if you notice more mucus or a change in the color of your mucus.     You are not getting better as expected.   Where can you learn more?  Go to https://www.[employer].net/patiented  Enter H333 in the search box to learn more about \"Bronchitis: Care Instructions.\"  Current as of: November 14, 2022               Content Version: 13.7    3908-2846 [employer].   Care instructions adapted under license by your healthcare professional. If you have questions about a medical condition or this instruction, always ask your healthcare professional. [employer] disclaims any warranty or liability for your use of this information.      Dear Emani Vargas,    After reviewing your responses, I've been able to diagnose you with \"Bronchitis\" which is a common infection of your lungs. Because you have COPD, I have prescribed augmentin, an antibiotic, to help this clear. I recommend using albuterol nebulizers/inhalers as well to open your airway.    To treat bronchitis, the main thing to do is drink lots of fluids and rest. Honey may soothe a sore throat and help manage your cough. You can take this straight or add to tea (or just warm water) with lemon juice. Sit in the bathroom with a hot shower running and breathe in the steam to loosen any congestion in your chest. Avoid smoke (cigarettes, bonfires, fireplace, wood burning stoves).    Medications to help your symptoms are also available over the counter. Take Tylenol or an NSAID such as ibuprofen or naproxen as needed for pain. Do not take ibuprofen if you have kidney disease, stomach ulcers or allergy to aspirin.  Delsym (dextromethorphan polistirex) is an over the counter " cough medication that lasts 12 hours. Mucinex or Robitussin (guiafenesin) thin mucus and may help it to loosen more quickly. Cough drops can calm your cough as well.    Bronchitis is most often highly contagious as viruses are spread through the air or touch. Avoid contact with others who may become infected, particularly children, the elderly and those whose immune systems might be weak.     If your symptoms worsen, you develop chest pain or shortness of breath, fevers over 101, or are not improving at all in 7 days, please contact your primary care provider for an appointment or visit any of our convenient Walk-in Care or Urgent Care Centers to be seen which can be found on our website here.    Thanks again for choosing us as your health care partner,    Tayla Vieyra PA-C

## 2023-11-14 ENCOUNTER — TELEPHONE (OUTPATIENT)
Dept: PHYSICAL MEDICINE AND REHAB | Facility: CLINIC | Age: 58
End: 2023-11-14
Payer: MEDICARE

## 2023-11-14 DIAGNOSIS — M54.16 LUMBAR RADICULOPATHY: Primary | ICD-10-CM

## 2023-11-15 NOTE — TELEPHONE ENCOUNTER
Phone call to patient to discuss further. Patient reports she had 80% relief of her symptoms for 2.75 months. Pain has since returned and wanting the injection again. Order placed in Epic for repeat bilateral L4-5 transforminal epidural steroid injections. Injection requirements reviewed in full with patient. Discussed steroids have immunosuppressant properties which could potentially put patient at a higher risk for a worsened course of any infection including COVID. Stated understanding. Transferred to scheduling to make appointment for injection and follow up 2 weeks after with [facility].

## 2023-12-01 ENCOUNTER — RADIOLOGY INJECTION OFFICE VISIT (OUTPATIENT)
Dept: PHYSICAL MEDICINE AND REHAB | Facility: CLINIC | Age: 58
End: 2023-12-01
Attending: PHYSICIAN ASSISTANT
Payer: MEDICARE

## 2023-12-01 VITALS
DIASTOLIC BLOOD PRESSURE: 68 MMHG | SYSTOLIC BLOOD PRESSURE: 112 MMHG | RESPIRATION RATE: 20 BRPM | OXYGEN SATURATION: 95 % | TEMPERATURE: 97.8 F | HEART RATE: 72 BPM

## 2023-12-01 DIAGNOSIS — E11.9 DIABETES MELLITUS, TYPE 2 (H): Primary | ICD-10-CM

## 2023-12-01 DIAGNOSIS — M54.16 LUMBAR RADICULOPATHY: ICD-10-CM

## 2023-12-01 LAB — GLUCOSE SERPL-MCNC: 132 MG/DL (ref 70–99)

## 2023-12-01 PROCEDURE — 64483 NJX AA&/STRD TFRM EPI L/S 1: CPT | Mod: 50 | Performed by: PAIN MEDICINE

## 2023-12-01 PROCEDURE — 82962 GLUCOSE BLOOD TEST: CPT | Performed by: PAIN MEDICINE

## 2023-12-01 RX ORDER — DEXAMETHASONE SODIUM PHOSPHATE 10 MG/ML
INJECTION, SOLUTION INTRAMUSCULAR; INTRAVENOUS
Status: COMPLETED | OUTPATIENT
Start: 2023-12-01 | End: 2023-12-01

## 2023-12-01 RX ORDER — LIDOCAINE HYDROCHLORIDE 10 MG/ML
INJECTION, SOLUTION EPIDURAL; INFILTRATION; INTRACAUDAL; PERINEURAL
Status: COMPLETED | OUTPATIENT
Start: 2023-12-01 | End: 2023-12-01

## 2023-12-01 RX ADMIN — DEXAMETHASONE SODIUM PHOSPHATE 20 MG: 10 INJECTION, SOLUTION INTRAMUSCULAR; INTRAVENOUS at 10:04

## 2023-12-01 RX ADMIN — LIDOCAINE HYDROCHLORIDE 4 ML: 10 INJECTION, SOLUTION EPIDURAL; INFILTRATION; INTRACAUDAL; PERINEURAL at 10:04

## 2023-12-01 ASSESSMENT — PAIN SCALES - GENERAL
PAINLEVEL: NO PAIN (0)
PAINLEVEL: MODERATE PAIN (4)

## 2023-12-01 NOTE — PATIENT INSTRUCTIONS
Please be advised that your blood glucose levels may be increased for the next 5-7 days as a result of the steroid you received with your injection today.  It is recommended that you monitor your blood glucose levels closely over the next week and direct any additional questions regarding your blood glucose management to your primary doctor.       DISCHARGE INSTRUCTIONS    During office hours (8:00 a.m.- 4:00 p.m.) questions or concerns may be answered  by calling [facility] Navigation Nurses at  [phone].  Messages received after hours will be returned the following business day.      In the case of an emergency, please dial 911 or seek assistance at the nearest Emergency Room/Urgent Care facility.     All Patients:    You may experience an increase in your symptoms for the first 2 days (It may take anywhere between 2 days- 2 weeks for the steroid to have maximum effect).    You may use ice on the injection site, as frequently as 20 minutes each hour if needed.    You may take your pain medicine.    You may continue taking your regular medication after your injection. If you have had a Medial Branch Block you may resume pain medication once your pain diary is completed.    You may shower. No swimming, tub bath or hot tub for 48 hours.  You may remove your bandaid/bandage as soon as you are home.    You may resume light activities, as tolerated.    Resume your usual diet as tolerated.    It is strongly advised that you do not drive for 1-3 hours post injection.    If you have had oral sedation:  Do not drive for 8 hours post injection.      If you have had IV sedation:  Do not drive for 24 hours post injection.  Do not operate hazardous machinery or make important personal/business decisions for 24 hours.      POSSIBLE STEROID SIDE EFFECTS (If steroid/cortisone was used for your procedure)    -If you experience these symptoms, it should only last for a short period    Swelling of the legs              Skin  redness (flushing)     Mouth (oral) irritation   Blood sugar (glucose) levels            Sweats                    Mood changes  Headache  Sleeplessness  Weakened immune system for up to 14 days, which could increase the risk of garo the COVID-19 virus and/or experiencing more severe symptoms of the disease, if exposed.  Decreased effectiveness of the flu vaccine if given within 2 weeks of the steroid.         POSSIBLE PROCEDURE SIDE EFFECTS  -Call the Spine Center if you are concerned  Increased Pain           Increased numbness/tingling      Nausea/Vomiting          Bruising/bleeding at site      Redness or swelling                                              Difficulty walking      Weakness           Fever greater than 100.5    *In the event of a severe headache after an epidural steroid injection that is relieved by lying down, please call the [facility] to speak with a clinical staff member*

## 2023-12-02 DIAGNOSIS — E11.9 TYPE 2 DIABETES MELLITUS WITHOUT COMPLICATION, WITHOUT LONG-TERM CURRENT USE OF INSULIN (H): ICD-10-CM

## 2023-12-04 ENCOUNTER — TELEPHONE (OUTPATIENT)
Dept: AUDIOLOGY | Facility: CLINIC | Age: 58
End: 2023-12-04
Payer: MEDICARE

## 2023-12-04 ENCOUNTER — HOSPITAL ENCOUNTER (OUTPATIENT)
Dept: MAMMOGRAPHY | Facility: CLINIC | Age: 58
Discharge: HOME | End: 2023-12-04
Attending: FAMILY MEDICINE | Admitting: FAMILY MEDICINE
Payer: MEDICARE

## 2023-12-04 DIAGNOSIS — R92.2 INCONCLUSIVE MAMMOGRAM: ICD-10-CM

## 2023-12-04 DIAGNOSIS — Z12.31 ENCOUNTER FOR SCREENING MAMMOGRAM FOR BREAST CANCER: ICD-10-CM

## 2023-12-04 PROCEDURE — 77065 DX MAMMO INCL CAD UNI: CPT | Mod: RT

## 2023-12-04 RX ORDER — BLOOD SUGAR DIAGNOSTIC
STRIP MISCELLANEOUS
Qty: 100 STRIP | Refills: 3 | Status: SHIPPED | OUTPATIENT
Start: 2023-12-04

## 2023-12-05 ENCOUNTER — ALLIED HEALTH/NURSE VISIT (OUTPATIENT)
Dept: AUDIOLOGY | Facility: CLINIC | Age: 58
End: 2023-12-05
Payer: MEDICARE

## 2023-12-05 DIAGNOSIS — H90.3 SENSORINEURAL HEARING LOSS, BILATERAL: Primary | ICD-10-CM

## 2023-12-05 PROCEDURE — V5299 HEARING SERVICE: HCPCS | Performed by: AUDIOLOGIST

## 2023-12-05 NOTE — PROGRESS NOTES
Out of warranty hearing aids (pair) brought in for check; both earmolds were retubed. Listening check of both devices yielded strong signal, free from distortion in each. Patient contacted by phone for equipment  at [facility] . Patient expressed verbal understanding.    Carter Peng, [facility]  [place] Licensed Audiologist [id number]

## 2023-12-18 NOTE — PROGRESS NOTES
Assessment:   Emani Vargas is a 58 y.o. y.o. female with past medical history significant for osteoporosis,chronic pain, bipolar disorder,  asthma, breast cancer status post lumpectomy and radiation, , obesity, type 2 diabetes mellitus who presents today for follow-up regarding 3 areas of pain:  1.  Chronic bilateral low back pain.  My review of been MRI lumbar spine shows lower lumbar facet arthropathy.  At L4-5 there is a disc protrusion causing right lateral recess stenosis.  Patient is following up after bilateral L4-5 transforaminal epidural steroid injections December 1, 2023 which provided 70 to 80% relief of pain.  2.  Chronic coccydynia  3.  Chronic right greater than left shoulder pain.  Patient previously got right shoulder injections at her rheumatology clinic.  Most recent injection was May 2022 which provided 80% relief of pain for 4 months.  She has not establish care with a new rheumatologist since her last rheumatologist left their practice.  She would like a repeat right shoulder injection.       Plan:     A shared decision making plan was used.  The patient's values and choices were respected.  The following represents what was discussed and decided upon by the physician assistant and the patient.      1.  DIAGNOSTIC TESTS: I reviewed the MRI lumbar spine.  - I also reviewed the x-ray right shoulder from 2018.  - No additional imaging was ordered.    2.  PHYSICAL THERAPY: Patient completed physical therapy at [facility] last month.  She admits she has not been doing home exercises due to depression.  She is going to talk to her primary care provider about adjusting her antidepressant doses.  Hopefully she will be able to resume her home exercise program.    3.  MEDICATIONS:  - No changes are made to the patient's medications.  - Patient can continue Tylenol as needed.  - Patient can continue Voltaren gel as needed  - Patient takes gabapentin 1600 milligrams at bedtime   - Patient can continue  methocarbamol as needed.  - Patient takes naproxen as needed      4.  INTERVENTIONS:    -I offered the patient a right glenohumeral joint injection under ultrasound guidance.  Patient indicated she would like to proceed and an order was placed.  - I also offered the patient a sacrococcygeal joint injection.  She will consider that.  - As long as she has at least 50% relief of pain for at least 3 months from her bilateral L4-5 transforaminal epidural steroid injections we could repeat these injections in the future.  - If that does not help we could consider an L5-S1 interlaminar epidural steroid injection.  - I did discuss with the patient that I do not recommend more than 4 corticosteroid injections within 12 months due to risk for osteoporosis/osteopenia.  She voiced understanding to this.    5.  PATIENT EDUCATION: Patient is in agreement the above plan.  All questions were answered.    6.  FOLLOW-UP: Patient will follow-up with me 2 weeks after her right glenohumeral joint injection.  If she has questions or concerns in the meantime, she should not hesitate to call.    Subjective:     Emani Vargas is a 58 y.o. female who presents today for follow-up regarding chronic low back pain.  Patient is following up after bilateral L4-5 transforaminal epidural steroid injections December 1, 2023.  Patient reports 70 to 80% relief of pain.    Patient complains of mild residual bilateral low back pain.  She reports improvement in bilateral lower extremity paresthesias involving primarily the lateral aspects of her legs but also the medial right thigh compared with before the injection.    Patient also continues to complain of coccydynia.  Pain is aggravated with sitting, especially in firm chairs.  Pain is alleviated with sitting in a softer chair or using a wedge cushion.    Patient also brings forward a new complaint today of right greater than left shoulder pain.  Is a chronic issue for her.  She previously had shoulder  joint injections through rheumatology.  Most recent injection was May 2022.  This provided 80% relief of pain for 4 months.  She would like a repeat injection.  She has difficulty sleeping at night because of the shoulder pain.      Treatment to date:  - physical therapy at [facility] 2023  - Bilateral L4-5 transforaminal epidural steroid injections December 1, 2023 with 70 to 80% relief of pain  - Bilateral L4-5 transforaminal epidural steroid injections August 21, 2023 with 80% relief of pain  - Failed to have adequate relief with bilateral L3, 4, 5 new branch blocks July 19, 2023  - Right shoulder joint injection May 2022 with 80% relief of pain for 4 months  - Left shoulder joint injection not helpful  - Bilateral L4-5 facet joint injections August 16, 2016 with only short-term relief.  - Tylenol  - Voltaren gel  - Gabapentin 1600 mg at bedtime  - Methocarbamol  - Celebrex causes stomach irritation  - Naproxen as needed  - Oxycodone at bedtime as needed is helpful      Review of Systems:  Positive for numbness/tingling, intermittent weakness, loss of bladder  headache, pain much worse at night, trip/stumble/falls.  Negative for loss of bowel/bladder control, inability to urinate, difficulty swallowing, difficulty with hand skills, fevers, unintentional weight loss.     Objective:   CONSTITUTIONAL:  Vital signs as above.  No acute distress.  The patient is well nourished and well groomed.    PSYCHIATRIC:  The patient is awake, alert, oriented to person, place and time.  The patient is answering questions appropriately with clear speech.  Normal affect.  HEENT: Normocephalic, atraumatic.  Sclera clear.  Neck is supple.  SKIN:  Skin over the face, neck bilateral upper extremities is clean, dry, intact without rashes.  MUSCULOSKELETAL: Gait is nonantalgic.  Tender to palpation about the right shoulder joint.  Range of motion of the right shoulder is mildly restricted with internal and external rotation.  5/5  strength bilateral shoulder abductors, elbow flexors/extensors, wrist extensors, finger flexors/abductors, hip flexors, knee flexors/extensors, ankle dorsi/plantar flexors.  NEUROLOGICAL: Diminished sensation bilateral lower extremities lateral calves.  No ankle clonus.  Negative within skis bilaterally.    RESULTS:      I reviewed the MRI lumbar spine from [facility] dated Sandy 10, 2023.  This shows a chronic L1 superior endplate compression fracture.  No additional fracture.  At L4-5 there is a disc protrusion contributing to right lateral recess stenosis and impingement of the right L5 nerve root.  There is moderate bilateral foraminal stenosis at this level.  Patient also has moderate bilateral facet arthropathy L5-S1.     XR SHOULDER RIGHT 2 OR MORE VWS  5/24/2018 3:13 PM     INDICATION: Patient with history of right-sided breast cancer.  complaining of right shoulder pain.  she has multiple other sites of arthritic pain.  COMPARISON: None.     FINDINGS: Very minimal degenerative irregularity at the rotator cuff insertion site. Glenohumeral joint appears normal. No fracture or dislocation.

## 2023-12-21 ENCOUNTER — OFFICE VISIT (OUTPATIENT)
Dept: PHYSICAL MEDICINE AND REHAB | Facility: CLINIC | Age: 58
End: 2023-12-21
Payer: MEDICARE

## 2023-12-21 VITALS
WEIGHT: 274.1 LBS | BODY MASS INDEX: 40.6 KG/M2 | SYSTOLIC BLOOD PRESSURE: 136 MMHG | HEIGHT: 69 IN | DIASTOLIC BLOOD PRESSURE: 84 MMHG | HEART RATE: 87 BPM

## 2023-12-21 DIAGNOSIS — M54.16 LUMBAR RADICULAR PAIN: ICD-10-CM

## 2023-12-21 DIAGNOSIS — M25.511 CHRONIC RIGHT SHOULDER PAIN: Primary | ICD-10-CM

## 2023-12-21 DIAGNOSIS — G89.29 CHRONIC RIGHT SHOULDER PAIN: Primary | ICD-10-CM

## 2023-12-21 DIAGNOSIS — M53.3 COCCYDYNIA: ICD-10-CM

## 2023-12-21 PROCEDURE — 99214 OFFICE O/P EST MOD 30 MIN: CPT | Performed by: PHYSICIAN ASSISTANT

## 2023-12-21 ASSESSMENT — PAIN SCALES - GENERAL: PAINLEVEL: MILD PAIN (3)

## 2023-12-21 NOTE — LETTER
12/21/2023         RE: Emani Vargas  [address] [place] [address]        Dear Colleague,    Thank you for referring your patient, Emani Vargas, to the [facility]. Please see a copy of my visit note below.    Assessment:   Emani Vargas is a 58 y.o. y.o. female with past medical history significant for osteoporosis,chronic pain, bipolar disorder,  asthma, breast cancer status post lumpectomy and radiation, , obesity, type 2 diabetes mellitus who presents today for follow-up regarding 3 areas of pain:  1.  Chronic bilateral low back pain.  My review of been MRI lumbar spine shows lower lumbar facet arthropathy.  At L4-5 there is a disc protrusion causing right lateral recess stenosis.  Patient is following up after bilateral L4-5 transforaminal epidural steroid injections December 1, 2023 which provided 70 to 80% relief of pain.  2.  Chronic coccydynia  3.  Chronic right greater than left shoulder pain.  Patient previously got right shoulder injections at her rheumatology clinic.  Most recent injection was May 2022 which provided 80% relief of pain for 4 months.  She has not establish care with a new rheumatologist since her last rheumatologist left their practice.  She would like a repeat right shoulder injection.       Plan:     A shared decision making plan was used.  The patient's values and choices were respected.  The following represents what was discussed and decided upon by the physician assistant and the patient.      1.  DIAGNOSTIC TESTS: I reviewed the MRI lumbar spine.  - I also reviewed the x-ray right shoulder from 2018.  - No additional imaging was ordered.    2.  PHYSICAL THERAPY: Patient completed physical therapy at [facility] last month.  She admits she has not been doing home exercises due to depression.  She is going to talk to her primary care provider about adjusting her antidepressant doses.  Hopefully she will be able to resume her home exercise  program.    3.  MEDICATIONS:  - No changes are made to the patient's medications.  - Patient can continue Tylenol as needed.  - Patient can continue Voltaren gel as needed  - Patient takes gabapentin 1600 milligrams at bedtime   - Patient can continue methocarbamol as needed.  - Patient takes naproxen as needed      4.  INTERVENTIONS:    -I offered the patient a right glenohumeral joint injection under ultrasound guidance.  Patient indicated she would like to proceed and an order was placed.  - I also offered the patient a sacrococcygeal joint injection.  She will consider that.  - As long as she has at least 50% relief of pain for at least 3 months from her bilateral L4-5 transforaminal epidural steroid injections we could repeat these injections in the future.  - If that does not help we could consider an L5-S1 interlaminar epidural steroid injection.  - I did discuss with the patient that I do not recommend more than 4 corticosteroid injections within 12 months due to risk for osteoporosis/osteopenia.  She voiced understanding to this.    5.  PATIENT EDUCATION: Patient is in agreement the above plan.  All questions were answered.    6.  FOLLOW-UP: Patient will follow-up with me 2 weeks after her right glenohumeral joint injection.  If she has questions or concerns in the meantime, she should not hesitate to call.    Subjective:     Emani Vargas is a 58 y.o. female who presents today for follow-up regarding chronic low back pain.  Patient is following up after bilateral L4-5 transforaminal epidural steroid injections December 1, 2023.  Patient reports 70 to 80% relief of pain.    Patient complains of mild residual bilateral low back pain.  She reports improvement in bilateral lower extremity paresthesias involving primarily the lateral aspects of her legs but also the medial right thigh compared with before the injection.    Patient also continues to complain of coccydynia.  Pain is aggravated with sitting,  especially in firm chairs.  Pain is alleviated with sitting in a softer chair or using a wedge cushion.    Patient also brings forward a new complaint today of right greater than left shoulder pain.  Is a chronic issue for her.  She previously had shoulder joint injections through rheumatology.  Most recent injection was May 2022.  This provided 80% relief of pain for 4 months.  She would like a repeat injection.  She has difficulty sleeping at night because of the shoulder pain.      Treatment to date:  - physical therapy at [facility] 2023  - Bilateral L4-5 transforaminal epidural steroid injections December 1, 2023 with 70 to 80% relief of pain  - Bilateral L4-5 transforaminal epidural steroid injections August 21, 2023 with 80% relief of pain  - Failed to have adequate relief with bilateral L3, 4, 5 new branch blocks July 19, 2023  - Right shoulder joint injection May 2022 with 80% relief of pain for 4 months  - Left shoulder joint injection not helpful  - Bilateral L4-5 facet joint injections August 16, 2016 with only short-term relief.  - Tylenol  - Voltaren gel  - Gabapentin 1600 mg at bedtime  - Methocarbamol  - Celebrex causes stomach irritation  - Naproxen as needed  - Oxycodone at bedtime as needed is helpful      Review of Systems:  Positive for numbness/tingling, intermittent weakness, loss of bladder  headache, pain much worse at night, trip/stumble/falls.  Negative for loss of bowel/bladder control, inability to urinate, difficulty swallowing, difficulty with hand skills, fevers, unintentional weight loss.     Objective:   CONSTITUTIONAL:  Vital signs as above.  No acute distress.  The patient is well nourished and well groomed.    PSYCHIATRIC:  The patient is awake, alert, oriented to person, place and time.  The patient is answering questions appropriately with clear speech.  Normal affect.  HEENT: Normocephalic, atraumatic.  Sclera clear.  Neck is supple.  SKIN:  Skin over the face, neck  bilateral upper extremities is clean, dry, intact without rashes.  MUSCULOSKELETAL: Gait is nonantalgic.  Tender to palpation about the right shoulder joint.  Range of motion of the right shoulder is mildly restricted with internal and external rotation.  5/5 strength bilateral shoulder abductors, elbow flexors/extensors, wrist extensors, finger flexors/abductors, hip flexors, knee flexors/extensors, ankle dorsi/plantar flexors.  NEUROLOGICAL: Diminished sensation bilateral lower extremities lateral calves.  No ankle clonus.  Negative within skis bilaterally.    RESULTS:      I reviewed the MRI lumbar spine from [facility] dated Sandy 10, 2023.  This shows a chronic L1 superior endplate compression fracture.  No additional fracture.  At L4-5 there is a disc protrusion contributing to right lateral recess stenosis and impingement of the right L5 nerve root.  There is moderate bilateral foraminal stenosis at this level.  Patient also has moderate bilateral facet arthropathy L5-S1.     XR SHOULDER RIGHT 2 OR MORE VWS  5/24/2018 3:13 PM     INDICATION: Patient with history of right-sided breast cancer.  complaining of right shoulder pain.  she has multiple other sites of arthritic pain.  COMPARISON: None.     FINDINGS: Very minimal degenerative irregularity at the rotator cuff insertion site. Glenohumeral joint appears normal. No fracture or dislocation.      Again, thank you for allowing me to participate in the care of your patient.        Sincerely,        Flavia Camarena PA-C

## 2023-12-21 NOTE — PATIENT INSTRUCTIONS
Right shoulder injection has been ordered today.      Please note that this injection uses cortisone.  The cortisone may somewhat weaken the immune system.  It is unknown how much the immune system is weakened.  It is unknown if it is weakened to the point that you may be more likely to get the COVID-19 virus, or if you do get the COVID-19 virus, if you would be sicker than you would have been if you had not had the cortisone injection.  If you do not wish to proceed with the injection, please let the nurse/physician know and do NOT schedule the injection.    Please note that since your immune system is weakened from the cortisone, having any vaccine/shot may be less effective if you have this vaccine within 2 weeks from your cortisone injection.  It is advised to wait 2 weeks after your cortisone injection to have any vaccine (or if you have a vaccine first, wait 2 weeks before you have the cortisone injection).    Please schedule this injection at least 1 week  from now to allow time for insurance prior authorization.  On the day of your injection, you cannot be sick or taking antibiotics.  If you become sick and are prescribed, please call the clinic so your injection can be rescheduled for once you have completed your antibiotics.  You will need to bring a  with you for your injection.   If you have any questions or concerns prior to your injection, please do not hesitate to call the nurse navigation line at [phone] or contact Flavia Camarena through [facility].

## 2024-01-17 NOTE — TELEPHONE ENCOUNTER
Please call patient, I have her off of the ozempic at this time, is she looking to restart it or has she been on it this whole time?     She should be seen probably this spring for a med check as well

## 2024-01-19 ENCOUNTER — MYC REFILL (OUTPATIENT)
Dept: FAMILY MEDICINE | Facility: CLINIC | Age: 59
End: 2024-01-19
Payer: MEDICARE

## 2024-01-19 DIAGNOSIS — E11.9 TYPE 2 DIABETES MELLITUS WITHOUT COMPLICATION, WITHOUT LONG-TERM CURRENT USE OF INSULIN (H): Primary | ICD-10-CM

## 2024-01-19 RX ORDER — SEMAGLUTIDE 1.34 MG/ML
INJECTION, SOLUTION SUBCUTANEOUS
Qty: 1.5 ML | OUTPATIENT
Start: 2024-01-19

## 2024-01-19 RX ORDER — SEMAGLUTIDE 1.34 MG/ML
0.25 INJECTION, SOLUTION SUBCUTANEOUS
Qty: 1.5 ML | Refills: 3 | Status: SHIPPED | OUTPATIENT
Start: 2024-01-19 | End: 2024-02-22

## 2024-01-19 NOTE — TELEPHONE ENCOUNTER
Pt has been taking .25mg weekly. She has been taking all year except for one month metformin due to ozempic shortage.   Ok to continue on 0.25mg weekly dose? Glucose has been around 120, daily. No side effects. Would like to continue.  Future appt April 2024

## 2024-02-06 NOTE — PROGRESS NOTES
Assessment:   Emani Vargas is a 58 y.o. y.o. female with past medical history significant for osteoporosis,chronic pain, bipolar disorder,  asthma, breast cancer status post lumpectomy and radiation, , obesity, type 2 diabetes mellitus who presents today for follow-up regarding acute on chronic low back pain with radiation into the right lower extremity.  My review of been MRI lumbar spine shows lower lumbar facet arthropathy.  At L4-5 there is a disc protrusion causing right lateral recess stenosis.  Patient had bilateral L4-5 transforaminal epidural steroid injections on December 1, 2023 which provided 70 to 80% relief of pain for 2 months.  4 days ago, pain returned.  She denies any injury or event to cause the recurrent pain.      Depression Screening Follow-up        2/8/2024     3:29 PM   PHQ   PHQ-9 Total Score 14   Q9: Thoughts of better off dead/self-harm past 2 weeks Not at all       Does the patient currently have a mental health provider?  No  Follow Up Actions Taken  Patient to follow up with PCP. Clinic staff to schedule appointment if able.     Flavia Camarena PA-C           Plan:     A shared decision making plan was used.  The patient's values and choices were respected.  The following represents what was discussed and decided upon by the physician assistant and the patient.      1.  DIAGNOSTIC TESTS: I reviewed the MRI lumbar spine.  - No additional imaging was ordered.    2.  PHYSICAL THERAPY: Patient completed physical therapy at [facility] November 2023.  She will continue with home exercises.  No additional physical therapy was ordered.    3.  MEDICATIONS:  - No changes are made to the patient's medications.  - Patient can continue Tylenol as needed.  - Patient can continue Voltaren gel as needed  - Patient takes gabapentin 1600 milligrams at bedtime   - Patient can continue methocarbamol as needed.  - Patient takes naproxen as needed      4.  INTERVENTIONS:  - I offered the patient right L4-5  and L5-S1 transforaminal epidural steroid injections.  I explained how this to be different than the bilateral L4-5 transforaminal epidural steroid injections.  Since pain is currently only right-sided we will focus the treatment on the right side.  She indicated she would like to proceed and an order was placed.  Due to patient's severe pain we will try to arrange for this injection be done as soon as possible.  I have sent a message to our prior authorization team.  If the injection cannot be completed for 1 to 2 weeks I would prescribe a Medrol Dosepak for her to use in the meantime.    5.  PATIENT EDUCATION: Patient is in agreement the above plan.  All questions were answered.    6.  FOLLOW-UP: Patient will follow-up with me after her right L4-5 and L5-S1 transforaminal epidural steroid injections.  If she has questions or concerns in the meantime, she should not hesitate to call.    Subjective:     Emani Vargas is a 58 y.o. female who presents today for follow-up regarding acute on chronic low back pain with radiation into the right lower extremity.  Patient had bilateral L4-5 transforaminal epidural steroid injections December 1, 2023.  Patient reports those injections provided 70 to 80% relief of pain until 4 days ago when pain returned.  She denies any injury or event to cause the pain.  She states that this feels like the exact same pain she had before her injections.  Pain is severe.    Patient complains of right-sided low back pain.  Pain radiates into the buttock.  Pain radiates down the posterolateral thigh.  Sometimes she feels pain on the medial thigh as well.  She then has pain and numbness on the dorsal foot and yesterday she had pain on the arch of the foot.  She rates her pain today as a 6 out of 10.  At its worst it is a 9 out of 10.  At its best it is a 2 out of 10.  Pain is aggravated prolonged sitting and prolonged standing.  Pain is alleviated with lying down.  She denies loss of bowel or  bladder control.  Denies recent fevers.      Treatment to date:  - physical therapy at [facility] 2023  - Bilateral L4-5 transforaminal epidural steroid injections December 1, 2023 with 70 to 80% relief of pain for 2 months  - Bilateral L4-5 transforaminal epidural steroid injections August 21, 2023 with 80% relief of pain  - Failed to have adequate relief with bilateral L3, 4, 5 new branch blocks July 19, 2023  - Right shoulder joint injection May 2022 with 80% relief of pain for 4 months  - Left shoulder joint injection not helpful  - Bilateral L4-5 facet joint injections August 16, 2016 with only short-term relief.  - Tylenol  - Voltaren gel  - Gabapentin 1600 mg at bedtime  - Methocarbamol  - Celebrex causes stomach irritation  - Naproxen as needed  - Oxycodone as needed is helpful      Review of Systems:  Positive for numbness/tingling, weakness, headache, pain much worse at night, difficulty with hand skills.  Negative for loss of bowel/bladder control, inability to urinate, trip/stumble/falls, difficulty swallowing, fevers, unintentional weight loss.     Objective:   CONSTITUTIONAL:  Vital signs as above.  No acute distress.  The patient is well nourished and well groomed.    PSYCHIATRIC:  The patient is awake, alert, oriented to person, place and time.  The patient is answering questions appropriately with clear speech.  Normal affect.  HEENT: Normocephalic, atraumatic.  Sclera clear.  Neck is supple.  SKIN:  Skin over the face, neck bilateral upper extremities is clean, dry, intact without rashes.  MUSCULOSKELETAL: Gait is antalgic, favoring the right.  Able to rise onto toes and heels bilaterally support.  Tender to palpation right lower lumbar paraspinous muscles.  Breakaway weakness right hip flexors, otherwise 5/5 strength left hip flexors, bilateral knee flexors/extensors, ankle dorsi/plantar flexors, EHL.  NEUROLOGICAL: Diminished sensation right plantar and dorsal foot.  No ankle clonus.  Negative  Babinski's bilaterally.    RESULTS:      I reviewed the MRI lumbar spine from [facility] dated Sandy 10, 2023.  This shows a chronic L1 superior endplate compression fracture.  No additional fracture.  At L4-5 there is a disc protrusion contributing to right lateral recess stenosis and impingement of the right L5 nerve root.  There is moderate bilateral foraminal stenosis at this level.  Patient also has moderate bilateral facet arthropathy L5-S1.

## 2024-02-08 ENCOUNTER — OFFICE VISIT (OUTPATIENT)
Dept: PHYSICAL MEDICINE AND REHAB | Facility: CLINIC | Age: 59
End: 2024-02-08
Payer: MEDICARE

## 2024-02-08 VITALS
HEART RATE: 85 BPM | HEIGHT: 69 IN | SYSTOLIC BLOOD PRESSURE: 145 MMHG | BODY MASS INDEX: 38.57 KG/M2 | DIASTOLIC BLOOD PRESSURE: 82 MMHG | WEIGHT: 260.4 LBS

## 2024-02-08 DIAGNOSIS — M54.16 LUMBAR RADICULAR PAIN: Primary | ICD-10-CM

## 2024-02-08 PROCEDURE — 99214 OFFICE O/P EST MOD 30 MIN: CPT | Performed by: PHYSICIAN ASSISTANT

## 2024-02-08 ASSESSMENT — PATIENT HEALTH QUESTIONNAIRE - PHQ9: SUM OF ALL RESPONSES TO PHQ QUESTIONS 1-9: 14

## 2024-02-08 ASSESSMENT — PAIN SCALES - GENERAL: PAINLEVEL: SEVERE PAIN (6)

## 2024-02-08 NOTE — PATIENT INSTRUCTIONS
Right L4/5 and L5/S1 epidural steroid injections have been ordered today.      Please note that this injection uses cortisone.  The cortisone may somewhat weaken the immune system.  It is unknown how much the immune system is weakened.  It is unknown if it is weakened to the point that you may be more likely to get the COVID-19 virus, or if you do get the COVID-19 virus, if you would be sicker than you would have been if you had not had the cortisone injection.  If you do not wish to proceed with the injection, please let the nurse/physician know and do NOT schedule the injection.    Please note that since your immune system is weakened from the cortisone, having any vaccine/shot may be less effective if you have this vaccine within 2 weeks from your cortisone injection.  It is advised to wait 2 weeks after your cortisone injection to have any vaccine (or if you have a vaccine first, wait 2 weeks before you have the cortisone injection).    Please schedule this injection at least 1 week  from now to allow time for insurance prior authorization.  On the day of your injection, you cannot be sick or taking antibiotics.  If you become sick and are prescribed, please call the clinic so your injection can be rescheduled for once you have completed your antibiotics.  You will need to bring a  with you for your injection.   If you have any questions or concerns prior to your injection, please do not hesitate to call the nurse navigation line at [phone] or contact Flavia Camarena through [facility].

## 2024-02-08 NOTE — LETTER
2/8/2024         RE: Emani Vargas  [address] [place] [address]        Dear Colleague,    Thank you for referring your patient, Emani Vargas, to the [facility]. Please see a copy of my visit note below.    Assessment:   Emani Vargas is a 58 y.o. y.o. female with past medical history significant for osteoporosis,chronic pain, bipolar disorder,  asthma, breast cancer status post lumpectomy and radiation, , obesity, type 2 diabetes mellitus who presents today for follow-up regarding acute on chronic low back pain with radiation into the right lower extremity.  My review of been MRI lumbar spine shows lower lumbar facet arthropathy.  At L4-5 there is a disc protrusion causing right lateral recess stenosis.  Patient had bilateral L4-5 transforaminal epidural steroid injections on December 1, 2023 which provided 70 to 80% relief of pain for 2 months.  4 days ago, pain returned.  She denies any injury or event to cause the recurrent pain.      Depression Screening Follow-up        2/8/2024     3:29 PM   PHQ   PHQ-9 Total Score 14   Q9: Thoughts of better off dead/self-harm past 2 weeks Not at all       Does the patient currently have a mental health provider?  No  Follow Up Actions Taken  Patient to follow up with PCP. Clinic staff to schedule appointment if able.     Flavia Camarena PA-C           Plan:     A shared decision making plan was used.  The patient's values and choices were respected.  The following represents what was discussed and decided upon by the physician assistant and the patient.      1.  DIAGNOSTIC TESTS: I reviewed the MRI lumbar spine.  - No additional imaging was ordered.    2.  PHYSICAL THERAPY: Patient completed physical therapy at [facility] November 2023.  She will continue with home exercises.  No additional physical therapy was ordered.    3.  MEDICATIONS:  - No changes are made to the patient's medications.  - Patient can continue Tylenol as  needed.  - Patient can continue Voltaren gel as needed  - Patient takes gabapentin 1600 milligrams at bedtime   - Patient can continue methocarbamol as needed.  - Patient takes naproxen as needed      4.  INTERVENTIONS:  - I offered the patient right L4-5 and L5-S1 transforaminal epidural steroid injections.  I explained how this to be different than the bilateral L4-5 transforaminal epidural steroid injections.  Since pain is currently only right-sided we will focus the treatment on the right side.  She indicated she would like to proceed and an order was placed.  Due to patient's severe pain we will try to arrange for this injection be done as soon as possible.  I have sent a message to our prior authorization team.  If the injection cannot be completed for 1 to 2 weeks I would prescribe a Medrol Dosepak for her to use in the meantime.    5.  PATIENT EDUCATION: Patient is in agreement the above plan.  All questions were answered.    6.  FOLLOW-UP: Patient will follow-up with me after her right L4-5 and L5-S1 transforaminal epidural steroid injections.  If she has questions or concerns in the meantime, she should not hesitate to call.    Subjective:     Emani Vargas is a 58 y.o. female who presents today for follow-up regarding acute on chronic low back pain with radiation into the right lower extremity.  Patient had bilateral L4-5 transforaminal epidural steroid injections December 1, 2023.  Patient reports those injections provided 70 to 80% relief of pain until 4 days ago when pain returned.  She denies any injury or event to cause the pain.  She states that this feels like the exact same pain she had before her injections.  Pain is severe.    Patient complains of right-sided low back pain.  Pain radiates into the buttock.  Pain radiates down the posterolateral thigh.  Sometimes she feels pain on the medial thigh as well.  She then has pain and numbness on the dorsal foot and yesterday she had pain on the arch  of the foot.  She rates her pain today as a 6 out of 10.  At its worst it is a 9 out of 10.  At its best it is a 2 out of 10.  Pain is aggravated prolonged sitting and prolonged standing.  Pain is alleviated with lying down.  She denies loss of bowel or bladder control.  Denies recent fevers.      Treatment to date:  - physical therapy at [facility] 2023  - Bilateral L4-5 transforaminal epidural steroid injections December 1, 2023 with 70 to 80% relief of pain for 2 months  - Bilateral L4-5 transforaminal epidural steroid injections August 21, 2023 with 80% relief of pain  - Failed to have adequate relief with bilateral L3, 4, 5 new branch blocks July 19, 2023  - Right shoulder joint injection May 2022 with 80% relief of pain for 4 months  - Left shoulder joint injection not helpful  - Bilateral L4-5 facet joint injections August 16, 2016 with only short-term relief.  - Tylenol  - Voltaren gel  - Gabapentin 1600 mg at bedtime  - Methocarbamol  - Celebrex causes stomach irritation  - Naproxen as needed  - Oxycodone as needed is helpful      Review of Systems:  Positive for numbness/tingling, weakness, headache, pain much worse at night, difficulty with hand skills.  Negative for loss of bowel/bladder control, inability to urinate, trip/stumble/falls, difficulty swallowing, fevers, unintentional weight loss.     Objective:   CONSTITUTIONAL:  Vital signs as above.  No acute distress.  The patient is well nourished and well groomed.    PSYCHIATRIC:  The patient is awake, alert, oriented to person, place and time.  The patient is answering questions appropriately with clear speech.  Normal affect.  HEENT: Normocephalic, atraumatic.  Sclera clear.  Neck is supple.  SKIN:  Skin over the face, neck bilateral upper extremities is clean, dry, intact without rashes.  MUSCULOSKELETAL: Gait is antalgic, favoring the right.  Able to rise onto toes and heels bilaterally support.  Tender to palpation right lower lumbar  paraspinous muscles.  Breakaway weakness right hip flexors, otherwise 5/5 strength left hip flexors, bilateral knee flexors/extensors, ankle dorsi/plantar flexors, EHL.  NEUROLOGICAL: Diminished sensation right plantar and dorsal foot.  No ankle clonus.  Negative Babinski's bilaterally.    RESULTS:      I reviewed the MRI lumbar spine from [facility] dated Sandy 10, 2023.  This shows a chronic L1 superior endplate compression fracture.  No additional fracture.  At L4-5 there is a disc protrusion contributing to right lateral recess stenosis and impingement of the right L5 nerve root.  There is moderate bilateral foraminal stenosis at this level.  Patient also has moderate bilateral facet arthropathy L5-S1.         Depression Response    Patient completed the PHQ-9 assessment for depression and scored >9? Yes  Question 9 on the PHQ-9 was positive for suicidality? No  Does patient have current mental health provider? No    Is this a virtual visit? No    I personally notified the following: visit provider             Again, thank you for allowing me to participate in the care of your patient.        Sincerely,        Flavia Camarena PA-C

## 2024-02-13 ENCOUNTER — RADIOLOGY INJECTION OFFICE VISIT (OUTPATIENT)
Dept: PHYSICAL MEDICINE AND REHAB | Facility: CLINIC | Age: 59
End: 2024-02-13
Attending: PHYSICIAN ASSISTANT
Payer: MEDICARE

## 2024-02-13 VITALS
SYSTOLIC BLOOD PRESSURE: 112 MMHG | HEIGHT: 69 IN | BODY MASS INDEX: 38.51 KG/M2 | DIASTOLIC BLOOD PRESSURE: 68 MMHG | HEART RATE: 74 BPM | RESPIRATION RATE: 16 BRPM | TEMPERATURE: 98 F | OXYGEN SATURATION: 96 % | WEIGHT: 260 LBS

## 2024-02-13 DIAGNOSIS — M54.16 LUMBAR RADICULAR PAIN: ICD-10-CM

## 2024-02-13 DIAGNOSIS — E11.9 DIABETES MELLITUS (H): Primary | ICD-10-CM

## 2024-02-13 LAB — GLUCOSE SERPL-MCNC: 118 MG/DL (ref 70–99)

## 2024-02-13 PROCEDURE — 64484 NJX AA&/STRD TFRM EPI L/S EA: CPT | Mod: RT | Performed by: STUDENT IN AN ORGANIZED HEALTH CARE EDUCATION/TRAINING PROGRAM

## 2024-02-13 PROCEDURE — 64483 NJX AA&/STRD TFRM EPI L/S 1: CPT | Mod: RT | Performed by: STUDENT IN AN ORGANIZED HEALTH CARE EDUCATION/TRAINING PROGRAM

## 2024-02-13 PROCEDURE — 82962 GLUCOSE BLOOD TEST: CPT | Performed by: STUDENT IN AN ORGANIZED HEALTH CARE EDUCATION/TRAINING PROGRAM

## 2024-02-13 RX ORDER — BUPIVACAINE HYDROCHLORIDE 2.5 MG/ML
INJECTION, SOLUTION EPIDURAL; INFILTRATION; INTRACAUDAL
Status: COMPLETED | OUTPATIENT
Start: 2024-02-13 | End: 2024-02-13

## 2024-02-13 RX ORDER — DEXAMETHASONE SODIUM PHOSPHATE 10 MG/ML
INJECTION, SOLUTION INTRAMUSCULAR; INTRAVENOUS
Status: COMPLETED | OUTPATIENT
Start: 2024-02-13 | End: 2024-02-13

## 2024-02-13 RX ORDER — LIDOCAINE HYDROCHLORIDE 10 MG/ML
INJECTION, SOLUTION EPIDURAL; INFILTRATION; INTRACAUDAL; PERINEURAL
Status: COMPLETED | OUTPATIENT
Start: 2024-02-13 | End: 2024-02-13

## 2024-02-13 RX ADMIN — LIDOCAINE HYDROCHLORIDE 4 ML: 10 INJECTION, SOLUTION EPIDURAL; INFILTRATION; INTRACAUDAL; PERINEURAL at 14:36

## 2024-02-13 RX ADMIN — DEXAMETHASONE SODIUM PHOSPHATE 10 MG: 10 INJECTION, SOLUTION INTRAMUSCULAR; INTRAVENOUS at 14:37

## 2024-02-13 RX ADMIN — BUPIVACAINE HYDROCHLORIDE 2 ML: 2.5 INJECTION, SOLUTION EPIDURAL; INFILTRATION; INTRACAUDAL at 14:37

## 2024-02-13 ASSESSMENT — PAIN SCALES - GENERAL
PAINLEVEL: MODERATE PAIN (5)
PAINLEVEL: SEVERE PAIN (6)

## 2024-02-13 NOTE — PATIENT INSTRUCTIONS
DISCHARGE INSTRUCTIONS    During office hours (8:00 a.m.- 4:00 p.m.) questions or concerns may be answered  by calling [facility] Navigation Nurses at  [phone].  Messages received after hours will be returned the following business day.      In the case of an emergency, please dial 911 or seek assistance at the nearest Emergency Room/Urgent Care facility.     All Patients:    You may experience an increase in your symptoms for the first 2 days (It may take anywhere between 2 days- 2 weeks for the steroid to have maximum effect).    You may use ice on the injection site, as frequently as 20 minutes each hour if needed.    You may take your pain medicine.    You may continue taking your regular medication after your injection. If you have had a Medial Branch Block you may resume pain medication once your pain diary is completed.    You may shower. No swimming, tub bath or hot tub for 48 hours.  You may remove your bandaid/bandage as soon as you are home.    You may resume light activities, as tolerated.    Resume your usual diet as tolerated.    It is strongly advised that you do not drive for 1-3 hours post injection.    If you have had oral sedation:  Do not drive for 8 hours post injection.      If you have had IV sedation:  Do not drive for 24 hours post injection.  Do not operate hazardous machinery or make important personal/business decisions for 24 hours.      POSSIBLE STEROID SIDE EFFECTS (If steroid/cortisone was used for your procedure)    -If you experience these symptoms, it should only last for a short period    Swelling of the legs              Skin redness (flushing)     Mouth (oral) irritation   Blood sugar (glucose) levels            Sweats                    Mood changes  Headache  Sleeplessness  Weakened immune system for up to 14 days, which could increase the risk of garo the COVID-19 virus and/or experiencing more severe symptoms of the disease, if exposed.  Decreased  effectiveness of the flu vaccine if given within 2 weeks of the steroid.         POSSIBLE PROCEDURE SIDE EFFECTS  -Call the Spine Center if you are concerned  Increased Pain           Increased numbness/tingling      Nausea/Vomiting          Bruising/bleeding at site      Redness or swelling                                              Difficulty walking      Weakness           Fever greater than 100.5    *In the event of a severe headache after an epidural steroid injection that is relieved by lying down, please call the [facility] to speak with a clinical staff member*     Please be advised that your blood glucose levels may be increased for the next 5-7 days as a result of the steroid you received with your injection today.  It is recommended that you monitor your blood glucose levels closely over the next week and direct any additional questions regarding your blood glucose management to your primary doctor.

## 2024-02-22 DIAGNOSIS — M79.7 FIBROMYALGIA: ICD-10-CM

## 2024-02-22 DIAGNOSIS — G89.29 OTHER CHRONIC PAIN: ICD-10-CM

## 2024-02-22 DIAGNOSIS — E11.9 TYPE 2 DIABETES MELLITUS WITHOUT COMPLICATION, WITHOUT LONG-TERM CURRENT USE OF INSULIN (H): ICD-10-CM

## 2024-02-22 DIAGNOSIS — F31.30 BIPOLAR DISORDER, CURRENT EPISODE DEPRESSED, MILD OR MODERATE SEVERITY, UNSPECIFIED (H): ICD-10-CM

## 2024-02-22 NOTE — TELEPHONE ENCOUNTER
Refill Request  Medication name: Pending Prescriptions:                       Disp   Refills    methocarbamol (ROBAXIN) 500 MG tablet     90 tab*3            Sig: Take 1 tablet (500 mg) by mouth 3 times daily as           needed    simvastatin (ZOCOR) 20 MG tablet          90 tab*3            Sig: Take 1 tablet (20 mg) by mouth at bedtime    gabapentin (NEURONTIN) 800 MG tablet      180 ta*1            Sig: Take 2 tablets (1,600 mg) by mouth at bedtime    OZEMPIC (0.25 OR 0.5 MG/DOSE) 2 MG/1.5ML *1.5 mL 3            Sig: Inject 0.25 mg Subcutaneous every 7 days    Requested Pharmacy:     [facility] [place] [address]

## 2024-02-23 RX ORDER — GABAPENTIN 800 MG/1
1600 TABLET ORAL AT BEDTIME
Qty: 180 TABLET | Refills: 0 | Status: SHIPPED | OUTPATIENT
Start: 2024-02-23 | End: 2024-04-10

## 2024-02-23 RX ORDER — SEMAGLUTIDE 1.34 MG/ML
0.25 INJECTION, SOLUTION SUBCUTANEOUS
Qty: 1.5 ML | Refills: 1 | Status: SHIPPED | OUTPATIENT
Start: 2024-02-23 | End: 2024-07-11

## 2024-02-23 RX ORDER — METHOCARBAMOL 500 MG/1
500 TABLET, FILM COATED ORAL 3 TIMES DAILY PRN
Qty: 90 TABLET | Refills: 3 | Status: SHIPPED | OUTPATIENT
Start: 2024-02-23 | End: 2024-08-30

## 2024-02-23 RX ORDER — SIMVASTATIN 20 MG
20 TABLET ORAL AT BEDTIME
Qty: 90 TABLET | Refills: 1 | Status: SHIPPED | OUTPATIENT
Start: 2024-02-23 | End: 2024-10-02

## 2024-03-03 ENCOUNTER — HEALTH MAINTENANCE LETTER (OUTPATIENT)
Age: 59
End: 2024-03-03

## 2024-03-13 DIAGNOSIS — F31.30 BIPOLAR DISORDER, CURRENT EPISODE DEPRESSED, MILD OR MODERATE SEVERITY, UNSPECIFIED (H): ICD-10-CM

## 2024-03-13 RX ORDER — FLUOXETINE 40 MG/1
40 CAPSULE ORAL DAILY
Qty: 90 CAPSULE | Refills: 1 | Status: SHIPPED | OUTPATIENT
Start: 2024-03-13

## 2024-03-13 NOTE — TELEPHONE ENCOUNTER
Refill Request  Medication name: Pending Prescriptions:                       Disp   Refills    FLUoxetine (PROZAC) 40 MG capsule         90 cap*1            Sig: Take 1 capsule (40 mg) by mouth daily    Requested Pharmacy:  [facility] [place] [place] [address]

## 2024-03-15 NOTE — PROGRESS NOTES
Assessment:   Emani Vargas is a 58 y.o. y.o. female with past medical history significant for osteoporosis,chronic pain, bipolar disorder,  asthma, breast cancer status post lumpectomy and radiation, , obesity, type 2 diabetes mellitus who presents today for follow-up regarding acute on chronic low back pain with radiation into the right lower extremity.  My review of been MRI lumbar spine shows lower lumbar facet arthropathy.  At L4-5 there is a disc protrusion causing right lateral recess stenosis.  Patient is status post right L4-5 and L5-S1 transforaminal epidural steroid injections February 13, 2024 which only provided 20% relief of pain.  On exam today she appears to be primarily symptomatic from right sacroiliac joint dysfunction.  She reproduction of pain with SI joint provocative maneuvers x 3.         Plan:     A shared decision making plan was used.  The patient's values and choices were respected.  The following represents what was discussed and decided upon by the physician assistant and the patient.      1.  DIAGNOSTIC TESTS: I reviewed the MRI lumbar spine.  - No additional imaging was ordered.    2.  PHYSICAL THERAPY: Patient completed physical therapy at [facility] November 2023.  She will continue with home exercises.  No additional physical therapy was ordered.    3.  MEDICATIONS:  - No changes are made to the patient's medications.  - Patient can continue Tylenol as needed.  - Patient can continue Voltaren gel as needed  - Patient takes gabapentin 1600 milligrams at bedtime   - Patient can continue methocarbamol as needed.  - Patient takes naproxen as needed      4.  INTERVENTIONS:  -Offer the patient a right sacroiliac joint injection.  Patient indicated she would like to proceed and an order was placed.  - If this does not help I am not sure that I have another injection to offer.    5.  PATIENT EDUCATION: Patient is in agreement the above plan.  All questions were answered.  -I told the  patient is a right sacroiliac joint injection fails to provide relief I would recommend a referral to neurosurgery.    6.  FOLLOW-UP: Patient follow-up with me 2 weeks after her right sacroiliac joint injection.  She has questions or concerns in the meantime, she should not hesitate to call.    Subjective:     Emani Vargas is a 58 y.o. female who presents today for follow-up regarding acute on chronic low back pain with radiation into the right lower extremity.  Patient is following up after right L4-5 and L5-S1 transforaminal epidural steroid injections February 13, 2024.  Patient reports only 20% relief of pain.    Patient complains of right low back and upper buttock pain.  She denies any pain further down the leg but states that she feels a numb and weak sensation that radiates into the right lateral hip and anterolateral thigh to the knee.  She also numbness and tingling right dorsal lateral foot.  She rates her pain today as a 6 out of 10.  At its worst it is a 9 out of 10.  At best it is a 2 out of 10.  Pain is aggravated with sitting on a hard surface and standing in 1 spot.  Pain is alleviated with sitting in a chair and finding a balance between rest and movement.  She denies any new symptoms that she is lasting.      Treatment to date:  - physical therapy at [facility] 2023  - Right L4-5 and L5-S1 transforaminal epidural steroid injections February 13, 2024 with 20% relief  - Bilateral L4-5 transforaminal epidural steroid injections December 1, 2023 with 70 to 80% relief of pain for 2 months  - Bilateral L4-5 transforaminal epidural steroid injections August 21, 2023 with 80% relief of pain  - Failed to have adequate relief with bilateral L3, 4, 5 new branch blocks July 19, 2023  - Right shoulder joint injection May 2022 with 80% relief of pain for 4 months  - Left shoulder joint injection not helpful  - Bilateral L4-5 facet joint injections August 16, 2016 with only short-term relief.  - Tylenol  -  Voltaren gel  - Gabapentin 1600 mg at bedtime  - Methocarbamol  - Celebrex causes stomach irritation  - Naproxen as needed  - Oxycodone as needed is helpful      Review of Systems:  Positive for numbness/tingling, weakness, headache, pain much worse at night (sometimes), difficulty swallowing, difficulty with hand skills.  Negative for loss of bowel/bladder control, inability to urinate, trip/number/falls, fevers, unintentional weight loss.     Objective:   CONSTITUTIONAL:  Vital signs as above.  Patient appears to be in pain.  The patient is well nourished and well groomed.    PSYCHIATRIC:  The patient is awake, alert, oriented to person, place and time.  The patient is answering questions appropriately with clear speech.  Normal affect.  HEENT: Normocephalic, atraumatic.  Sclera clear.  Neck is supple.  SKIN:  Skin over the face, neck bilateral upper extremities is clean, dry, intact without rashes.  MUSCULOSKELETAL: Gait is antalgic, favoring the right.  She transitions from seated to standing slowly and with pain behaviors.  Able to rise onto toes and heels bilaterally support.  Tender to palpation right lower lumbar paraspinous muscles and right sacroiliac joint.   5/5 strength bilateral hip flexors, bilateral knee flexors/extensors, ankle dorsi/plantar flexors, EHL.  Positive Laquita finger test right.  Negative pelvic distraction.  Negative thigh thrust bilaterally.  Positive Nabil's test right, negative Nabil's test left.  Positive Gaenslen's test bilaterally reproducing right SI joint pain.  Positive Yeomans test right, negative Yeoman's test left.  NEUROLOGICAL: Cranial nerves II through XII grossly intact.    RESULTS:      I reviewed the MRI lumbar spine from [facility] dated Sandy 10, 2023.  This shows a chronic L1 superior endplate compression fracture.  No additional fracture.  At L4-5 there is a disc protrusion contributing to right lateral recess stenosis and impingement of the right L5 nerve root.   There is moderate bilateral foraminal stenosis at this level.  Patient also has moderate bilateral facet arthropathy L5-S1.

## 2024-03-18 ENCOUNTER — OFFICE VISIT (OUTPATIENT)
Dept: PHYSICAL MEDICINE AND REHAB | Facility: CLINIC | Age: 59
End: 2024-03-18
Payer: MEDICARE

## 2024-03-18 VITALS
BODY MASS INDEX: 39.78 KG/M2 | DIASTOLIC BLOOD PRESSURE: 76 MMHG | HEART RATE: 73 BPM | SYSTOLIC BLOOD PRESSURE: 131 MMHG | WEIGHT: 268.6 LBS | HEIGHT: 69 IN

## 2024-03-18 DIAGNOSIS — M53.3 SACROILIAC JOINT PAIN: Primary | ICD-10-CM

## 2024-03-18 PROCEDURE — 99213 OFFICE O/P EST LOW 20 MIN: CPT | Performed by: PHYSICIAN ASSISTANT

## 2024-03-18 ASSESSMENT — PAIN SCALES - GENERAL: PAINLEVEL: SEVERE PAIN (6)

## 2024-03-18 NOTE — LETTER
3/18/2024         RE: Emani Vargas  [address] [place] [address]        Dear Colleague,    Thank you for referring your patient, Emani Vargas, to the [facility]. Please see a copy of my visit note below.    Assessment:   Emani Vargas is a 58 y.o. y.o. female with past medical history significant for osteoporosis,chronic pain, bipolar disorder,  asthma, breast cancer status post lumpectomy and radiation, , obesity, type 2 diabetes mellitus who presents today for follow-up regarding acute on chronic low back pain with radiation into the right lower extremity.  My review of been MRI lumbar spine shows lower lumbar facet arthropathy.  At L4-5 there is a disc protrusion causing right lateral recess stenosis.  Patient is status post right L4-5 and L5-S1 transforaminal epidural steroid injections February 13, 2024 which only provided 20% relief of pain.  On exam today she appears to be primarily symptomatic from right sacroiliac joint dysfunction.  She reproduction of pain with SI joint provocative maneuvers x 3.         Plan:     A shared decision making plan was used.  The patient's values and choices were respected.  The following represents what was discussed and decided upon by the physician assistant and the patient.      1.  DIAGNOSTIC TESTS: I reviewed the MRI lumbar spine.  - No additional imaging was ordered.    2.  PHYSICAL THERAPY: Patient completed physical therapy at [facility] November 2023.  She will continue with home exercises.  No additional physical therapy was ordered.    3.  MEDICATIONS:  - No changes are made to the patient's medications.  - Patient can continue Tylenol as needed.  - Patient can continue Voltaren gel as needed  - Patient takes gabapentin 1600 milligrams at bedtime   - Patient can continue methocarbamol as needed.  - Patient takes naproxen as needed      4.  INTERVENTIONS:  -Offer the patient a right sacroiliac joint injection.  Patient  indicated she would like to proceed and an order was placed.  - If this does not help I am not sure that I have another injection to offer.    5.  PATIENT EDUCATION: Patient is in agreement the above plan.  All questions were answered.  -I told the patient is a right sacroiliac joint injection fails to provide relief I would recommend a referral to neurosurgery.    6.  FOLLOW-UP: Patient follow-up with me 2 weeks after her right sacroiliac joint injection.  She has questions or concerns in the meantime, she should not hesitate to call.    Subjective:     Emani Vargas is a 58 y.o. female who presents today for follow-up regarding acute on chronic low back pain with radiation into the right lower extremity.  Patient is following up after right L4-5 and L5-S1 transforaminal epidural steroid injections February 13, 2024.  Patient reports only 20% relief of pain.    Patient complains of right low back and upper buttock pain.  She denies any pain further down the leg but states that she feels a numb and weak sensation that radiates into the right lateral hip and anterolateral thigh to the knee.  She also numbness and tingling right dorsal lateral foot.  She rates her pain today as a 6 out of 10.  At its worst it is a 9 out of 10.  At best it is a 2 out of 10.  Pain is aggravated with sitting on a hard surface and standing in 1 spot.  Pain is alleviated with sitting in a chair and finding a balance between rest and movement.  She denies any new symptoms that she is lasting.      Treatment to date:  - physical therapy at [facility] 2023  - Right L4-5 and L5-S1 transforaminal epidural steroid injections February 13, 2024 with 20% relief  - Bilateral L4-5 transforaminal epidural steroid injections December 1, 2023 with 70 to 80% relief of pain for 2 months  - Bilateral L4-5 transforaminal epidural steroid injections August 21, 2023 with 80% relief of pain  - Failed to have adequate relief with bilateral L3, 4, 5 new  branch blocks July 19, 2023  - Right shoulder joint injection May 2022 with 80% relief of pain for 4 months  - Left shoulder joint injection not helpful  - Bilateral L4-5 facet joint injections August 16, 2016 with only short-term relief.  - Tylenol  - Voltaren gel  - Gabapentin 1600 mg at bedtime  - Methocarbamol  - Celebrex causes stomach irritation  - Naproxen as needed  - Oxycodone as needed is helpful      Review of Systems:  Positive for numbness/tingling, weakness, headache, pain much worse at night (sometimes), difficulty swallowing, difficulty with hand skills.  Negative for loss of bowel/bladder control, inability to urinate, trip/number/falls, fevers, unintentional weight loss.     Objective:   CONSTITUTIONAL:  Vital signs as above.  Patient appears to be in pain.  The patient is well nourished and well groomed.    PSYCHIATRIC:  The patient is awake, alert, oriented to person, place and time.  The patient is answering questions appropriately with clear speech.  Normal affect.  HEENT: Normocephalic, atraumatic.  Sclera clear.  Neck is supple.  SKIN:  Skin over the face, neck bilateral upper extremities is clean, dry, intact without rashes.  MUSCULOSKELETAL: Gait is antalgic, favoring the right.  She transitions from seated to standing slowly and with pain behaviors.  Able to rise onto toes and heels bilaterally support.  Tender to palpation right lower lumbar paraspinous muscles and right sacroiliac joint.   5/5 strength bilateral hip flexors, bilateral knee flexors/extensors, ankle dorsi/plantar flexors, EHL.  Positive Laquita finger test right.  Negative pelvic distraction.  Negative thigh thrust bilaterally.  Positive Nabil's test right, negative Nabil's test left.  Positive Gaenslen's test bilaterally reproducing right SI joint pain.  Positive Yeomans test right, negative Yeoman's test left.  NEUROLOGICAL: Cranial nerves II through XII grossly intact.    RESULTS:      I reviewed the MRI lumbar spine  from [facility] dated Sandy 10, 2023.  This shows a chronic L1 superior endplate compression fracture.  No additional fracture.  At L4-5 there is a disc protrusion contributing to right lateral recess stenosis and impingement of the right L5 nerve root.  There is moderate bilateral foraminal stenosis at this level.  Patient also has moderate bilateral facet arthropathy L5-S1.         Again, thank you for allowing me to participate in the care of your patient.        Sincerely,        Flavia Camarena PA-C

## 2024-03-18 NOTE — PATIENT INSTRUCTIONS
Right sacroiliac joint injection has been ordered today.      Please note that this injection uses cortisone.  The cortisone may somewhat weaken the immune system.  It is unknown how much the immune system is weakened.  It is unknown if it is weakened to the point that you may be more likely to get the COVID-19 virus, or if you do get the COVID-19 virus, if you would be sicker than you would have been if you had not had the cortisone injection.  If you do not wish to proceed with the injection, please let the nurse/physician know and do NOT schedule the injection.    Please note that since your immune system is weakened from the cortisone, having any vaccine/shot may be less effective if you have this vaccine within 2 weeks from your cortisone injection.  It is advised to wait 2 weeks after your cortisone injection to have any vaccine (or if you have a vaccine first, wait 2 weeks before you have the cortisone injection).    Please schedule this injection at least 1 week  from now to allow time for insurance prior authorization.  On the day of your injection, you cannot be sick or taking antibiotics.  If you become sick and are prescribed, please call the clinic so your injection can be rescheduled for once you have completed your antibiotics.  You will need to bring a  with you for your injection.   If you have any questions or concerns prior to your injection, please do not hesitate to call the nurse navigation line at [phone] or contact Flavia Camarena through [employer].

## 2024-04-04 ENCOUNTER — RADIOLOGY INJECTION OFFICE VISIT (OUTPATIENT)
Dept: PHYSICAL MEDICINE AND REHAB | Facility: CLINIC | Age: 59
End: 2024-04-04
Attending: PHYSICIAN ASSISTANT
Payer: MEDICARE

## 2024-04-04 VITALS
SYSTOLIC BLOOD PRESSURE: 122 MMHG | HEART RATE: 76 BPM | DIASTOLIC BLOOD PRESSURE: 82 MMHG | TEMPERATURE: 98.5 F | OXYGEN SATURATION: 94 % | RESPIRATION RATE: 16 BRPM

## 2024-04-04 DIAGNOSIS — M53.3 SACROILIAC JOINT PAIN: ICD-10-CM

## 2024-04-04 DIAGNOSIS — E11.9 DIABETES MELLITUS, TYPE 2 (H): Primary | ICD-10-CM

## 2024-04-04 LAB — GLUCOSE SERPL-MCNC: 120 MG/DL (ref 70–99)

## 2024-04-04 PROCEDURE — 82962 GLUCOSE BLOOD TEST: CPT | Performed by: PAIN MEDICINE

## 2024-04-04 PROCEDURE — 27096 INJECT SACROILIAC JOINT: CPT | Mod: RT | Performed by: PAIN MEDICINE

## 2024-04-04 RX ORDER — ROPIVACAINE HYDROCHLORIDE 5 MG/ML
INJECTION, SOLUTION EPIDURAL; INFILTRATION; PERINEURAL
Status: COMPLETED | OUTPATIENT
Start: 2024-04-04 | End: 2024-04-04

## 2024-04-04 RX ORDER — LIDOCAINE HYDROCHLORIDE 10 MG/ML
INJECTION, SOLUTION EPIDURAL; INFILTRATION; INTRACAUDAL; PERINEURAL
Status: COMPLETED | OUTPATIENT
Start: 2024-04-04 | End: 2024-04-04

## 2024-04-04 RX ORDER — BUPIVACAINE HYDROCHLORIDE 2.5 MG/ML
INJECTION, SOLUTION EPIDURAL; INFILTRATION; INTRACAUDAL
Status: COMPLETED | OUTPATIENT
Start: 2024-04-04 | End: 2024-04-04

## 2024-04-04 RX ADMIN — METHYLPREDNISOLONE ACETATE 20 MG: 40 INJECTION, SUSPENSION INTRA-ARTICULAR; INTRALESIONAL; INTRAMUSCULAR; SOFT TISSUE at 13:09

## 2024-04-04 RX ADMIN — ROPIVACAINE HYDROCHLORIDE 2.5 ML: 5 INJECTION, SOLUTION EPIDURAL; INFILTRATION; PERINEURAL at 13:08

## 2024-04-04 RX ADMIN — LIDOCAINE HYDROCHLORIDE 1 ML: 10 INJECTION, SOLUTION EPIDURAL; INFILTRATION; INTRACAUDAL; PERINEURAL at 13:08

## 2024-04-04 ASSESSMENT — PAIN SCALES - GENERAL
PAINLEVEL: MODERATE PAIN (4)
PAINLEVEL: MODERATE PAIN (4)

## 2024-04-04 NOTE — PATIENT INSTRUCTIONS
Follow-up visit with DIANA Lam in 2 weeks to discuss injection outcome and determine care plan going forward.    Please be advised that your blood glucose levels may be increased for the next 5-7 days as a result of the steroid you received with your injection today.  It is recommended that you monitor your blood glucose levels closely over the next week and direct any additional questions regarding your blood glucose management to your primary doctor.       DISCHARGE INSTRUCTIONS    During office hours (8:00 a.m.- 4:00 p.m.) questions or concerns may be answered  by calling [facility] Navigation Nurses at  [phone].  Messages received after hours will be returned the following business day.      In the case of an emergency, please dial 911 or seek assistance at the nearest Emergency Room/Urgent Care facility.     All Patients:    You may experience an increase in your symptoms for the first 2 days (It may take anywhere between 2 days- 2 weeks for the steroid to have maximum effect).    You may use ice on the injection site, as frequently as 20 minutes each hour if needed.    You may take your pain medicine.    You may continue taking your regular medication after your injection. If you have had a Medial Branch Block you may resume pain medication once your pain diary is completed.    You may shower. No swimming, tub bath or hot tub for 48 hours.  You may remove your bandaid/bandage as soon as you are home.    You may resume light activities, as tolerated.    Resume your usual diet as tolerated.    It is strongly advised that you do not drive for 1-3 hours post injection.    If you have had oral sedation:  Do not drive for 8 hours post injection.      If you have had IV sedation:  Do not drive for 24 hours post injection.  Do not operate hazardous machinery or make important personal/business decisions for 24 hours.      POSSIBLE STEROID SIDE EFFECTS (If steroid/cortisone was used for your  procedure)    -If you experience these symptoms, it should only last for a short period    Swelling of the legs              Skin redness (flushing)     Mouth (oral) irritation   Blood sugar (glucose) levels            Sweats                    Mood changes  Headache  Sleeplessness  Weakened immune system for up to 14 days, which could increase the risk of garo the COVID-19 virus and/or experiencing more severe symptoms of the disease, if exposed.  Decreased effectiveness of the flu vaccine if given within 2 weeks of the steroid.         POSSIBLE PROCEDURE SIDE EFFECTS  -Call the Spine Center if you are concerned  Increased Pain           Increased numbness/tingling      Nausea/Vomiting          Bruising/bleeding at site      Redness or swelling                                              Difficulty walking      Weakness           Fever greater than 100.5    *In the event of a severe headache after an epidural steroid injection that is relieved by lying down, please call the [facility] to speak with a clinical staff member*

## 2024-04-10 ENCOUNTER — OFFICE VISIT (OUTPATIENT)
Dept: FAMILY MEDICINE | Facility: CLINIC | Age: 59
End: 2024-04-10
Payer: MEDICARE

## 2024-04-10 VITALS
TEMPERATURE: 97.9 F | BODY MASS INDEX: 39.55 KG/M2 | HEIGHT: 69 IN | WEIGHT: 267 LBS | HEART RATE: 76 BPM | OXYGEN SATURATION: 95 % | SYSTOLIC BLOOD PRESSURE: 120 MMHG | DIASTOLIC BLOOD PRESSURE: 80 MMHG | RESPIRATION RATE: 17 BRPM

## 2024-04-10 DIAGNOSIS — E03.8 SUBCLINICAL HYPOTHYROIDISM: ICD-10-CM

## 2024-04-10 DIAGNOSIS — E11.9 TYPE 2 DIABETES MELLITUS WITHOUT COMPLICATION, WITHOUT LONG-TERM CURRENT USE OF INSULIN (H): Primary | ICD-10-CM

## 2024-04-10 DIAGNOSIS — K21.9 GASTROESOPHAGEAL REFLUX DISEASE WITHOUT ESOPHAGITIS: ICD-10-CM

## 2024-04-10 DIAGNOSIS — F31.30 BIPOLAR DISORDER, CURRENT EPISODE DEPRESSED, MILD OR MODERATE SEVERITY, UNSPECIFIED (H): ICD-10-CM

## 2024-04-10 DIAGNOSIS — M79.7 FIBROMYALGIA: ICD-10-CM

## 2024-04-10 LAB — HBA1C MFR BLD: 5.9 % (ref 0–5.6)

## 2024-04-10 PROCEDURE — 84443 ASSAY THYROID STIM HORMONE: CPT | Performed by: FAMILY MEDICINE

## 2024-04-10 PROCEDURE — 36415 COLL VENOUS BLD VENIPUNCTURE: CPT | Performed by: FAMILY MEDICINE

## 2024-04-10 PROCEDURE — 84439 ASSAY OF FREE THYROXINE: CPT | Performed by: FAMILY MEDICINE

## 2024-04-10 PROCEDURE — 99214 OFFICE O/P EST MOD 30 MIN: CPT | Performed by: FAMILY MEDICINE

## 2024-04-10 PROCEDURE — 83036 HEMOGLOBIN GLYCOSYLATED A1C: CPT | Performed by: FAMILY MEDICINE

## 2024-04-10 RX ORDER — TRAZODONE HYDROCHLORIDE 100 MG/1
200 TABLET ORAL AT BEDTIME
Qty: 180 TABLET | Refills: 3 | Status: SHIPPED | OUTPATIENT
Start: 2024-04-10 | End: 2024-10-02

## 2024-04-10 RX ORDER — GABAPENTIN 800 MG/1
1600 TABLET ORAL AT BEDTIME
Qty: 180 TABLET | Refills: 0 | Status: SHIPPED | OUTPATIENT
Start: 2024-04-10 | End: 2024-08-23

## 2024-04-10 RX ORDER — BUPROPION HYDROCHLORIDE 150 MG/1
150 TABLET ORAL EVERY MORNING
Qty: 90 TABLET | Refills: 3 | Status: SHIPPED | OUTPATIENT
Start: 2024-04-10

## 2024-04-10 RX ORDER — RESPIRATORY SYNCYTIAL VIRUS VACCINE 120MCG/0.5
0.5 KIT INTRAMUSCULAR ONCE
Qty: 1 EACH | Refills: 0 | Status: CANCELLED | OUTPATIENT
Start: 2024-04-10 | End: 2024-04-10

## 2024-04-10 RX ORDER — LEVOTHYROXINE SODIUM 75 UG/1
37.5 TABLET ORAL DAILY
Qty: 45 TABLET | Refills: 1 | Status: SHIPPED | OUTPATIENT
Start: 2024-04-10 | End: 2024-06-25

## 2024-04-10 RX ORDER — TRAZODONE HYDROCHLORIDE 50 MG/1
25-50 TABLET, FILM COATED ORAL
Qty: 90 TABLET | Refills: 3 | Status: SHIPPED | OUTPATIENT
Start: 2024-04-10 | End: 2024-10-02

## 2024-04-10 RX ORDER — FAMOTIDINE 40 MG/1
40 TABLET, FILM COATED ORAL 2 TIMES DAILY PRN
Qty: 180 TABLET | Refills: 3 | Status: SHIPPED | OUTPATIENT
Start: 2024-04-10

## 2024-04-10 ASSESSMENT — PATIENT HEALTH QUESTIONNAIRE - PHQ9
SUM OF ALL RESPONSES TO PHQ QUESTIONS 1-9: 19
SUM OF ALL RESPONSES TO PHQ QUESTIONS 1-9: 19
10. IF YOU CHECKED OFF ANY PROBLEMS, HOW DIFFICULT HAVE THESE PROBLEMS MADE IT FOR YOU TO DO YOUR WORK, TAKE CARE OF THINGS AT HOME, OR GET ALONG WITH OTHER PEOPLE: VERY DIFFICULT

## 2024-04-10 NOTE — PROGRESS NOTES
"  Assessment & Plan     Type 2 diabetes mellitus without complication, without long-term current use of insulin (H)  -Will update labs as listed and adjust treatment if needed.   - HEMOGLOBIN A1C  - HEMOGLOBIN A1C    Bipolar disorder, current episode depressed, mild or moderate severity, unspecified (H)  -Has not been sleeping well. We spent some time discussing the importance of good sleep hygiene. In addition to this, she can add in a low dose of addition trazodone as needed as well. Continue with the gabapentin as well.   -Is likely having some mild hypomania, but has had difficult to control symptoms her whole life and is apprehensive to change her medications due to this. Is stable at this time.   - traZODone (DESYREL) 100 MG tablet  Dispense: 180 tablet; Refill: 3  - traZODone (DESYREL) 50 MG tablet  Dispense: 90 tablet; Refill: 3  - buPROPion (WELLBUTRIN XL) 150 MG 24 hr tablet  Dispense: 90 tablet; Refill: 3  - gabapentin (NEURONTIN) 800 MG tablet  Dispense: 180 tablet; Refill: 0    Gastroesophageal reflux disease without esophagitis  -Doing well with the pepcid.   - famotidine (PEPCID) 40 MG tablet  Dispense: 180 tablet; Refill: 3    Fibromyalgia  -continue on her current neurontin  - gabapentin (NEURONTIN) 800 MG tablet  Dispense: 180 tablet; Refill: 0    Subclinical hypothyroidism  -Continue on current medications, updating labs as listed.   - levothyroxine (SYNTHROID/LEVOTHROID) 75 MCG tablet  Dispense: 45 tablet; Refill: 1  - TSH  - T4, free              BMI  Estimated body mass index is 39.43 kg/m  as calculated from the following:    Height as of this encounter: 1.753 m (5' 9\").    Weight as of this encounter: 121.1 kg (267 lb).   Weight management plan: Discussed healthy diet and exercise guidelines    Depression Screening Follow Up        4/10/2024     8:08 AM   PHQ   PHQ-9 Total Score 19   Q9: Thoughts of better off dead/self-harm past 2 weeks Not at all         Follow Up Actions Taken  Crisis " resource information provided in After Visit Summary  Adjusted patient's anti-depressant medication.         Caitie Joaquin is a 59 year old, presenting for the following health issues:  Recheck Medication        4/10/2024     2:09 PM   Additional Questions   Roomed by April     History of Present Illness       Diabetes:   She presents for follow up of diabetes.  She is checking home blood glucose one time daily.   She checks blood glucose after meals.  Blood glucose is never over 200 and never under 70. She is aware of hypoglycemia symptoms including shakiness, dizziness, weakness, lethargy, blurred vision and confusion.    She has no concerns regarding her diabetes at this time.  She is having numbness in feet, burning in feet and blurry vision.  The patient has not had a diabetic eye exam in the last 12 months.          Hypothyroidism:     Since last visit, patient describes the following symptoms::  Anxiety, Constipation, Depression, Dry skin, Fatigue, Hair loss, Loose stools, Tremors, Weight gain and Weight loss    Weight gain::  Less than 5 lbs.    She eats 2-3 servings of fruits and vegetables daily.She consumes 1 sweetened beverage(s) daily.She exercises with enough effort to increase her heart rate 9 or less minutes per day.  She exercises with enough effort to increase her heart rate 3 or less days per week. She is missing 1 dose(s) of medications per week.  She is not taking prescribed medications regularly due to remembering to take.     She notes that she is having more issues sleeping. She typically goes to be between 2-5, will get up, drink some coffee and go back to sleep until 12-1, will be up til 7, will fall asleep in the leena and then it starts all over. She does wonder if she can increase her trazodone. She has gone up to 300 mg and that is too much. She has been taking 225 mg. This does seem to help some.     She does feel that she doesn't know balance, she does have manic days though  "still, does at times want to spend money on these days as well.     She is doing well with her food for the most part, is working on this again.     Working on her eye visit.           Objective    /80   Pulse 76   Temp 97.9  F (36.6  C)   Resp 17   Ht 1.753 m (5' 9\")   Wt 121.1 kg (267 lb)   SpO2 95%   BMI 39.43 kg/m    Body mass index is 39.43 kg/m .  Physical Exam   GENERAL: alert and no distress  NECK: no adenopathy, no asymmetry, masses, or scars  RESP: lungs clear to auscultation - no rales, rhonchi or wheezes  CV: regular rate and rhythm, normal S1 S2, no S3 or S4, no murmur, click or rub, no peripheral edema  MS: no gross musculoskeletal defects noted, no edema  PSYCH: mentation appears normal, affect normal/bright            Signed Electronically by: Angelia Krishna MD    "

## 2024-04-11 LAB
T4 FREE SERPL-MCNC: 1.14 NG/DL (ref 0.9–1.7)
TSH SERPL DL<=0.005 MIU/L-ACNC: 5.84 UIU/ML (ref 0.3–4.2)

## 2024-04-16 RX ORDER — METHYLPREDNISOLONE ACETATE 40 MG/ML
INJECTION, SUSPENSION INTRA-ARTICULAR; INTRALESIONAL; INTRAMUSCULAR; SOFT TISSUE
Status: SHIPPED | OUTPATIENT
Start: 2024-04-04

## 2024-04-16 NOTE — PROGRESS NOTES
Assessment:   Emani Vargas is a 59 y.o. female with past medical history significant for osteoporosis,chronic pain, bipolar disorder,  asthma, breast cancer status post lumpectomy and radiation, , obesity, type 2 diabetes mellitus who presents today for follow-up regarding chronic low back pain with radiation into the right lower extremity.  My review of been MRI lumbar spine shows lower lumbar facet arthropathy.  At L4-5 there is a disc protrusion causing right lateral recess stenosis.  Patient is status post a right sacroiliac joint injection April 4, 2024 which did not provide any relief of pain.         Plan:     A shared decision making plan was used.  The patient's values and choices were respected.  The following represents what was discussed and decided upon by the physician assistant and the patient.      1.  DIAGNOSTIC TESTS: I reviewed the MRI lumbar spine.  - I ordered lumbar spine flexion-extension x-rays in preparation for her consultation with neurosurgery.    2.  PHYSICAL THERAPY: Patient completed physical therapy at [facility] November 2023.  She will continue with home exercises.  No additional physical therapy was ordered.    3.  MEDICATIONS:  - No changes are made to the patient's medications.  - Patient can continue Tylenol as needed.  - Patient can continue Voltaren gel as needed  - Patient takes gabapentin 1600 milligrams at bedtime   - Patient can continue methocarbamol as needed.  - Patient takes naproxen as needed      4.  INTERVENTIONS: No additional interventions were ordered.  Patient has tried and failed extensive interventional pain management.    5.  REFERRALS: Entered a referral for the patient to see Dr. Cummins.  - I told the patient if she does not wish to pursue surgery or if surgery is not recommended I am happy to refer her to chronic pain clinic.    6.  FOLLOW-UP: Patient going to follow-up with me as needed.  She will notify me if she would like to pursue a referral to  the chronic pain clinic.  If she has any other questions or concerns, she should not hesitate to call.    Subjective:     Emani Vargas is a 59 y.o. female who presents today for follow-up regarding acute on chronic low back pain with radiation into the right lower extremity.  Patient is following up after a right sacroiliac joint injection April 4, 2024.  Patient reports no improvement in pain.  In fact, she feels like her pain is worse.  She feels a pressure sensation where she had the injection.  She is also having more midline lower back pain.    Patient complains of midline lower lumbar spine pain.  Pain extends down the midline toward the tailbone.  She also is pain in the right SI joint region.  Pain radiates down the right posterior thigh to the knee.  She also has some pain in the anterolateral thigh to the knee.  She has numbness and tingling in the right anterior/lateral shin extending into the dorsal foot.  She feels weakness in the right leg.  She rates her pain today as a 5 out of 10.  At its worst it is an 8 of 10.  I discussed that is a 2 out of 10.  Pain is aggravated with sitting, standing, walking, lying on her back.  Pain is alleviated with applying ice and heat.      Treatment to date:  - physical therapy at [facility] 2023  - Right sacroiliac joint injection April 4, 2024 with no relief  - Right L4-5 and L5-S1 transforaminal epidural steroid injections February 13, 2024 with 20% relief  - Bilateral L4-5 transforaminal epidural steroid injections December 1, 2023 with 70 to 80% relief of pain for 2 months  - Bilateral L4-5 transforaminal epidural steroid injections August 21, 2023 with 80% relief of pain  - Failed to have adequate relief with bilateral L3, 4, 5 new branch blocks July 19, 2023  - Right shoulder joint injection May 2022 with 80% relief of pain for 4 months  - Left shoulder joint injection not helpful  - Bilateral L4-5 facet joint injections August 16, 2016 with only short-term  relief.  - Tylenol  - Voltaren gel  - Gabapentin 1600 mg at bedtime  - Methocarbamol  - Celebrex causes stomach irritation  - Naproxen as needed  - Oxycodone as needed is helpful  - Tramadol takes the edge off      Review of Systems:  Positive for numbness/tingling, weakness, wetting pants, headache, pain much worse at night, difficulty swallowing, difficulty with hand skills.  Negative for loss of bowel/bladder control, inability to urinate, trip/stumble/falls, fevers, unintentional weight loss.     Objective:   CONSTITUTIONAL:  Vital signs as above.  Patient appears to be in pain.  The patient is well nourished and well groomed.    PSYCHIATRIC:  The patient is awake, alert, oriented to person, place and time.  The patient is answering questions appropriately with clear speech.  Normal affect.  HEENT: Normocephalic, atraumatic.  Sclera clear.  Neck is supple.  SKIN:  Skin over the face, neck bilateral upper extremities is clean, dry, intact without rashes.  MUSCULOSKELETAL: Gait is antalgic, favoring the right.  She transitions from seated to standing slowly and with pain behaviors.  Able to rise onto toes and heels bilaterally support.  Tender to palpation midline lower lumbar spine.  Tender to palpation right greater than left lower lumbar paraspinous muscles and right sacroiliac joint.   5/5 strength bilateral hip flexors, bilateral knee flexors/extensors, ankle dorsi/plantar flexors.  NEUROLOGICAL: Cranial nerves II through XII grossly intact.  Subjective diminished sensation right lower leg and foot stocking distribution.    RESULTS:      I reviewed the MRI lumbar spine from [facility] dated Sandy 10, 2023.  This shows a chronic L1 superior endplate compression fracture.  No additional fracture.  At L4-5 there is a disc protrusion contributing to right lateral recess stenosis and impingement of the right L5 nerve root.  There is moderate bilateral foraminal stenosis at this level.  Patient also has moderate  bilateral facet arthropathy L5-S1.

## 2024-04-18 ENCOUNTER — TELEPHONE (OUTPATIENT)
Dept: NEUROSURGERY | Facility: CLINIC | Age: 59
End: 2024-04-18

## 2024-04-18 ENCOUNTER — OFFICE VISIT (OUTPATIENT)
Dept: PHYSICAL MEDICINE AND REHAB | Facility: CLINIC | Age: 59
End: 2024-04-18
Payer: MEDICARE

## 2024-04-18 ENCOUNTER — HOSPITAL ENCOUNTER (OUTPATIENT)
Dept: RADIOLOGY | Facility: HOSPITAL | Age: 59
Discharge: HOME | End: 2024-04-18
Attending: PHYSICIAN ASSISTANT | Admitting: PHYSICIAN ASSISTANT
Payer: MEDICARE

## 2024-04-18 VITALS
DIASTOLIC BLOOD PRESSURE: 80 MMHG | SYSTOLIC BLOOD PRESSURE: 132 MMHG | HEIGHT: 69 IN | WEIGHT: 267 LBS | HEART RATE: 76 BPM | BODY MASS INDEX: 39.55 KG/M2

## 2024-04-18 DIAGNOSIS — M43.16 SPONDYLOLISTHESIS OF LUMBAR REGION: Primary | ICD-10-CM

## 2024-04-18 DIAGNOSIS — M54.16 LUMBAR RADICULAR PAIN: ICD-10-CM

## 2024-04-18 DIAGNOSIS — M43.16 SPONDYLOLISTHESIS OF LUMBAR REGION: ICD-10-CM

## 2024-04-18 PROCEDURE — 72120 X-RAY BEND ONLY L-S SPINE: CPT

## 2024-04-18 PROCEDURE — 99214 OFFICE O/P EST MOD 30 MIN: CPT | Performed by: PHYSICIAN ASSISTANT

## 2024-04-18 ASSESSMENT — PAIN SCALES - GENERAL: PAINLEVEL: MODERATE PAIN (5)

## 2024-04-18 NOTE — LETTER
4/18/2024         RE: Emani Varags  [address] [place] [address]        Dear Colleague,    Thank you for referring your patient, Emani Vargas, to the [facility]. Please see a copy of my visit note below.    Assessment:   Emani Vargas is a 59 y.o. female with past medical history significant for osteoporosis,chronic pain, bipolar disorder,  asthma, breast cancer status post lumpectomy and radiation, , obesity, type 2 diabetes mellitus who presents today for follow-up regarding chronic low back pain with radiation into the right lower extremity.  My review of been MRI lumbar spine shows lower lumbar facet arthropathy.  At L4-5 there is a disc protrusion causing right lateral recess stenosis.  Patient is status post a right sacroiliac joint injection April 4, 2024 which did not provide any relief of pain.         Plan:     A shared decision making plan was used.  The patient's values and choices were respected.  The following represents what was discussed and decided upon by the physician assistant and the patient.      1.  DIAGNOSTIC TESTS: I reviewed the MRI lumbar spine.  - I ordered lumbar spine flexion-extension x-rays in preparation for her consultation with neurosurgery.    2.  PHYSICAL THERAPY: Patient completed physical therapy at [facility] November 2023.  She will continue with home exercises.  No additional physical therapy was ordered.    3.  MEDICATIONS:  - No changes are made to the patient's medications.  - Patient can continue Tylenol as needed.  - Patient can continue Voltaren gel as needed  - Patient takes gabapentin 1600 milligrams at bedtime   - Patient can continue methocarbamol as needed.  - Patient takes naproxen as needed      4.  INTERVENTIONS: No additional interventions were ordered.  Patient has tried and failed extensive interventional pain management.    5.  REFERRALS: Entered a referral for the patient to see Dr. Cummins.  - I told the  patient if she does not wish to pursue surgery or if surgery is not recommended I am happy to refer her to chronic pain clinic.    6.  FOLLOW-UP: Patient going to follow-up with me as needed.  She will notify me if she would like to pursue a referral to the chronic pain clinic.  If she has any other questions or concerns, she should not hesitate to call.    Subjective:     Emani Vargas is a 59 y.o. female who presents today for follow-up regarding acute on chronic low back pain with radiation into the right lower extremity.  Patient is following up after a right sacroiliac joint injection April 4, 2024.  Patient reports no improvement in pain.  In fact, she feels like her pain is worse.  She feels a pressure sensation where she had the injection.  She is also having more midline lower back pain.    Patient complains of midline lower lumbar spine pain.  Pain extends down the midline toward the tailbone.  She also is pain in the right SI joint region.  Pain radiates down the right posterior thigh to the knee.  She also has some pain in the anterolateral thigh to the knee.  She has numbness and tingling in the right anterior/lateral shin extending into the dorsal foot.  She feels weakness in the right leg.  She rates her pain today as a 5 out of 10.  At its worst it is an 8 of 10.  I discussed that is a 2 out of 10.  Pain is aggravated with sitting, standing, walking, lying on her back.  Pain is alleviated with applying ice and heat.      Treatment to date:  - physical therapy at [facility] 2023  - Right sacroiliac joint injection April 4, 2024 with no relief  - Right L4-5 and L5-S1 transforaminal epidural steroid injections February 13, 2024 with 20% relief  - Bilateral L4-5 transforaminal epidural steroid injections December 1, 2023 with 70 to 80% relief of pain for 2 months  - Bilateral L4-5 transforaminal epidural steroid injections August 21, 2023 with 80% relief of pain  - Failed to have adequate relief with  bilateral L3, 4, 5 new branch blocks July 19, 2023  - Right shoulder joint injection May 2022 with 80% relief of pain for 4 months  - Left shoulder joint injection not helpful  - Bilateral L4-5 facet joint injections August 16, 2016 with only short-term relief.  - Tylenol  - Voltaren gel  - Gabapentin 1600 mg at bedtime  - Methocarbamol  - Celebrex causes stomach irritation  - Naproxen as needed  - Oxycodone as needed is helpful  - Tramadol takes the edge off      Review of Systems:  Positive for numbness/tingling, weakness, wetting pants, headache, pain much worse at night, difficulty swallowing, difficulty with hand skills.  Negative for loss of bowel/bladder control, inability to urinate, trip/stumble/falls, fevers, unintentional weight loss.     Objective:   CONSTITUTIONAL:  Vital signs as above.  Patient appears to be in pain.  The patient is well nourished and well groomed.    PSYCHIATRIC:  The patient is awake, alert, oriented to person, place and time.  The patient is answering questions appropriately with clear speech.  Normal affect.  HEENT: Normocephalic, atraumatic.  Sclera clear.  Neck is supple.  SKIN:  Skin over the face, neck bilateral upper extremities is clean, dry, intact without rashes.  MUSCULOSKELETAL: Gait is antalgic, favoring the right.  She transitions from seated to standing slowly and with pain behaviors.  Able to rise onto toes and heels bilaterally support.  Tender to palpation midline lower lumbar spine.  Tender to palpation right greater than left lower lumbar paraspinous muscles and right sacroiliac joint.   5/5 strength bilateral hip flexors, bilateral knee flexors/extensors, ankle dorsi/plantar flexors.  NEUROLOGICAL: Cranial nerves II through XII grossly intact.  Subjective diminished sensation right lower leg and foot stocking distribution.    RESULTS:      I reviewed the MRI lumbar spine from [facility] dated Sandy 10, 2023.  This shows a chronic L1 superior endplate compression  fracture.  No additional fracture.  At L4-5 there is a disc protrusion contributing to right lateral recess stenosis and impingement of the right L5 nerve root.  There is moderate bilateral foraminal stenosis at this level.  Patient also has moderate bilateral facet arthropathy L5-S1.         Again, thank you for allowing me to participate in the care of your patient.        Sincerely,        Flavia Camarena PA-C   stable

## 2024-04-25 ENCOUNTER — OFFICE VISIT (OUTPATIENT)
Dept: NEUROSURGERY | Facility: CLINIC | Age: 59
End: 2024-04-25
Attending: PHYSICIAN ASSISTANT
Payer: MEDICARE

## 2024-04-25 VITALS
HEART RATE: 76 BPM | BODY MASS INDEX: 40.14 KG/M2 | WEIGHT: 271 LBS | SYSTOLIC BLOOD PRESSURE: 141 MMHG | HEIGHT: 69 IN | OXYGEN SATURATION: 94 % | DIASTOLIC BLOOD PRESSURE: 72 MMHG

## 2024-04-25 DIAGNOSIS — M54.16 LUMBAR RADICULAR PAIN: Primary | ICD-10-CM

## 2024-04-25 DIAGNOSIS — M43.16 SPONDYLOLISTHESIS OF LUMBAR REGION: ICD-10-CM

## 2024-04-25 PROCEDURE — 99203 OFFICE O/P NEW LOW 30 MIN: CPT | Performed by: SURGERY

## 2024-04-25 ASSESSMENT — PAIN SCALES - GENERAL: PAINLEVEL: MILD PAIN (3)

## 2024-04-25 NOTE — PROGRESS NOTES
NEUROSURGERY CONSULTATION NOTE      Neurosurgery was asked to see this patient by Flavia Camarena PA-C for evaluation of lumbar radiculopathy.       CONSULTATION ASSESSMENT AND PLAN:    58 yo female who presents with right> left leg pain,numbness and tingling. Lumbar xray shows 1 anterolisthesis of lumbar 4-5 without any significant dynamic instability.  We also reviewed her MRI of her lumbar spine from June 2023 which shows a chronic L1 compression fracture and at the lumbar 4-5 level right lateral recess stenosis resulting in a right L5 nerve impingement as well as moderate bilateral foraminal narrowing at this level.  No significant foraminal narrowing at L5-S1.  Symptoms described in a more L5 distribution. Recommend updating MRI lumbar for surgical planning given new left leg symptoms. No significant lateral recess stenosis at left lumbar 4-5.  Consider right lumbar 4-5 hemilaminectomy, medial facetectomy, foraminotomy and microdiscectomy pending review or MRI.  She return to clinic after imaging obtained.    Nelida Cummins MD      HISTORY OF PRESENTING ILLNESS:    58 yo female who presents with right> left leg pain,numbness and tingling. Painful more proximally. Distally experiences more paresthesia. Laying in a recliner can improve symptoms. Also laying flat with feet elevated can help.  Activity worsens her symptoms as well as sitting. Right leg is weak. Whole leg feels like jelly. Urinary incontinence. Wears pads.  No bowel dysfunction. Symptoms today 3/10 but can be a 6-8/10. Has been modifying activity to decrease symptoms.     Physical therapy 2023 X[facility] stearns. Relief a little following. No more then a day.   First few spinal injections helped. More recently they are worsening her symptoms.   Tylenol, Voltaren gel, Gabapentin, Methocarbamol, Celebrex, Naproxen, Oxycodone, Tramadol- with varied relief. Oxycodone with most relief.       Past Medical History:   Diagnosis Date    Allergic  rhinitis     Anemia     Anxiety     Asthma     Bipolar 1 disorder (H)     Breast cancer of upper-outer quadrant of right female breast (H) 04/11/2017    Chronic pain     Back pain due to arthritis.    COPD (chronic obstructive pulmonary disease) (H)     Depression     Diabetes (H)     Endometriosis     Fibroid uterus     Hx of radiation therapy 01/01/2017    Insomnia     Osteoarthritis     Overweight     Refusal of blood transfusions as patient is [employer] 04/12/2017    Sensorineural hearing loss, bilateral     Shortness of breath     Vitamin D deficiency        Past Surgical History:   Procedure Laterality Date    ARTHROSCOPY KNEE Right 1996    Description: Arthroscopy Knee Right;  Recorded: 12/08/2011;    BIOPSY BREAST Right 2000's    BIOPSY BREAST Right 02/20/2017    KNEE SURGERY Right 1997    lateral release    LAPAROSCOPIC ENDOMETRIOSIS FULGURATION  2002    LUMPECTOMY BREAST Right 03/10/2017    with sentinel lymph node biopsy.    MO HYSTEROSCOPY,W/ENDO BX N/A 12/15/2020    Procedure: HYSTEROSCOPY, WITH DILATION AND CURETTAGE;  Surgeon: Scottie Canas MD;  Location: [facility];  Service: Gynecology    TOTAL HIP ARTHROPLASTY N/A 9/24/2014    Procedure: LEFT HIP TOTAL ARTHROPLASTY;  Surgeon: Antony Elias MD;  Location: [facility] OR;  Service:     TOTAL HIP ARTHROPLASTY Right 8/10/2015    Procedure: HIP TOTAL ARTHROPLASTY RIGHT;  Surgeon: Antony Elias MD;  Location: [facility];  Service:     [facility] TOTAL KNEE ARTHROPLASTY Right 8/28/2017    Procedure: RIGHT TOTAL KNEE ARTHROPLASTY;  Surgeon: Antony Elias MD;  Location: [facility];  Service: Orthopedics       REVIEW OF SYSTEMS:  See ROS form under media     MEDICATIONS:    Current Outpatient Medications   Medication Sig Dispense Refill    albuterol (PROAIR HFA/PROVENTIL HFA/VENTOLIN HFA) 108 (90 Base) MCG/ACT inhaler Inhale 2 puffs into the lungs every 6 hours as needed for shortness of breath, wheezing or  cough 18 g 1    blood glucose (NO BRAND SPECIFIED) lancets standard Use to test blood sugar once daily 100 each 3    blood glucose (ONETOUCH VERIO IQ) test strip USE 1 STRIP TO CHECK GLUCOSE ONCE DAILY TO  TEST  BLOOD  SUGAR  ONCE  DAILY. 100 strip 3    buPROPion (WELLBUTRIN XL) 150 MG 24 hr tablet Take 1 tablet (150 mg) by mouth every morning 90 tablet 3    cetirizine (ZYRTEC) 10 MG CHEW Take 10 mg by mouth daily      diclofenac sodium (VOLTAREN) 1 % Gel [DICLOFENAC SODIUM (VOLTAREN) 1 % GEL] Apply approximately 1/2-1 gm over affected hand and/or left elbow joints bid, as needed. 1 Tube 2    famotidine (PEPCID) 40 MG tablet Take 1 tablet (40 mg) by mouth 2 times daily as needed for heartburn 180 tablet 3    FLUoxetine (PROZAC) 40 MG capsule Take 1 capsule (40 mg) by mouth daily 90 capsule 1    fluticasone propionate (FLONASE ALLERGY RELIEF) 50 mcg/actuation nasal spray [FLUTICASONE PROPIONATE (FLONASE ALLERGY RELIEF) 50 MCG/ACTUATION NASAL SPRAY] One spray in each nostril twice daily. OFFICE VISIT NEEDED FOR ANY FURTHER REFILLS 16 g 0    gabapentin (NEURONTIN) 800 MG tablet Take 2 tablets (1,600 mg) by mouth at bedtime 180 tablet 0    levothyroxine (SYNTHROID/LEVOTHROID) 75 MCG tablet Take 0.5 tablets (37.5 mcg) by mouth daily 45 tablet 1    magnesium citrate solution Take 296 mLs by mouth daily as needed      methocarbamol (ROBAXIN) 500 MG tablet Take 1 tablet (500 mg) by mouth 3 times daily as needed 90 tablet 3    montelukast (SINGULAIR) 10 MG tablet TAKE 1 TABLET (10 MG) BY MOUTH AT BEDTIME 90 tablet 3    olopatadine (PATADAY) 0.2 % ophthalmic solution Place 0.05 mLs (1 drop) into both eyes daily 5 mL 6    OZEMPIC, 0.25 OR 0.5 MG/DOSE, 2 MG/1.5ML SOPN pen Inject 0.25 mg Subcutaneous every 7 days 1.5 mL 1    simvastatin (ZOCOR) 20 MG tablet Take 1 tablet (20 mg) by mouth at bedtime 90 tablet 1    traZODone (DESYREL) 100 MG tablet Take 2 tablets (200 mg) by mouth at bedtime 180 tablet 3    traZODone (DESYREL) 50  MG tablet Take 0.5-1 tablets (25-50 mg) by mouth nightly as needed for sleep In addition to 200 mg tablet, tdd 225-250 mg nightly 90 tablet 3    acetaminophen (TYLENOL) 500 MG tablet Take 1,000 mg by mouth every 6 hours as needed (Patient not taking: Reported on 2024)      fluticasone-salmeterol (ADVAIR HFA) 115-21 MCG/ACT inhaler Inhale 2 puffs into the lungs 2 times daily (Patient not taking: Reported on 4/10/2024) 12 g 1         ALLERGIES/SENSITIVITIES:     Allergies   Allergen Reactions    Cat/Feline Products [Cat Hair Extract] Itching    Trees Other (See Comments)     Grass, itching       PERTINENT SOCIAL HISTORY:   Social History     Socioeconomic History    Marital status:    Tobacco Use    Smoking status: Former     Current packs/day: 0.00     Average packs/day: 1 pack/day for 8.0 years (8.0 ttl pk-yrs)     Types: Cigarettes     Start date: 1977     Quit date: 1985     Years since quittin.3    Smokeless tobacco: Never   Substance and Sexual Activity    Alcohol use: Yes     Alcohol/week: 0.0 standard drinks of alcohol     Comment: Alcoholic Drinks/day: 1 drink/month    Drug use: No     Comment: Drug use: past use of marijuana and hash.     Social Determinants of Health     Financial Resource Strain: Low Risk  (2023)    Financial Resource Strain     Within the past 12 months, have you or your family members you live with been unable to get utilities (heat, electricity) when it was really needed?: No   Food Insecurity: High Risk (2023)    Food Insecurity     Within the past 12 months, did you worry that your food would run out before you got money to buy more?: Yes     Within the past 12 months, did the food you bought just not last and you didn t have money to get more?: No   Transportation Needs: Low Risk  (2023)    Transportation Needs     Within the past 12 months, has lack of transportation kept you from medical appointments, getting your medicines, non-medical  "meetings or appointments, work, or from getting things that you need?: No   Interpersonal Safety: Low Risk  (9/26/2023)    Interpersonal Safety     Do you feel physically and emotionally safe where you currently live?: Yes     Within the past 12 months, have you been hit, slapped, kicked or otherwise physically hurt by someone?: No     Within the past 12 months, have you been humiliated or emotionally abused in other ways by your partner or ex-partner?: No   Housing Stability: Low Risk  (9/25/2023)    Housing Stability     Do you have housing? : Yes     Are you worried about losing your housing?: No         FAMILY HISTORY:  Family History   Problem Relation Age of Onset    Depression Mother     Chronic Obstructive Pulmonary Disease Mother     Diabetes Father     Skin Cancer Father 45    Colon Cancer Maternal Grandmother     Colon Cancer Maternal Grandfather     Diabetes Paternal Grandmother     Colon Cancer Paternal Grandmother 80    Testicular cancer Brother 44    Lung Cancer Paternal Aunt 60    Breast Cancer Cousin 48        Paternal side.    Anesthesia Reaction No family hx of     Glaucoma No family hx of     Macular Degeneration No family hx of         PHYSICAL EXAM:   Constitutional: BP (!) 141/72   Pulse 76   Ht 1.753 m (5' 9\")   Wt 122.9 kg (271 lb)   SpO2 94%   BMI 40.02 kg/m       Mental Status: A & O in no acute distress.  Affect is appropriate.  Speech is fluent.  Recent and remote memory are intact.  Attention span and concentration are normal.     Motor:  Normal bulk and tone all muscle groups of lower extremities.    Strength: 5/5 x 4    Sensory: Sensation intact bilaterally to light touch.     Coordination;  Heel/toe difficulty L>R. antalgic gait and station.     Reflexes; knee/ ankle jerk 1+    IMAGING:  I personally reviewed all radiographic images         Cc:   Angelia Krishna"

## 2024-04-25 NOTE — PATIENT INSTRUCTIONS
Recommend an updated MRI scan.      Consider right lumbar 4-5 hemilaminectomy, medial facetectomy, foraminotomy and microdiscectomy.       Follow up after MRI scan.

## 2024-04-25 NOTE — LETTER
4/25/2024         RE: Emani Vargas  [address] [place] [address]        Dear Colleague,    Thank you for referring your patient, Emani Vargas, to the [facility] SPINE AND NEUROSURGERY. Please see a copy of my visit note below.    NEUROSURGERY CONSULTATION NOTE      Neurosurgery was asked to see this patient by Flavia Camarena PA-C for evaluation of lumbar radiculopathy.       CONSULTATION ASSESSMENT AND PLAN:    58 yo female who presents with right> left leg pain,numbness and tingling. Lumbar xray shows 1 anterolisthesis of lumbar 4-5 without any significant dynamic instability.  We also reviewed her MRI of her lumbar spine from June 2023 which shows a chronic L1 compression fracture and at the lumbar 4-5 level right lateral recess stenosis resulting in a right L5 nerve impingement as well as moderate bilateral foraminal narrowing at this level.  No significant foraminal narrowing at L5-S1.  Symptoms described in a more L5 distribution. Recommend updating MRI lumbar for surgical planning given new left leg symptoms. No significant lateral recess stenosis at left lumbar 4-5.  Consider right lumbar 4-5 hemilaminectomy, medial facetectomy, foraminotomy and microdiscectomy pending review or MRI.  She return to clinic after imaging obtained.    Nelida Cummins MD      HISTORY OF PRESENTING ILLNESS:    58 yo female who presents with right> left leg pain,numbness and tingling. Painful more proximally. Distally experiences more paresthesia. Laying in a recliner can improve symptoms. Also laying flat with feet elevated can help.  Activity worsens her symptoms as well as sitting. Right leg is weak. Whole leg feels like jelly. Urinary incontinence. Wears pads.  No bowel dysfunction. Symptoms today 3/10 but can be a 6-8/10. Has been modifying activity to decrease symptoms.     Physical therapy 2023 [facility] therapy. Relief a little following. No more then a day.   First few spinal injections  helped. More recently they are worsening her symptoms.   Tylenol, Voltaren gel, Gabapentin, Methocarbamol, Celebrex, Naproxen, Oxycodone, Tramadol- with varied relief. Oxycodone with most relief.       Past Medical History:   Diagnosis Date     Allergic rhinitis      Anemia      Anxiety      Asthma      Bipolar 1 disorder (H)      Breast cancer of upper-outer quadrant of right female breast (H) 04/11/2017     Chronic pain     Back pain due to arthritis.     COPD (chronic obstructive pulmonary disease) (H)      Depression      Diabetes (H)      Endometriosis      Fibroid uterus      Hx of radiation therapy 01/01/2017     Insomnia      Osteoarthritis      Overweight      Refusal of blood transfusions as patient is Tenriism 04/12/2017     Sensorineural hearing loss, bilateral      Shortness of breath      Vitamin D deficiency        Past Surgical History:   Procedure Laterality Date     ARTHROSCOPY KNEE Right 1996    Description: Arthroscopy Knee Right;  Recorded: 12/08/2011;     BIOPSY BREAST Right 2000's     BIOPSY BREAST Right 02/20/2017     KNEE SURGERY Right 1997    lateral release     LAPAROSCOPIC ENDOMETRIOSIS FULGURATION  2002     LUMPECTOMY BREAST Right 03/10/2017    with sentinel lymph node biopsy.     ND HYSTEROSCOPY,W/ENDO BX N/A 12/15/2020    Procedure: HYSTEROSCOPY, WITH DILATION AND CURETTAGE;  Surgeon: Scottie Canas MD;  Location: [facility];  Service: Gynecology     TOTAL HIP ARTHROPLASTY N/A 9/24/2014    Procedure: LEFT HIP TOTAL ARTHROPLASTY;  Surgeon: Antony Elias MD;  Location: [facility] OR;  Service:      TOTAL HIP ARTHROPLASTY Right 8/10/2015    Procedure: HIP TOTAL ARTHROPLASTY RIGHT;  Surgeon: Antony Elias MD;  Location: [facility] OR;  Service:      [facility] TOTAL KNEE ARTHROPLASTY Right 8/28/2017    Procedure: RIGHT TOTAL KNEE ARTHROPLASTY;  Surgeon: Antony Elias MD;  Location: [facility] OR;  Service: Orthopedics       REVIEW OF  SYSTEMS:  See ROS form under media     MEDICATIONS:    Current Outpatient Medications   Medication Sig Dispense Refill     albuterol (PROAIR HFA/PROVENTIL HFA/VENTOLIN HFA) 108 (90 Base) MCG/ACT inhaler Inhale 2 puffs into the lungs every 6 hours as needed for shortness of breath, wheezing or cough 18 g 1     blood glucose (NO BRAND SPECIFIED) lancets standard Use to test blood sugar once daily 100 each 3     blood glucose (ONETOUCH VERIO IQ) test strip USE 1 STRIP TO CHECK GLUCOSE ONCE DAILY TO  TEST  BLOOD  SUGAR  ONCE  DAILY. 100 strip 3     buPROPion (WELLBUTRIN XL) 150 MG 24 hr tablet Take 1 tablet (150 mg) by mouth every morning 90 tablet 3     cetirizine (ZYRTEC) 10 MG CHEW Take 10 mg by mouth daily       diclofenac sodium (VOLTAREN) 1 % Gel [DICLOFENAC SODIUM (VOLTAREN) 1 % GEL] Apply approximately 1/2-1 gm over affected hand and/or left elbow joints bid, as needed. 1 Tube 2     famotidine (PEPCID) 40 MG tablet Take 1 tablet (40 mg) by mouth 2 times daily as needed for heartburn 180 tablet 3     FLUoxetine (PROZAC) 40 MG capsule Take 1 capsule (40 mg) by mouth daily 90 capsule 1     fluticasone propionate (FLONASE ALLERGY RELIEF) 50 mcg/actuation nasal spray [FLUTICASONE PROPIONATE (FLONASE ALLERGY RELIEF) 50 MCG/ACTUATION NASAL SPRAY] One spray in each nostril twice daily. OFFICE VISIT NEEDED FOR ANY FURTHER REFILLS 16 g 0     gabapentin (NEURONTIN) 800 MG tablet Take 2 tablets (1,600 mg) by mouth at bedtime 180 tablet 0     levothyroxine (SYNTHROID/LEVOTHROID) 75 MCG tablet Take 0.5 tablets (37.5 mcg) by mouth daily 45 tablet 1     magnesium citrate solution Take 296 mLs by mouth daily as needed       methocarbamol (ROBAXIN) 500 MG tablet Take 1 tablet (500 mg) by mouth 3 times daily as needed 90 tablet 3     montelukast (SINGULAIR) 10 MG tablet TAKE 1 TABLET (10 MG) BY MOUTH AT BEDTIME 90 tablet 3     olopatadine (PATADAY) 0.2 % ophthalmic solution Place 0.05 mLs (1 drop) into both eyes daily 5 mL 6      OZEMPIC, 0.25 OR 0.5 MG/DOSE, 2 MG/1.5ML SOPN pen Inject 0.25 mg Subcutaneous every 7 days 1.5 mL 1     simvastatin (ZOCOR) 20 MG tablet Take 1 tablet (20 mg) by mouth at bedtime 90 tablet 1     traZODone (DESYREL) 100 MG tablet Take 2 tablets (200 mg) by mouth at bedtime 180 tablet 3     traZODone (DESYREL) 50 MG tablet Take 0.5-1 tablets (25-50 mg) by mouth nightly as needed for sleep In addition to 200 mg tablet, tdd 225-250 mg nightly 90 tablet 3     acetaminophen (TYLENOL) 500 MG tablet Take 1,000 mg by mouth every 6 hours as needed (Patient not taking: Reported on 2024)       fluticasone-salmeterol (ADVAIR HFA) 115-21 MCG/ACT inhaler Inhale 2 puffs into the lungs 2 times daily (Patient not taking: Reported on 4/10/2024) 12 g 1         ALLERGIES/SENSITIVITIES:     Allergies   Allergen Reactions     Cat/Feline Products [Cat Hair Extract] Itching     Trees Other (See Comments)     Grass, itching       PERTINENT SOCIAL HISTORY:   Social History     Socioeconomic History     Marital status:    Tobacco Use     Smoking status: Former     Current packs/day: 0.00     Average packs/day: 1 pack/day for 8.0 years (8.0 ttl pk-yrs)     Types: Cigarettes     Start date: 1977     Quit date: 1985     Years since quittin.3     Smokeless tobacco: Never   Substance and Sexual Activity     Alcohol use: Yes     Alcohol/week: 0.0 standard drinks of alcohol     Comment: Alcoholic Drinks/day: 1 drink/month     Drug use: No     Comment: Drug use: past use of marijuana and hash.     Social Determinants of Health     Financial Resource Strain: Low Risk  (2023)    Financial Resource Strain      Within the past 12 months, have you or your family members you live with been unable to get utilities (heat, electricity) when it was really needed?: No   Food Insecurity: High Risk (2023)    Food Insecurity      Within the past 12 months, did you worry that your food would run out before you got money to buy  "more?: Yes      Within the past 12 months, did the food you bought just not last and you didn t have money to get more?: No   Transportation Needs: Low Risk  (9/25/2023)    Transportation Needs      Within the past 12 months, has lack of transportation kept you from medical appointments, getting your medicines, non-medical meetings or appointments, work, or from getting things that you need?: No   Interpersonal Safety: Low Risk  (9/26/2023)    Interpersonal Safety      Do you feel physically and emotionally safe where you currently live?: Yes      Within the past 12 months, have you been hit, slapped, kicked or otherwise physically hurt by someone?: No      Within the past 12 months, have you been humiliated or emotionally abused in other ways by your partner or ex-partner?: No   Housing Stability: Low Risk  (9/25/2023)    Housing Stability      Do you have housing? : Yes      Are you worried about losing your housing?: No         FAMILY HISTORY:  Family History   Problem Relation Age of Onset     Depression Mother      Chronic Obstructive Pulmonary Disease Mother      Diabetes Father      Skin Cancer Father 45     Colon Cancer Maternal Grandmother      Colon Cancer Maternal Grandfather      Diabetes Paternal Grandmother      Colon Cancer Paternal Grandmother 80     Testicular cancer Brother 44     Lung Cancer Paternal Aunt 60     Breast Cancer Cousin 48        Paternal side.     Anesthesia Reaction No family hx of      Glaucoma No family hx of      Macular Degeneration No family hx of         PHYSICAL EXAM:   Constitutional: BP (!) 141/72   Pulse 76   Ht 1.753 m (5' 9\")   Wt 122.9 kg (271 lb)   SpO2 94%   BMI 40.02 kg/m       Mental Status: A & O in no acute distress.  Affect is appropriate.  Speech is fluent.  Recent and remote memory are intact.  Attention span and concentration are normal.     Motor:  Normal bulk and tone all muscle groups of lower extremities.    Strength: 5/5 x 4    Sensory: Sensation " intact bilaterally to light touch.     Coordination;  Heel/toe difficulty L>R. antalgic gait and station.     Reflexes; knee/ ankle jerk 1+    IMAGING:  I personally reviewed all radiographic images         Cc:   Angelia Krishna, thank you for allowing me to participate in the care of your patient.        Sincerely,        Nelida Cummins MD

## 2024-04-29 ENCOUNTER — TRANSFERRED RECORDS (OUTPATIENT)
Dept: HEALTH INFORMATION MANAGEMENT | Facility: CLINIC | Age: 59
End: 2024-04-29
Payer: MEDICARE

## 2024-05-10 ENCOUNTER — HOSPITAL ENCOUNTER (OUTPATIENT)
Dept: MRI IMAGING | Facility: CLINIC | Age: 59
Discharge: HOME | End: 2024-05-10
Attending: SURGERY | Admitting: SURGERY
Payer: MEDICARE

## 2024-05-10 DIAGNOSIS — M43.16 SPONDYLOLISTHESIS OF LUMBAR REGION: ICD-10-CM

## 2024-05-10 DIAGNOSIS — M54.16 LUMBAR RADICULAR PAIN: ICD-10-CM

## 2024-05-10 PROCEDURE — G1010 CDSM STANSON: HCPCS

## 2024-05-13 ENCOUNTER — TELEPHONE (OUTPATIENT)
Dept: NEUROSURGERY | Facility: CLINIC | Age: 59
End: 2024-05-13
Payer: MEDICARE

## 2024-05-13 NOTE — TELEPHONE ENCOUNTER
Date: May 13, 2024  (Provide the date when patient was called)  Provider: MD     Provider/Other: Dr. Cummins  (with whom  should patient maximo with)  Reason for out-going call: FOLLOW-UP  (Reason patient was contacted)    Detailed message: ldvm for patient to c/b and [facility] follow up to discuss MRI results.

## 2024-05-13 NOTE — TELEPHONE ENCOUNTER
----- Message from Nelida Cummins MD sent at 5/13/2024  7:28 AM CDT -----  Please reach out to patient to schedule follow up to discuss imaging findings  ----- Message -----  From: Josesito Yuan Ib  Sent: 5/11/2024   4:26 PM CDT  To: Nelida Cummins MD   Impression: Retinal edema: H35.81. Left. Plan: The patient has cystoid macular edema. We reviewed treatment options including observation, topical NSAID and corticosteroid eye drops, subtenon's steroid injections, intravitreal steroid injections, and possibly anti-VEGF injections. We will begin conservatively with topical NSAID drops and corticosteroid eye drops. If the IOP is acceptable and significant CME remains, then we will consider a subtenon's or intravitreal steroid injection at the patient's next appointment. 

Start PF/Acular BID OS

## 2024-05-21 ENCOUNTER — OFFICE VISIT (OUTPATIENT)
Dept: NEUROSURGERY | Facility: CLINIC | Age: 59
End: 2024-05-21
Payer: MEDICARE

## 2024-05-21 DIAGNOSIS — M54.16 LUMBAR RADICULAR PAIN: Primary | ICD-10-CM

## 2024-05-21 PROCEDURE — 99213 OFFICE O/P EST LOW 20 MIN: CPT | Performed by: SURGERY

## 2024-05-21 ASSESSMENT — PAIN SCALES - GENERAL: PAINLEVEL: SEVERE PAIN (6)

## 2024-05-21 NOTE — LETTER
5/21/2024         RE: Emani Vargas  [address] [place] [address]        Dear Colleague,    Thank you for referring your patient, Emani Vargas, to the [facility] SPINE AND NEUROSURGERY. Please see a copy of my visit note below.    NEUROSURGERY FOLLOW UP  NOTE    Emani Vargas comes today in follow-up. Patient is a 60 yo female who presents with right> left leg pain,numbness and tingling. Lumbar xray shows 1 anterolisthesis of lumbar 4-5 without any significant dynamic instability.  We also reviewed her MRI of her lumbar spine from June 2023 which shows a chronic L1 compression fracture and at the lumbar 4-5 level right lateral recess stenosis resulting in a right L5 nerve impingement as well as moderate bilateral foraminal narrowing at this level.  No significant foraminal narrowing at L5-S1.    Symptoms described in a more L5 distribution. Recommend updating MRI lumbar for surgical planning given new left leg symptoms. No significant lateral recess stenosis at left lumbar 4-5.  Consider right lumbar 4-5 hemilaminectomy, medial facetectomy, foraminotomy and microdiscectomy pending review or MRI.  She return to clinic after imaging obtained.    She now returns to clinic. Symptoms on right mostly. Likely related to lumbar 4-5 foraminal narrowing on right. Consider far lateral foraminotomy with microdiscectomy, she would like to discuss additional conservative options. Could consider repeating right lumbar 4-5. Also she may think about acupuncture. Massage also OK to proceed with. She will return to clinic after she exhausts conservative options and continues to have symptoms.     Nelida Cummins MD      CC:     Angelia Krishna  [address] [place] [id number]      Again, thank you for allowing me to participate in the care of your patient.        Sincerely,        Nelida Cummins MD

## 2024-05-21 NOTE — PROGRESS NOTES
NEUROSURGERY FOLLOW UP  NOTE    Emani Vargas comes today in follow-up. Patient is a 60 yo female who presents with right> left leg pain,numbness and tingling. Lumbar xray shows 1 anterolisthesis of lumbar 4-5 without any significant dynamic instability.  We also reviewed her MRI of her lumbar spine from June 2023 which shows a chronic L1 compression fracture and at the lumbar 4-5 level right lateral recess stenosis resulting in a right L5 nerve impingement as well as moderate bilateral foraminal narrowing at this level.  No significant foraminal narrowing at L5-S1.    Symptoms described in a more L5 distribution. Recommend updating MRI lumbar for surgical planning given new left leg symptoms. No significant lateral recess stenosis at left lumbar 4-5.  Consider right lumbar 4-5 hemilaminectomy, medial facetectomy, foraminotomy and microdiscectomy pending review or MRI.  She return to clinic after imaging obtained.    She now returns to clinic. Symptoms on right mostly. Likely related to lumbar 4-5 foraminal narrowing on right. Consider far lateral foraminotomy with microdiscectomy, she would like to discuss additional conservative options. Could consider repeating right lumbar 4-5. Also she may think about acupuncture. Massage also OK to proceed with. She will return to clinic after she exhausts conservative options and continues to have symptoms.     Nelida Cummins MD      CC:     Angelia Krishna  [address] [facility]  [address] [address]

## 2024-05-23 ENCOUNTER — OFFICE VISIT (OUTPATIENT)
Dept: PHYSICAL MEDICINE AND REHAB | Facility: CLINIC | Age: 59
End: 2024-05-23
Payer: MEDICARE

## 2024-05-23 DIAGNOSIS — M43.16 SPONDYLOLISTHESIS OF LUMBAR REGION: ICD-10-CM

## 2024-05-23 DIAGNOSIS — M51.369 DDD (DEGENERATIVE DISC DISEASE), LUMBAR: Primary | ICD-10-CM

## 2024-05-23 DIAGNOSIS — M47.816 LUMBAR FACET ARTHROPATHY: ICD-10-CM

## 2024-05-23 PROCEDURE — 99214 OFFICE O/P EST MOD 30 MIN: CPT | Performed by: PHYSICIAN ASSISTANT

## 2024-05-23 ASSESSMENT — PAIN SCALES - GENERAL: PAINLEVEL: MODERATE PAIN (4)

## 2024-05-23 NOTE — LETTER
5/23/2024         RE: Emani Vargas  [address] [place] [address]        Dear Colleague,    Thank you for referring your patient, Emani Vargas, to the [facility] SPINE AND NEUROSURGERY. Please see a copy of my visit note below.    Assessment:   Emani Vargas is a 59 y.o. female with past medical history significant for osteoporosis,chronic pain, bipolar disorder,  asthma, breast cancer status post lumpectomy and radiation, , obesity, type 2 diabetes mellitus who presents today for follow-up regarding chronic low back pain with radiation into the right lower extremity with associated numbness and tingling.  My review of been MRI lumbar spine shows lower lumbar facet arthropathy.  At L4-5 there is 2 mm anterolisthesis and a right sided disc protrusion contacting the right L4 nerve root.  - Patient saw Dr. Cummins May 21, 2024.  Dr. Cummins did not recommend surgical intervention.         Plan:     A shared decision making plan was used.  The patient's values and choices were respected.  The following represents what was discussed and decided upon by the physician assistant and the patient.      1.  DIAGNOSTIC TESTS:  - I reviewed the MRI lumbar spine.  - I reviewed the lumbar spine flexion-extension x-rays.  - No additional diagnostic test were ordered.    2.  PHYSICAL THERAPY: Patient completed physical therapy at [facility] November 2023.  She will continue with home exercises.  No additional physical therapy was ordered.    3.  MEDICATIONS:  - No changes are made to the patient's medications.  - Patient can continue Tylenol as needed.  - Patient can continue Voltaren gel as needed  - Patient takes gabapentin 1600 milligrams at bedtime   - Patient can continue methocarbamol as needed.  - Patient takes naproxen as needed      4.  INTERVENTIONS: No additional interventions were ordered.  Patient has tried and failed extensive conservative treatment including facet joint injections, failed medial  branch blocks, epidural steroid injection x 3, and right sacroiliac joint injection.  I do wonder if she could be a candidate for the Intracept procedure.  Her MRI lumbar spine shows Modic type I endplate changes at L4-5.  We will have her see [facility] to see if she could be a candidate for this.    5.  REFERRALS: I entered a referral for the patient to go to [facility].  I would like to see if she could benefit from the Intracept procedure.  If not, hopefully they can help manage her chronic pain.    6.  FOLLOW-UP: Patient is going to follow-up with me as needed.  If she has questions or concerns, she should not hesitate to call.    Subjective:     Emani Vargas is a 59 y.o. female who presents today for follow-up regarding chronic low back pain with right lower extremity paresthesias.  Patient is following up after her visit with Dr. Cummins.  She saw Dr. Cummins on May 21.  Dr. Cummins did not recommend any surgical intervention.    Patient complains of right greater than left low back pain.  Pain is looking the lower lumbar region.  She has numbness and tingling in the right dorsal foot and sometimes her whole leg feels numb and tingly.  Her right leg feels weak.  She rates her pain today as a 4 out of 10.  At its worst it is an 8 out of 10.  At its best it is a 3 out of 10.  Pain is aggravated with walking too much, sitting too much, bending, lifting.  Pain is alleviated with rest, hot shower, ice.  Denies any new symptoms since he was last seen.      Treatment to date:  - physical therapy at [facility] 2023  - Right sacroiliac joint injection April 4, 2024 with no relief  - Right L4-5 and L5-S1 transforaminal epidural steroid injections February 13, 2024 with 20% relief  - Bilateral L4-5 transforaminal epidural steroid injections December 1, 2023 with 70 to 80% relief of pain for 2 months  - Bilateral L4-5 transforaminal epidural steroid injections August 21, 2023 with 80% relief of pain  -  Failed to have adequate relief with bilateral L3, 4, 5 new branch blocks July 19, 2023  - Right shoulder joint injection May 2022 with 80% relief of pain for 4 months  - Left shoulder joint injection not helpful  - Bilateral L4-5 facet joint injections August 16, 2016 with only short-term relief.  - Tylenol  - Voltaren gel  - Gabapentin 1600 mg at bedtime  - Methocarbamol  - Celebrex causes stomach irritation  - Naproxen as needed  - Oxycodone as needed is helpful  - Tramadol takes the edge off      Review of Systems:  Positive for numbness/tingling, weakness, wetting pants, headache, pain much worse at night, trip/stumble/falls, difficulty with hand skills.  Negative for loss of bowel/bladder control, inability to urinate, difficulty swallowing, fevers, unintentional weight loss.     Objective:   CONSTITUTIONAL:  Vital signs as above.  Patient appears to be in pain.  The patient is well nourished and well groomed.    PSYCHIATRIC:  The patient is awake, alert, oriented to person, place and time.  The patient is answering questions appropriately with clear speech.  Normal affect.  HEENT: Normocephalic, atraumatic.  Sclera clear.  Neck is supple.  SKIN:  Skin over the face, neck bilateral upper extremities is clean, dry, intact without rashes.  MUSCULOSKELETAL: Gait is antalgic, favoring the right.  She transitions from seated to standing slowly and with pain behaviors.    Tender to palpation midline lower lumbar spine.  Tender to palpation right greater than left lower lumbar paraspinous muscles and right sacroiliac joint.   5/5 strength bilateral hip flexors, bilateral knee flexors/extensors, ankle dorsi/plantar flexors.  NEUROLOGICAL: Cranial nerves II through XII grossly intact.  Subjective diminished sensation right lower leg and foot stocking distribution.    RESULTS:    I reviewed the MRI lumbar spine from [facility] dated May 10, 2024.  At L4-5 there is 2 mm anterolisthesis.  There is a right foraminal disc  protrusion contacting the right L4 nerve root.  There is severe bilateral facet arthropathy.  There is mild spinal canal stenosis and mild bilateral lateral recess stenosis at this level.    I reviewed the lumbar spine flexion-extension x-rays from [facility] dated April 18, 2024.  This shows grade 1 spondylolisthesis L4-5.  There is no evidence for dynamic instability.      Again, thank you for allowing me to participate in the care of your patient.        Sincerely,        Flavia Camarena PA-C

## 2024-05-23 NOTE — PROGRESS NOTES
Assessment:   Emani Vargas is a 59 y.o. female with past medical history significant for osteoporosis,chronic pain, bipolar disorder,  asthma, breast cancer status post lumpectomy and radiation, , obesity, type 2 diabetes mellitus who presents today for follow-up regarding chronic low back pain with radiation into the right lower extremity with associated numbness and tingling.  My review of been MRI lumbar spine shows lower lumbar facet arthropathy.  At L4-5 there is 2 mm anterolisthesis and a right sided disc protrusion contacting the right L4 nerve root.  - Patient saw Dr. Cummins May 21, 2024.  Dr. Cummins did not recommend surgical intervention.         Plan:     A shared decision making plan was used.  The patient's values and choices were respected.  The following represents what was discussed and decided upon by the physician assistant and the patient.      1.  DIAGNOSTIC TESTS:  - I reviewed the MRI lumbar spine.  - I reviewed the lumbar spine flexion-extension x-rays.  - No additional diagnostic test were ordered.    2.  PHYSICAL THERAPY: Patient completed physical therapy at [facility] November 2023.  She will continue with home exercises.  No additional physical therapy was ordered.    3.  MEDICATIONS:  - No changes are made to the patient's medications.  - Patient can continue Tylenol as needed.  - Patient can continue Voltaren gel as needed  - Patient takes gabapentin 1600 milligrams at bedtime   - Patient can continue methocarbamol as needed.  - Patient takes naproxen as needed      4.  INTERVENTIONS: No additional interventions were ordered.  Patient has tried and failed extensive conservative treatment including facet joint injections, failed medial branch blocks, epidural steroid injection x 3, and right sacroiliac joint injection.  I do wonder if she could be a candidate for the Intracept procedure.  Her MRI lumbar spine shows Modic type I endplate changes at L4-5.  We will have her see [facility] to see if she could be a candidate for this.    5.  REFERRALS: I entered a referral for the patient to go to [facility].  I would like to see if she could benefit from the Intracept procedure.  If not, hopefully they can help manage her chronic pain.    6.  FOLLOW-UP: Patient is going to follow-up with me as needed.  If she has questions or concerns, she should not hesitate to call.    Subjective:     Emani Vargas is a 59 y.o. female who presents today for follow-up regarding chronic low back pain with right lower extremity paresthesias.  Patient is following up after her visit with Dr. Cummins.  She saw Dr. Cummins on May 21.  Dr. Cummins did not recommend any surgical intervention.    Patient complains of right greater than left low back pain.  Pain is looking the lower lumbar region.  She has numbness and tingling in the right dorsal foot and sometimes her whole leg feels numb and tingly.  Her right leg feels weak.  She rates her pain today as a 4 out of 10.  At its worst it is an 8 out of 10.  At its best it is a 3 out of 10.  Pain is aggravated with walking too much, sitting too much, bending, lifting.  Pain is alleviated with rest, hot shower, ice.  Denies any new symptoms since he was last seen.      Treatment to date:  - physical therapy at [facility] 2023  - Right sacroiliac joint injection April 4, 2024 with no relief  - Right L4-5 and L5-S1 transforaminal epidural steroid injections February 13, 2024 with 20% relief  - Bilateral L4-5 transforaminal epidural steroid injections December 1, 2023 with 70 to 80% relief of pain for 2 months  - Bilateral L4-5 transforaminal epidural steroid injections August 21, 2023 with 80% relief of pain  - Failed to have adequate relief with bilateral L3, 4, 5 new branch blocks July 19, 2023  - Right shoulder joint injection May 2022 with 80% relief of pain for 4 months  - Left shoulder joint injection not helpful  - Bilateral L4-5 facet joint  injections August 16, 2016 with only short-term relief.  - Tylenol  - Voltaren gel  - Gabapentin 1600 mg at bedtime  - Methocarbamol  - Celebrex causes stomach irritation  - Naproxen as needed  - Oxycodone as needed is helpful  - Tramadol takes the edge off      Review of Systems:  Positive for numbness/tingling, weakness, wetting pants, headache, pain much worse at night, trip/stumble/falls, difficulty with hand skills.  Negative for loss of bowel/bladder control, inability to urinate, difficulty swallowing, fevers, unintentional weight loss.     Objective:   CONSTITUTIONAL:  Vital signs as above.  Patient appears to be in pain.  The patient is well nourished and well groomed.    PSYCHIATRIC:  The patient is awake, alert, oriented to person, place and time.  The patient is answering questions appropriately with clear speech.  Normal affect.  HEENT: Normocephalic, atraumatic.  Sclera clear.  Neck is supple.  SKIN:  Skin over the face, neck bilateral upper extremities is clean, dry, intact without rashes.  MUSCULOSKELETAL: Gait is antalgic, favoring the right.  She transitions from seated to standing slowly and with pain behaviors.    Tender to palpation midline lower lumbar spine.  Tender to palpation right greater than left lower lumbar paraspinous muscles and right sacroiliac joint.   5/5 strength bilateral hip flexors, bilateral knee flexors/extensors, ankle dorsi/plantar flexors.  NEUROLOGICAL: Cranial nerves II through XII grossly intact.  Subjective diminished sensation right lower leg and foot stocking distribution.    RESULTS:    I reviewed the MRI lumbar spine from [facility] dated May 10, 2024.  At L4-5 there is 2 mm anterolisthesis.  There is a right foraminal disc protrusion contacting the right L4 nerve root.  There is severe bilateral facet arthropathy.  There is mild spinal canal stenosis and mild bilateral lateral recess stenosis at this level.    I reviewed the lumbar spine flexion-extension x-rays  from [facility] dated April 18, 2024.  This shows grade 1 spondylolisthesis L4-5.  There is no evidence for dynamic instability.

## 2024-06-06 ENCOUNTER — HOSPITAL ENCOUNTER (OUTPATIENT)
Dept: MAMMOGRAPHY | Facility: CLINIC | Age: 59
Discharge: HOME | End: 2024-06-06
Attending: FAMILY MEDICINE | Admitting: FAMILY MEDICINE
Payer: MEDICARE

## 2024-06-06 DIAGNOSIS — N64.89 BREAST ASYMMETRY: ICD-10-CM

## 2024-06-06 PROCEDURE — 77062 BREAST TOMOSYNTHESIS BI: CPT

## 2024-06-07 ENCOUNTER — TRANSFERRED RECORDS (OUTPATIENT)
Dept: HEALTH INFORMATION MANAGEMENT | Facility: CLINIC | Age: 59
End: 2024-06-07
Payer: MEDICARE

## 2024-06-25 ENCOUNTER — TELEPHONE (OUTPATIENT)
Dept: FAMILY MEDICINE | Facility: CLINIC | Age: 59
End: 2024-06-25
Payer: MEDICARE

## 2024-06-25 DIAGNOSIS — E03.8 SUBCLINICAL HYPOTHYROIDISM: ICD-10-CM

## 2024-06-25 RX ORDER — LEVOTHYROXINE SODIUM 75 UG/1
TABLET ORAL
Qty: 54 TABLET | Refills: 1 | Status: SHIPPED | OUTPATIENT
Start: 2024-06-25

## 2024-06-25 NOTE — TELEPHONE ENCOUNTER
levothyroxine (SYNTHROID/LEVOTHROID) 75 MCG tablet   Patient states that they were instructed to take one whole tablet twice weekly (Tues/Friday).  Please send new rx if appropriate

## 2024-07-02 ENCOUNTER — OFFICE VISIT (OUTPATIENT)
Dept: FAMILY MEDICINE | Facility: CLINIC | Age: 59
End: 2024-07-02
Payer: MEDICARE

## 2024-07-02 VITALS
HEIGHT: 68 IN | BODY MASS INDEX: 42.06 KG/M2 | TEMPERATURE: 98 F | HEART RATE: 90 BPM | WEIGHT: 277.5 LBS | RESPIRATION RATE: 16 BRPM | OXYGEN SATURATION: 94 % | DIASTOLIC BLOOD PRESSURE: 80 MMHG | SYSTOLIC BLOOD PRESSURE: 112 MMHG

## 2024-07-02 DIAGNOSIS — E11.9 TYPE 2 DIABETES MELLITUS WITHOUT COMPLICATION, WITHOUT LONG-TERM CURRENT USE OF INSULIN (H): ICD-10-CM

## 2024-07-02 DIAGNOSIS — M54.9 CHRONIC NECK AND BACK PAIN: ICD-10-CM

## 2024-07-02 DIAGNOSIS — G47.00 INSOMNIA, UNSPECIFIED TYPE: ICD-10-CM

## 2024-07-02 DIAGNOSIS — M54.2 CHRONIC NECK AND BACK PAIN: ICD-10-CM

## 2024-07-02 DIAGNOSIS — F31.9 BIPOLAR AFFECTIVE DISORDER, REMISSION STATUS UNSPECIFIED (H): ICD-10-CM

## 2024-07-02 DIAGNOSIS — M81.0 OSTEOPOROSIS, UNSPECIFIED OSTEOPOROSIS TYPE, UNSPECIFIED PATHOLOGICAL FRACTURE PRESENCE: ICD-10-CM

## 2024-07-02 DIAGNOSIS — J30.9 ALLERGIC RHINITIS, UNSPECIFIED SEASONALITY, UNSPECIFIED TRIGGER: ICD-10-CM

## 2024-07-02 DIAGNOSIS — G89.29 CHRONIC NECK AND BACK PAIN: ICD-10-CM

## 2024-07-02 DIAGNOSIS — J44.9 CHRONIC OBSTRUCTIVE PULMONARY DISEASE, UNSPECIFIED COPD TYPE (H): ICD-10-CM

## 2024-07-02 DIAGNOSIS — Z01.818 PREOP GENERAL PHYSICAL EXAM: Primary | ICD-10-CM

## 2024-07-02 DIAGNOSIS — E03.8 SUBCLINICAL HYPOTHYROIDISM: ICD-10-CM

## 2024-07-02 LAB
ANION GAP SERPL CALCULATED.3IONS-SCNC: 13 MMOL/L (ref 7–15)
ATRIAL RATE - MUSE: 82 BPM
BUN SERPL-MCNC: 14.4 MG/DL (ref 8–23)
CALCIUM SERPL-MCNC: 9.4 MG/DL (ref 8.8–10.2)
CHLORIDE SERPL-SCNC: 102 MMOL/L (ref 98–107)
CREAT SERPL-MCNC: 0.98 MG/DL (ref 0.51–0.95)
DEPRECATED HCO3 PLAS-SCNC: 25 MMOL/L (ref 22–29)
DIASTOLIC BLOOD PRESSURE - MUSE: NORMAL MMHG
EGFRCR SERPLBLD CKD-EPI 2021: 66 ML/MIN/1.73M2
ERYTHROCYTE [DISTWIDTH] IN BLOOD BY AUTOMATED COUNT: 12.6 % (ref 10–15)
GLUCOSE SERPL-MCNC: 109 MG/DL (ref 70–99)
HCT VFR BLD AUTO: 44.1 % (ref 35–47)
HGB BLD-MCNC: 14.2 G/DL (ref 11.7–15.7)
INTERPRETATION ECG - MUSE: NORMAL
MCH RBC QN AUTO: 29.8 PG (ref 26.5–33)
MCHC RBC AUTO-ENTMCNC: 32.2 G/DL (ref 31.5–36.5)
MCV RBC AUTO: 93 FL (ref 78–100)
P AXIS - MUSE: 51 DEGREES
PLATELET # BLD AUTO: 214 10E3/UL (ref 150–450)
POTASSIUM SERPL-SCNC: 4.9 MMOL/L (ref 3.4–5.3)
PR INTERVAL - MUSE: 166 MS
QRS DURATION - MUSE: 74 MS
QT - MUSE: 388 MS
QTC - MUSE: 453 MS
R AXIS - MUSE: 32 DEGREES
RBC # BLD AUTO: 4.76 10E6/UL (ref 3.8–5.2)
SODIUM SERPL-SCNC: 140 MMOL/L (ref 135–145)
SYSTOLIC BLOOD PRESSURE - MUSE: NORMAL MMHG
T AXIS - MUSE: 49 DEGREES
VENTRICULAR RATE- MUSE: 82 BPM
WBC # BLD AUTO: 9 10E3/UL (ref 4–11)

## 2024-07-02 PROCEDURE — G2211 COMPLEX E/M VISIT ADD ON: HCPCS

## 2024-07-02 PROCEDURE — 85027 COMPLETE CBC AUTOMATED: CPT

## 2024-07-02 PROCEDURE — 80048 BASIC METABOLIC PNL TOTAL CA: CPT

## 2024-07-02 PROCEDURE — 99214 OFFICE O/P EST MOD 30 MIN: CPT

## 2024-07-02 PROCEDURE — 93010 ELECTROCARDIOGRAM REPORT: CPT | Mod: OFF | Performed by: INTERNAL MEDICINE

## 2024-07-02 PROCEDURE — 93005 ELECTROCARDIOGRAM TRACING: CPT

## 2024-07-02 PROCEDURE — 36415 COLL VENOUS BLD VENIPUNCTURE: CPT

## 2024-07-02 RX ORDER — RESPIRATORY SYNCYTIAL VIRUS VACCINE 120MCG/0.5
0.5 KIT INTRAMUSCULAR ONCE
Qty: 1 EACH | Refills: 0 | Status: CANCELLED | OUTPATIENT
Start: 2024-07-02 | End: 2024-07-02

## 2024-07-02 ASSESSMENT — PAIN SCALES - GENERAL: PAINLEVEL: NO PAIN (0)

## 2024-07-02 NOTE — PATIENT INSTRUCTIONS

## 2024-07-02 NOTE — PROGRESS NOTES
Preoperative Evaluation  [facility]  [address]   [address] [place]  [place] [address]  Phone: [phone]  Fax: [phone]  Primary Provider: Angelia Krishna MD  Pre-op Performing Provider: Angelina Bañuelos PA-C  Jul 2, 2024 6/27/2024   Surgical Information   What procedure is being done? Spinal cord stimulater trial   Facility or Hospital where procedure/surgery will be performed: [facility]   Who is doing the procedure / surgery? I think Keiry Whiting   Date of surgery / procedure: July 10th 2024   Time of surgery / procedure: 12:15 arive at 11:15   Where do you plan to recover after surgery? at home alone        Fax number for surgical facility: [phone]    Assessment & Plan     The proposed surgical procedure is considered INTERMEDIATE risk.    Preop general physical exam  Chronic neck and back pain  Patient presents today for preop exam for spinal cord stimulator implant to attempt to treat back pain. Will check BMP, CBC and EKG.  EKG per my interpretation shows normal sinus rhythm, no ST/T segment elevations, normal axis deviation.  Patient is at high risk for sleep apnea but has not had a formal evaluation by sleep medicine completed.  I discussed this with the patient that she is considered to be high risk and I would recommend having this done in the near future.  Patient is aware of the risks associated with proceeding with surgery prior to having the sleep study done.  - Basic metabolic panel  - CBC with platelets  - EKG 12-lead, tracing only    Type 2 diabetes mellitus without complication, without long-term current use of insulin (H)  Patient is instructed to hold Wegovy 7 days prior to her procedure.  Patient is due for repeat hemoglobin A1c on 7/10/2024, insurance will not cover me to retest this sooner.  Last hemoglobin A1c on 4/10/2024 showed excellent control at 5.9%.      Allergic  rhinitis, unspecified seasonality, unspecified trigger  Able.  Hold allergy medication day of procedure.    Chronic obstructive pulmonary disease, unspecified COPD type (H)  Stable.  Patient is educated to bring her albuterol inhaler with her day of procedure.    Bipolar affective disorder, remission status unspecified (H)  Stable.  Patient is educated to hold her medication day of procedure.    Refusal of blood transfusions as patient is Mosque  I reviewed this with the patient today and she confirms that she would not like a blood transfusion if it was indicated.    Subclinical hypothyroidism  Stable on levothyroxine.  No indication to recheck TSH today.    Insomnia, unspecified type  Stable.        Possible Sleep Apnea: Discussed with the patient. She is aware of the risks associated with this and does not want to delay her procedure. Discussed with patient having a sleep study completed in the future.       7/2/2024     2:27 PM   STOP-Bang Total Score   Total Score 5   Risk Stratification 5 - 8: High Risk for MINNIE       Risks and Recommendations  The patient has the following additional risks and recommendations for perioperative complications:   - Morbid obesity (BMI >40)  Pulmonary:    - Incentive spirometry post-op      Antiplatelet or Anticoagulation Medication Instructions   - Patient is on no antiplatelet or anticoagulation medications.    Additional Medication Instructions  - Was reviewed with patient prior to this appointment by her surgical team. She is aware to hold the ozempic at least 7 days prior to the surgery. She will hold same day medications on day of procedure - as educated by her surgical team.    Recommendation  Approval given to proceed with proposed procedure, without further diagnostic evaluation.    Caitie Joaquin is a 59 year old, presenting for the following:  Pre-Op Exam (Spinal stimulator [facility] 07/10/2024)          7/2/2024     1:48 PM   Additional  Questions   Roomed by Krystle HIGGINBOTHAM LPN         7/2/2024   Forms   Any forms needing to be completed Yes        HPI related to upcoming procedure: Trial of spinal stimulator secondary to back pain.         6/27/2024   Pre-Op Questionnaire   Have you ever had a heart attack or stroke? No   Have you ever had surgery on your heart or blood vessels, such as a stent placement, a coronary artery bypass, or surgery on an artery in your head, neck, heart, or legs? No   Do you have chest pain with activity? No   Do you have a history of heart failure? No   Do you currently have a cold, bronchitis or symptoms of other infection? No   Do you have a cough, shortness of breath, or wheezing? (!) YES - chronic in nature secondary to COPD   Do you or anyone in your family have previous history of blood clots? No   Do you or does anyone in your family have a serious bleeding problem such as prolonged bleeding following surgeries or cuts? No   Have you ever had problems with anemia or been told to take iron pills? No   Have you had any abnormal blood loss such as black, tarry or bloody stools, or abnormal vaginal bleeding? (!) YES - history of abnormal vaginal bleeding. Not currently having any bleeding.   Have you ever had a blood transfusion? No   Are you willing to have a blood transfusion if it is medically needed before, during, or after your surgery? (!) NO - confirmed with patient    Have you or any of your relatives ever had problems with anesthesia? No   Do you have sleep apnea, excessive snoring or daytime drowsiness? (!) YES - Never had a formal diagnosis. Reports history of snoring and friends have stated she stops breathing at times when sleeping.    Do you have a CPAP machine? (!) NO - confirmed with patient    Do you have any artifical heart valves or other implanted medical devices like a pacemaker, defibrillator, or continuous glucose monitor? No   Do you have artificial joints? (!) YES - Bilateral hips and knees    Are  you allergic to latex? No        Health Care Directive  Patient does not have a Health Care Directive or Living Will: Patient states has Advance Directive and will bring in a copy to clinic.      Status of Chronic Conditions:  COPD - Patient has a longstanding history of moderate-severe COPD . Patient has been doing well overall noting SOB, HOARSENESS, and COUGH and continues on medication regimen consisting of albuterol prn without adverse reactions or side effects. No longer taking the fluticasone-salmeterol.     History of asthma: this has not been an issue since patient has stopped working.    DIABETES - Patient has a longstanding history of DiabetesType Type II . Patient is being treated with ozempic and denies significant side effects. Control has been good.     Allergies: Stable. Montelukast, zyrtec, fluticasone, olopatadine and albuterol inhaler prn.     History of bipolar disorder: currently taking gabapentin, buproprion and fluoxetine.     Sublicnical hypothyroidism: currently taking levothyroxine 75 mcg daily     Insomnia: Stable. Taking trazodone for sleep    Taking methocarbamol for pain but it also helps her with sleep. All chronic conditions have been stable besides the pain - which is why she is having the surgical procedure.      Patient Active Problem List    Diagnosis Date Noted    Moderate persistent asthma with acute exacerbation 01/26/2023     Priority: Medium    Diabetes mellitus, type 2 (H) 06/08/2022     Priority: Medium    Bipolar disorder (H) 12/02/2020     Priority: Medium    Postmenopausal bleeding 12/02/2020     Priority: Medium    Fibromyalgia 12/12/2019     Priority: Medium    Morbid obesity (H) 08/15/2018     Priority: Medium    Osteoporosis 05/05/2017     Priority: Medium    Refusal of blood transfusions as patient is Zoroastrian 04/12/2017     Priority: Medium    Malignant neoplasm of upper-outer quadrant of right breast in female, estrogen receptor positive (H) 04/11/2017      Priority: Medium    History of malignant neoplasm of breast 12/31/2016     Priority: Medium    Compression fracture of vertebral column with routine healing 09/09/2016     Priority: Medium    Hearing Loss      Priority: Medium     Created by Conversion  Replacement Utility updated for latest IMO load        Insomnia      Priority: Medium     Created by Conversion        Allergic rhinitis      Priority: Medium    Osteoarthrosis, unspecified whether generalized or localized, pelvic region and thigh 09/24/2014     Priority: Medium    COPD (chronic obstructive pulmonary disease) (H) 05/25/2007     Priority: Medium    Chronic neck and back pain 08/14/1984     Priority: Medium      Past Medical History:   Diagnosis Date    Allergic rhinitis     Anemia     Anxiety     Asthma     Bipolar 1 disorder (H)     Breast cancer of upper-outer quadrant of right female breast (H) 04/11/2017    Chronic pain     Back pain due to arthritis.    COPD (chronic obstructive pulmonary disease) (H)     Depression     Diabetes (H)     Endometriosis     Fibroid uterus     Hx of radiation therapy 01/01/2017    Insomnia     Osteoarthritis     Overweight     Refusal of blood transfusions as patient is Zoroastrian 04/12/2017    Sensorineural hearing loss, bilateral     Shortness of breath     Vitamin D deficiency      Past Surgical History:   Procedure Laterality Date    ARTHROSCOPY KNEE Right 1996    Description: Arthroscopy Knee Right;  Recorded: 12/08/2011;    BIOPSY BREAST Right 2000's    BIOPSY BREAST Right 02/20/2017    KNEE SURGERY Right 1997    lateral release    LAPAROSCOPIC ENDOMETRIOSIS FULGURATION  2002    LUMPECTOMY BREAST Right 03/10/2017    with sentinel lymph node biopsy.    AZ HYSTEROSCOPY,W/ENDO BX N/A 12/15/2020    Procedure: HYSTEROSCOPY, WITH DILATION AND CURETTAGE;  Surgeon: Scottie Canas MD;  Location: [facility];  Service: Gynecology    TOTAL HIP ARTHROPLASTY N/A 9/24/2014    Procedure: LEFT HIP TOTAL  ARTHROPLASTY;  Surgeon: Antony Elias MD;  Location: [facility] OR;  Service:     TOTAL HIP ARTHROPLASTY Right 8/10/2015    Procedure: HIP TOTAL ARTHROPLASTY RIGHT;  Surgeon: Antony Elias MD;  Location: [facility] OR;  Service:     RUST TOTAL KNEE ARTHROPLASTY Right 8/28/2017    Procedure: RIGHT TOTAL KNEE ARTHROPLASTY;  Surgeon: Antony Elias MD;  Location: [facility] OR;  Service: Orthopedics     Current Outpatient Medications   Medication Sig Dispense Refill    acetaminophen (TYLENOL) 500 MG tablet Take 1,000 mg by mouth every 6 hours as needed      albuterol (PROAIR HFA/PROVENTIL HFA/VENTOLIN HFA) 108 (90 Base) MCG/ACT inhaler Inhale 2 puffs into the lungs every 6 hours as needed for shortness of breath, wheezing or cough 18 g 1    blood glucose (NO BRAND SPECIFIED) lancets standard Use to test blood sugar once daily 100 each 3    blood glucose (ONETOUCH VERIO IQ) test strip USE 1 STRIP TO CHECK GLUCOSE ONCE DAILY TO  TEST  BLOOD  SUGAR  ONCE  DAILY. 100 strip 3    buPROPion (WELLBUTRIN XL) 150 MG 24 hr tablet Take 1 tablet (150 mg) by mouth every morning 90 tablet 3    cetirizine (ZYRTEC) 10 MG CHEW Take 10 mg by mouth daily      diclofenac sodium (VOLTAREN) 1 % Gel [DICLOFENAC SODIUM (VOLTAREN) 1 % GEL] Apply approximately 1/2-1 gm over affected hand and/or left elbow joints bid, as needed. 1 Tube 2    famotidine (PEPCID) 40 MG tablet Take 1 tablet (40 mg) by mouth 2 times daily as needed for heartburn 180 tablet 3    FLUoxetine (PROZAC) 40 MG capsule Take 1 capsule (40 mg) by mouth daily 90 capsule 1    fluticasone propionate (FLONASE ALLERGY RELIEF) 50 mcg/actuation nasal spray [FLUTICASONE PROPIONATE (FLONASE ALLERGY RELIEF) 50 MCG/ACTUATION NASAL SPRAY] One spray in each nostril twice daily. OFFICE VISIT NEEDED FOR ANY FURTHER REFILLS 16 g 0    gabapentin (NEURONTIN) 800 MG tablet Take 2 tablets (1,600 mg) by mouth at bedtime 180 tablet 0    levothyroxine  (SYNTHROID/LEVOTHROID) 75 MCG tablet Take 1/2 tablet daily five days a week and 1 tablet two days a week 54 tablet 1    magnesium citrate solution Take 296 mLs by mouth daily as needed      methocarbamol (ROBAXIN) 500 MG tablet Take 1 tablet (500 mg) by mouth 3 times daily as needed 90 tablet 3    montelukast (SINGULAIR) 10 MG tablet TAKE 1 TABLET (10 MG) BY MOUTH AT BEDTIME 90 tablet 3    olopatadine (PATADAY) 0.2 % ophthalmic solution Place 0.05 mLs (1 drop) into both eyes daily 5 mL 6    OZEMPIC, 0.25 OR 0.5 MG/DOSE, 2 MG/1.5ML SOPN pen Inject 0.25 mg Subcutaneous every 7 days 1.5 mL 1    simvastatin (ZOCOR) 20 MG tablet Take 1 tablet (20 mg) by mouth at bedtime 90 tablet 1    traZODone (DESYREL) 100 MG tablet Take 2 tablets (200 mg) by mouth at bedtime 180 tablet 3    traZODone (DESYREL) 50 MG tablet Take 0.5-1 tablets (25-50 mg) by mouth nightly as needed for sleep In addition to 200 mg tablet, tdd 225-250 mg nightly 90 tablet 3    fluticasone-salmeterol (ADVAIR HFA) 115-21 MCG/ACT inhaler Inhale 2 puffs into the lungs 2 times daily (Patient not taking: Reported on 2024) 12 g 1       Allergies   Allergen Reactions    Cat/Feline Products [Cat Hair Extract] Itching    Trees Other (See Comments)     Grass, itching        Social History     Tobacco Use    Smoking status: Former     Current packs/day: 0.00     Average packs/day: 1 pack/day for 8.0 years (8.0 ttl pk-yrs)     Types: Cigarettes     Start date: 1977     Quit date: 1985     Years since quittin.5     Passive exposure: Past    Smokeless tobacco: Never   Substance Use Topics    Alcohol use: Yes     Alcohol/week: 0.0 standard drinks of alcohol     Comment: Alcoholic Drinks/day: 1 drink/month     Family History   Problem Relation Age of Onset    Depression Mother     Chronic Obstructive Pulmonary Disease Mother     Diabetes Father     Skin Cancer Father 45    Colon Cancer Maternal Grandmother     Colon Cancer Maternal Grandfather     Diabetes  "Paternal Grandmother     Colon Cancer Paternal Grandmother 80    Testicular cancer Brother 44    Lung Cancer Paternal Aunt 60    Breast Cancer Cousin 48        Paternal side.    Anesthesia Reaction No family hx of     Glaucoma No family hx of     Macular Degeneration No family hx of      History   Drug Use No     Comment: Drug use: past use of marijuana and hash.         Review of Systems  CONSTITUTIONAL: NEGATIVE for fever, chills, change in weight  INTEGUMENTARY/SKIN: NEGATIVE for worrisome rashes, moles or lesions  EYES: NEGATIVE for vision changes or irritation  ENT/MOUTH: NEGATIVE for ear, mouth and throat problems  RESP: NEGATIVE for significant cough or SOB  BREAST: NEGATIVE for masses, tenderness or discharge  CV: NEGATIVE for chest pain, palpitations or peripheral edema  GI: NEGATIVE for nausea, abdominal pain, heartburn, or change in bowel habits  : NEGATIVE for frequency, dysuria, or hematuria  MUSCULOSKELETAL: NEGATIVE for significant arthralgias or myalgia  NEURO: NEGATIVE for weakness, dizziness or paresthesias  ENDOCRINE: NEGATIVE for temperature intolerance, skin/hair changes  HEME: NEGATIVE for bleeding problems  PSYCHIATRIC: NEGATIVE for changes in mood or affect    Objective    /80 (BP Location: Left arm, Patient Position: Sitting, Cuff Size: Adult Regular)   Pulse 90   Temp 98  F (36.7  C) (Oral)   Resp 16   Ht 1.727 m (5' 8\")   Wt 125.9 kg (277 lb 8 oz)   LMP  (LMP Unknown)   SpO2 94%   Breastfeeding No   BMI 42.19 kg/m     Estimated body mass index is 42.19 kg/m  as calculated from the following:    Height as of this encounter: 1.727 m (5' 8\").    Weight as of this encounter: 125.9 kg (277 lb 8 oz).  Physical Exam  GENERAL: alert and no distress  EYES: Eyes grossly normal to inspection, conjunctivae and sclerae normal  NECK: no adenopathy, no asymmetry, masses, or scars  RESP: lungs clear to auscultation - no rales, rhonchi or wheezes  CV: regular rate and rhythm, normal S1 S2, " no S3 or S4, no murmur, click or rub,  MS: no gross musculoskeletal defects noted,   SKIN: no suspicious lesions or rashes  PSYCH: mentation appears normal, affect normal/bright    Recent Labs   Lab Test 04/10/24  1459 09/26/23  1453   NA  --  139   POTASSIUM  --  4.2   CR  --  0.94   A1C 5.9* 6.0*        Diagnostics  Labs pending at this time.  Results will be reviewed when available.   EKG: appears normal, NSR, normal axis, normal intervals, no acute ST/T changes c/w ischemia    Revised Cardiac Risk Index (RCRI)  The patient has the following serious cardiovascular risks for perioperative complications:   - No serious cardiac risks = 0 points     RCRI Interpretation: 0 points: Class I (very low risk - 0.4% complication rate)       Signed Electronically by: Angelina Bañuelos PA-C  Copy of this evaluation report is provided to requesting physician.          Statement Selected

## 2024-07-11 DIAGNOSIS — E11.9 TYPE 2 DIABETES MELLITUS WITHOUT COMPLICATION, WITHOUT LONG-TERM CURRENT USE OF INSULIN (H): ICD-10-CM

## 2024-07-11 RX ORDER — SEMAGLUTIDE 0.68 MG/ML
INJECTION, SOLUTION SUBCUTANEOUS
Qty: 3 ML | Refills: 0 | Status: SHIPPED | OUTPATIENT
Start: 2024-07-11 | End: 2024-08-28

## 2024-07-16 ENCOUNTER — TRANSFERRED RECORDS (OUTPATIENT)
Dept: HEALTH INFORMATION MANAGEMENT | Facility: CLINIC | Age: 59
End: 2024-07-16
Payer: MEDICARE

## 2024-07-21 ENCOUNTER — HEALTH MAINTENANCE LETTER (OUTPATIENT)
Age: 59
End: 2024-07-21

## 2024-08-15 ENCOUNTER — TRANSFERRED RECORDS (OUTPATIENT)
Dept: HEALTH INFORMATION MANAGEMENT | Facility: CLINIC | Age: 59
End: 2024-08-15
Payer: MEDICARE

## 2024-08-23 DIAGNOSIS — M79.7 FIBROMYALGIA: ICD-10-CM

## 2024-08-23 DIAGNOSIS — F31.30 BIPOLAR DISORDER, CURRENT EPISODE DEPRESSED, MILD OR MODERATE SEVERITY, UNSPECIFIED (H): ICD-10-CM

## 2024-08-23 RX ORDER — GABAPENTIN 800 MG/1
TABLET ORAL
Qty: 180 TABLET | Refills: 0 | Status: SHIPPED | OUTPATIENT
Start: 2024-08-23

## 2024-08-26 DIAGNOSIS — E11.9 TYPE 2 DIABETES MELLITUS WITHOUT COMPLICATION, WITHOUT LONG-TERM CURRENT USE OF INSULIN (H): ICD-10-CM

## 2024-08-28 RX ORDER — SEMAGLUTIDE 0.68 MG/ML
INJECTION, SOLUTION SUBCUTANEOUS
Qty: 3 ML | Refills: 2 | Status: SHIPPED | OUTPATIENT
Start: 2024-08-28 | End: 2024-10-02

## 2024-08-29 DIAGNOSIS — G89.29 OTHER CHRONIC PAIN: ICD-10-CM

## 2024-08-30 RX ORDER — METHOCARBAMOL 500 MG/1
500 TABLET, FILM COATED ORAL 3 TIMES DAILY PRN
Qty: 90 TABLET | Refills: 3 | Status: SHIPPED | OUTPATIENT
Start: 2024-08-30

## 2024-09-25 ENCOUNTER — OFFICE VISIT (OUTPATIENT)
Dept: AUDIOLOGY | Facility: CLINIC | Age: 59
End: 2024-09-25
Payer: MEDICARE

## 2024-09-25 DIAGNOSIS — H90.3 SENSORINEURAL HEARING LOSS, BILATERAL: Primary | ICD-10-CM

## 2024-09-25 PROCEDURE — V5299 HEARING SERVICE: HCPCS | Performed by: AUDIOLOGIST

## 2024-09-25 NOTE — PROGRESS NOTES
Patient was scheduled today for hearing evaluation/hearing aid consult appointment. She has Medicare for her primary coverage; Medicaid [place] for her secondary. No orders for hearing testing were in place for today's appointment, and Ms. Vargas declined to proceed without them as she otherwise may be financially liable for the appointment.     Ms. Vargas was only scheduled for an evaluation and hearing aid consult today; no fitting or follow up appointments had been scheduled. She was provided with written information on the [facility] hearing aid program and contact information for audiology central scheduling to set up the necessary appointments. As a patient with medical assistance, hearing aids will likely be covered at this time. However, medical clearance from a physician will be required before hearing aid fitting can occur. She was provided with a hearing aid medical clearance form to be completed by her primary care provider at an upcoming appointment, and to be returned to audiology when presenting for hearing aid appointments. She will also request orders for hearing evaluation from that primary care provider.     Ms. Vargas requested that her earmolds be retubed today. Both were in poor repair; the left earmold has tears in the sound bore area as well as in the helix. Earmolds were cleaned, tubing was cut to fit and cemented to the earmolds. Ms. Vargas left the appointment with both earmolds and hearing aids in plastic cases to allow time for the cement to cure.     Ms. Vargas expressed verbal understanding of this information and plan.    Carter Peng, [facility]-A  [place] Licensed Audiologist [id number]

## 2024-09-27 SDOH — HEALTH STABILITY: PHYSICAL HEALTH: ON AVERAGE, HOW MANY DAYS PER WEEK DO YOU ENGAGE IN MODERATE TO STRENUOUS EXERCISE (LIKE A BRISK WALK)?: 0 DAYS

## 2024-09-27 SDOH — HEALTH STABILITY: PHYSICAL HEALTH: ON AVERAGE, HOW MANY MINUTES DO YOU ENGAGE IN EXERCISE AT THIS LEVEL?: 0 MIN

## 2024-09-27 ASSESSMENT — SOCIAL DETERMINANTS OF HEALTH (SDOH): HOW OFTEN DO YOU GET TOGETHER WITH FRIENDS OR RELATIVES?: ONCE A WEEK

## 2024-10-01 PROBLEM — Z53.1 PROCEDURE AND TREATMENT NOT CARRIED OUT BECAUSE OF PATIENT'S DECISION FOR REASONS OF BELIEF AND GROUP PRESSURE: Status: ACTIVE | Noted: 2017-04-12

## 2024-10-01 ASSESSMENT — PATIENT HEALTH QUESTIONNAIRE - PHQ9
SUM OF ALL RESPONSES TO PHQ QUESTIONS 1-9: 17
10. IF YOU CHECKED OFF ANY PROBLEMS, HOW DIFFICULT HAVE THESE PROBLEMS MADE IT FOR YOU TO DO YOUR WORK, TAKE CARE OF THINGS AT HOME, OR GET ALONG WITH OTHER PEOPLE: SOMEWHAT DIFFICULT
SUM OF ALL RESPONSES TO PHQ QUESTIONS 1-9: 17

## 2024-10-02 ENCOUNTER — OFFICE VISIT (OUTPATIENT)
Dept: FAMILY MEDICINE | Facility: CLINIC | Age: 59
End: 2024-10-02
Attending: FAMILY MEDICINE
Payer: MEDICARE

## 2024-10-02 VITALS
TEMPERATURE: 98.1 F | HEIGHT: 68 IN | SYSTOLIC BLOOD PRESSURE: 112 MMHG | DIASTOLIC BLOOD PRESSURE: 70 MMHG | RESPIRATION RATE: 16 BRPM | OXYGEN SATURATION: 98 % | HEART RATE: 80 BPM | BODY MASS INDEX: 42.74 KG/M2 | WEIGHT: 282 LBS

## 2024-10-02 DIAGNOSIS — E03.8 SUBCLINICAL HYPOTHYROIDISM: ICD-10-CM

## 2024-10-02 DIAGNOSIS — J32.9 CHRONIC SINUSITIS, UNSPECIFIED LOCATION: ICD-10-CM

## 2024-10-02 DIAGNOSIS — R05.3 CHRONIC COUGH: ICD-10-CM

## 2024-10-02 DIAGNOSIS — Z00.00 ENCOUNTER FOR ROUTINE HISTORY AND PHYSICAL EXAMINATION OF ADULT: Primary | ICD-10-CM

## 2024-10-02 DIAGNOSIS — E11.9 TYPE 2 DIABETES MELLITUS WITHOUT COMPLICATION, WITHOUT LONG-TERM CURRENT USE OF INSULIN (H): ICD-10-CM

## 2024-10-02 DIAGNOSIS — H91.93 BILATERAL HEARING LOSS, UNSPECIFIED HEARING LOSS TYPE: ICD-10-CM

## 2024-10-02 DIAGNOSIS — F31.30 BIPOLAR DISORDER, CURRENT EPISODE DEPRESSED, MILD OR MODERATE SEVERITY, UNSPECIFIED (H): ICD-10-CM

## 2024-10-02 LAB
EST. AVERAGE GLUCOSE BLD GHB EST-MCNC: 131 MG/DL
HBA1C MFR BLD: 6.2 % (ref 0–5.6)

## 2024-10-02 PROCEDURE — 99396 PREV VISIT EST AGE 40-64: CPT | Performed by: FAMILY MEDICINE

## 2024-10-02 PROCEDURE — 84443 ASSAY THYROID STIM HORMONE: CPT | Performed by: FAMILY MEDICINE

## 2024-10-02 PROCEDURE — 36415 COLL VENOUS BLD VENIPUNCTURE: CPT | Performed by: FAMILY MEDICINE

## 2024-10-02 PROCEDURE — 84439 ASSAY OF FREE THYROXINE: CPT | Performed by: FAMILY MEDICINE

## 2024-10-02 PROCEDURE — 82570 ASSAY OF URINE CREATININE: CPT | Performed by: FAMILY MEDICINE

## 2024-10-02 PROCEDURE — 80061 LIPID PANEL: CPT | Performed by: FAMILY MEDICINE

## 2024-10-02 PROCEDURE — 83036 HEMOGLOBIN GLYCOSYLATED A1C: CPT | Performed by: FAMILY MEDICINE

## 2024-10-02 PROCEDURE — 82043 UR ALBUMIN QUANTITATIVE: CPT | Performed by: FAMILY MEDICINE

## 2024-10-02 RX ORDER — MONTELUKAST SODIUM 10 MG/1
TABLET ORAL
Qty: 90 TABLET | Refills: 3 | Status: SHIPPED | OUTPATIENT
Start: 2024-10-02

## 2024-10-02 RX ORDER — SIMVASTATIN 20 MG
20 TABLET ORAL AT BEDTIME
Qty: 90 TABLET | Refills: 1 | Status: SHIPPED | OUTPATIENT
Start: 2024-10-02

## 2024-10-02 RX ORDER — TRAZODONE HYDROCHLORIDE 50 MG/1
TABLET, FILM COATED ORAL
Qty: 42 TABLET | Refills: 0 | Status: SHIPPED | OUTPATIENT
Start: 2024-10-02 | End: 2024-11-13

## 2024-10-02 RX ORDER — SEMAGLUTIDE 0.68 MG/ML
0.5 INJECTION, SOLUTION SUBCUTANEOUS
Qty: 3 ML | Refills: 2 | Status: SHIPPED | OUTPATIENT
Start: 2024-10-02

## 2024-10-02 RX ORDER — TRAZODONE HYDROCHLORIDE 100 MG/1
100 TABLET ORAL AT BEDTIME
Qty: 90 TABLET | Refills: 1 | Status: SHIPPED | OUTPATIENT
Start: 2024-10-02

## 2024-10-02 NOTE — PROGRESS NOTES
Preventive Care Visit  [facility]  Angelia Krishna MD, Family Medicine  Oct 2, 2024      Assessment & Plan     Encounter for routine history and physical examination of adult  -Routine health maintenance discussion:  No smoking, limited alcohol (7 or less servings per week), 5 fruits/veg servings per day, 200 minutes of exercise per week.  Daily calcium/vitamin D guidelines, bone health, colon cancer screening beginning at age 50.  Accident avoidance, sun screen.     Bipolar disorder, current episode depressed, mild or moderate severity, unspecified (H)  -Will work on slow wean off of the trazodone to see how she does. If we don't feel that her mood is improving with this we can increase her fluoxetine or her wellbutrin.  - traZODone (DESYREL) 100 MG tablet  Dispense: 90 tablet; Refill: 1  - traZODone (DESYREL) 50 MG tablet  Dispense: 42 tablet; Refill: 0    Subclinical hypothyroidism  -Will check labs and adjust treatment if indicated.   - TSH  - T4, free  - TSH  - T4, free    Type 2 diabetes mellitus without complication, without long-term current use of insulin (H)  -Doing well, can consdier increased dose of Ozempic to see if we can help with some weight loss to help with her insulin resistance.   - HEMOGLOBIN A1C  - Lipid panel reflex to direct LDL Non-fasting  - Albumin Random Urine Quantitative with Creat Ratio  - semaglutide (OZEMPIC, 0.25 OR 0.5 MG/DOSE,) 2 MG/3ML pen  Dispense: 3 mL; Refill: 2  - simvastatin (ZOCOR) 20 MG tablet  Dispense: 90 tablet; Refill: 1  - HEMOGLOBIN A1C  - Lipid panel reflex to direct LDL Non-fasting  - Albumin Random Urine Quantitative with Creat Ratio    Chronic cough  -Doing well with Singulair  - montelukast (SINGULAIR) 10 MG tablet  Dispense: 90 tablet; Refill: 3    Chronic sinusitis, unspecified location  -per above  - montelukast (SINGULAIR) 10 MG tablet  Dispense: 90 tablet; Refill: 3    Hearing Loss  Cleared for hearing aids    BMI  Estimated  "body mass index is 42.88 kg/m  as calculated from the following:    Height as of this encounter: 1.727 m (5' 8\").    Weight as of this encounter: 127.9 kg (282 lb).   Weight management plan: Discussed healthy diet and exercise guidelines    Depression Screening Follow Up        10/1/2024     3:19 PM   PHQ   PHQ-9 Total Score 17   Q9: Thoughts of better off dead/self-harm past 2 weeks Not at all         Follow Up Actions Taken  Adjusted patient's anti-depressant medication.     Counseling  Appropriate preventive services were addressed with this patient via screening, questionnaire, or discussion as appropriate for fall prevention, nutrition, physical activity, Tobacco-use cessation, social engagement, weight loss and cognition.  Checklist reviewing preventive services available has been given to the patient.  Reviewed patient's diet, addressing concerns and/or questions.   The patient was instructed to see the dentist every 6 months.   Discussed possible causes of fatigue. The patient was provided with written information regarding signs of hearing loss.   The patient's PHQ-9 score is consistent with moderate depression. She was provided with information regarding depression.         Caitie Joaquin is a 59 year old, presenting for the following:  Annual Visit (Non fasting Annual Medicare Wellness)        10/2/2024     2:05 PM   Additional Questions   Roomed by Jody Smiley   Accompanied by none         10/2/2024   Forms   Any forms needing to be completed Yes            Health Care Directive  Patient does not have a Health Care Directive or Living Will: Discussed advance care planning with patient; however, patient declined at this time.    HPI    She is doing well.     She does have appropriate questions about the tuility of a statin for her. She does wonder about the Ozempic as well for her diabetes as wellas her weight. Hasn't noted much difference with her weight.     She does note that she did run out of her " trazodone and has been able to sleep and has stayed asleep and feels more energy.     She does wonder about changing her anti-depressant as well, not noting much difference with her mood.       9/27/2024   General Health   How would you rate your overall physical health? (!) FAIR   Feel stress (tense, anxious, or unable to sleep) Rather much      (!) STRESS CONCERN      9/27/2024   Nutrition   Diet: Diabetic    Breakfast skipped       Multiple values from one day are sorted in reverse-chronological order         9/27/2024   Exercise   Days per week of moderate/strenous exercise 0 days   Average minutes spent exercising at this level 0 min      (!) EXERCISE CONCERN      9/27/2024   Social Factors   Frequency of gathering with friends or relatives Once a week   Worry food won't last until get money to buy more Yes   Food not last or not have enough money for food? Yes   Do you have housing? (Housing is defined as stable permanent housing and does not include staying ouside in a car, in a tent, in an abandoned building, in an overnight shelter, or couch-surfing.) Yes   Are you worried about losing your housing? No   Lack of transportation? No   Unable to get utilities (heat,electricity)? No      (!) FOOD SECURITY CONCERN PRESENT      10/2/2024   Fall Risk   Gait Speed Test (Document in seconds) 4.24   Gait Speed Test Interpretation Less than or equal to 5.00 seconds - PASS             9/27/2024   Activities of Daily Living- Home Safety   Needs help with the following daily activites None of the above   Safety concerns in the home None of the above            9/27/2024   Dental   Dentist two times every year? (!) NO            9/27/2024   Hearing Screening   Hearing concerns? (!) I FEEL THAT PEOPLE ARE MUMBLING OR NOT SPEAKING CLEARLY.    (!) I NEED TO ASK PEOPLE TO SPEAK UP OR REPEAT THEMSELVES.    (!) IT'S HARDER TO UNDERSTAND WOMEN'S VOICES THAN MEN'S VOICES.    (!) IT'S HARD TO FOLLOW A CONVERSATION IN A NOISY  RESTAURANT OR CROWDED ROOM.    (!) TROUBLE UNDESTANDING A SPEAKER IN A PUBLIC MEETING OR [employer] SERVICE.    (!) TROUBLE FOLLOWING DIALOGUE IN THE THEATHER.    (!) TROUBLE UNDERSTANDING SOFT OR WHISPERED SPEECH.    (!) TROUBLE UNDERSTANDING SPEECH ON THE TELEPHONE       Multiple values from one day are sorted in reverse-chronological order         2024   Driving Risk Screening   Patient/family members have concerns about driving No            2024   General Alertness/Fatigue Screening   Have you been more tired than usual lately? (!) YES            2024   Urinary Incontinence Screening   Bothered by leaking urine in past 6 months No             Today's PHQ-9 Score:       10/1/2024     3:19 PM   PHQ-9 SCORE   PHQ-9 Total Score MyChart 17 (Moderately severe depression)   PHQ-9 Total Score 17         2024   Substance Use   Alcohol more than 3/day or more than 7/wk No   Do you have a current opioid prescription? No   How severe/bad is pain from 1 to 10? 5/10   Do you use any other substances recreationally? No        Social History     Tobacco Use    Smoking status: Former     Current packs/day: 0.00     Average packs/day: 1 pack/day for 8.0 years (8.0 ttl pk-yrs)     Types: Cigarettes     Start date: 1977     Quit date: 1985     Years since quittin.7     Passive exposure: Past    Smokeless tobacco: Never   Vaping Use    Vaping status: Never Used   Substance Use Topics    Alcohol use: Yes     Alcohol/week: 0.0 standard drinks of alcohol     Comment: Alcoholic Drinks/day: 1 drink/month    Drug use: No     Comment: Drug use: past use of marijuana and hash.           2024   LAST FHS-7 RESULTS   1st degree relative breast or ovarian cancer No   Any relative bilateral breast cancer No   Any male have breast cancer No   Any ONE woman have BOTH breast AND ovarian cancer No   Any woman with breast cancer before 50yrs No   2 or more relatives with breast AND/OR ovarian cancer No   2 or more  relatives with breast AND/OR bowel cancer Yes           Mammogram Screening - Mammogram every 1-2 years updated in Health Maintenance based on mutual decision making      History of abnormal Pap smear: No - age 30- 64 PAP with HPV every 5 years recommended        Latest Ref Rng & Units 2/10/2020     2:00 PM   PAP / HPV   PAP Negative for squamous intraepithelial lesion or malignancy. Negative for squamous intraepithelial lesion or malignancy  Electronically signed by Larisa Gonzalez CT (ASCP) on 2/19/2020 at 10:40 AM      HPV 16 DNA NEG Negative    HPV 18 DNA NEG Negative    Other HR HPV NEG Negative      ASCVD Risk   The 10-year ASCVD risk score (Ciro GOMEZ, et al., 2019) is: 4%    Values used to calculate the score:      Age: 59 years      Sex: Female      Is Non- : No      Diabetic: Yes      Tobacco smoker: No      Systolic Blood Pressure: 112 mmHg      Is BP treated: No      HDL Cholesterol: 46 mg/dL      Total Cholesterol: 133 mg/dL            Reviewed and updated as needed this visit by Provider                      Current providers sharing in care for this patient include:  Patient Care Team:  Angelia Krishna MD as PCP - General (Family Medicine)  Nelida Recio MD as MD (Hematology & Oncology)  Christina Leigh MD as MD (Hematology & Oncology)  Viola Singh OD as MD (Ophthalmology)  Shira Miller MD as Referring Physician (Family Medicine)  Angelia Krishna MD as Assigned PCP  Angelia Krishna MD as MD (Family Medicine)  Flavia Camarena PA-C as Assigned Musculoskeletal Provider  Nelida Cummins MD as Surgeon (Neurological Surgery)  Nelida Cummins MD as Assigned Neuroscience Provider  Ekaterina Holman AuD as Audiologist (Audiology)    The following health maintenance items are reviewed in Epic and correct as of today:  Health Maintenance   Topic Date Due    COPD ACTION PLAN  Never done    ZOSTER IMMUNIZATION (1 of  "2) Never done    EYE EXAM  12/19/2023    RSV VACCINE (1 - Risk 60-74 years 1-dose series) Never done    A1C  07/10/2024    INFLUENZA VACCINE (1) 09/01/2024    COVID-19 Vaccine (4 - 2024-25 season) 09/01/2024    LIPID  09/26/2024    MICROALBUMIN  09/26/2024    DIABETIC FOOT EXAM  09/26/2024    ANNUAL REVIEW OF HM ORDERS  09/26/2024    MEDICARE ANNUAL WELLNESS VISIT  09/26/2024    HPV TEST  02/10/2025    PAP  02/10/2025    Pneumococcal Vaccine: Pediatrics (0 to 5 Years) and At-Risk Patients (6 to 64 Years) (1 of 2 - PCV) 10/10/2024 (Originally 2/20/1971)    MAMMO SCREENING  12/06/2024    COLORECTAL CANCER SCREENING  01/05/2025    TSH W/FREE T4 REFLEX  04/10/2025    BMP  07/02/2025    DTAP/TDAP/TD IMMUNIZATION (2 - Td or Tdap) 07/11/2026    ADVANCE CARE PLANNING  07/02/2029    SPIROMETRY  Completed    HEPATITIS C SCREENING  Completed    HIV SCREENING  Completed    PHQ-2 (once per calendar year)  Completed    HPV IMMUNIZATION  Aged Out    MENINGITIS IMMUNIZATION  Aged Out    RSV MONOCLONAL ANTIBODY  Aged Out         Review of Systems  Constitutional, HEENT, cardiovascular, pulmonary, gi and gu systems are negative, except as otherwise noted.     Objective    Exam  /70 (BP Location: Left arm, Patient Position: Sitting, Cuff Size: Adult Regular)   Pulse 80   Temp 98.1  F (36.7  C) (Oral)   Resp 16   Ht 1.727 m (5' 8\")   Wt 127.9 kg (282 lb)   LMP  (LMP Unknown)   SpO2 98%   Breastfeeding No   BMI 42.88 kg/m     Estimated body mass index is 42.88 kg/m  as calculated from the following:    Height as of this encounter: 1.727 m (5' 8\").    Weight as of this encounter: 127.9 kg (282 lb).    Physical Exam  GENERAL: alert and no distress  EYES: Eyes grossly normal to inspection, PERRL and conjunctivae and sclerae normal  HENT: ear canals and TM's normal, nose and mouth without ulcers or lesions  NECK: no adenopathy, no asymmetry, masses, or scars  RESP: lungs clear to auscultation - no rales, rhonchi or " wheezes  CV: regular rate and rhythm, normal S1 S2, no S3 or S4, no murmur, click or rub, no peripheral edema  ABDOMEN: soft, nontender, no hepatosplenomegaly, no masses and bowel sounds normal  MS: no gross musculoskeletal defects noted, no edema  SKIN: no suspicious lesions or rashes  NEURO: Normal strength and tone, mentation intact and speech normal  PSYCH: mentation appears normal, affect normal/bright    Diabetic foot exam: normal DP and PT pulses, normal sensory exam, and normal monofilament exam          10/2/2024   Mini Cog   Clock Draw Score 2 Normal   3 Item Recall 3 objects recalled   Mini Cog Total Score 5                 Signed Electronically by: Angelia Krishna MD    Answers submitted by the patient for this visit:  Patient Health Questionnaire (Submitted on 10/1/2024)  If you checked off any problems, how difficult have these problems made it for you to do your work, take care of things at home, or get along with other people?: Somewhat difficult  PHQ9 TOTAL SCORE: 17

## 2024-10-02 NOTE — PATIENT INSTRUCTIONS
Let's have you go to 200 mg nightly of the trazodone for one week, then 175 mg nightly for one week, 150 for one week, 125 nightly for one week then 100 mg nightly.     After that we can do something similar with just the 75 mg tapering down. Maybe getting to 25-50 mg daily as needed.     Discontinuation symptoms: anxiety, restlessness, headaches, sweating    Let's see how your mood is doing after you are down on the dosing of your trazodone. If your mood is not improving we can either increase the bupropion or the fluoxetine.

## 2024-10-03 LAB
CHOLEST SERPL-MCNC: 167 MG/DL
CREAT UR-MCNC: 144 MG/DL
FASTING STATUS PATIENT QL REPORTED: YES
HDLC SERPL-MCNC: 46 MG/DL
LDLC SERPL CALC-MCNC: 101 MG/DL
MICROALBUMIN UR-MCNC: <12 MG/L
MICROALBUMIN/CREAT UR: NORMAL MG/G{CREAT}
NONHDLC SERPL-MCNC: 121 MG/DL
T4 FREE SERPL-MCNC: 1.16 NG/DL (ref 0.9–1.7)
TRIGL SERPL-MCNC: 99 MG/DL
TSH SERPL DL<=0.005 MIU/L-ACNC: 5.27 UIU/ML (ref 0.3–4.2)

## 2024-11-04 DIAGNOSIS — F31.30 BIPOLAR DISORDER, CURRENT EPISODE DEPRESSED, MILD OR MODERATE SEVERITY, UNSPECIFIED (H): ICD-10-CM

## 2024-11-04 DIAGNOSIS — H10.13 ALLERGIC CONJUNCTIVITIS OF BOTH EYES: ICD-10-CM

## 2024-11-04 DIAGNOSIS — J45.41 MODERATE PERSISTENT ASTHMA WITH ACUTE EXACERBATION: ICD-10-CM

## 2024-11-04 RX ORDER — FLUOXETINE 40 MG/1
40 CAPSULE ORAL DAILY
Qty: 90 CAPSULE | Refills: 1 | Status: SHIPPED | OUTPATIENT
Start: 2024-11-04

## 2024-11-04 RX ORDER — OLOPATADINE HYDROCHLORIDE 2 MG/ML
1 SOLUTION/ DROPS OPHTHALMIC DAILY
Qty: 5 ML | Refills: 1 | Status: SHIPPED | OUTPATIENT
Start: 2024-11-04

## 2024-11-04 RX ORDER — ALBUTEROL SULFATE 90 UG/1
2 INHALANT RESPIRATORY (INHALATION) EVERY 6 HOURS PRN
Qty: 18 G | Refills: 1 | Status: SHIPPED | OUTPATIENT
Start: 2024-11-04

## 2024-11-14 DIAGNOSIS — M79.7 FIBROMYALGIA: ICD-10-CM

## 2024-11-14 DIAGNOSIS — F31.30 BIPOLAR DISORDER, CURRENT EPISODE DEPRESSED, MILD OR MODERATE SEVERITY, UNSPECIFIED (H): ICD-10-CM

## 2024-11-14 RX ORDER — GABAPENTIN 800 MG/1
TABLET ORAL
Qty: 180 TABLET | Refills: 1 | Status: SHIPPED | OUTPATIENT
Start: 2024-11-14

## 2025-01-12 DIAGNOSIS — E03.8 SUBCLINICAL HYPOTHYROIDISM: ICD-10-CM

## 2025-01-13 RX ORDER — LEVOTHYROXINE SODIUM 75 UG/1
TABLET ORAL
Qty: 54 TABLET | Refills: 1 | Status: SHIPPED | OUTPATIENT
Start: 2025-01-13

## 2025-01-18 ENCOUNTER — HEALTH MAINTENANCE LETTER (OUTPATIENT)
Age: 60
End: 2025-01-18

## 2025-02-20 ENCOUNTER — OFFICE VISIT (OUTPATIENT)
Dept: AUDIOLOGY | Facility: CLINIC | Age: 60
End: 2025-02-20
Payer: MEDICARE

## 2025-02-20 DIAGNOSIS — H91.93 BILATERAL HEARING LOSS, UNSPECIFIED HEARING LOSS TYPE: Primary | ICD-10-CM

## 2025-02-20 DIAGNOSIS — H90.3 SENSORINEURAL HEARING LOSS, BILATERAL: Primary | ICD-10-CM

## 2025-02-20 NOTE — PROGRESS NOTES
AUDIOLOGY REPORT    SUBJECTIVE:  Emani Vargas is a 60 year old female who was seen in the Audiology Clinic at the [facility] for audiologic evaluation, referred by Angelia Krishna MD. The patient has known hearing loss, is a long term user of amplification, and was last tested at this clinic on 4-17-19. The patient reported no subjective changes to ears or hearing, but is reliant on amplification for daily functioning and wishes to obtain new devices (last fit with [employer] BTE devices with custom earmolds on 5-8-19.  The patient denied tinnitus, dizziness/vertigo, otalgia, aural fullness, recent illness, or history of significant noise exposure. She has medical clearance for new amplification from Dr. Krishna, dated 10-2-24.    OBJECTIVE:  Abuse Screening:  Do you feel unsafe at home or work/school? No  Do you feel threatened by someone? No  Does anyone try to keep you from having contact with others, or doing things outside of your home? No  Physical signs of abuse present? No     Fall Risk Screen:  1. Have you fallen two or more times in the past year? No  2. Have you fallen and had an injury in the past year? No    Otoscopic exam indicates ears are clear of cerumen, bilaterally.     Pure Tone Thresholds assessed using conventional audiometry with good  reliability from 250-8000 Hz bilaterally using insert earphones and circumaural headphones.     RIGHT:  moderate sloping to severe, rising to moderate sensorineural hearing loss    LEFT:    mild sloping to moderate-severe, rising to moderate sensorineural hearing loss    Tympanogram:    RIGHT: normal eardrum mobility    LEFT:   normal eardrum mobility    Reflexes for 1000 Hz (reported by stimulus ear):  RIGHT: Ipsilateral is absent at frequencies tested  RIGHT: Contralateral could not be assessed due to equipment issues  LEFT:   Ipsilateral is absent at frequencies tested  LEFT:   Contralateral could not be assessed due to equipment  issues    Speech Reception Threshold:    RIGHT: 65 dB HL    LEFT:   60 dB HL  Word Recognition Score:     RIGHT: 80% at 95 dB HL using NU-6 recorded word list.    LEFT:   84% at 95 dB HL using NU-6 recorded word list.    Patient is a hearing aid candidate. Patient would like to move forward with a hearing aid evaluation today. Therefore, the patient was presented with different options for amplification to help aid in communication. Discussed styles, levels of technology and monaural vs. binaural fitting.     The hearing aid(s) mutually chosen were:  Binaural: Phonak Slim L70-R  COLOR: graphite  BATTERY SIZE: rechargeable  EARMOLD/TIPS: generic vented or power domes; size to be determined at fitting appointment  CANAL/ LENGTH: 2P, bilaterally      ASSESSMENT:     ICD-10-CM    1. Sensorineural hearing loss, bilateral  H90.3       Compared to patient's previous audiogram dated 4-17-19, thresholds shifts of 10-30 dB were noted in the high frequency range, bilaterally.    Reviewed purchase information and warranty information with patient. The 45 day trial period was explained to patient. The patient was given a copy of the [place] Department [employer] consumer brochure on purchasing hearing instruments. Patient risk factors have been provided to the patient in writing prior to the sale of the hearing aid per FDA regulation. The risk factors are also available in the User Instructional Booklet to be presented on the day of the hearing aid fitting. Hearing aid(s) ordered. Hearing aid evaluation completed.Today s results were discussed with the patient in detail.     PLAN:  Patient was counseled regarding hearing loss and impact on communication. Ms. Vargas is scheduled to return 3-20-25 for a hearing aid fitting and programming appointment. Purchase agreement will be completed on that date. Ms. Vargas expressed verbal understanding of this information and plan. Please call this clinic with questions regarding  these results or recommendations.        Carter Peng, [facility]-A  [place] Licensed Audiologist [id number]

## 2025-03-16 ENCOUNTER — APPOINTMENT (OUTPATIENT)
Dept: CT IMAGING | Facility: CLINIC | Age: 60
End: 2025-03-16
Attending: PHYSICIAN ASSISTANT
Payer: MEDICARE

## 2025-03-16 ENCOUNTER — HOSPITAL ENCOUNTER (OUTPATIENT)
Facility: CLINIC | Age: 60
Setting detail: OBSERVATION
LOS: 1 days | Discharge: HOME | End: 2025-03-17
Attending: EMERGENCY MEDICINE | Admitting: INTERNAL MEDICINE
Payer: MEDICARE

## 2025-03-16 ENCOUNTER — NURSE TRIAGE (OUTPATIENT)
Dept: NURSING | Facility: CLINIC | Age: 60
End: 2025-03-16
Payer: MEDICARE

## 2025-03-16 DIAGNOSIS — R11.0 NAUSEA: Primary | ICD-10-CM

## 2025-03-16 DIAGNOSIS — K52.9 ACUTE COLITIS: ICD-10-CM

## 2025-03-16 DIAGNOSIS — K62.5 RECTAL BLEEDING: ICD-10-CM

## 2025-03-16 LAB
ABO + RH BLD: NORMAL
ALBUMIN SERPL BCG-MCNC: 4.1 G/DL (ref 3.5–5.2)
ALP SERPL-CCNC: 88 U/L (ref 40–150)
ALT SERPL W P-5'-P-CCNC: 26 U/L (ref 0–50)
ANION GAP SERPL CALCULATED.3IONS-SCNC: 13 MMOL/L (ref 7–15)
AST SERPL W P-5'-P-CCNC: 34 U/L (ref 0–45)
BASOPHILS # BLD AUTO: 0 10E3/UL (ref 0–0.2)
BASOPHILS NFR BLD AUTO: 0 %
BILIRUB SERPL-MCNC: 1 MG/DL
BLD GP AB SCN SERPL QL: NEGATIVE
BUN SERPL-MCNC: 14.3 MG/DL (ref 8–23)
CALCIUM SERPL-MCNC: 9.4 MG/DL (ref 8.8–10.4)
CHLORIDE SERPL-SCNC: 102 MMOL/L (ref 98–107)
CREAT SERPL-MCNC: 1.1 MG/DL (ref 0.51–0.95)
CRP SERPL-MCNC: 20 MG/L
EGFRCR SERPLBLD CKD-EPI 2021: 57 ML/MIN/1.73M2
EOSINOPHIL # BLD AUTO: 0.2 10E3/UL (ref 0–0.7)
EOSINOPHIL NFR BLD AUTO: 2 %
ERYTHROCYTE [DISTWIDTH] IN BLOOD BY AUTOMATED COUNT: 13.3 % (ref 10–15)
FLUAV RNA SPEC QL NAA+PROBE: NEGATIVE
FLUBV RNA RESP QL NAA+PROBE: NEGATIVE
GLUCOSE SERPL-MCNC: 131 MG/DL (ref 70–99)
HCO3 SERPL-SCNC: 22 MMOL/L (ref 22–29)
HCT VFR BLD AUTO: 43 % (ref 35–47)
HGB BLD-MCNC: 14.1 G/DL (ref 11.7–15.7)
IMM GRANULOCYTES # BLD: 0.1 10E3/UL
IMM GRANULOCYTES NFR BLD: 0 %
LACTATE SERPL-SCNC: 0.4 MMOL/L (ref 0.7–2)
LIPASE SERPL-CCNC: 32 U/L (ref 13–60)
LYMPHOCYTES # BLD AUTO: 2.4 10E3/UL (ref 0.8–5.3)
LYMPHOCYTES NFR BLD AUTO: 21 %
MAGNESIUM SERPL-MCNC: 2 MG/DL (ref 1.7–2.3)
MCH RBC QN AUTO: 30.1 PG (ref 26.5–33)
MCHC RBC AUTO-ENTMCNC: 32.8 G/DL (ref 31.5–36.5)
MCV RBC AUTO: 92 FL (ref 78–100)
MONOCYTES # BLD AUTO: 0.8 10E3/UL (ref 0–1.3)
MONOCYTES NFR BLD AUTO: 6 %
NEUTROPHILS # BLD AUTO: 8.5 10E3/UL (ref 1.6–8.3)
NEUTROPHILS NFR BLD AUTO: 71 %
NRBC # BLD AUTO: 0 10E3/UL
NRBC BLD AUTO-RTO: 0 /100
PLATELET # BLD AUTO: 234 10E3/UL (ref 150–450)
POTASSIUM SERPL-SCNC: 4.1 MMOL/L (ref 3.4–5.3)
PROT SERPL-MCNC: 7.1 G/DL (ref 6.4–8.3)
RBC # BLD AUTO: 4.68 10E6/UL (ref 3.8–5.2)
RSV RNA SPEC NAA+PROBE: NEGATIVE
SARS-COV-2 RNA RESP QL NAA+PROBE: NEGATIVE
SODIUM SERPL-SCNC: 137 MMOL/L (ref 135–145)
SPECIMEN EXP DATE BLD: NORMAL
WBC # BLD AUTO: 11.9 10E3/UL (ref 4–11)

## 2025-03-16 PROCEDURE — 36415 COLL VENOUS BLD VENIPUNCTURE: CPT | Performed by: STUDENT IN AN ORGANIZED HEALTH CARE EDUCATION/TRAINING PROGRAM

## 2025-03-16 PROCEDURE — 85004 AUTOMATED DIFF WBC COUNT: CPT | Performed by: STUDENT IN AN ORGANIZED HEALTH CARE EDUCATION/TRAINING PROGRAM

## 2025-03-16 PROCEDURE — 99221 1ST HOSP IP/OBS SF/LOW 40: CPT | Mod: AI | Performed by: HOSPITALIST

## 2025-03-16 PROCEDURE — 96375 TX/PRO/DX INJ NEW DRUG ADDON: CPT

## 2025-03-16 PROCEDURE — 250N000011 HC RX IP 250 OP 636: Performed by: PHYSICIAN ASSISTANT

## 2025-03-16 PROCEDURE — 120N000001 HC R&B MED SURG/OB

## 2025-03-16 PROCEDURE — 96365 THER/PROPH/DIAG IV INF INIT: CPT

## 2025-03-16 PROCEDURE — 99222 1ST HOSP IP/OBS MODERATE 55: CPT | Performed by: SURGERY

## 2025-03-16 PROCEDURE — 258N000003 HC RX IP 258 OP 636: Performed by: PHYSICIAN ASSISTANT

## 2025-03-16 PROCEDURE — 82040 ASSAY OF SERUM ALBUMIN: CPT | Performed by: STUDENT IN AN ORGANIZED HEALTH CARE EDUCATION/TRAINING PROGRAM

## 2025-03-16 PROCEDURE — 83605 ASSAY OF LACTIC ACID: CPT | Performed by: PHYSICIAN ASSISTANT

## 2025-03-16 PROCEDURE — 96361 HYDRATE IV INFUSION ADD-ON: CPT

## 2025-03-16 PROCEDURE — 83690 ASSAY OF LIPASE: CPT | Performed by: PHYSICIAN ASSISTANT

## 2025-03-16 PROCEDURE — 87637 SARSCOV2&INF A&B&RSV AMP PRB: CPT | Performed by: HOSPITALIST

## 2025-03-16 PROCEDURE — 83735 ASSAY OF MAGNESIUM: CPT | Performed by: STUDENT IN AN ORGANIZED HEALTH CARE EDUCATION/TRAINING PROGRAM

## 2025-03-16 PROCEDURE — 36415 COLL VENOUS BLD VENIPUNCTURE: CPT | Performed by: PHYSICIAN ASSISTANT

## 2025-03-16 PROCEDURE — 250N000011 HC RX IP 250 OP 636: Performed by: HOSPITALIST

## 2025-03-16 PROCEDURE — 86900 BLOOD TYPING SEROLOGIC ABO: CPT | Performed by: STUDENT IN AN ORGANIZED HEALTH CARE EDUCATION/TRAINING PROGRAM

## 2025-03-16 PROCEDURE — 96374 THER/PROPH/DIAG INJ IV PUSH: CPT | Mod: 59

## 2025-03-16 PROCEDURE — 74174 CTA ABD&PLVS W/CONTRAST: CPT

## 2025-03-16 PROCEDURE — 258N000003 HC RX IP 258 OP 636: Performed by: HOSPITALIST

## 2025-03-16 PROCEDURE — 84155 ASSAY OF PROTEIN SERUM: CPT | Performed by: STUDENT IN AN ORGANIZED HEALTH CARE EDUCATION/TRAINING PROGRAM

## 2025-03-16 PROCEDURE — 86140 C-REACTIVE PROTEIN: CPT | Performed by: PHYSICIAN ASSISTANT

## 2025-03-16 PROCEDURE — 99285 EMERGENCY DEPT VISIT HI MDM: CPT

## 2025-03-16 RX ORDER — FAMOTIDINE 40 MG/1
40 TABLET, FILM COATED ORAL AT BEDTIME
COMMUNITY

## 2025-03-16 RX ORDER — PIPERACILLIN SODIUM, TAZOBACTAM SODIUM 3; .375 G/15ML; G/15ML
3.38 INJECTION, POWDER, LYOPHILIZED, FOR SOLUTION INTRAVENOUS ONCE
Status: DISCONTINUED | OUTPATIENT
Start: 2025-03-16 | End: 2025-03-16

## 2025-03-16 RX ORDER — IOPAMIDOL 755 MG/ML
90 INJECTION, SOLUTION INTRAVASCULAR ONCE
Status: COMPLETED | OUTPATIENT
Start: 2025-03-16 | End: 2025-03-16

## 2025-03-16 RX ORDER — HYDROMORPHONE HYDROCHLORIDE 1 MG/ML
0.5 INJECTION, SOLUTION INTRAMUSCULAR; INTRAVENOUS; SUBCUTANEOUS EVERY 6 HOURS PRN
Status: DISCONTINUED | OUTPATIENT
Start: 2025-03-16 | End: 2025-03-17 | Stop reason: HOSPADM

## 2025-03-16 RX ORDER — ONDANSETRON 4 MG/1
4 TABLET, ORALLY DISINTEGRATING ORAL EVERY 6 HOURS PRN
Status: DISCONTINUED | OUTPATIENT
Start: 2025-03-16 | End: 2025-03-17 | Stop reason: HOSPADM

## 2025-03-16 RX ORDER — ONDANSETRON 2 MG/ML
4 INJECTION INTRAMUSCULAR; INTRAVENOUS EVERY 6 HOURS PRN
Status: DISCONTINUED | OUTPATIENT
Start: 2025-03-16 | End: 2025-03-17 | Stop reason: HOSPADM

## 2025-03-16 RX ORDER — ONDANSETRON 2 MG/ML
4 INJECTION INTRAMUSCULAR; INTRAVENOUS ONCE
Status: COMPLETED | OUTPATIENT
Start: 2025-03-16 | End: 2025-03-16

## 2025-03-16 RX ORDER — HYDROMORPHONE HYDROCHLORIDE 1 MG/ML
0.5 INJECTION, SOLUTION INTRAMUSCULAR; INTRAVENOUS; SUBCUTANEOUS ONCE
Status: DISCONTINUED | OUTPATIENT
Start: 2025-03-16 | End: 2025-03-16

## 2025-03-16 RX ORDER — ACETAMINOPHEN 325 MG/1
650 TABLET ORAL EVERY 6 HOURS PRN
Status: DISCONTINUED | OUTPATIENT
Start: 2025-03-16 | End: 2025-03-17 | Stop reason: HOSPADM

## 2025-03-16 RX ORDER — LIDOCAINE 40 MG/G
CREAM TOPICAL
Status: DISCONTINUED | OUTPATIENT
Start: 2025-03-16 | End: 2025-03-17 | Stop reason: HOSPADM

## 2025-03-16 RX ORDER — SODIUM CHLORIDE 9 MG/ML
INJECTION, SOLUTION INTRAVENOUS CONTINUOUS
Status: DISCONTINUED | OUTPATIENT
Start: 2025-03-16 | End: 2025-03-17

## 2025-03-16 RX ADMIN — HYDROMORPHONE HYDROCHLORIDE 0.5 MG: 1 INJECTION, SOLUTION INTRAMUSCULAR; INTRAVENOUS; SUBCUTANEOUS at 20:23

## 2025-03-16 RX ADMIN — IOPAMIDOL 90 ML: 755 INJECTION, SOLUTION INTRAVENOUS at 13:19

## 2025-03-16 RX ADMIN — SODIUM CHLORIDE 1000 ML: 0.9 INJECTION, SOLUTION INTRAVENOUS at 12:53

## 2025-03-16 RX ADMIN — ONDANSETRON 4 MG: 4 TABLET, ORALLY DISINTEGRATING ORAL at 18:04

## 2025-03-16 RX ADMIN — PROCHLORPERAZINE EDISYLATE 5 MG: 5 INJECTION INTRAMUSCULAR; INTRAVENOUS at 20:22

## 2025-03-16 RX ADMIN — ONDANSETRON 4 MG: 2 INJECTION, SOLUTION INTRAMUSCULAR; INTRAVENOUS at 12:53

## 2025-03-16 RX ADMIN — PIPERACILLIN AND TAZOBACTAM 3.38 G: 3; .375 INJECTION, POWDER, FOR SOLUTION INTRAVENOUS at 14:58

## 2025-03-16 RX ADMIN — SODIUM CHLORIDE: 0.9 INJECTION, SOLUTION INTRAVENOUS at 17:22

## 2025-03-16 ASSESSMENT — ACTIVITIES OF DAILY LIVING (ADL)
ADLS_ACUITY_SCORE: 48
ADLS_ACUITY_SCORE: 49
ADLS_ACUITY_SCORE: 48
ADLS_ACUITY_SCORE: 49
ADLS_ACUITY_SCORE: 48
ADLS_ACUITY_SCORE: 48
ADLS_ACUITY_SCORE: 49
ADLS_ACUITY_SCORE: 48
ADLS_ACUITY_SCORE: 49
ADLS_ACUITY_SCORE: 48
ADLS_ACUITY_SCORE: 49

## 2025-03-16 ASSESSMENT — COLUMBIA-SUICIDE SEVERITY RATING SCALE - C-SSRS
2. HAVE YOU ACTUALLY HAD ANY THOUGHTS OF KILLING YOURSELF IN THE PAST MONTH?: NO
6. HAVE YOU EVER DONE ANYTHING, STARTED TO DO ANYTHING, OR PREPARED TO DO ANYTHING TO END YOUR LIFE?: NO
1. IN THE PAST MONTH, HAVE YOU WISHED YOU WERE DEAD OR WISHED YOU COULD GO TO SLEEP AND NOT WAKE UP?: NO

## 2025-03-16 NOTE — ED TRIAGE NOTES
The patient presents to the ED with GI bleeding that began last night around midnight. The patient reports several episodes of bright red blood. The patient reports abdominal cramping and discomfort as well. History of diverticulosis on her last colonoscopy. The patient denies fever. She does report nausea and vomiting today, no hematemesis per patient. Does report chills and fatigue.

## 2025-03-16 NOTE — ED PROVIDER NOTES
EMERGENCY DEPARTMENT ENCOUNTER      NAME: Emani Vargas  AGE: 60 year old female  YOB: 1965  MRN: [id number]  EVALUATION DATE & TIME: 3/16/2025 12:21 PM    PCP: Angelia Krishna    ED PROVIDER: Edinson Pro PA-C      Chief Complaint   Patient presents with    Rectal Bleeding       FINAL IMPRESSION:  1. Rectal bleeding    2. Acute colitis        ED COURSE & MEDICAL DECISION MAKING:    Pertinent Labs & Imaging studies reviewed. (See chart for details)  12:24 PM I met the patient and performed my initial interview and exam.   2:42 PM Spoke with Dr. Shine.   2:47 PM Patient updated.   2:55 PM Staffed with Dr. Smith     3:05 PM Discussed with Dr. Woods [facility] who recommends off on antibiotics, adding on stool cultures here.  Regardless will admit patient to the hospital.  3:47 PM PM Spoke with hospital medicine Dr. Caldwell, \[facility] [place] [facility]\"" medicine        60 year old female presents to the Emergency Department for evaluation of rectal bleeding.     ED Course as of 03/16/25 1548   Sun Mar 16, 2025   1242 WBC(!): 11.9   1249 Hemoglobin: 14.1   1249 Patient is a [employer]ism, not interested in blood products.    1301 Patient is a 60-year-old female, presents emergency department for evaluation of bright red blood in her stool.  Patient reports that the episodes developed last night at around midnight, and she had 6 episodes since.  She does have a history of hemorrhoids, however also having some blood in the water, which is likely not from hemorrhoid.  On arrival here, she has diffuse abdominal discomfort, however no focal area of tenderness, rebound or CVA tenderness.  No urinary symptoms.  Denies dizziness or lightheadedness.  No chest pain or shortness of breath.  She is not febrile tachycardic tachypneic.  She brought pictures of her stool and it does appear to be bright red blood in the bowl.  She additionally has some blood when she wipes.  Will obtain CTA of the abdomen and pelvis to evaluate  for acute GI bleeding.  Patient is otherwise stable here in the emergency department.  Patient's Jenkins score for GI bleed is 7 points, 96% probability of possible safe discharge.  Will plan for imaging, and discussion with GI regarding recommendations.   1315     CRP notably elevated at 20.  Remainder of labs here are unremarkable with no significant changes.   1431 CTA Abdomen Pelvis with Contrast  CTA of the abdomen and pelvis here shows colitis involving the descending and proximal sigmoid colon, which given the appearance and distribution is favored to reflect not embolic ischemic colitis over infectious etiologies.  Cholelithiasis.   1439     With general surgery who recommends antibiotics here in the emergency department as well as admission for ongoing management and observation of possible ischemic colitis.   1505 Lactic Acid(!): 0.4   1510     Spoke with both general surgery, and [facility],  recommends holding off on antibiotics here, will obtain stool samples if possible.  Patient be admitted for possible colitis versus ischemic colitis.  Both GI and surgery will consult as inpatient.    Patient is already finished with antibiotics by the time I spoke with GI.  Will obtain stool samples here if possible.  Patient be admitted to hospital medicine for possible colitis versus infectious colitis.   1548     Spoke with hospital medicine, will except patient for admission.  Patient will be placed a for admission here at [facility].     Medical Decision Making  Obtained supplemental history:Supplemental history obtained?: No  Reviewed external records: External records reviewed?: Outpatient Record: Outpatient nurse triage on call from today, outpatient office visit on 02/20/2025  Care impacted by chronic illness:Other: COPD, fibromyalgia, bipolar, osteoporosis  Did you consider but not order tests?: Work up considered but not performed and documented in chart, if applicable  Did you interpret images  independently?: Independent interpretation of ECG and images noted in documentation, when applicable.  Consultation discussion with other provider:Did you involve another provider (consultant, , pharmacy, etc.)?: No  Admit.    MIPS (CTPE, Dental pain, Chaudhry, Sinusitis, Asthma/COPD, Head Trauma): Not Applicable    SEPSIS: None      At the conclusion of the encounter I discussed the results of all of the tests and the disposition. The questions were answered. The patient or family acknowledged understanding and was agreeable with the care plan.       0 minutes of critical care time     MEDICATIONS GIVEN IN THE EMERGENCY:  Medications   HYDROmorphone (PF) (DILAUDID) injection 0.5 mg (has no administration in time range)   ondansetron (ZOFRAN) injection 4 mg (4 mg Intravenous $Given 3/16/25 1253)   sodium chloride 0.9% BOLUS 1,000 mL (0 mLs Intravenous Stopped 3/16/25 1446)   iopamidol (ISOVUE-370) solution 90 mL (90 mLs Intravenous $Given 3/16/25 1319)       NEW PRESCRIPTIONS STARTED AT TODAY'S ER VISIT  New Prescriptions    No medications on file       ===============================================================    HPI    Patient information was obtained from: Patient     Use of : N/A        Emani Vargas is a 60 year old female with a pertinent history of obesity, diabetes, COPD, fibromyalgia, who presents to this ED for evaluation of rectal bleeding. Patient reports that every two hours since last night has bright red blood, sludge from the rectum. Possibly passing some blood clots. Patient reports, chills, headaches, no fevers. No chest pain, pressure. No shortness of breath. Not lightheaded or dizzy.      PAST MEDICAL HISTORY:  Past Medical History:   Diagnosis Date    Allergic rhinitis     Anemia     Anxiety     Asthma     Bipolar 1 disorder (H)     Breast cancer of upper-outer quadrant of right female breast (H) 04/11/2017    Chronic pain     Back pain due to arthritis.    COPD (chronic  obstructive pulmonary disease) (H)     Depression     Diabetes (H)     Endometriosis     Fibroid uterus     Hx of radiation therapy 01/01/2017    Insomnia     Osteoarthritis     Overweight     Refusal of blood transfusions as patient is Yarsanism 04/12/2017    Sensorineural hearing loss, bilateral     Shortness of breath     Vitamin D deficiency        PAST SURGICAL HISTORY:  Past Surgical History:   Procedure Laterality Date    ARTHROSCOPY KNEE Right 1996    Description: Arthroscopy Knee Right;  Recorded: 12/08/2011;    BIOPSY BREAST Right 2000's    BIOPSY BREAST Right 02/20/2017    KNEE SURGERY Right 1997    lateral release    LAPAROSCOPIC ENDOMETRIOSIS FULGURATION  2002    LUMPECTOMY BREAST Right 03/10/2017    with sentinel lymph node biopsy.    IL HYSTEROSCOPY,W/ENDO BX N/A 12/15/2020    Procedure: HYSTEROSCOPY, WITH DILATION AND CURETTAGE;  Surgeon: Scottie Canas MD;  Location: [facility] OR;  Service: Gynecology    TOTAL HIP ARTHROPLASTY N/A 9/24/2014    Procedure: LEFT HIP TOTAL ARTHROPLASTY;  Surgeon: Antony Elias MD;  Location: [facility] OR;  Service:     TOTAL HIP ARTHROPLASTY Right 8/10/2015    Procedure: HIP TOTAL ARTHROPLASTY RIGHT;  Surgeon: Antony Elias MD;  Location: [facility] OR;  Service:     [facility] TOTAL KNEE ARTHROPLASTY Right 8/28/2017    Procedure: RIGHT TOTAL KNEE ARTHROPLASTY;  Surgeon: Antony Elias MD;  Location: [facility] OR;  Service: Orthopedics           CURRENT MEDICATIONS:    acetaminophen (TYLENOL) 500 MG tablet  albuterol (PROAIR HFA/PROVENTIL HFA/VENTOLIN HFA) 108 (90 Base) MCG/ACT inhaler  blood glucose (NO BRAND SPECIFIED) lancets standard  blood glucose (ONETOUCH VERIO IQ) test strip  buPROPion (WELLBUTRIN XL) 150 MG 24 hr tablet  cetirizine (ZYRTEC) 10 MG CHEW  diclofenac sodium (VOLTAREN) 1 % Gel  famotidine (PEPCID) 40 MG tablet  FLUoxetine (PROZAC) 40 MG capsule  fluticasone propionate (FLONASE ALLERGY RELIEF) 50  mcg/actuation nasal spray  gabapentin (NEURONTIN) 800 MG tablet  levothyroxine (SYNTHROID/LEVOTHROID) 75 MCG tablet  magnesium citrate solution  methocarbamol (ROBAXIN) 500 MG tablet  montelukast (SINGULAIR) 10 MG tablet  olopatadine (PATADAY) 0.2 % ophthalmic solution  OZEMPIC, 0.25 OR 0.5 MG/DOSE, 2 MG/3ML pen  simvastatin (ZOCOR) 20 MG tablet  traZODone (DESYREL) 100 MG tablet  traZODone (DESYREL) 50 MG tablet         ALLERGIES:  Allergies   Allergen Reactions    Cat/Feline Products [Cat Dander] Itching    Trees Other (See Comments)     Grass, itching       FAMILY HISTORY:  Family History   Problem Relation Age of Onset    Depression Mother     Chronic Obstructive Pulmonary Disease Mother     Diabetes Father     Skin Cancer Father 45    Colon Cancer Maternal Grandmother     Colon Cancer Maternal Grandfather     Diabetes Paternal Grandmother     Colon Cancer Paternal Grandmother 80    Testicular cancer Brother 44    Lung Cancer Paternal Aunt 60    Breast Cancer Cousin 48        Paternal side.    Anesthesia Reaction No family hx of     Glaucoma No family hx of     Macular Degeneration No family hx of        SOCIAL HISTORY:   Social History     Socioeconomic History    Marital status:    Tobacco Use    Smoking status: Former     Current packs/day: 0.00     Average packs/day: 1 pack/day for 8.0 years (8.0 ttl pk-yrs)     Types: Cigarettes     Start date: 1977     Quit date: 1985     Years since quittin.2     Passive exposure: Past    Smokeless tobacco: Never   Vaping Use    Vaping status: Never Used   Substance and Sexual Activity    Alcohol use: Yes     Alcohol/week: 0.0 standard drinks of alcohol     Comment: Alcoholic Drinks/day: 1 drink/month    Drug use: No     Comment: Drug use: past use of marijuana and hash.     Social Drivers of Health     Financial Resource Strain: Low Risk  (2024)    Financial Resource Strain     Within the past 12 months, have you or your family members you live  "with been unable to get utilities (heat, electricity) when it was really needed?: No   Food Insecurity: High Risk (9/27/2024)    Food Insecurity     Within the past 12 months, did you worry that your food would run out before you got money to buy more?: Yes     Within the past 12 months, did the food you bought just not last and you didn t have money to get more?: Yes   Transportation Needs: Low Risk  (9/27/2024)    Transportation Needs     Within the past 12 months, has lack of transportation kept you from medical appointments, getting your medicines, non-medical meetings or appointments, work, or from getting things that you need?: No   Physical Activity: Inactive (9/27/2024)    Exercise Vital Sign     Days of Exercise per Week: 0 days     Minutes of Exercise per Session: 0 min   Stress: Stress Concern Present (9/27/2024)    [employer] - Occupational Stress Questionnaire     Feeling of Stress : Rather much   Social Connections: Unknown (9/27/2024)    Social Connection and Isolation Panel [NHANES]     Frequency of Social Gatherings with Friends and Family: Once a week   Interpersonal Safety: Low Risk  (7/2/2024)    Interpersonal Safety     Do you feel physically and emotionally safe where you currently live?: Yes     Within the past 12 months, have you been hit, slapped, kicked or otherwise physically hurt by someone?: No     Within the past 12 months, have you been humiliated or emotionally abused in other ways by your partner or ex-partner?: No   Housing Stability: Low Risk  (9/27/2024)    Housing Stability     Do you have housing? : Yes     Are you worried about losing your housing?: No       VITALS:  BP (!) 149/96   Pulse 96   Temp 98.1  F (36.7  C) (Oral)   Resp 16   Ht 1.753 m (5' 9\")   Wt 123.4 kg (272 lb)   LMP  (LMP Unknown)   SpO2 94%   BMI 40.17 kg/m      PHYSICAL EXAM    Physical Exam  Vitals and nursing note reviewed.   Constitutional:       General: She is not in " acute distress.     Appearance: Normal appearance. She is normal weight. She is not toxic-appearing or diaphoretic.   HENT:      Right Ear: External ear normal.      Left Ear: External ear normal.      Mouth/Throat:      Mouth: Mucous membranes are moist.      Pharynx: No oropharyngeal exudate or posterior oropharyngeal erythema.   Eyes:      Conjunctiva/sclera: Conjunctivae normal.   Cardiovascular:      Rate and Rhythm: Normal rate and regular rhythm.      Pulses: Normal pulses.   Pulmonary:      Effort: Pulmonary effort is normal.   Abdominal:      General: Abdomen is flat.      Palpations: Abdomen is soft.      Tenderness: There is abdominal tenderness. There is no right CVA tenderness, left CVA tenderness, guarding or rebound.      Comments: Minimal, diffuse abdominal tenderness without rebound or guarding.   Musculoskeletal:      Right lower leg: No edema.      Left lower leg: No edema.   Skin:     Findings: No erythema or rash.   Neurological:      General: No focal deficit present.      Mental Status: She is alert. Mental status is at baseline.         LAB:  All pertinent labs reviewed and interpreted.  Labs Ordered and Resulted from Time of ED Arrival to Time of ED Departure   COMPREHENSIVE METABOLIC PANEL - Abnormal       Result Value    Sodium 137      Potassium 4.1      Carbon Dioxide (CO2) 22      Anion Gap 13      Urea Nitrogen 14.3      Creatinine 1.10 (*)     GFR Estimate 57 (*)     Calcium 9.4      Chloride 102      Glucose 131 (*)     Alkaline Phosphatase 88      AST 34      ALT 26      Protein Total 7.1      Albumin 4.1      Bilirubin Total 1.0     CBC WITH PLATELETS AND DIFFERENTIAL - Abnormal    WBC Count 11.9 (*)     RBC Count 4.68      Hemoglobin 14.1      Hematocrit 43.0      MCV 92      MCH 30.1      MCHC 32.8      RDW 13.3      Platelet Count 234      % Neutrophils 71      % Lymphocytes 21      % Monocytes 6      % Eosinophils 2      % Basophils 0      % Immature Granulocytes 0      NRBCs  per 100 WBC 0      Absolute Neutrophils 8.5 (*)     Absolute Lymphocytes 2.4      Absolute Monocytes 0.8      Absolute Eosinophils 0.2      Absolute Basophils 0.0      Absolute Immature Granulocytes 0.1      Absolute NRBCs 0.0     CRP INFLAMMATION - Abnormal    CRP Inflammation 20.00 (*)    LACTIC ACID WHOLE BLOOD WITH 1X REPEAT IN 2 HR WHEN >2 - Abnormal    Lactic Acid, Initial 0.4 (*)    MAGNESIUM - Normal    Magnesium 2.0     LIPASE - Normal    Lipase 32     TYPE AND SCREEN, ADULT    ABO/RH(D) A POS      Antibody Screen Negative      SPECIMEN EXPIRATION DATE [id number]     ENTERIC BACTERIA AND VIRUS PANEL BY PCR   C. DIFFICILE TOXIN B PCR WITH REFLEX TO C. DIFFICILE EIA   ABO/RH TYPE AND SCREEN       RADIOLOGY:  Reviewed all pertinent imaging. Please see official radiology report.  CTA Abdomen Pelvis with Contrast   Final Result   IMPRESSION:   1.  Colitis involving the descending and proximal sigmoid colon, which given appearance and distribution is favored to reflect nonembolic ischemic colitis over other infectious or inflammatory etiologies. No evidence of active bleeding.   2.  Scattered colonic diverticulosis, however no diverticulitis.   3.  Severe hepatic steatosis.   4.  Cholelithiasis.           PROCEDURES:   None.     Edinson Pro PA-C  Emergency Medicine  [facility] EMERGENCY ROOM  [address] [id number] [phone]  Dept: [phone]       Edinson Pro PA-C  03/16/25 5399

## 2025-03-16 NOTE — TELEPHONE ENCOUNTER
"Nurse Triage SBAR    Situation: Rectal bleeding    Background: Patient calling. Bleeding started at midnight. Hx of hemorrhoid bleeding but she says it is different kind of blood.     Assessment: Vomiting overnight. Constipation. Rectal bleeding. Bright red blood. Abdominal pain. Pelvic pain. Noticed a blood clot. Occassional shivering. Tired. Now when she goes to the bathroom most of the output is blood with some stool - about 6 rectal bleeding stools.     Protocol Recommended Disposition: Emergency Department    Recommendation: According to the protocol, Patient should go to the ED now. Advised Patient that the patient needs to go to the ED now. Care advice given. Patient verbalizes understanding and agrees with plan of care. Reviewed concerning symptoms and when to call 911.    Carlotta Schmidt RN Nursing Advisor 3/16/2025 11:36 AM     Reason for Disposition   [1] MODERATE rectal bleeding (small blood clots, passing blood without stool, or toilet water turns red) AND [2] more than once a day    Additional Information   Negative: Shock suspected (e.g., cold/pale/clammy skin, too weak to stand, low BP, rapid pulse)   Negative: Difficult to awaken or acting confused (e.g., disoriented, slurred speech)   Negative: Passed out (i.e., lost consciousness, collapsed and was not responding)   Negative: [1] Vomiting AND [2] contains red blood or black (\"coffee ground\") material  (Exception: Few red streaks in vomit that only happened once.)   Negative: Sounds like a life-threatening emergency to the triager   Negative: Diarrhea is main symptom   Negative: Stool color other than brown or tan is main concern  (no bleeding and no melena)   Negative: SEVERE rectal bleeding (large blood clots; constant or on and off bleeding)   Negative: SEVERE dizziness (e.g., unable to stand, requires support to walk, feels like passing out now)    Protocols used: Rectal Bleeding-A-AH    "

## 2025-03-16 NOTE — ED PROVIDER NOTES
"Emergency Department Midlevel Supervisory Note     I had a face to face encounter with this patient seen by the Advanced Practice Provider ([email or web address]). I personally made/approved the management plan and take responsibility for the patient management. I personally saw patient and performed a substantive portion of the visit including all aspects of the medical decision making.     ED Course:  14:51 Asim Pro PA-C staffed patient with me. I agree with their assessment and plan of management, and I will see the patient.   I met with the patient to introduce myself, gather additional history, perform my initial exam, and discuss the plan.     Brief HPI:     Emani Vargas is a 60 year old female who presents for evaluation of rectal bleeding.     Patient reports that every two hours since last night has bright red blood, sludge from the rectum. Possibly passing some blood clots.    I, Damián Segovia, am serving as a scribe to document services personally performed by Gunnar Smith MD, based on my observations and the provider's statements to me.   I, Gunnar Smith MD attest that Damián Segovia was acting in a scribe capacity, has observed my performance of the services and has documented them in accordance with my direction.    Brief Physical Exam: BP (!) 149/96   Pulse 96   Temp 98.1  F (36.7  C) (Oral)   Resp 16   Ht 1.753 m (5' 9\")   Wt 123.4 kg (272 lb)   LMP  (LMP Unknown)   SpO2 94%   BMI 40.17 kg/m    Constitutional:  Alert, in no acute distress  EYES: Conjunctivae clear  HENT:  Atraumatic  Respiratory:  Respirations even, unlabored, in no acute respiratory distress  Cardiovascular:  Regular rate and rhythm, good peripheral perfusion  GI: Soft, non-distended, non-tender  Musculoskeletal:  Moves all 4 extremities equally, grossly symmetrical strength  Integument: Warm & dry. No appreciable rash, erythema.  Neurologic:  Alert & oriented, speech clear and fluent, no focal deficits noted  Psych: Normal " mood and affect       MDM:    ED Course as of 03/16/25 2240   Sun Mar 16, 2025   1242 WBC(!): 11.9   1249 Hemoglobin: 14.1   1249 Patient is a [employer], not interested in blood products.    1301 Patient is a 60-year-old female, presents emergency department for evaluation of bright red blood in her stool.  Patient reports that the episodes developed last night at around midnight, and she had 6 episodes since.  She does have a history of hemorrhoids, however also having some blood in the water, which is likely not from hemorrhoid.  On arrival here, she has diffuse abdominal discomfort, however no focal area of tenderness, rebound or CVA tenderness.  No urinary symptoms.  Denies dizziness or lightheadedness.  No chest pain or shortness of breath.  She is not febrile tachycardic tachypneic.  She brought pictures of her stool and it does appear to be bright red blood in the bowl.  She additionally has some blood when she wipes.  Will obtain CTA of the abdomen and pelvis to evaluate for acute GI bleeding.  Patient is otherwise stable here in the emergency department.  Patient's Dillingham score for GI bleed is 7 points, 96% probability of possible safe discharge.  Will plan for imaging, and discussion with GI regarding recommendations.   1315     CRP notably elevated at 20.  Remainder of labs here are unremarkable with no significant changes.   1431 CTA Abdomen Pelvis with Contrast  CTA of the abdomen and pelvis here shows colitis involving the descending and proximal sigmoid colon, which given the appearance and distribution is favored to reflect not embolic ischemic colitis over infectious etiologies.  Cholelithiasis.   1439     With general surgery who recommends antibiotics here in the emergency department as well as admission for ongoing management and observation of possible ischemic colitis.   1505 Lactic Acid(!): 0.4   1510     Spoke with both general surgery, and [facility], GI recommends holding off on  antibiotics here, will obtain stool samples if possible.  Patient be admitted for possible colitis versus ischemic colitis.  Both GI and surgery will consult as inpatient.    Patient is already finished with antibiotics by the time I spoke with GI.  Will obtain stool samples here if possible.  Patient be admitted to hospital medicine for possible colitis versus infectious colitis.   1548     Spoke with hospital medicine, will except patient for admission.  Patient will be placed a for admission here at [facility].       1. Rectal bleeding    2. Acute colitis        Labs and Imaging:  Results for orders placed or performed during the hospital encounter of 03/16/25   CTA Abdomen Pelvis with Contrast    Impression    IMPRESSION:  1.  Colitis involving the descending and proximal sigmoid colon, which given appearance and distribution is favored to reflect nonembolic ischemic colitis over other infectious or inflammatory etiologies. No evidence of active bleeding.  2.  Scattered colonic diverticulosis, however no diverticulitis.  3.  Severe hepatic steatosis.  4.  Cholelithiasis.     Comprehensive metabolic panel   Result Value Ref Range    Sodium 137 135 - 145 mmol/L    Potassium 4.1 3.4 - 5.3 mmol/L    Carbon Dioxide (CO2) 22 22 - 29 mmol/L    Anion Gap 13 7 - 15 mmol/L    Urea Nitrogen 14.3 8.0 - 23.0 mg/dL    Creatinine 1.10 (H) 0.51 - 0.95 mg/dL    GFR Estimate 57 (L) >60 mL/min/1.73m2    Calcium 9.4 8.8 - 10.4 mg/dL    Chloride 102 98 - 107 mmol/L    Glucose 131 (H) 70 - 99 mg/dL    Alkaline Phosphatase 88 40 - 150 U/L    AST 34 0 - 45 U/L    ALT 26 0 - 50 U/L    Protein Total 7.1 6.4 - 8.3 g/dL    Albumin 4.1 3.5 - 5.2 g/dL    Bilirubin Total 1.0 <=1.2 mg/dL   Result Value Ref Range    Magnesium 2.0 1.7 - 2.3 mg/dL   CBC with platelets and differential   Result Value Ref Range    WBC Count 11.9 (H) 4.0 - 11.0 10e3/uL    RBC Count 4.68 3.80 - 5.20 10e6/uL    Hemoglobin 14.1 11.7 - 15.7 g/dL    Hematocrit 43.0 35.0 -  47.0 %    MCV 92 78 - 100 fL    MCH 30.1 26.5 - 33.0 pg    MCHC 32.8 31.5 - 36.5 g/dL    RDW 13.3 10.0 - 15.0 %    Platelet Count 234 150 - 450 10e3/uL    % Neutrophils 71 %    % Lymphocytes 21 %    % Monocytes 6 %    % Eosinophils 2 %    % Basophils 0 %    % Immature Granulocytes 0 %    NRBCs per 100 WBC 0 <1 /100    Absolute Neutrophils 8.5 (H) 1.6 - 8.3 10e3/uL    Absolute Lymphocytes 2.4 0.8 - 5.3 10e3/uL    Absolute Monocytes 0.8 0.0 - 1.3 10e3/uL    Absolute Eosinophils 0.2 0.0 - 0.7 10e3/uL    Absolute Basophils 0.0 0.0 - 0.2 10e3/uL    Absolute Immature Granulocytes 0.1 <=0.4 10e3/uL    Absolute NRBCs 0.0 10e3/uL   Result Value Ref Range    CRP Inflammation 20.00 (H) <5.00 mg/L   Result Value Ref Range    Lipase 32 13 - 60 U/L   Adult Type and Screen   Result Value Ref Range    ABO/RH(D) A POS     Antibody Screen Negative Negative    SPECIMEN EXPIRATION DATE [id number]        I have reviewed the relevant laboratory studies above.    I independently interpreted the following imaging study(s):   Abdominal CT angiogram      Gunnar Smith MD  [facility] EMERGENCY ROOM  [address] [id number] [phone]       Gunnar Smith MD  03/16/25 5125

## 2025-03-16 NOTE — PHARMACY-ADMISSION MEDICATION HISTORY
Pharmacist Admission Medication History    Admission medication history is complete. The information provided in this note is only as accurate as the sources available at the time of the update.    Information Source(s): Patient and CareEverywhere/SureScripts via in-person    Pertinent Information:     Patient ok with holding pataday eye drops while in hospital    Changes made to PTA medication list:  Added: None  Deleted: trazodone  Changed: famotidine from 40mg bid prn to 40mg at bedtime.  Ozempic from 0.5mg to 0.25mg    Allergies reviewed with patient and updates made in EHR: yes    Medication History Completed By: Deja Singh RPH 3/16/2025 4:58 PM    PTA Med List   Medication Sig Note Last Dose/Taking    acetaminophen (TYLENOL) 500 MG tablet Take 1,000 mg by mouth every 6 hours as needed  More than a month    albuterol (PROAIR HFA/PROVENTIL HFA/VENTOLIN HFA) 108 (90 Base) MCG/ACT inhaler Inhale 2 puffs into the lungs every 6 hours as needed for shortness of breath, wheezing or cough.  Past Month    buPROPion (WELLBUTRIN XL) 150 MG 24 hr tablet Take 1 tablet (150 mg) by mouth every morning  3/15/2025 Morning    cetirizine (ZYRTEC) 10 MG tablet Take 10 mg by mouth every evening.  3/14/2025    diclofenac sodium (VOLTAREN) 1 % Gel [DICLOFENAC SODIUM (VOLTAREN) 1 % GEL] Apply approximately 1/2-1 gm over affected hand and/or left elbow joints bid, as needed.  More than a month    famotidine (PEPCID) 40 MG tablet Take 40 mg by mouth at bedtime.  3/15/2025 Bedtime    FLUoxetine (PROZAC) 40 MG capsule TAKE ONE CAPSULE BY MOUTH EVERY DAY  3/15/2025 Evening    fluticasone propionate (FLONASE ALLERGY RELIEF) 50 mcg/actuation nasal spray [FLUTICASONE PROPIONATE (FLONASE ALLERGY RELIEF) 50 MCG/ACTUATION NASAL SPRAY] One spray in each nostril twice daily. OFFICE VISIT NEEDED FOR ANY FURTHER REFILLS (Patient taking differently: Spray 1 spray into both nostrils 2 times daily as needed.)  More than a month    gabapentin  (NEURONTIN) 800 MG tablet TAKE TWO TABLETS BY MOUTH EVERY EVENING AT BEDTIME  3/15/2025 Bedtime    levothyroxine (SYNTHROID/LEVOTHROID) 75 MCG tablet TAKE ONE-HALF TABLET BY MOUTH 5 DAYS PER WEEK, AND ONE TABLET 2 DAYS PER WEEK 3/16/2025: Takes the whole tab on Tues/Fri;  half tab the rest of the days of the week 3/15/2025 Morning    magnesium citrate solution Take 30 mLs by mouth daily as needed.  More than a month    methocarbamol (ROBAXIN) 500 MG tablet TAKE ONE TABLET BY MOUTH THREE TIMES A DAY AS NEEDED  Past Week    montelukast (SINGULAIR) 10 MG tablet TAKE 1 TABLET (10 MG) BY MOUTH AT BEDTIME  3/15/2025 Bedtime    olopatadine (PATADAY) 0.2 % ophthalmic solution Place 0.05 mLs (1 drop) into both eyes daily.  3/14/2025    semaglutide (OZEMPIC) 2 MG/3ML pen Inject 0.25 mg subcutaneously every 7 days.  3/13/2025    simvastatin (ZOCOR) 20 MG tablet Take 1 tablet (20 mg) by mouth at bedtime.  3/15/2025 Bedtime

## 2025-03-16 NOTE — PROGRESS NOTES
[id number]  Pt A&Ox4. C/o 3/10 abdominal pain, and 5/10 headache pain. Per previous RN, one time IV dilaudid dose was ordered, but pt declined this. Paged to provider for pain medications for headache. Pt nauseous and given PRN zofran ODT. NS continuous at 125hr. Pt aware stool sample is needed still. NPO.

## 2025-03-16 NOTE — H&P
"[facility]    History and Physical - Hospitalist Service       Date of Admission:  3/16/2025    Assessment & Plan      Emani Vargas is a 60 year old female presenting for evaluation of rectal bleeding.  She is clinically stable.  No associated anemia.  Imaging notes colitis.  Ddx includes ischemic colitis vs infectious.  Stool studies are pending.  GI and general surgery consulted.  Plan for bowel rest and fluids pending further data.    # Lower GI bleeding  - NPO  - IVF  - follow CBC  - GI and general surgery consults    # Anxiety  # Bipolar disorder  # Depression    # Diabetes    # COPD    # Outpatient followup  - hepatic steatosis            Diet: NPO for Medical/Clinical Reasons Except for: Meds    Chaudhry Catheter: Not present  Lines: None     Cardiac Monitoring: None  Code Status: No CPR- Do NOT Intubate    Clinically Significant Risk Factors Present on Admission                             # Severe Obesity: Estimated body mass index is 40.17 kg/m  as calculated from the following:    Height as of this encounter: 1.753 m (5' 9\").    Weight as of this encounter: 123.4 kg (272 lb).       # Asthma: noted on problem list        Disposition Plan     Medically Ready for Discharge: Anticipated in 2-4 Days           Servando Caldwell MD  Hospitalist Service  [facility]  Securely message with [employer] (more info)  Text page via [employer] Paging/Directory     ______________________________________________________________________    Chief Complaint   Rectal bleeding    History is obtained from the patient    History of Present Illness   Emani Vargas is a 60 year old female who presents with a chief complaint of rectal bleeding.  She was in her usual state of health until two days ago.  She felt fatigue at that time.  Yesterday, she developed nausea.  She ate pizza, yogurt, and blueberries.  She developed vomiting.  Last night she had blood in her stool and in total had six " bloody bowel movements.  She endorses having chills since last night as well.  Denies chest pain, chest pressure, or a change in baseline dyspnea.  She has chronic abdominal pain, though the current pain is more severe and constant.  It is located in the epigastric and lower abdomen.  She reports having burping as wlel.  She has a friend who recently had influenza.      Past Medical History    Past Medical History:   Diagnosis Date    Allergic rhinitis     Anemia     Anxiety     Asthma     Bipolar 1 disorder (H)     Breast cancer of upper-outer quadrant of right female breast (H) 04/11/2017    Chronic pain     Back pain due to arthritis.    COPD (chronic obstructive pulmonary disease) (H)     Depression     Diabetes (H)     Endometriosis     Fibroid uterus     Hx of radiation therapy 01/01/2017    Insomnia     Osteoarthritis     Overweight     Refusal of blood transfusions as patient is [employer] 04/12/2017    Sensorineural hearing loss, bilateral     Shortness of breath     Vitamin D deficiency        Past Surgical History   Past Surgical History:   Procedure Laterality Date    ARTHROSCOPY KNEE Right 1996    Description: Arthroscopy Knee Right;  Recorded: 12/08/2011;    BIOPSY BREAST Right 2000's    BIOPSY BREAST Right 02/20/2017    KNEE SURGERY Right 1997    lateral release    LAPAROSCOPIC ENDOMETRIOSIS FULGURATION  2002    LUMPECTOMY BREAST Right 03/10/2017    with sentinel lymph node biopsy.    SC HYSTEROSCOPY,W/ENDO BX N/A 12/15/2020    Procedure: HYSTEROSCOPY, WITH DILATION AND CURETTAGE;  Surgeon: Scottie Canas MD;  Location: [facility];  Service: Gynecology    TOTAL HIP ARTHROPLASTY N/A 9/24/2014    Procedure: LEFT HIP TOTAL ARTHROPLASTY;  Surgeon: Antony Elias MD;  Location: [facility] OR;  Service:     TOTAL HIP ARTHROPLASTY Right 8/10/2015    Procedure: HIP TOTAL ARTHROPLASTY RIGHT;  Surgeon: Antony Elias MD;  Location: [facility] OR;  Service:     [facility] TOTAL KNEE  ARTHROPLASTY Right 8/28/2017    Procedure: RIGHT TOTAL KNEE ARTHROPLASTY;  Surgeon: Anotny Elias MD;  Location: [facility];  Service: Orthopedics       Prior to Admission Medications   Prior to Admission Medications   Prescriptions Last Dose Informant Patient Reported? Taking?   FLUoxetine (PROZAC) 40 MG capsule   No No   Sig: TAKE ONE CAPSULE BY MOUTH EVERY DAY   OZEMPIC, 0.25 OR 0.5 MG/DOSE, 2 MG/3ML pen   No No   Sig: INJECT 0.5MG UNDER THE SKIN ONCE WEEKLY   acetaminophen (TYLENOL) 500 MG tablet   Yes No   Sig: Take 1,000 mg by mouth every 6 hours as needed   albuterol (PROAIR HFA/PROVENTIL HFA/VENTOLIN HFA) 108 (90 Base) MCG/ACT inhaler   No No   Sig: Inhale 2 puffs into the lungs every 6 hours as needed for shortness of breath, wheezing or cough.   blood glucose (NO BRAND SPECIFIED) lancets standard   No No   Sig: Use to test blood sugar once daily   blood glucose (ONETOUCH VERIO IQ) test strip   No No   Sig: USE 1 STRIP TO CHECK GLUCOSE ONCE DAILY TO  TEST  BLOOD  SUGAR  ONCE  DAILY.   buPROPion (WELLBUTRIN XL) 150 MG 24 hr tablet   No No   Sig: Take 1 tablet (150 mg) by mouth every morning   cetirizine (ZYRTEC) 10 MG CHEW   Yes No   Sig: Take 10 mg by mouth daily   diclofenac sodium (VOLTAREN) 1 % Gel   No No   Sig: [DICLOFENAC SODIUM (VOLTAREN) 1 % GEL] Apply approximately 1/2-1 gm over affected hand and/or left elbow joints bid, as needed.   famotidine (PEPCID) 40 MG tablet   No No   Sig: Take 1 tablet (40 mg) by mouth 2 times daily as needed for heartburn   fluticasone propionate (FLONASE ALLERGY RELIEF) 50 mcg/actuation nasal spray   No No   Sig: [FLUTICASONE PROPIONATE (FLONASE ALLERGY RELIEF) 50 MCG/ACTUATION NASAL SPRAY] One spray in each nostril twice daily. OFFICE VISIT NEEDED FOR ANY FURTHER REFILLS   gabapentin (NEURONTIN) 800 MG tablet   No No   Sig: TAKE TWO TABLETS BY MOUTH EVERY EVENING AT BEDTIME   levothyroxine (SYNTHROID/LEVOTHROID) 75 MCG tablet   No No   Sig: TAKE ONE-HALF  TABLET BY MOUTH 5 DAYS PER WEEK, AND ONE TABLET 2 DAYS PER WEEK   magnesium citrate solution   Yes No   Sig: Take 296 mLs by mouth daily as needed   methocarbamol (ROBAXIN) 500 MG tablet   No No   Sig: TAKE ONE TABLET BY MOUTH THREE TIMES A DAY AS NEEDED   montelukast (SINGULAIR) 10 MG tablet   No No   Sig: TAKE 1 TABLET (10 MG) BY MOUTH AT BEDTIME   olopatadine (PATADAY) 0.2 % ophthalmic solution   No No   Sig: Place 0.05 mLs (1 drop) into both eyes daily.   simvastatin (ZOCOR) 20 MG tablet   No No   Sig: Take 1 tablet (20 mg) by mouth at bedtime.   traZODone (DESYREL) 100 MG tablet   No No   Sig: Take 1 tablet (100 mg) by mouth at bedtime.   traZODone (DESYREL) 50 MG tablet   No No   Sig: Take 1.5 tablets (75 mg) by mouth at bedtime for 14 days, THEN 1 tablet (50 mg) at bedtime for 14 days, THEN 0.5 tablets (25 mg) at bedtime for 14 days. In addition to 100 mg tablets, tapering down.      Facility-Administered Medications: None           Physical Exam   Vital Signs: Temp: 98.1  F (36.7  C) Temp src: Oral BP: 117/61 Pulse: 96   Resp: 16 SpO2: 93 %      Weight: 272 lbs 0 oz    Gen:  sitting in bed in no extremis  Neuro: alert ,conversant  CV:  nl rate, regular rhythm  Pulm:  no acute resp distress, ctab  GI:  abdomen NTTP    Medical Decision Making             Data   Reviewed:    Na 137  K 4.1  BUN 14  Cr 1.1    Lactate 0.4    WBC 12  Hgb 14  Plts 234    CTA ab/pelvis  IMPRESSION:  1.  Colitis involving the descending and proximal sigmoid colon, which given appearance and distribution is favored to reflect nonembolic ischemic colitis over other infectious or inflammatory etiologies. No evidence of active bleeding.  2.  Scattered colonic diverticulosis, however no diverticulitis.  3.  Severe hepatic steatosis.  4.  Cholelithiasis.

## 2025-03-17 VITALS
DIASTOLIC BLOOD PRESSURE: 72 MMHG | WEIGHT: 272 LBS | SYSTOLIC BLOOD PRESSURE: 113 MMHG | RESPIRATION RATE: 19 BRPM | HEART RATE: 70 BPM | HEIGHT: 69 IN | OXYGEN SATURATION: 94 % | BODY MASS INDEX: 40.29 KG/M2 | TEMPERATURE: 98.7 F

## 2025-03-17 LAB
ANION GAP SERPL CALCULATED.3IONS-SCNC: 9 MMOL/L (ref 7–15)
BUN SERPL-MCNC: 9.6 MG/DL (ref 8–23)
CALCIUM SERPL-MCNC: 8.4 MG/DL (ref 8.8–10.4)
CHLORIDE SERPL-SCNC: 106 MMOL/L (ref 98–107)
CREAT SERPL-MCNC: 1.07 MG/DL (ref 0.51–0.95)
EGFRCR SERPLBLD CKD-EPI 2021: 59 ML/MIN/1.73M2
ERYTHROCYTE [DISTWIDTH] IN BLOOD BY AUTOMATED COUNT: 13.4 % (ref 10–15)
GLUCOSE SERPL-MCNC: 113 MG/DL (ref 70–99)
HCO3 SERPL-SCNC: 25 MMOL/L (ref 22–29)
HCT VFR BLD AUTO: 38.3 % (ref 35–47)
HGB BLD-MCNC: 12.6 G/DL (ref 11.7–15.7)
MCH RBC QN AUTO: 30.2 PG (ref 26.5–33)
MCHC RBC AUTO-ENTMCNC: 32.9 G/DL (ref 31.5–36.5)
MCV RBC AUTO: 92 FL (ref 78–100)
PLATELET # BLD AUTO: 162 10E3/UL (ref 150–450)
POTASSIUM SERPL-SCNC: 3.7 MMOL/L (ref 3.4–5.3)
RBC # BLD AUTO: 4.17 10E6/UL (ref 3.8–5.2)
SODIUM SERPL-SCNC: 140 MMOL/L (ref 135–145)
WBC # BLD AUTO: 8.4 10E3/UL (ref 4–11)

## 2025-03-17 PROCEDURE — 85018 HEMOGLOBIN: CPT | Performed by: HOSPITALIST

## 2025-03-17 PROCEDURE — 36415 COLL VENOUS BLD VENIPUNCTURE: CPT | Performed by: HOSPITALIST

## 2025-03-17 PROCEDURE — 250N000011 HC RX IP 250 OP 636: Performed by: HOSPITALIST

## 2025-03-17 PROCEDURE — 99231 SBSQ HOSP IP/OBS SF/LOW 25: CPT | Performed by: PHYSICIAN ASSISTANT

## 2025-03-17 PROCEDURE — 250N000013 HC RX MED GY IP 250 OP 250 PS 637: Performed by: HOSPITALIST

## 2025-03-17 PROCEDURE — G0378 HOSPITAL OBSERVATION PER HR: HCPCS

## 2025-03-17 PROCEDURE — 99239 HOSP IP/OBS DSCHRG MGMT >30: CPT | Performed by: INTERNAL MEDICINE

## 2025-03-17 PROCEDURE — 80048 BASIC METABOLIC PNL TOTAL CA: CPT | Performed by: HOSPITALIST

## 2025-03-17 PROCEDURE — 258N000003 HC RX IP 258 OP 636: Performed by: HOSPITALIST

## 2025-03-17 PROCEDURE — 250N000013 HC RX MED GY IP 250 OP 250 PS 637: Performed by: INTERNAL MEDICINE

## 2025-03-17 PROCEDURE — 250N000011 HC RX IP 250 OP 636: Performed by: INTERNAL MEDICINE

## 2025-03-17 RX ORDER — BUPROPION HYDROCHLORIDE 150 MG/1
150 TABLET ORAL EVERY MORNING
Status: DISCONTINUED | OUTPATIENT
Start: 2025-03-17 | End: 2025-03-17 | Stop reason: HOSPADM

## 2025-03-17 RX ORDER — SODIUM CHLORIDE AND POTASSIUM CHLORIDE 150; 900 MG/100ML; MG/100ML
INJECTION, SOLUTION INTRAVENOUS CONTINUOUS
Status: DISCONTINUED | OUTPATIENT
Start: 2025-03-17 | End: 2025-03-17

## 2025-03-17 RX ORDER — MONTELUKAST SODIUM 10 MG/1
10 TABLET ORAL AT BEDTIME
Status: DISCONTINUED | OUTPATIENT
Start: 2025-03-17 | End: 2025-03-17 | Stop reason: HOSPADM

## 2025-03-17 RX ORDER — LEVOTHYROXINE SODIUM 75 UG/1
75 TABLET ORAL
Status: DISCONTINUED | OUTPATIENT
Start: 2025-03-18 | End: 2025-03-17 | Stop reason: HOSPADM

## 2025-03-17 RX ORDER — SIMETHICONE 80 MG
80 TABLET,CHEWABLE ORAL EVERY 6 HOURS PRN
Status: DISCONTINUED | OUTPATIENT
Start: 2025-03-17 | End: 2025-03-17 | Stop reason: HOSPADM

## 2025-03-17 RX ORDER — SIMVASTATIN 20 MG
20 TABLET ORAL AT BEDTIME
Status: DISCONTINUED | OUTPATIENT
Start: 2025-03-17 | End: 2025-03-17 | Stop reason: HOSPADM

## 2025-03-17 RX ORDER — GABAPENTIN 400 MG/1
1600 CAPSULE ORAL AT BEDTIME
Status: DISCONTINUED | OUTPATIENT
Start: 2025-03-17 | End: 2025-03-17 | Stop reason: HOSPADM

## 2025-03-17 RX ORDER — OLOPATADINE HYDROCHLORIDE 2 MG/ML
1 SOLUTION/ DROPS OPHTHALMIC DAILY
Status: DISCONTINUED | OUTPATIENT
Start: 2025-03-17 | End: 2025-03-17 | Stop reason: HOSPADM

## 2025-03-17 RX ORDER — ONDANSETRON 4 MG/1
4 TABLET, ORALLY DISINTEGRATING ORAL EVERY 6 HOURS PRN
Qty: 10 TABLET | Refills: 0 | Status: SHIPPED | OUTPATIENT
Start: 2025-03-17

## 2025-03-17 RX ORDER — FAMOTIDINE 20 MG/1
40 TABLET, FILM COATED ORAL AT BEDTIME
Status: DISCONTINUED | OUTPATIENT
Start: 2025-03-17 | End: 2025-03-17 | Stop reason: HOSPADM

## 2025-03-17 RX ADMIN — SIMVASTATIN 20 MG: 20 TABLET, FILM COATED ORAL at 02:33

## 2025-03-17 RX ADMIN — FAMOTIDINE 40 MG: 20 TABLET, FILM COATED ORAL at 02:33

## 2025-03-17 RX ADMIN — MONTELUKAST 10 MG: 10 TABLET, FILM COATED ORAL at 02:33

## 2025-03-17 RX ADMIN — POTASSIUM CHLORIDE AND SODIUM CHLORIDE: 900; 150 INJECTION, SOLUTION INTRAVENOUS at 12:19

## 2025-03-17 RX ADMIN — GABAPENTIN 1600 MG: 400 CAPSULE ORAL at 02:33

## 2025-03-17 RX ADMIN — SODIUM CHLORIDE: 0.9 INJECTION, SOLUTION INTRAVENOUS at 02:35

## 2025-03-17 RX ADMIN — PROCHLORPERAZINE EDISYLATE 5 MG: 5 INJECTION INTRAMUSCULAR; INTRAVENOUS at 02:19

## 2025-03-17 RX ADMIN — FLUOXETINE HYDROCHLORIDE 40 MG: 20 CAPSULE ORAL at 08:50

## 2025-03-17 RX ADMIN — BUPROPION HYDROCHLORIDE 150 MG: 150 TABLET, EXTENDED RELEASE ORAL at 08:50

## 2025-03-17 RX ADMIN — SIMETHICONE 80 MG: 80 TABLET, CHEWABLE ORAL at 14:58

## 2025-03-17 RX ADMIN — LEVOTHYROXINE SODIUM 37.5 MCG: 75 TABLET ORAL at 08:51

## 2025-03-17 ASSESSMENT — ACTIVITIES OF DAILY LIVING (ADL)
ADLS_ACUITY_SCORE: 48

## 2025-03-17 NOTE — PROGRESS NOTES
"[facility]    Medicine Progress Note - Hospitalist Service    Date of Admission:  3/16/2025    Assessment & Plan     Emani Vargas is a 60 year old female presenting for evaluation of rectal bleeding with associated abd cramping and nausea.  CT imaging suggestive of colitis.    Stool studies are pending.  GI and general surgery consulted.  .     1.  Rectal bleeding, resolved       Colitis (descending and prox sigmoid)    No further bloody stools since arrival to the ED  Pain near-resolved; still with nausea  Etiology of colitis infectious vs. Ischemic  Lipase not elevated    D/W surgery in her ED room this am - recommending supportive care  Will have her trial clear liquids  GI to see on rounds today    Tells me that she is due for a colonoscopy and is scheduled for one in the next month  Last colonoscopy was 10 years ago    Vitals have been stable  Hgb dropped at bit this am with IVF given      Leukocytosis has resolved, no fevers       2.  PATRICIA, improving  Creat slightly elevated on arrival  Improving with IVF      3.  Diabetes     On ozempic - last dose 3/13/25  Continue with sliding scale insulin prn     4. COPD  Respiratory status stable  Home singular has been re-ordered     5. Hypothyroidism  PTA synthroid reordered    6.  Anxiety   Bipolar disorder   Depression    Resume home medications - wellbutrin, fluoxetine  PPE Used:  Mask, gown, gloves         DVT Prophylaxis: Pneumatic Compression Devices  Chaudhry Catheter: Not present  Lines: None     Cardiac Monitoring: None  Code Status: No CPR- Do NOT Intubate      Clinically Significant Risk Factors Present on Admission           # Hypocalcemia: Lowest Ca = 8.4 mg/dL in last 2 days, will monitor and replace as appropriate                   # Severe Obesity: Estimated body mass index is 40.17 kg/m  as calculated from the following:    Height as of this encounter: 1.753 m (5' 9\").    Weight as of this encounter: 123.4 kg (272 lb).       # " Asthma: noted on problem list        Disposition Plan     Medically Ready for Discharge: Anticipated Tomorrow             Evelin Gonzales DO  Hospitalist Service  [facility]  Securely message with [employer] (more info)  Text page via [employer] Paging/Directory   ______________________________________________________________________    Interval History     Doing ok. Still with some nausea and not very hungry.  Abd pain/cramping improved.  No emesis.  No stools since arrival to the hospital.  No HA, F/C, CP or SOB.  No dizziness when up to the restroom this am.  No concerns per nursing overnight    Physical Exam   Vital Signs: Temp: 97.7  F (36.5  C) Temp src: Oral BP: 105/64 Pulse: 69   Resp: 16 SpO2: 94 % O2 Device: None (Room air)    Weight: 272 lbs 0 oz    GEN:  Alert, oriented x 3, appears slightly pale and fatigued, no overt respiratory distress.  HEENT:  Normocephalic/atraumatic, no scleral icterus, no nasal discharge  CV:  Regular rate and rhythm, no loud murmur. S1 + S2 noted, no S3 or S4.  LUNGS:  Clear to auscultation ant/lat bilaterally without rales/rhonchi/wheezing/retractions.  Symmetric chest rise on inhalation noted.  ABD:  Active bowel sounds, soft, mild tenderness to the LLQ without clear R/G/R.  Mildly distended.  EXT:  No pretibial edema bilaterally.  No cyanosis.    SKIN:  Dry to touch, no exanthems noted in the visualized areas.    Medical Decision Making       50 MINUTES SPENT BY ME on the date of service doing chart review, history, exam, documentation & further activities per the note.      Data   Medications   Current Facility-Administered Medications   Medication Dose Route Frequency Provider Last Rate Last Admin    sodium chloride 0.9 % infusion   Intravenous Continuous Servando Caldwell  mL/hr at 03/17/25 0400 Rate Verify at 03/17/25 0400     Current Facility-Administered Medications   Medication Dose Route Frequency Provider Last Rate Last Admin     buPROPion (WELLBUTRIN XL) 24 hr tablet 150 mg  150 mg Oral QAM Servando Caldwell MD        famotidine (PEPCID) tablet 40 mg  40 mg Oral At Bedtime Servando Caldwell MD   40 mg at 03/17/25 0233    FLUoxetine (PROzac) capsule 40 mg  40 mg Oral Daily Servando Caldwell MD        gabapentin (NEURONTIN) capsule 1,600 mg  1,600 mg Oral At Bedtime Servando Caldwell MD   1,600 mg at 03/17/25 0233    levothyroxine (SYNTHROID/LEVOTHROID) half-tab 37.5 mcg  37.5 mcg Oral Once per day on Sunday Monday Wednesday Thursday Saturday Servando Caldwell MD        [START ON 3/18/2025] levothyroxine (SYNTHROID/LEVOTHROID) tablet 75 mcg  75 mcg Oral Once per day on Tuesday Friday Servando Caldwell MD        montelukast (SINGULAIR) tablet 10 mg  10 mg Oral At Bedtime Servando Caldwell MD   10 mg at 03/17/25 0233    [Held by provider] olopatadine (PATADAY) 0.2 % SOLN 1 drop  1 drop Both Eyes Daily Servando Caldwell MD        simvastatin (ZOCOR) tablet 20 mg  20 mg Oral At Bedtime Servando Caldwell MD   20 mg at 03/17/25 0233    sodium chloride (PF) 0.9% PF flush 3 mL  3 mL Intracatheter Q8H Servando Caldwell MD         Labs and Imaging results below reviewed today.  Recent Labs   Lab 03/17/25  0635 03/16/25  1232   WBC 8.4 11.9*   HGB 12.6 14.1   HCT 38.3 43.0   MCV 92 92    234     Recent Labs   Lab 03/17/25  0635 03/16/25  1232    137   POTASSIUM 3.7 4.1   CHLORIDE 106 102   CO2 25 22   ANIONGAP 9 13   * 131*   BUN 9.6 14.3   CR 1.07* 1.10*   GFRESTIMATED 59* 57*   LYNN 8.4* 9.4     Recent Labs   Lab 03/17/25  0635 03/16/25  1232    137   POTASSIUM 3.7 4.1   CHLORIDE 106 102   CO2 25 22   * 131*     Recent Labs   Lab 03/16/25  1232   AST 34   ALT 26   ALKPHOS 88   BILITOTAL 1.0     Recent Labs   Lab 03/16/25  1232   LIPASE 32       Recent Results (from the past 24 hours)   CTA Abdomen Pelvis with Contrast    Narrative    EXAM: CTA ABDOMEN PELVIS WITH CONTRAST  LOCATION: [facility]  DATE:  3/16/2025    INDICATION: Bright red blood per rectum x6, diffuse lower abdominal pain.  COMPARISON: CT chest, abdomen and pelvis 4/12/2017  TECHNIQUE: CT angiogram abdomen pelvis during arterial phase of injection of IV contrast. 2D and 3D MIP reconstructions were performed by the CT technologist. Dose reduction techniques were used.  CONTRAST: 90ml Isovue 370    FINDINGS:  ANGIOGRAM ABDOMEN/PELVIS: The abdominal aorta is nonaneurysmal with scattered mild atheromatous disease but no evidence of dissection or other acute abnormality. The celiac, superior mesenteric, renal and inferior mesenteric arteries are patent without   acute abnormality or flow-limiting stenosis. The bilateral common, internal and external iliac arteries as well as common femoral arteries are patent.    The portal, hepatic, splenic and superior mesenteric veins are patent.    LOWER CHEST: Lung bases are clear.    HEPATOBILIARY: Severe hepatic steatosis. No worrisome liver lesions. Cholelithiasis without pericholecystic inflammation. No biliary ductal dilation.    PANCREAS: Normal.    SPLEEN: Normal.    ADRENAL GLANDS: Normal.    KIDNEYS/BLADDER: The kidneys enhance symmetrically. No urolithiasis or hydronephrosis. Urinary bladder is partially obscured by streak artifact though visualized contours are normal.    BOWEL: There is focal wall thickening and subtle inflammation involving the colon from the splenic flexure through the proximal sigmoid. No findings of active bleeding. Scattered colonic diverticulosis without evidence of diverticulitis. The appendix is   normal. No free fluid or free air. Small sliding-type hiatal hernia.    LYMPH NODES: No lymphadenopathy.    PELVIC ORGANS: Probable fibroid uterus. No adnexal mass.    MUSCULOSKELETAL: Degenerative changes of the spine with unchanged mild L1 superior endplate deformity. Thoracic spine stimulator leads terminate posterior to T8. No worrisome bone lesions. Bilateral hip replacements.       Impression    IMPRESSION:  1.  Colitis involving the descending and proximal sigmoid colon, which given appearance and distribution is favored to reflect nonembolic ischemic colitis over other infectious or inflammatory etiologies. No evidence of active bleeding.  2.  Scattered colonic diverticulosis, however no diverticulitis.  3.  Severe hepatic steatosis.  4.  Cholelithiasis.

## 2025-03-17 NOTE — CONSULTS
[facility] Consultation     Emani Vargas  [address] [place] [address]  60 year old female     Admission Date/Time: 3/16/2025    HPI:  Emani Vargas is a 60 year old female with PMH notable for diabetes, COPD, who presented to the hospital for abdominal pain and rectal bleeding, who we were asked to see for concerns of ischemic colitis.    The patient reports that 2 days ago she was not feeling well, with low appetite.  That evening she had a piece of pizza, and subsequently had a difficult to pass bowel movement that she describes as sludge.  Very shortly after she had an episode of vomiting.  This was followed then by abdominal cramping and rectal bleeding.  She describes her rectal bleeding is bright red in nature.  She continued to have abdominal cramping and rectal bleeding into the following day, prompting her to present to the hospital for evaluation.    Currently, the patient continues to have diffuse mild abdominal cramping.  She has nausea, but has not had any vomiting since the night of onset.  She has not had any passage of stools or rectal bleeding since presenting to the hospital.  She denies any fevers, chills, recent antibiotic use, sick contacts.  She notes that over the last month she has been struggling with constipation, noting that she will often go up to 4 days without a bowel movement.  She ports taking a senna about 3 weeks ago, but otherwise has not been taking anything regularly since.  She denies any episodes of lightheadedness or syncope prior to the onset of her symptoms.    Last colonoscopy 2015 for screening purposes, notable for a rectal polyp, and sigmoid diverticulosis.  Also noted with slight erythema in the very distal rectum without ulceration.  Random colon biopsies were normal.  Polyp biopsy was a hyperplastic polyp.  Recall for 10 years.      PAST MEDICAL HISTORY:  Patient Active Problem List    Diagnosis Date Noted    Rectal bleeding 03/16/2025      Priority: Medium    Acute colitis 2025     Priority: Medium    Sensorineural hearing loss, bilateral 2025     Priority: Medium    Moderate persistent asthma with acute exacerbation 2023     Priority: Medium    Diabetes mellitus, type 2 (H) 2022     Priority: Medium    Bipolar disorder (H) 2020     Priority: Medium    Postmenopausal bleeding 2020     Priority: Medium    Fibromyalgia 2019     Priority: Medium    Morbid obesity (H) 08/15/2018     Priority: Medium    Osteoporosis 2017     Priority: Medium    Procedure and treatment not carried out because of patient's decision for reasons of belief and group pressure 2017     Priority: Medium    Malignant neoplasm of upper-outer quadrant of right breast in female, estrogen receptor positive (H) 2017     Priority: Medium    History of malignant neoplasm of breast 2016     Priority: Medium    Compression fracture of vertebral column with routine healing 2016     Priority: Medium    Hearing Loss      Priority: Medium     Created by Conversion  Replacement Utility updated for latest IMO load        Insomnia      Priority: Medium     Created by Conversion        Allergic rhinitis      Priority: Medium    Osteoarthrosis, unspecified whether generalized or localized, pelvic region and thigh 2014     Priority: Medium    COPD (chronic obstructive pulmonary disease) (H) 2007     Priority: Medium    Chronic neck and back pain 1984     Priority: Medium     SOCIAL HISTORY:  Social History     Tobacco Use    Smoking status: Former     Current packs/day: 0.00     Average packs/day: 1 pack/day for 8.0 years (8.0 ttl pk-yrs)     Types: Cigarettes     Start date: 1977     Quit date: 1985     Years since quittin.2     Passive exposure: Past    Smokeless tobacco: Never   Vaping Use    Vaping status: Never Used   Substance Use Topics    Alcohol use: Yes     Alcohol/week: 0.0 standard  drinks of alcohol     Comment: Alcoholic Drinks/day: 1 drink/month    Drug use: No     Comment: Drug use: past use of marijuana and hash.     FAMILY HISTORY:  Family History   Problem Relation Age of Onset    Depression Mother     Chronic Obstructive Pulmonary Disease Mother     Diabetes Father     Skin Cancer Father 45    Colon Cancer Maternal Grandmother     Colon Cancer Maternal Grandfather     Diabetes Paternal Grandmother     Colon Cancer Paternal Grandmother 80    Testicular cancer Brother 44    Lung Cancer Paternal Aunt 60    Breast Cancer Cousin 48        Paternal side.    Anesthesia Reaction No family hx of     Glaucoma No family hx of     Macular Degeneration No family hx of      ALLERGIES:   Allergies   Allergen Reactions    Cat/Feline Products [Cat Dander] Itching    Trees Other (See Comments)     Grass, itching     MEDICATIONS:   Current Facility-Administered Medications   Medication Dose Route Frequency Provider Last Rate Last Admin    acetaminophen (TYLENOL) tablet 650 mg  650 mg Oral Q6H PRN Servando Caldwell MD        buPROPion (WELLBUTRIN XL) 24 hr tablet 150 mg  150 mg Oral QAM Servando Caldwell MD   150 mg at 03/17/25 0850    famotidine (PEPCID) tablet 40 mg  40 mg Oral At Bedtime Servando Caldwell MD   40 mg at 03/17/25 0233    FLUoxetine (PROzac) capsule 40 mg  40 mg Oral Daily Servando Caldwell MD   40 mg at 03/17/25 0850    gabapentin (NEURONTIN) capsule 1,600 mg  1,600 mg Oral At Bedtime Servando Caldwell MD   1,600 mg at 03/17/25 0233    HYDROmorphone (PF) (DILAUDID) injection 0.5 mg  0.5 mg Intravenous Q6H PRN Servando Caldwell MD   0.5 mg at 03/16/25 2023    levothyroxine (SYNTHROID/LEVOTHROID) half-tab 37.5 mcg  37.5 mcg Oral Once per day on Sunday Monday Wednesday Thursday Saturday Servando Caldwell MD   37.5 mcg at 03/17/25 0851    [START ON 3/18/2025] levothyroxine (SYNTHROID/LEVOTHROID) tablet 75 mcg  75 mcg Oral Once per day on Tuesday Friday Servando Caldwell MD        lidocaine (LMX4)  cream   Topical Q1H PRN Servando Caldwell MD        lidocaine 1 % 0.1-1 mL  0.1-1 mL Other Q1H PRN Servando Caldwell MD        montelukast (SINGULAIR) tablet 10 mg  10 mg Oral At Bedtime Servando Caldwell MD   10 mg at 03/17/25 0233    [Held by provider] olopatadine (PATADAY) 0.2 % SOLN 1 drop  1 drop Both Eyes Daily Sevrando Caldwell MD        ondansetron (ZOFRAN ODT) ODT tab 4 mg  4 mg Oral Q6H PRN Servando Caldwell MD   4 mg at 03/16/25 1804    Or    ondansetron (ZOFRAN) injection 4 mg  4 mg Intravenous Q6H PRN Servando Caldwell MD        oxyCODONE IR (ROXICODONE) half-tab 2.5 mg  2.5 mg Oral Q6H PRN Servando Caldwell MD        prochlorperazine (COMPAZINE) injection 5 mg  5 mg Intravenous Q6H PRN Servando Caldwell MD   5 mg at 03/17/25 0219    simvastatin (ZOCOR) tablet 20 mg  20 mg Oral At Bedtime Servando Caldwell MD   20 mg at 03/17/25 0233    sodium chloride (PF) 0.9% PF flush 3 mL  3 mL Intracatheter Q8H Servando Caldwell MD        sodium chloride (PF) 0.9% PF flush 3 mL  3 mL Intracatheter q1 min prn Servando Caldwell MD        sodium chloride 0.9 % infusion   Intravenous Continuous Servando Caldwell  mL/hr at 03/17/25 0851 Rate Verify at 03/17/25 0851     Current Outpatient Medications   Medication Sig Dispense Refill    acetaminophen (TYLENOL) 500 MG tablet Take 1,000 mg by mouth every 6 hours as needed      albuterol (PROAIR HFA/PROVENTIL HFA/VENTOLIN HFA) 108 (90 Base) MCG/ACT inhaler Inhale 2 puffs into the lungs every 6 hours as needed for shortness of breath, wheezing or cough. 18 g 1    buPROPion (WELLBUTRIN XL) 150 MG 24 hr tablet Take 1 tablet (150 mg) by mouth every morning 90 tablet 3    cetirizine (ZYRTEC) 10 MG tablet Take 10 mg by mouth every evening.      diclofenac sodium (VOLTAREN) 1 % Gel [DICLOFENAC SODIUM (VOLTAREN) 1 % GEL] Apply approximately 1/2-1 gm over affected hand and/or left elbow joints bid, as needed. 1 Tube 2    famotidine (PEPCID) 40 MG tablet Take 40 mg by mouth at bedtime.       "FLUoxetine (PROZAC) 40 MG capsule TAKE ONE CAPSULE BY MOUTH EVERY DAY 90 capsule 1    fluticasone propionate (FLONASE ALLERGY RELIEF) 50 mcg/actuation nasal spray [FLUTICASONE PROPIONATE (FLONASE ALLERGY RELIEF) 50 MCG/ACTUATION NASAL SPRAY] One spray in each nostril twice daily. OFFICE VISIT NEEDED FOR ANY FURTHER REFILLS (Patient taking differently: Spray 1 spray into both nostrils 2 times daily as needed.) 16 g 0    gabapentin (NEURONTIN) 800 MG tablet TAKE TWO TABLETS BY MOUTH EVERY EVENING AT BEDTIME 180 tablet 1    levothyroxine (SYNTHROID/LEVOTHROID) 75 MCG tablet TAKE ONE-HALF TABLET BY MOUTH 5 DAYS PER WEEK, AND ONE TABLET 2 DAYS PER WEEK 54 tablet 1    magnesium citrate solution Take 30 mLs by mouth daily as needed.      methocarbamol (ROBAXIN) 500 MG tablet TAKE ONE TABLET BY MOUTH THREE TIMES A DAY AS NEEDED 90 tablet 3    montelukast (SINGULAIR) 10 MG tablet TAKE 1 TABLET (10 MG) BY MOUTH AT BEDTIME 90 tablet 3    olopatadine (PATADAY) 0.2 % ophthalmic solution Place 0.05 mLs (1 drop) into both eyes daily. 5 mL 1    semaglutide (OZEMPIC) 2 MG/3ML pen Inject 0.25 mg subcutaneously every 7 days.      simvastatin (ZOCOR) 20 MG tablet Take 1 tablet (20 mg) by mouth at bedtime. 90 tablet 1    blood glucose (NO BRAND SPECIFIED) lancets standard Use to test blood sugar once daily 100 each 3    blood glucose (ONETOUCH VERIO IQ) test strip USE 1 STRIP TO CHECK GLUCOSE ONCE DAILY TO  TEST  BLOOD  SUGAR  ONCE  DAILY. 100 strip 3     Facility-Administered Medications Ordered in Other Encounters   Medication Dose Route Frequency Provider Last Rate Last Admin    methylPREDNISolone (DEPO-Medrol) injection    Once PRN Dereck Lynn DO   20 mg at 04/04/24 1309       PHYSICAL EXAM:   /59 (BP Location: Right arm)   Pulse 66   Temp 97.3  F (36.3  C) (Oral)   Resp 17   Ht 1.753 m (5' 9\")   Wt 123.4 kg (272 lb)   LMP  (LMP Unknown)   SpO2 94%   BMI 40.17 kg/m     GEN: Awake, resting comfortably in " bed  HEENT: No icterus  HRT: Peers well-perfused, no lower extremity edema  LUNGS: No respiratory effort  ABD: Obese, mild tenderness in the middle lower and right lower abdomen, as well as the upper middle abdomen, no guarding   SKIN: Visualized skin intact without rash  MSKL: Moving extremities appropriately  NEURO: Alert, answers questions appropriately     ADDITIONAL DATA:   I reviewed the patient's new clinical lab test results.   Recent Labs   Lab Test 03/17/25  0635 03/16/25  1232 07/02/24  1457 04/26/19  0900 01/24/19  1117   WBC 8.4 11.9* 9.0   < > 8.3   HGB 12.6 14.1 14.2   < > 12.5   MCV 92 92 93   < > 92    234 214   < > 201   INR  --   --   --   --  0.90    < > = values in this interval not displayed.     Recent Labs   Lab Test 03/17/25  0635 03/16/25  1232 07/02/24  1457   POTASSIUM 3.7 4.1 4.9   CHLORIDE 106 102 102   CO2 25 22 25   BUN 9.6 14.3 14.4   ANIONGAP 9 13 13     Recent Labs   Lab Test 03/16/25  1232 09/26/23  1453 06/08/22  1451 04/10/19  1533 08/15/18  1348 07/30/18  1525   ALBUMIN 4.1 4.1 3.4*   < >  --   --    BILITOTAL 1.0 0.3 0.8   < >  --   --    ALT 26 24 33   < >  --   --    AST 34 33 27   < >  --   --    PROTEIN  --   --   --   --  Negative Negative   LIPASE 32  --   --   --   --   --     < > = values in this interval not displayed.     Imaging results:  EXAM: CTA ABDOMEN PELVIS WITH CONTRAST  LOCATION: [facility]  DATE: 3/16/2025     INDICATION: Bright red blood per rectum x6, diffuse lower abdominal pain.  COMPARISON: CT chest, abdomen and pelvis 4/12/2017  TECHNIQUE: CT angiogram abdomen pelvis during arterial phase of injection of IV contrast. 2D and 3D MIP reconstructions were performed by the CT technologist. Dose reduction techniques were used.  CONTRAST: 90ml Isovue 370     FINDINGS:  ANGIOGRAM ABDOMEN/PELVIS: The abdominal aorta is nonaneurysmal with scattered mild atheromatous disease but no evidence of dissection or other acute  abnormality. The celiac, superior mesenteric, renal and inferior mesenteric arteries are patent without   acute abnormality or flow-limiting stenosis. The bilateral common, internal and external iliac arteries as well as common femoral arteries are patent.     The portal, hepatic, splenic and superior mesenteric veins are patent.  LOWER CHEST: Lung bases are clear.  HEPATOBILIARY: Severe hepatic steatosis. No worrisome liver lesions. Cholelithiasis without pericholecystic inflammation. No biliary ductal dilation.  PANCREAS: Normal.  SPLEEN: Normal.  ADRENAL GLANDS: Normal.  KIDNEYS/BLADDER: The kidneys enhance symmetrically. No urolithiasis or hydronephrosis. Urinary bladder is partially obscured by streak artifact though visualized contours are normal.  BOWEL: There is focal wall thickening and subtle inflammation involving the colon from the splenic flexure through the proximal sigmoid. No findings of active bleeding. Scattered colonic diverticulosis without evidence of diverticulitis. The appendix is   normal. No free fluid or free air. Small sliding-type hiatal hernia.  LYMPH NODES: No lymphadenopathy.  PELVIC ORGANS: Probable fibroid uterus. No adnexal mass.  MUSCULOSKELETAL: Degenerative changes of the spine with unchanged mild L1 superior endplate deformity. Thoracic spine stimulator leads terminate posterior to T8. No worrisome bone lesions. Bilateral hip replacements.                                                                 IMPRESSION:  1.  Colitis involving the descending and proximal sigmoid colon, which given appearance and distribution is favored to reflect nonembolic ischemic colitis over other infectious or inflammatory etiologies. No evidence of active bleeding.  2.  Scattered colonic diverticulosis, however no diverticulitis.  3.  Severe hepatic steatosis.  4.  Cholelithiasis.    ASSESSMENT:    Emani Vargas is a 60 year old female with PMH notable for diabetes, COPD, who presented to the  hospital for abdominal pain and rectal bleeding, who we were asked to see for concerns of ischemic colitis.    1.  Abdominal pain  2.  Rectal bleeding  3.  Ischemic colitis on CT   -Rectal bleeding and abdominal cramping following diarrhea and vomiting a couple nights ago.  Denies any episode of headedness or syncope.  Hemoglobin has remained stable, and she has had no further episodes of rectal bleeding since admission.  Lactate is normal.  Initially had a mild leukocytosis, but this has normalized as well.  CRP is elevated, likely due to the colitis.  She still has some mild abdominal cramping.  She is eager to eat and drink.  The patient would like to go home, and I feel this is reasonable.  She is already scheduled for a colonoscopy through [facility] in May.  4.  Hepatic steatosis, normal LFTs   -Incidental finding of severe hepatic steatosis on CT.  LFTs are normal.  The patient has class III obesity.  She will need follow-up with [facility] to discuss these findings further in the outpatient setting.  5. Elevated kidney function   -Mildly increased creatinine upon admission.  Likely due to dehydration    PLAN:  -Okay to resume diet as tolerated  -Okay to discharge from GI standpoint  -Colonoscopy as scheduled with [facility] in May  -Follow-up with [facility] in the next 2-3 months (after colonoscopy) to discuss hepatic steatosis, we'll call to arrange    Stephania GRIMALDO [facility]  3/17/2025 10:52 AM  [phone] (office)    This case was discussed with Dr. Lopez who agrees to the above assessment and plan.    40 minutes of total time was spent today providing patient care, including patient evaluation, reviewing documentation/test result, and .   ________________________________________________________________________

## 2025-03-17 NOTE — PLAN OF CARE
Problem: Adult Inpatient Plan of Care  Goal: Readiness for Transition of Care  Outcome: Progressing     Problem: Pain Acute  Goal: Optimal Pain Control and Function  Outcome: Progressing  Intervention: Develop Pain Management Plan  Recent Flowsheet Documentation  Taken 3/17/2025 0001 by Nelida Irvin RN  Pain Management Interventions: declines  Taken 3/16/2025 2023 by Nelida Irvin RN  Pain Management Interventions: medication (see MAR)   Goal Outcome Evaluation:       A&O x4. Independent with ambulation. Endorses 6/10 headache and intermittent nausea not relieved with zofran. PRN medications per MAR with good relief. NS infusing at 125/hr. No stools this shift.

## 2025-03-17 NOTE — CONSULTS
General Surgery Consultation  Emani Vargas MRN# [id number]   Age/Sex: 60 year old female YOB: 1965     Reason for consult: Abdominal pain and GI bleed       Requesting physician: Dr. Caldwell                    Assessment and Plan:   Assessment:  Abdominal pain  GI bleed  Colitis  Obesity  Diverticulosis    60-year-old female presenting with GI bleed likely secondary to colitis.  Unclear about the etiology of the colitis.  CTA does confirm that this is not a nonembolic ischemic colitis.  Patient's hemoglobin is stable.    Plan:  -Recommend conservative management with antibiotic coverage.    -Will continue monitor her hemoglobin and monitor for any further GI bleeding  -Recommendation is to get GI on board in case she does require any kind of endoscopic evaluation  -Continue medical management per primary team  -Routine following with you          Chief Complaint:     Chief Complaint   Patient presents with    Rectal Bleeding        History is obtained from the patient    HPI:   Emani Vargas is a 60 year old female who presents ED with complaints of abdominal pain.  Patient states that she has been having abdominal pain.  Recently she noticed that she had some GI bleeding per rectum when having bowel movements.  Patien never had this before.  She has a history of reticulosis.  Patient's previous colonoscopy was done in 2015.  Patient has no history of family colon cancer.  Patient states abdominal pain is located in the left lower quadrant of the abdomen.  Currently no nausea no vomiting at this time    Past Medical History:   Diagnosis Date    Allergic rhinitis     Anemia     Anxiety     Asthma     Bipolar 1 disorder (H)     Breast cancer of upper-outer quadrant of right female breast (H) 04/11/2017    Chronic pain     Back pain due to arthritis.    COPD (chronic obstructive pulmonary disease) (H)     Depression     Diabetes (H)     Endometriosis     Fibroid uterus     Hx of radiation therapy  01/01/2017    Insomnia     Osteoarthritis     Overweight     Refusal of blood transfusions as patient is Religion 04/12/2017    Sensorineural hearing loss, bilateral     Shortness of breath     Vitamin D deficiency               Past Surgical History:     Past Surgical History:   Procedure Laterality Date    ARTHROSCOPY KNEE Right 1996    Description: Arthroscopy Knee Right;  Recorded: 12/08/2011;    BIOPSY BREAST Right 2000's    BIOPSY BREAST Right 02/20/2017    KNEE SURGERY Right 1997    lateral release    LAPAROSCOPIC ENDOMETRIOSIS FULGURATION  2002    LUMPECTOMY BREAST Right 03/10/2017    with sentinel lymph node biopsy.    [place] HYSTEROSCOPY,W/ENDO BX N/A 12/15/2020    Procedure: HYSTEROSCOPY, WITH DILATION AND CURETTAGE;  Surgeon: Scottie Canas MD;  Location: [facility] OR;  Service: Gynecology    TOTAL HIP ARTHROPLASTY N/A 9/24/2014    Procedure: LEFT HIP TOTAL ARTHROPLASTY;  Surgeon: Antony Elias MD;  Location: [facility] OR;  Service:     TOTAL HIP ARTHROPLASTY Right 8/10/2015    Procedure: HIP TOTAL ARTHROPLASTY RIGHT;  Surgeon: Antony Elias MD;  Location: [facility] OR;  Service:     [facility] TOTAL KNEE ARTHROPLASTY Right 8/28/2017    Procedure: RIGHT TOTAL KNEE ARTHROPLASTY;  Surgeon: Antony Elias MD;  Location: [facility];  Service: Orthopedics             Social History:    reports that she quit smoking about 40 years ago. Her smoking use included cigarettes. She started smoking about 48 years ago. She has a 8 pack-year smoking history. She has been exposed to tobacco smoke. She has never used smokeless tobacco. She reports current alcohol use. She reports that she does not use drugs.           Family History:     Family History   Problem Relation Age of Onset    Depression Mother     Chronic Obstructive Pulmonary Disease Mother     Diabetes Father     Skin Cancer Father 45    Colon Cancer Maternal Grandmother     Colon Cancer Maternal Grandfather      Diabetes Paternal Grandmother     Colon Cancer Paternal Grandmother 80    Testicular cancer Brother 44    Lung Cancer Paternal Aunt 60    Breast Cancer Cousin 48        Paternal side.    Anesthesia Reaction No family hx of     Glaucoma No family hx of     Macular Degeneration No family hx of               Allergies:     Allergies   Allergen Reactions    Cat/Feline Products [Cat Dander] Itching    Trees Other (See Comments)     Grass, itching              Medications:     Prior to Admission medications    Medication Sig Start Date End Date Taking? Authorizing Provider   acetaminophen (TYLENOL) 500 MG tablet Take 1,000 mg by mouth every 6 hours as needed 1/24/19  Yes Provider, Historical   albuterol (PROAIR HFA/PROVENTIL HFA/VENTOLIN HFA) 108 (90 Base) MCG/ACT inhaler Inhale 2 puffs into the lungs every 6 hours as needed for shortness of breath, wheezing or cough. 11/4/24  Yes Angelia Krishna MD   buPROPion (WELLBUTRIN XL) 150 MG 24 hr tablet Take 1 tablet (150 mg) by mouth every morning 4/10/24  Yes Agnelia Krishna MD   cetirizine (ZYRTEC) 10 MG tablet Take 10 mg by mouth every evening.   Yes Reported, Patient   diclofenac sodium (VOLTAREN) 1 % Gel [DICLOFENAC SODIUM (VOLTAREN) 1 % GEL] Apply approximately 1/2-1 gm over affected hand and/or left elbow joints bid, as needed. 10/19/20  Yes Kilo Ramon DO   famotidine (PEPCID) 40 MG tablet Take 40 mg by mouth at bedtime.   Yes Unknown, Entered By History   FLUoxetine (PROZAC) 40 MG capsule TAKE ONE CAPSULE BY MOUTH EVERY DAY 11/4/24  Yes Angelia Krishna MD   fluticasone propionate (FLONASE ALLERGY RELIEF) 50 mcg/actuation nasal spray [FLUTICASONE PROPIONATE (FLONASE ALLERGY RELIEF) 50 MCG/ACTUATION NASAL SPRAY] One spray in each nostril twice daily. OFFICE VISIT NEEDED FOR ANY FURTHER REFILLS  Patient taking differently: Spray 1 spray into both nostrils 2 times daily as needed. 8/6/20  Yes Marques Mary MD   gabapentin (NEURONTIN) 800 MG  "tablet TAKE TWO TABLETS BY MOUTH EVERY EVENING AT BEDTIME 11/14/24  Yes Angelia Krishna MD   levothyroxine (SYNTHROID/LEVOTHROID) 75 MCG tablet TAKE ONE-HALF TABLET BY MOUTH 5 DAYS PER WEEK, AND ONE TABLET 2 DAYS PER WEEK 1/13/25  Yes Angelia Krishna MD   magnesium citrate solution Take 30 mLs by mouth daily as needed. 7/30/18  Yes Provider, Historical   methocarbamol (ROBAXIN) 500 MG tablet TAKE ONE TABLET BY MOUTH THREE TIMES A DAY AS NEEDED 8/30/24  Yes Angelia Krishna MD   montelukast (SINGULAIR) 10 MG tablet TAKE 1 TABLET (10 MG) BY MOUTH AT BEDTIME 10/2/24  Yes Angelia Krishna MD   olopatadine (PATADAY) 0.2 % ophthalmic solution Place 0.05 mLs (1 drop) into both eyes daily. 11/4/24  Yes Angelia Krishna MD   semaglutide (OZEMPIC) 2 MG/3ML pen Inject 0.25 mg subcutaneously every 7 days.   Yes Unknown, Entered By History   simvastatin (ZOCOR) 20 MG tablet Take 1 tablet (20 mg) by mouth at bedtime. 10/2/24  Yes Angelia Krishna MD   blood glucose (NO BRAND SPECIFIED) lancets standard Use to test blood sugar once daily 9/15/22   Shira Miller MD   blood glucose (ONETOUCH VERIO IQ) test strip USE 1 STRIP TO CHECK GLUCOSE ONCE DAILY TO  TEST  BLOOD  SUGAR  ONCE  DAILY. 12/4/23   Shira Miller MD              Review of Systems:   The Review of Systems is negative other than noted in the HPI            Physical Exam:   Patient Vitals for the past 24 hrs:   BP Temp Temp src Pulse Resp SpO2 Height Weight   03/16/25 2108 109/55 (P) 97.8  F (36.6  C) (P) Oral 69 -- (!) 90 % -- --   03/16/25 1556 117/61 -- -- -- -- 93 % -- --   03/16/25 1209 (!) 149/96 98.1  F (36.7  C) Oral 96 16 94 % 1.753 m (5' 9\") 123.4 kg (272 lb)          Intake/Output Summary (Last 24 hours) at 3/16/2025 2143  Last data filed at 3/16/2025 1555  Gross per 24 hour   Intake 1100 ml   Output --   Net 1100 ml      Constitutional:   awake, alert, cooperative, no apparent distress, and appears stated " age       Eyes:   PERRL, conjunctiva/corneas clear, EOM's intact; no scleral edema or icterus noted        ENT:   Normocephalic, without obvious abnormality, atraumatic, Lips, mucosa, and tongue normal        Hematologic / Lymphatic:   No lymphadenopathy       Lungs:   Normal respiratory effort, no accessory muscle use       Cardiovascular:   Regular rate and rhythm       Abdomen:   Obese abdomen, soft, nondistended, tender to palpation left lower quadrant       Musculoskeletal:   No obvious swelling, bruising or deformity       Skin:   Skin color and texture normal for patient, no rashes or lesions              Data:        All imaging studies reviewed by me.  I personally reviewed the imagings and agree with colitis involving the proximal sigmoid and distal descending colon.      Gerald Shine DO  General Surgeon  [facility]  Surgery [facility] - [address] [address]?  Office: [phone]  Employed by - [facility]  Pager: [phone]

## 2025-03-17 NOTE — DISCHARGE SUMMARY
[facility]    Discharge Summary  Hospitalist    Date of Admission:  3/16/2025  Date of Discharge:  3/17/2025  3:49 PM  Provider:  Evelin Gonzales, DO    Discharge Diagnoses    Suspected ischemic colitis   Rectal bleeding, resolved   Abdominal pain and nausea, resolved   PATRICIA  Severe hepatic steatosis on CT imaging    Other medical issues:  Past Medical History:   Diagnosis Date    Allergic rhinitis     Anemia     Anxiety     Asthma     Bipolar 1 disorder (H)     Breast cancer of upper-outer quadrant of right female breast (H) 04/11/2017    Chronic pain     Back pain due to arthritis.    COPD (chronic obstructive pulmonary disease) (H)     Depression     Diabetes (H)     Endometriosis     Fibroid uterus     Hx of radiation therapy 01/01/2017    Insomnia     Osteoarthritis     Overweight     Refusal of blood transfusions as patient is [place] 04/12/2017    Sensorineural hearing loss, bilateral     Shortness of breath     Vitamin D deficiency    Hypothyroidism      History of Present Illness   Emani Vargas is an 60 year old female who presented to the ED with abd pain, nausea and several episodes of bloody stools.  Please see the admission history and physical for full details.    Hospital Course   Emani Vargas was admitted on 3/16/2025.  The following problems were addressed during her hospitalization:    cramping and nausea.  CT imaging suggestive of colitis.    Stool studies are pending.  GI and general surgery consulted.  .     1.  Rectal bleeding, resolved       Suspected ischemic colitis       Abdominal pain    Presented to the ED after several episodes of rectal bleeding that were associated with nausea and abd pain.  CTA imaging with findings consistent with colitis; there was no evidence of active bleeding in the abd/pelvis. Stool culture/studies were ordered but she had no further rectal bleeding or stools.  Lipase was within normal limits. Etiology of colitis in the  setting of rectal bleeding that self-resolved is suspected d/t ischemic colitis.  She did have a leukocytosis that resolved with supportive care alone.     General surgery and GI services were consulted and followed her during her hospital stay.  Serial hgb levels and vitals remained stable. She was clinically improved, was able to tolerate slow diet advancement and will be discharged on a low fiber/low lactose diet for the next several days.    She does have an outpt colonoscopy already scheduled with [facility] 5/2025 and she was encouraged to keep that appointment. She had a colonoscopy 10 years ago with findings of hyperplastic polyps.    2.  Severe hepatic steatosis    This findings was noted on CT imaging.  GI recommended outpt clinic follow-up to further discuss this finding and recommendations in the next 2-3 months.    3.  Acute kidney injury       Dehydration, mild    She was noted to be mildly clinically dehydrated on arrival to the ED.  Creat was elevated, she was provided with IVF with improved clinical state and also with creat.  BMP in hospital f/up with PCP in the next 7-10 days.        Significant Results and Procedures   none    Pending Results   Unresulted Labs Ordered in the Past 30 Days of this Admission       No orders found for last 31 day(s).            Code Status   Full Code       Primary Care Physician   Angelia Krishna    Physical Exam   Temp: 98.7  F (37.1  C) Temp src: Oral BP: 113/72 Pulse: 70   Resp: 19 SpO2: 94 % O2 Device: None (Room air)    Vitals:    03/16/25 1209   Weight: 123.4 kg (272 lb)     Vital Signs with Ranges  Temp:  [97.3  F (36.3  C)-98.7  F (37.1  C)] 98.7  F (37.1  C)  Pulse:  [66-70] 70  Resp:  [17-19] 19  BP: (105-117)/(55-72) 113/72  SpO2:  [90 %-94 %] 94 %  I/O last 3 completed shifts:  In: 100 [IV Piggyback:100]  Out: -     GEN:  Alert, oriented x 3, appears slightly pale and fatigued, no overt respiratory distress.  HEENT:  Normocephalic/atraumatic, no  scleral icterus, no nasal discharge  CV:  Regular rate and rhythm, no loud murmur. S1 + S2 noted, no S3 or S4.  LUNGS:  Clear to auscultation ant/lat bilaterally without rales/rhonchi/wheezing/retractions.  Symmetric chest rise on inhalation noted.  ABD:  Active bowel sounds, soft, mild tenderness to the LLQ without clear R/G/R.  Mildly distended.  EXT:  No pretibial edema bilaterally.  No cyanosis.    SKIN:  Dry to touch, no exanthems noted in the visualized areas.    Discharge Disposition   Discharged to home    Consultations This Hospital Stay   SURGERY GENERAL IP CONSULT  GASTROENTEROLOGY IP CONSULT    Time Spent on this Encounter   IEvelin DO, personally saw the patient today and spent greater than 30 minutes discharging this patient.    Discharge Orders      Reason for your hospital stay    You were admitted for further evaluation of abd pain, nausea and several episodes of rectal bleeding PTA     Follow-up and recommended labs and tests     F/up with PCP in 7-10 days with preclinical BMP, CBC for hospital f/up  F/up with [facility] for outpatient colonoscopy that is scheduled 5/2025  F/up in [facility] clinic in 2-3 months to discuss further hepatic steatosis (clinic will arrange this f/up with you)     Activity    Your activity upon discharge: activity as tolerated     Diet    Follow this diet upon discharge:  continue with Low Fiber; Low Lactose Diet in small amounts more frequently throughout the day for the next 3-4 days and then advance to PTA diet as tolerated     Discharge Medications   Current Discharge Medication List        START taking these medications    Details   ondansetron (ZOFRAN ODT) 4 MG ODT tab Take 1 tablet (4 mg) by mouth every 6 hours as needed.  Qty: 10 tablet, Refills: 0    Associated Diagnoses: Nausea           CONTINUE these medications which have NOT CHANGED    Details   acetaminophen (TYLENOL) 500 MG tablet Take 1,000 mg by mouth every 6 hours as needed      albuterol  (PROAIR HFA/PROVENTIL HFA/VENTOLIN HFA) 108 (90 Base) MCG/ACT inhaler Inhale 2 puffs into the lungs every 6 hours as needed for shortness of breath, wheezing or cough.  Qty: 18 g, Refills: 1    Comments: Pharmacy may dispense brand covered by insurance (Proair, or proventil or ventolin or generic albuterol inhaler)  Associated Diagnoses: Moderate persistent asthma with acute exacerbation      buPROPion (WELLBUTRIN XL) 150 MG 24 hr tablet Take 1 tablet (150 mg) by mouth every morning  Qty: 90 tablet, Refills: 3    Associated Diagnoses: Bipolar disorder, current episode depressed, mild or moderate severity, unspecified (H)      cetirizine (ZYRTEC) 10 MG tablet Take 10 mg by mouth every evening.      diclofenac sodium (VOLTAREN) 1 % Gel [DICLOFENAC SODIUM (VOLTAREN) 1 % GEL] Apply approximately 1/2-1 gm over affected hand and/or left elbow joints bid, as needed.  Qty: 1 Tube, Refills: 2    Associated Diagnoses: Osteoarthritis of multiple joints, unspecified osteoarthritis type; Chronic elbow pain, left      famotidine (PEPCID) 40 MG tablet Take 40 mg by mouth at bedtime.      FLUoxetine (PROZAC) 40 MG capsule TAKE ONE CAPSULE BY MOUTH EVERY DAY  Qty: 90 capsule, Refills: 1    Associated Diagnoses: Bipolar disorder, current episode depressed, mild or moderate severity, unspecified (H)      fluticasone propionate (FLONASE ALLERGY RELIEF) 50 mcg/actuation nasal spray [FLUTICASONE PROPIONATE (FLONASE ALLERGY RELIEF) 50 MCG/ACTUATION NASAL SPRAY] One spray in each nostril twice daily. OFFICE VISIT NEEDED FOR ANY FURTHER REFILLS  Qty: 16 g, Refills: 0    Associated Diagnoses: Chronic cough; Chronic sinusitis, unspecified location      gabapentin (NEURONTIN) 800 MG tablet TAKE TWO TABLETS BY MOUTH EVERY EVENING AT BEDTIME  Qty: 180 tablet, Refills: 1    Associated Diagnoses: Bipolar disorder, current episode depressed, mild or moderate severity, unspecified (H); Fibromyalgia      levothyroxine (SYNTHROID/LEVOTHROID) 75 MCG  tablet TAKE ONE-HALF TABLET BY MOUTH 5 DAYS PER WEEK, AND ONE TABLET 2 DAYS PER WEEK  Qty: 54 tablet, Refills: 1    Associated Diagnoses: Subclinical hypothyroidism      magnesium citrate solution Take 30 mLs by mouth daily as needed.      methocarbamol (ROBAXIN) 500 MG tablet TAKE ONE TABLET BY MOUTH THREE TIMES A DAY AS NEEDED  Qty: 90 tablet, Refills: 3    Associated Diagnoses: Other chronic pain      montelukast (SINGULAIR) 10 MG tablet TAKE 1 TABLET (10 MG) BY MOUTH AT BEDTIME  Qty: 90 tablet, Refills: 3    Associated Diagnoses: Chronic cough; Chronic sinusitis, unspecified location      olopatadine (PATADAY) 0.2 % ophthalmic solution Place 0.05 mLs (1 drop) into both eyes daily.  Qty: 5 mL, Refills: 1    Associated Diagnoses: Allergic conjunctivitis of both eyes      semaglutide (OZEMPIC) 2 MG/3ML pen Inject 0.25 mg subcutaneously every 7 days.      simvastatin (ZOCOR) 20 MG tablet Take 1 tablet (20 mg) by mouth at bedtime.  Qty: 90 tablet, Refills: 1    Associated Diagnoses: Type 2 diabetes mellitus without complication, without long-term current use of insulin (H)      blood glucose (NO BRAND SPECIFIED) lancets standard Use to test blood sugar once daily  Qty: 100 each, Refills: 3    Associated Diagnoses: Type 2 diabetes mellitus without complication, without long-term current use of insulin (H)      blood glucose (ONETOUCH VERIO IQ) test strip USE 1 STRIP TO CHECK GLUCOSE ONCE DAILY TO  TEST  BLOOD  SUGAR  ONCE  DAILY.  Qty: 100 strip, Refills: 3    Associated Diagnoses: Type 2 diabetes mellitus without complication, without long-term current use of insulin (H)           Allergies   Allergies   Allergen Reactions    Cat/Feline Products [Cat Dander] Itching    Trees Other (See Comments)     Grass, itching     Data   Recent Labs   Lab 03/17/25  0635 03/16/25  1232   WBC 8.4 11.9*   HGB 12.6 14.1   HCT 38.3 43.0   MCV 92 92    234     Recent Labs   Lab 03/17/25  0635 03/16/25  1232   HGB 12.6 14.1  "    Recent Labs   Lab 03/16/25  1232   AST 34   ALT 26   ALKPHOS 88   BILITOTAL 1.0      Latest Reference Range & Units 03/16/25 12:32 03/17/25 06:35   Sodium 135 - 145 mmol/L 137 140   Potassium 3.4 - 5.3 mmol/L 4.1 3.7   Chloride 98 - 107 mmol/L 102 106   Carbon Dioxide (CO2) 22 - 29 mmol/L 22 25   Urea Nitrogen 8.0 - 23.0 mg/dL 14.3 9.6   Creatinine 0.51 - 0.95 mg/dL 1.10 (H) 1.07 (H)   GFR Estimate >60 mL/min/1.73m2 57 (L) 59 (L)   (H): Data is abnormally high  (L): Data is abnormally low    Lactic acid - 0.4  No results for input(s): \"INR\" in the last 168 hours.  Recent Labs   Lab 03/16/25  1232   LIPASE 32     No results for input(s): \"CHOL\", \"HDL\", \"LDL\", \"TRIG\", \"CHOLHDLRATIO\" in the last 168 hours.  No results for input(s): \"COLOR\", \"APPEARANCE\", \"URINEGLC\", \"URINEBILI\", \"URINEKETONE\", \"SG\", \"UBLD\", \"URINEPH\", \"PROTEIN\", \"UROBILINOGEN\", \"NITRITE\", \"LEUKEST\", \"RBCU\", \"WBCU\" in the last 168 hours.  Results for orders placed or performed during the hospital encounter of 03/16/25   CTA Abdomen Pelvis with Contrast    Narrative    EXAM: CTA ABDOMEN PELVIS WITH CONTRAST  LOCATION: [facility]  DATE: 3/16/2025    INDICATION: Bright red blood per rectum x6, diffuse lower abdominal pain.  COMPARISON: CT chest, abdomen and pelvis 4/12/2017  TECHNIQUE: CT angiogram abdomen pelvis during arterial phase of injection of IV contrast. 2D and 3D MIP reconstructions were performed by the CT technologist. Dose reduction techniques were used.  CONTRAST: 90ml Isovue 370    FINDINGS:  ANGIOGRAM ABDOMEN/PELVIS: The abdominal aorta is nonaneurysmal with scattered mild atheromatous disease but no evidence of dissection or other acute abnormality. The celiac, superior mesenteric, renal and inferior mesenteric arteries are patent without   acute abnormality or flow-limiting stenosis. The bilateral common, internal and external iliac arteries as well as common femoral arteries are patent.    The portal, " hepatic, splenic and superior mesenteric veins are patent.    LOWER CHEST: Lung bases are clear.    HEPATOBILIARY: Severe hepatic steatosis. No worrisome liver lesions. Cholelithiasis without pericholecystic inflammation. No biliary ductal dilation.    PANCREAS: Normal.    SPLEEN: Normal.    ADRENAL GLANDS: Normal.    KIDNEYS/BLADDER: The kidneys enhance symmetrically. No urolithiasis or hydronephrosis. Urinary bladder is partially obscured by streak artifact though visualized contours are normal.    BOWEL: There is focal wall thickening and subtle inflammation involving the colon from the splenic flexure through the proximal sigmoid. No findings of active bleeding. Scattered colonic diverticulosis without evidence of diverticulitis. The appendix is   normal. No free fluid or free air. Small sliding-type hiatal hernia.    LYMPH NODES: No lymphadenopathy.    PELVIC ORGANS: Probable fibroid uterus. No adnexal mass.    MUSCULOSKELETAL: Degenerative changes of the spine with unchanged mild L1 superior endplate deformity. Thoracic spine stimulator leads terminate posterior to T8. No worrisome bone lesions. Bilateral hip replacements.      Impression    IMPRESSION:  1.  Colitis involving the descending and proximal sigmoid colon, which given appearance and distribution is favored to reflect nonembolic ischemic colitis over other infectious or inflammatory etiologies. No evidence of active bleeding.  2.  Scattered colonic diverticulosis, however no diverticulitis.  3.  Severe hepatic steatosis.  4.  Cholelithiasis.

## 2025-03-17 NOTE — PROGRESS NOTES
"General Surgery Progress Note:    Hospital Day # 1    ASSESSMENT:   1. Rectal bleeding    2. Acute colitis        Emani Vargas is a 60 year old female admitted with bright red blood per rectum and abdominal pain and imaging findings concerning for non-embolic ischemic colitis. Afebrile and hemodynamically stable. Labs with hemoglobin 12.6 (14.1), no leukocytosis. No further stools since admission.    PLAN:   - Okay to advance diet from surgical standpoint  - No indication for acute surgical intervention  - Medical management per primary team  - General surgery will follow peripherally    SUBJECTIVE:   Emani Vargas is feeling about the same today. Endorses mild left sided abdominal pain and nausea. Denies fever, chills, vomiting. Has not had a BM since arrival to the ER. Reports her last colonoscopy was 10 years ago and she is scheduled for her next colonoscopy in May.    Patient Vitals for the past 24 hrs:   BP Temp Temp src Pulse Resp SpO2 Height Weight   03/17/25 0751 110/59 97.3  F (36.3  C) Oral 66 17 94 % -- --   03/17/25 0001 105/64 97.7  F (36.5  C) Oral -- -- 94 % -- --   03/16/25 2108 109/55 97.8  F (36.6  C) Oral 69 -- (!) 90 % -- --   03/16/25 1556 117/61 -- -- -- -- 93 % -- --   03/16/25 1209 (!) 149/96 98.1  F (36.7  C) Oral 96 16 94 % 1.753 m (5' 9\") 123.4 kg (272 lb)       Physical Exam:  General: patient seen resting in bed, no acute distress  Resp: no respiratory distress, breathing comfortably on room air  Abdomen: Soft, non-tender to palpation. No rebound or guarding.  Extremities: warm and well perfused    Admission on 03/16/2025   Component Date Value    Sodium 03/16/2025 137     Potassium 03/16/2025 4.1     Carbon Dioxide (CO2) 03/16/2025 22     Anion Gap 03/16/2025 13     Urea Nitrogen 03/16/2025 14.3     Creatinine 03/16/2025 1.10 (H)     GFR Estimate 03/16/2025 57 (L)     Calcium 03/16/2025 9.4     Chloride 03/16/2025 102     Glucose 03/16/2025 131 (H)     Alkaline Phosphatase 03/16/2025 " 88     AST 03/16/2025 34     ALT 03/16/2025 26     Protein Total 03/16/2025 7.1     Albumin 03/16/2025 4.1     Bilirubin Total 03/16/2025 1.0     Magnesium 03/16/2025 2.0     WBC Count 03/16/2025 11.9 (H)     RBC Count 03/16/2025 4.68     Hemoglobin 03/16/2025 14.1     Hematocrit 03/16/2025 43.0     MCV 03/16/2025 92     MCH 03/16/2025 30.1     MCHC 03/16/2025 32.8     RDW 03/16/2025 13.3     Platelet Count 03/16/2025 234     % Neutrophils 03/16/2025 71     % Lymphocytes 03/16/2025 21     % Monocytes 03/16/2025 6     % Eosinophils 03/16/2025 2     % Basophils 03/16/2025 0     % Immature Granulocytes 03/16/2025 0     NRBCs per 100 WBC 03/16/2025 0     Absolute Neutrophils 03/16/2025 8.5 (H)     Absolute Lymphocytes 03/16/2025 2.4     Absolute Monocytes 03/16/2025 0.8     Absolute Eosinophils 03/16/2025 0.2     Absolute Basophils 03/16/2025 0.0     Absolute Immature Granul* 03/16/2025 0.1     Absolute NRBCs 03/16/2025 0.0     ABO/RH(D) 03/16/2025 A POS     Antibody Screen 03/16/2025 Negative     SPECIMEN EXPIRATION DATE 03/16/2025 [id number]     CRP Inflammation 03/16/2025 20.00 (H)     Lipase 03/16/2025 32     Lactic Acid, Initial 03/16/2025 0.4 (L)     Influenza A PCR 03/16/2025 Negative     Influenza B PCR 03/16/2025 Negative     RSV PCR 03/16/2025 Negative     SARS CoV2 PCR 03/16/2025 Negative     Sodium 03/17/2025 140     Potassium 03/17/2025 3.7     Chloride 03/17/2025 106     Carbon Dioxide (CO2) 03/17/2025 25     Anion Gap 03/17/2025 9     Urea Nitrogen 03/17/2025 9.6     Creatinine 03/17/2025 1.07 (H)     GFR Estimate 03/17/2025 59 (L)     Calcium 03/17/2025 8.4 (L)     Glucose 03/17/2025 113 (H)     WBC Count 03/17/2025 8.4     RBC Count 03/17/2025 4.17     Hemoglobin 03/17/2025 12.6     Hematocrit 03/17/2025 38.3     MCV 03/17/2025 92     MCH 03/17/2025 30.2     MCHC 03/17/2025 32.9     RDW 03/17/2025 13.4     Platelet Count 03/17/2025 162         Carlotta Oh PA-C  [facility]  [facility] [place] - [address]  [address]  [place] [address]?  Office: [phone]

## 2025-03-20 ENCOUNTER — OFFICE VISIT (OUTPATIENT)
Dept: AUDIOLOGY | Facility: CLINIC | Age: 60
End: 2025-03-20
Payer: MEDICARE

## 2025-03-20 DIAGNOSIS — H91.93 BILATERAL HEARING LOSS, UNSPECIFIED HEARING LOSS TYPE: ICD-10-CM

## 2025-03-20 DIAGNOSIS — H90.3 SENSORINEURAL HEARING LOSS, BILATERAL: Primary | ICD-10-CM

## 2025-03-20 NOTE — PATIENT INSTRUCTIONS

## 2025-03-20 NOTE — PROGRESS NOTES
AUDIOLOGY REPORT    SUBJECTIVE: Emani Vargas, a 60 year old female, was seen in the Audiology Clinic at [facility] today for a Binaural hearing aid fitting. Previous results have revealed a bilateral moderate to severe sensorineural hearing loss. The patient was given medical clearance to pursue amplification by Angelia Krishna MD, on 10-2-24.      OBJECTIVE:  Prior to fitting, a hearing aid check was performed to ensure device functionality. The hearing aid conformity evaluation was completed.The hearing aids were placed and they provided a good fit. Real-ear-probe-microphone measurements were completed on the PHARMAJET system and were a good match to NAL-NL2 target with soft sounds audible, moderate sounds comfortable, and loud sounds below discomfort. UCLs are verified through maximum power output measures and demonstrate appropriate limiting of loud inputs. Ms. Vargas was oriented to proper hearing aid use, care, cleaning (no water, dry brush), use of , aid insertion/removal, user booklet, warranty information, storage cases, and other hearing aid details. The patient confirmed understanding of hearing aid use and care, and showed proper insertion of hearing aid and  use while in the office today. Ms. Vargas reported good volume and sound quality today.    EAR(S) FIT: Binaural  MA HEARING AID MAKE: Right: Phonak; Left: Phonak    MA HEARING AID MODEL #: Right: Slim L70-R; Left: Slim L70-R  HEARING AID STYLE: Right: CHRIS; Left: CHRIS  DOME SIZE: Right:  medium vented; Left::  medium vented   LENGTH: Right:  2P; Left:  2P  SERIAL NUMBERS: Right: [id number]; Left: [id number]  WARRANTY END DATE: Right: 4/3/2028; Left:: 4/3/2028       ASSESSMENT: Binaural hearing aid fitting completed today. Verification measures were performed. The 45 day trial period was explained to patient, and they expressed understanding. Ms. Vargas signed the Hearing Aid Purchase Agreement and was given a  copy, as well as details on her hearing aids. Patient was counseled that exact out of pocket amounts cannot be determined for hearing aid claims being sent to insurance. Any insurance coverage information presented to the patient is an estimate only, and is not a guarantee of payment. Patient has been advised to check with their own insurance.    PLAN: Ms. Vargas will return for follow-up 4-10-25 for a hearing aid review appointment. She expressed verbal understanding of this information and plan. Please call this clinic with questions regarding today s appointment.    Nahid Peng., [facility]  [place] Licensed Audiologist [id number]

## 2025-03-31 DIAGNOSIS — F31.30 BIPOLAR DISORDER, CURRENT EPISODE DEPRESSED, MILD OR MODERATE SEVERITY, UNSPECIFIED (H): ICD-10-CM

## 2025-03-31 DIAGNOSIS — E11.9 TYPE 2 DIABETES MELLITUS WITHOUT COMPLICATION, WITHOUT LONG-TERM CURRENT USE OF INSULIN (H): ICD-10-CM

## 2025-03-31 RX ORDER — BUPROPION HYDROCHLORIDE 150 MG/1
150 TABLET ORAL EVERY MORNING
Qty: 90 TABLET | Refills: 3 | Status: SHIPPED | OUTPATIENT
Start: 2025-03-31

## 2025-03-31 RX ORDER — SIMVASTATIN 20 MG
20 TABLET ORAL
Qty: 90 TABLET | Refills: 1 | Status: SHIPPED | OUTPATIENT
Start: 2025-03-31

## 2025-04-10 ENCOUNTER — OFFICE VISIT (OUTPATIENT)
Dept: AUDIOLOGY | Facility: CLINIC | Age: 60
End: 2025-04-10
Payer: MEDICARE

## 2025-04-10 DIAGNOSIS — H90.3 SENSORINEURAL HEARING LOSS, BILATERAL: Primary | ICD-10-CM

## 2025-04-10 NOTE — PROGRESS NOTES
AUDIOLOGY REPORT    SUBJECTIVE:Emani Vargas is a 60 year old female who was seen in the Audiology Clinic at the [facility] on 4/10/2025  for a follow-up check regarding the fitting of new hearing aids. Previous results have revealed moderate to severe sensorineural hearing loss in a cookie-bite configuration, bilaterally.  The patient has been seen previously in this clinic and was fit binaurally with Phonak Slim L70-R CHRIS devices on 3-20-25.  Emani reports more speech clarity in noisy situations. The right device's  does still seem to work its way out of her ear on occasion. She was able to pair the devices to her phone via Bluetooth at home and feels this is beneficial.    OBJECTIVE:   Datalogging indicated average daily wear of 17-18 hours daily, over the past 21 days. Targets were at 100%, but she requested more power and these were increased to 105%. Additionally, gain was increased 4-5 dB across all channels in both devices. She felt this to still be comfortable but an improvement; she was manually turning up the volume in most environments. The dome of the right device was changed to a small vented dome, and the retention tail was exchanged as it was not fitting the right ear well. Ms. Vargas noted improved sound quality and comfort/retention once these changes were made.    Reviewed 45 day trial period, care, cleaning (no water, dry brush), use of , aid insertion/removal, volume adjustment, user booklet, warranty information, storage cases, and other hearing aid details. The process of removing the dome and exchanging wax protection was demonstrated on one device; Ms. Vargas was able to perform this task independently on the second device. Additional small and medium vented domes were dispensed.     No charge visit today (in warranty hearing aid check).    ASSESSMENT: A follow-up appointment for hearing aid fitting was completed today. Changes to hearing aids were  completed as outlined above.     PLAN: Ms. Vargas will return for follow-up as needed, and the drop off procedure for items requiring repair and methods for requesting hearing aid supplies were discussed. She expressed verbal understanding of this information and plan.    Nahid Peng., [facility]  [place] Licensed Audiologist [id number]

## 2025-04-16 DIAGNOSIS — E11.9 TYPE 2 DIABETES MELLITUS WITHOUT COMPLICATION, WITHOUT LONG-TERM CURRENT USE OF INSULIN (H): Primary | ICD-10-CM

## 2025-04-16 RX ORDER — SEMAGLUTIDE 0.68 MG/ML
INJECTION, SOLUTION SUBCUTANEOUS
Qty: 3 ML | Refills: 2 | Status: SHIPPED | OUTPATIENT
Start: 2025-04-16

## 2025-04-21 ASSESSMENT — PATIENT HEALTH QUESTIONNAIRE - PHQ9
10. IF YOU CHECKED OFF ANY PROBLEMS, HOW DIFFICULT HAVE THESE PROBLEMS MADE IT FOR YOU TO DO YOUR WORK, TAKE CARE OF THINGS AT HOME, OR GET ALONG WITH OTHER PEOPLE: SOMEWHAT DIFFICULT
SUM OF ALL RESPONSES TO PHQ QUESTIONS 1-9: 18
SUM OF ALL RESPONSES TO PHQ QUESTIONS 1-9: 18

## 2025-04-22 ENCOUNTER — OFFICE VISIT (OUTPATIENT)
Dept: FAMILY MEDICINE | Facility: CLINIC | Age: 60
End: 2025-04-22
Payer: MEDICARE

## 2025-04-22 VITALS
BODY MASS INDEX: 40.14 KG/M2 | HEIGHT: 69 IN | TEMPERATURE: 97.8 F | WEIGHT: 271 LBS | DIASTOLIC BLOOD PRESSURE: 78 MMHG | HEART RATE: 87 BPM | RESPIRATION RATE: 16 BRPM | OXYGEN SATURATION: 96 % | SYSTOLIC BLOOD PRESSURE: 118 MMHG

## 2025-04-22 DIAGNOSIS — E11.9 TYPE 2 DIABETES MELLITUS WITHOUT COMPLICATION, WITHOUT LONG-TERM CURRENT USE OF INSULIN (H): Primary | ICD-10-CM

## 2025-04-22 DIAGNOSIS — K76.0 FATTY LIVER: ICD-10-CM

## 2025-04-22 DIAGNOSIS — F31.30 BIPOLAR DISORDER, CURRENT EPISODE DEPRESSED, MILD OR MODERATE SEVERITY, UNSPECIFIED (H): ICD-10-CM

## 2025-04-22 PROBLEM — F31.9 BIPOLAR DISORDER (H): Status: RESOLVED | Noted: 2020-12-02 | Resolved: 2025-04-22

## 2025-04-22 PROBLEM — K52.9 ACUTE COLITIS: Status: RESOLVED | Noted: 2025-03-16 | Resolved: 2025-04-22

## 2025-04-22 LAB
EST. AVERAGE GLUCOSE BLD GHB EST-MCNC: 120 MG/DL
HBA1C MFR BLD: 5.8 % (ref 0–5.6)

## 2025-04-22 PROCEDURE — 99214 OFFICE O/P EST MOD 30 MIN: CPT | Performed by: FAMILY MEDICINE

## 2025-04-22 PROCEDURE — 36415 COLL VENOUS BLD VENIPUNCTURE: CPT | Performed by: FAMILY MEDICINE

## 2025-04-22 PROCEDURE — 83036 HEMOGLOBIN GLYCOSYLATED A1C: CPT | Performed by: FAMILY MEDICINE

## 2025-04-22 PROCEDURE — 3078F DIAST BP <80 MM HG: CPT | Performed by: FAMILY MEDICINE

## 2025-04-22 PROCEDURE — 3074F SYST BP LT 130 MM HG: CPT | Performed by: FAMILY MEDICINE

## 2025-04-22 PROCEDURE — 80048 BASIC METABOLIC PNL TOTAL CA: CPT | Performed by: FAMILY MEDICINE

## 2025-04-22 RX ORDER — BUPROPION HYDROCHLORIDE 300 MG/1
300 TABLET ORAL EVERY MORNING
Qty: 90 TABLET | Refills: 1 | Status: SHIPPED | OUTPATIENT
Start: 2025-04-22

## 2025-04-22 ASSESSMENT — COLUMBIA-SUICIDE SEVERITY RATING SCALE - C-SSRS
2. IN THE PAST MONTH, HAVE YOU ACTUALLY HAD ANY THOUGHTS OF KILLING YOURSELF?: NO
6. HAVE YOU EVER DONE ANYTHING, STARTED TO DO ANYTHING, OR PREPARED TO DO ANYTHING TO END YOUR LIFE?: NO
1. WITHIN THE PAST MONTH, HAVE YOU WISHED YOU WERE DEAD OR WISHED YOU COULD GO TO SLEEP AND NOT WAKE UP?: YES

## 2025-04-22 NOTE — PROGRESS NOTES
"  Assessment & Plan     Type 2 diabetes mellitus without complication, without long-term current use of insulin (H)  -Patient is doing well at this time.  She is agreeable to increasing her dose of Ozempic with her next refill as she is tolerating the current dose without difficulty.  Discussed methods to help decrease Ozempic related nausea if she finds this worsens again especially in light of her hiatal hernia.  She will follow-up with me in 6 months if continuing to do well, okay to reach out for increased dose of Ozempic given her new finding of fatty liver as well.  She will call or message if other concerns arise.  - HEMOGLOBIN A1C  - Semaglutide, 1 MG/DOSE, (OZEMPIC) 4 MG/3ML pen  Dispense: 3 mL; Refill: 2  - Basic metabolic panel  - HEMOGLOBIN A1C  - Basic metabolic panel    Bipolar disorder, current episode depressed, mild or moderate severity, unspecified (H)  -Mood has been worse over the last few months, given this we discussed increasing one of her medications and she is agreeable to increasing her bupropion as her depression is more of her overarching symptom at this time.  She will reach out if this is not helping and we can either continue to increase or if she finds her anxiety is worsening we can increase her Prozac.  - buPROPion (WELLBUTRIN XL) 300 MG 24 hr tablet  Dispense: 90 tablet; Refill: 1      Fatty liver  -New finding on CT scan done when she was found to have colitis.  Discussed best treatment for this is weight loss which we will do for now with the Ozempic.  Can consider referral to GI as well in the future to look at getting a FibroScan done.      BMI  Estimated body mass index is 40.02 kg/m  as calculated from the following:    Height as of this encounter: 1.753 m (5' 9\").    Weight as of this encounter: 122.9 kg (271 lb).   Weight management plan: Discussed healthy diet and exercise guidelines    Depression Screening Follow Up        4/21/2025     3:27 PM   PHQ   PHQ-9 Total Score " 18    Q9: Thoughts of better off dead/self-harm past 2 weeks Several days   F/U: Thoughts of suicide or self-harm No   F/U: Safety concerns No       Patient-reported         4/21/2025     3:27 PM   Last PHQ-9   1.  Little interest or pleasure in doing things 3   2.  Feeling down, depressed, or hopeless 2   3.  Trouble falling or staying asleep, or sleeping too much 3   4.  Feeling tired or having little energy 3   5.  Poor appetite or overeating 2   6.  Feeling bad about yourself 1   7.  Trouble concentrating 3   8.  Moving slowly or restless 0   Q9: Thoughts of better off dead/self-harm past 2 weeks 1   PHQ-9 Total Score 18    In the past two weeks have you had thoughts of suicide or self harm? No   Do you have concerns about your personal safety or the safety of others? No       Patient-reported               4/22/2025     3:18 PM   C-SSRS (Brief [place])   Within the last month, have you wished you were dead or wished you could go to sleep and not wake up? Yes   Within the last month, have you had any actual thoughts of killing yourself? No   Within the last month, have you ever done anything, started to do anything, or prepared to do anything to end your life? No             Follow Up Actions Taken  Crisis resource information provided in the After Visit Summary  Adjusted medication    Discussed the following ways the patient can remain in a safe environment:  be around others        Subjective   Emani is a 60 year old, presenting for the following health issues:  Recheck Medication (Was just in the hospital. )        4/22/2025     2:52 PM   Additional Questions   Roomed by JUAN Baldwin     History of Present Illness       Mental Health Follow-up:  Patient presents to follow-up on Depression.Patient's depression since last visit has been:  No change  The patient is not having other symptoms associated with depression.      Any significant life events: No  Patient is not feeling anxious or having panic  "attacks.  Patient has no concerns about alcohol or drug use.    Diabetes:   She presents for follow up of diabetes.  She is checking home blood glucose a few times a month.   She checks blood glucose before and after meals.  Blood glucose is never over 200 and never under 70. She is aware of hypoglycemia symptoms including shakiness, weakness and lethargy.    She has no concerns regarding her diabetes at this time.  She is having numbness in feet and blurry vision.  The patient has not had a diabetic eye exam in the last 12 months.          Reason for visit:  Last hospital incident,  depression, diabetes    She eats 0-1 servings of fruits and vegetables daily.She consumes 1 sweetened beverage(s) daily.She exercises with enough effort to increase her heart rate 9 or less minutes per day.  She exercises with enough effort to increase her heart rate 3 or less days per week. She is missing 1 dose(s) of medications per week.  She is not taking prescribed medications regularly due to remembering to take.          She comes in today for follow up. She does note that she was in the hospital in March for colitis. When she was there she was found to have gallstones and fatty liver.     She is on 0.5 mg of Ozempic now and is doing ok with this. She does note that she was having a lot of nausea initially with this but is doing well now. Would be agreeable to going up on the dose.    Her depression is stable for her. Does have limited motivation. She doesn't feel overly anxious. She is doing better off of her trazodone, can usually fall asleep a little earlier, still up overnight.         Review of Systems  Constitutional, HEENT, cardiovascular, pulmonary, gi and gu systems are negative, except as otherwise noted.      Objective    /78   Pulse 87   Temp 97.8  F (36.6  C)   Resp 16   Ht 1.753 m (5' 9\")   Wt 122.9 kg (271 lb)   LMP  (LMP Unknown)   SpO2 96%   BMI 40.02 kg/m    Body mass index is 40.02 " kg/m .  Physical Exam   GENERAL: alert and no distress  NECK: no adenopathy, no asymmetry, masses, or scars  RESP: lungs clear to auscultation - no rales, rhonchi or wheezes  CV: regular rate and rhythm, normal S1 S2, no S3 or S4, no murmur, click or rub, no peripheral edema  MS: no gross musculoskeletal defects noted, no edema  PSYCH: mentation appears normal, affect normal/bright            Signed Electronically by: Angelia Krishna MD

## 2025-04-22 NOTE — PATIENT INSTRUCTIONS
[place]     Please consider the following vaccines, now covered by medicare part D, at the pharmacy: RSV, Shingles, pneumonia

## 2025-04-23 LAB
ANION GAP SERPL CALCULATED.3IONS-SCNC: 13 MMOL/L (ref 7–15)
BUN SERPL-MCNC: 12 MG/DL (ref 8–23)
CALCIUM SERPL-MCNC: 9.7 MG/DL (ref 8.8–10.4)
CHLORIDE SERPL-SCNC: 103 MMOL/L (ref 98–107)
CREAT SERPL-MCNC: 1.1 MG/DL (ref 0.51–0.95)
EGFRCR SERPLBLD CKD-EPI 2021: 57 ML/MIN/1.73M2
GLUCOSE SERPL-MCNC: 107 MG/DL (ref 70–99)
HCO3 SERPL-SCNC: 24 MMOL/L (ref 22–29)
POTASSIUM SERPL-SCNC: 4.6 MMOL/L (ref 3.4–5.3)
SODIUM SERPL-SCNC: 140 MMOL/L (ref 135–145)

## 2025-05-19 DIAGNOSIS — E03.8 SUBCLINICAL HYPOTHYROIDISM: ICD-10-CM

## 2025-05-19 DIAGNOSIS — M79.7 FIBROMYALGIA: ICD-10-CM

## 2025-05-19 DIAGNOSIS — F31.30 BIPOLAR DISORDER, CURRENT EPISODE DEPRESSED, MILD OR MODERATE SEVERITY, UNSPECIFIED (H): ICD-10-CM

## 2025-05-19 RX ORDER — FLUOXETINE HYDROCHLORIDE 40 MG/1
40 CAPSULE ORAL DAILY
Qty: 90 CAPSULE | Refills: 3 | Status: SHIPPED | OUTPATIENT
Start: 2025-05-19

## 2025-05-19 RX ORDER — GABAPENTIN 800 MG/1
TABLET ORAL
Qty: 180 TABLET | Refills: 1 | Status: SHIPPED | OUTPATIENT
Start: 2025-05-19

## 2025-05-19 RX ORDER — LEVOTHYROXINE SODIUM 75 UG/1
TABLET ORAL
Qty: 54 TABLET | Refills: 4 | Status: SHIPPED | OUTPATIENT
Start: 2025-05-19

## 2025-06-26 ENCOUNTER — TRANSFERRED RECORDS (OUTPATIENT)
Dept: ADMINISTRATIVE | Facility: CLINIC | Age: 60
End: 2025-06-26
Payer: MEDICARE

## 2025-07-01 PROBLEM — D12.6 ADENOMATOUS POLYP OF COLON: Status: ACTIVE | Noted: 2025-07-01

## 2025-07-01 NOTE — PROCEDURES
[facility]   [address], [address], [place] [address]     Patient Name: Emani Vargas  Gender:  Female  Exam Date: 06/26/2025 Visit Number:  [id number]  Age: 60 Years YOB: 1965  Attending MD: Winston Rutherford MD Medical Record#:  [id number]    Procedure: Colonoscopy   Indications: Colorectal cancer screening      Referring MD: Referral Self  Primary MD:      Angelia Krishna MD  Medications: Admitting Medications:   0.9% Normal Saline at TKO   Intra Procedure Medications:   Patient received monitored anesthesia care.     Complications: No immediate complications  ______________________________________________________________________________  Procedure:   An examination of the heart and lungs was performed and found to be within acceptable limits.  .  The patient was therefore deemed a reasonable candidate for endoscopy and sedation.   The risks and benefits of the procedure were explained to the patient.After obtaining informed consent, the patient received monitored anesthesia care and I passed the scope   without difficulty via the rectum to the cecum.  The appendiceal orifice and ic valve were identified.  The scope was retroflexed during the examination  The quality of the prep was good  (Miralax/Gatorade/2 tablets Bisacodyl/Magnesium Citrate).    This was a complete examination throughout the entire colon.    Findings:    Polyp location: transverse colon.  Quantity: 1.  Size: 6 mm.  Polyp shape:  sessile.         Maneuver: polypectomy was performed with a cold snare.       Removal:  complete.  Retrieval: complete.  Bleeding: minimal/oozing.    Diverticulosis.  Location: - sigmoid.    Description:  mild.    Size:  small.    Quantity:  several.    Hemorrhoids.  Internal hemorrhoids without bleeding.    Impression:  Colorectal polyps  Diverticulosis of colon, acquired  Hemorrhoids without complication    Preliminary Plan:  The patient and their physician will receive a copy of  the pathology report as well as pathology-based recommendations for future screening or surveillance.  Pathology Results:  A: COLON, TRANSVERSE, POLYP:           1. Tubular adenoma           2. Negative for high grade dysplasia           3. Per the colonoscopy report:               a. Polyp size: 6 mm               b. Resection: Complete               c. Retrieval: Complete      MICROSCOPIC  A: Performed   SPECIAL STAINING/DEEPER  A: Deeper     Electronically signed by: Bandar Marshall MD    Interpreted at [facility], [address] [place] [id number]    Orders    Instruction(s)/Education:  Instruction/Education Timeframe Assessment   Colon Cancer Prevention  K57.30   Colon Polyps  K57.30   Diverticulosis/Diverticulitis  K57.30   Hemorrhoids (Internal)  K57.30       Final Plan:  Repeat colonoscopy in 5 years.    We will attempt to contact you at appropriate intervals via U.[place] mail.  We may not be able to find you or contact you at that time, therefore you should know that the responsibility for following our recommendation rests with you.  If you don't hear from us at the time your procedure is due, please contact our office to schedule an appointment.  If your contact information should change, please contact our office so that we can update your record.      _Electronically signed by:___________________  Winston Rutherford MD                 06/26/2025    cc: Angelia Krishna MD

## 2025-07-03 ENCOUNTER — PATIENT OUTREACH (OUTPATIENT)
Dept: GASTROENTEROLOGY | Facility: CLINIC | Age: 60
End: 2025-07-03
Payer: MEDICARE

## 2025-07-28 ENCOUNTER — HOSPITAL ENCOUNTER (OUTPATIENT)
Dept: MAMMOGRAPHY | Facility: CLINIC | Age: 60
Discharge: HOME | End: 2025-07-28
Attending: FAMILY MEDICINE | Admitting: FAMILY MEDICINE
Payer: MEDICARE

## 2025-07-28 DIAGNOSIS — R92.8 BI-RADS CATEGORY 3 MAMMOGRAM RESULT: ICD-10-CM

## 2025-07-28 PROCEDURE — 77062 BREAST TOMOSYNTHESIS BI: CPT

## 2025-07-30 ENCOUNTER — HOSPITAL ENCOUNTER (OUTPATIENT)
Dept: MAMMOGRAPHY | Facility: CLINIC | Age: 60
Discharge: HOME | End: 2025-07-30
Attending: FAMILY MEDICINE
Payer: MEDICARE

## 2025-07-30 DIAGNOSIS — R92.1 BREAST CALCIFICATION, LEFT: ICD-10-CM

## 2025-07-30 PROCEDURE — 272N000715 MA STEREOTACTIC BREAST BIOPSY VACUUM LT

## 2025-07-30 PROCEDURE — 250N000009 HC RX 250: Performed by: FAMILY MEDICINE

## 2025-07-30 PROCEDURE — 250N000011 HC RX IP 250 OP 636: Performed by: FAMILY MEDICINE

## 2025-07-30 RX ORDER — LIDOCAINE HYDROCHLORIDE AND EPINEPHRINE 10; 10 MG/ML; UG/ML
5 INJECTION, SOLUTION INFILTRATION; PERINEURAL ONCE
Status: COMPLETED | OUTPATIENT
Start: 2025-07-30 | End: 2025-07-30

## 2025-07-30 RX ADMIN — LIDOCAINE HYDROCHLORIDE AND EPINEPHRINE 10 ML: 10; 10 INJECTION, SOLUTION INFILTRATION; PERINEURAL at 10:32

## 2025-07-30 RX ADMIN — LIDOCAINE HYDROCHLORIDE 6 ML: 10 INJECTION, SOLUTION INFILTRATION; PERINEURAL at 10:32

## 2025-07-30 NOTE — PROGRESS NOTES
Emani arrived at [facility]. I escorted pt to the Breast Center consult room. Pt was identified using full name and . Wristband is on pt wrist. Pt was able to state which procedure and correct side breast biopsy was occurring today. I reviewed the consent form with the pt and pt was given the consent form to review before signing.     I reviewed post breast biopsy care. Pt acknowledged understanding of post biopsy care. Pt was given written post breast biopsy care handout to take home.    I explained results are expected in 3-5 business days and Breast Center RN will call pt at number pt provided today following Radiologist's review of results. Pt has active MyChart, therefore, I explained pathology result alert may occur and reach pt before Radiologist receives and reviews pathology, imaging, and provides correlation to the RN. It is up to pt to decide to view results or wait for RN call. Pt verbalizes understanding and agreement of plan.    Pt had no questions or concerns.     I escorted pt to the changing room and pt changed into gown. Pt placed personal belongings in a locker and pt has the blunt. I escorted pt to Mammo room 3 and pt sat on chair. Yun Pabon, Mammo Tech, was present and introductions were made. Dr Kerry Lawson was notified pt was ready for biopsy. Dr Lawson presented and introduced self to pt and completed time out process prior to biopsy.     Wendy Mercado, RN, BSN, CBCN  [facility]

## 2025-08-04 ENCOUNTER — TELEPHONE (OUTPATIENT)
Dept: MAMMOGRAPHY | Facility: CLINIC | Age: 60
End: 2025-08-04

## 2025-08-04 LAB
PATH REPORT.COMMENTS IMP SPEC: ABNORMAL
PATH REPORT.COMMENTS IMP SPEC: YES
PATH REPORT.FINAL DX SPEC: ABNORMAL
PATH REPORT.GROSS SPEC: ABNORMAL
PATH REPORT.MICROSCOPIC SPEC OTHER STN: ABNORMAL
PATH REPORT.MICROSCOPIC SPEC OTHER STN: ABNORMAL
PATH REPORT.RELEVANT HX SPEC: ABNORMAL
PATHOLOGY SYNOPTIC REPORT: ABNORMAL
PHOTO IMAGE: ABNORMAL

## 2025-08-06 ENCOUNTER — LAB (OUTPATIENT)
Dept: LAB | Facility: CLINIC | Age: 60
End: 2025-08-06
Payer: MEDICARE

## 2025-08-06 ENCOUNTER — OFFICE VISIT (OUTPATIENT)
Dept: SURGERY | Facility: CLINIC | Age: 60
End: 2025-08-06
Attending: FAMILY MEDICINE
Payer: MEDICARE

## 2025-08-06 ENCOUNTER — PATIENT OUTREACH (OUTPATIENT)
Dept: ONCOLOGY | Facility: CLINIC | Age: 60
End: 2025-08-06

## 2025-08-06 DIAGNOSIS — C50.912 INVASIVE LOBULAR CARCINOMA OF LEFT BREAST IN FEMALE (H): Primary | ICD-10-CM

## 2025-08-06 DIAGNOSIS — E11.9 DIABETES MELLITUS, TYPE 2 (H): Primary | ICD-10-CM

## 2025-08-06 DIAGNOSIS — C50.912 INVASIVE LOBULAR CARCINOMA OF LEFT BREAST IN FEMALE (H): ICD-10-CM

## 2025-08-06 LAB
EST. AVERAGE GLUCOSE BLD GHB EST-MCNC: 114 MG/DL
HBA1C MFR BLD: 5.6 % (ref 0–5.6)

## 2025-08-06 PROCEDURE — 83036 HEMOGLOBIN GLYCOSYLATED A1C: CPT

## 2025-08-06 PROCEDURE — 36415 COLL VENOUS BLD VENIPUNCTURE: CPT

## 2025-08-06 PROCEDURE — G0463 HOSPITAL OUTPT CLINIC VISIT: HCPCS | Performed by: SURGERY

## 2025-08-07 ENCOUNTER — DOCUMENTATION ONLY (OUTPATIENT)
Dept: LAB | Facility: CLINIC | Age: 60
End: 2025-08-07
Payer: MEDICARE

## 2025-08-07 ENCOUNTER — PATIENT OUTREACH (OUTPATIENT)
Dept: CARE COORDINATION | Facility: CLINIC | Age: 60
End: 2025-08-07
Payer: MEDICARE

## 2025-08-07 ENCOUNTER — CARE COORDINATION (OUTPATIENT)
Dept: SURGERY | Facility: CLINIC | Age: 60
End: 2025-08-07
Payer: MEDICARE

## 2025-08-08 DIAGNOSIS — E11.9 TYPE 2 DIABETES MELLITUS WITHOUT COMPLICATION, WITHOUT LONG-TERM CURRENT USE OF INSULIN (H): ICD-10-CM

## 2025-08-11 ENCOUNTER — PATIENT OUTREACH (OUTPATIENT)
Dept: CARE COORDINATION | Facility: CLINIC | Age: 60
End: 2025-08-11
Payer: MEDICARE

## 2025-08-11 RX ORDER — SEMAGLUTIDE 1.34 MG/ML
INJECTION, SOLUTION SUBCUTANEOUS
Qty: 3 ML | Refills: 5 | Status: SHIPPED | OUTPATIENT
Start: 2025-08-11

## 2025-08-13 ENCOUNTER — LAB (OUTPATIENT)
Dept: LAB | Facility: CLINIC | Age: 60
End: 2025-08-13
Payer: MEDICARE

## 2025-08-13 ENCOUNTER — APPOINTMENT (OUTPATIENT)
Dept: LAB | Facility: CLINIC | Age: 60
End: 2025-08-13
Payer: MEDICARE

## 2025-08-13 DIAGNOSIS — C50.912 INVASIVE LOBULAR CARCINOMA OF LEFT BREAST IN FEMALE (H): Primary | ICD-10-CM

## 2025-08-13 PROCEDURE — G0452 MOLECULAR PATHOLOGY INTERPR: HCPCS | Mod: 26 | Performed by: PATHOLOGY

## 2025-08-14 LAB
PATH REPORT.ADDENDUM SPEC: ABNORMAL
PATH REPORT.COMMENTS IMP SPEC: ABNORMAL
PATH REPORT.COMMENTS IMP SPEC: YES
PATH REPORT.FINAL DX SPEC: ABNORMAL
PATH REPORT.GROSS SPEC: ABNORMAL
PATH REPORT.MICROSCOPIC SPEC OTHER STN: ABNORMAL
PATH REPORT.MICROSCOPIC SPEC OTHER STN: ABNORMAL
PATH REPORT.RELEVANT HX SPEC: ABNORMAL
PATHOLOGY SYNOPTIC REPORT: ABNORMAL
PHOTO IMAGE: ABNORMAL

## 2025-08-19 ENCOUNTER — TELEPHONE (OUTPATIENT)
Dept: SURGERY | Facility: CLINIC | Age: 60
End: 2025-08-19
Payer: MEDICARE

## 2025-09-03 ENCOUNTER — PATIENT OUTREACH (OUTPATIENT)
Dept: CARE COORDINATION | Facility: CLINIC | Age: 60
End: 2025-09-03
Payer: MEDICARE